# Patient Record
Sex: FEMALE | Race: BLACK OR AFRICAN AMERICAN | NOT HISPANIC OR LATINO | Employment: OTHER | URBAN - NONMETROPOLITAN AREA
[De-identification: names, ages, dates, MRNs, and addresses within clinical notes are randomized per-mention and may not be internally consistent; named-entity substitution may affect disease eponyms.]

---

## 2017-03-29 ENCOUNTER — APPOINTMENT (OUTPATIENT)
Dept: LAB | Facility: HOSPITAL | Age: 62
End: 2017-03-29
Attending: INTERNAL MEDICINE

## 2017-03-29 ENCOUNTER — TRANSCRIBE ORDERS (OUTPATIENT)
Dept: ADMINISTRATIVE | Facility: HOSPITAL | Age: 62
End: 2017-03-29

## 2017-03-29 DIAGNOSIS — Z09 FOLLOW UP: Primary | ICD-10-CM

## 2017-03-29 LAB
ALBUMIN SERPL-MCNC: 4.4 G/DL (ref 3.5–5)
ALBUMIN/GLOB SERPL: 1.4 G/DL (ref 1.1–2.5)
ALP SERPL-CCNC: 80 U/L (ref 24–120)
ALT SERPL W P-5'-P-CCNC: <15 U/L (ref 0–54)
ANION GAP SERPL CALCULATED.3IONS-SCNC: 16 MMOL/L (ref 4–13)
ARTICHOKE IGE QN: 121 MG/DL (ref 0–99)
AST SERPL-CCNC: 24 U/L (ref 7–45)
BILIRUB SERPL-MCNC: 0.8 MG/DL (ref 0.1–1)
BUN BLD-MCNC: 17 MG/DL (ref 5–21)
BUN/CREAT SERPL: 16 (ref 7–25)
CALCIUM SPEC-SCNC: 10 MG/DL (ref 8.4–10.4)
CHLORIDE SERPL-SCNC: 99 MMOL/L (ref 98–110)
CHOLEST SERPL-MCNC: 222 MG/DL (ref 130–200)
CO2 SERPL-SCNC: 30 MMOL/L (ref 24–31)
CREAT BLD-MCNC: 1.06 MG/DL (ref 0.5–1.4)
GFR SERPL CREATININE-BSD FRML MDRD: 64 ML/MIN/1.73
GLOBULIN UR ELPH-MCNC: 3.2 GM/DL
GLUCOSE BLD-MCNC: 94 MG/DL (ref 70–100)
HDLC SERPL-MCNC: 55 MG/DL
LDLC/HDLC SERPL: 2.64 {RATIO}
POTASSIUM BLD-SCNC: 4.1 MMOL/L (ref 3.5–5.3)
PROT SERPL-MCNC: 7.6 G/DL (ref 6.3–8.7)
SODIUM BLD-SCNC: 145 MMOL/L (ref 135–145)
TRIGL SERPL-MCNC: 110 MG/DL (ref 0–149)

## 2017-03-29 PROCEDURE — 36415 COLL VENOUS BLD VENIPUNCTURE: CPT | Performed by: INTERNAL MEDICINE

## 2017-03-29 PROCEDURE — 80053 COMPREHEN METABOLIC PANEL: CPT | Performed by: INTERNAL MEDICINE

## 2017-03-29 PROCEDURE — 80061 LIPID PANEL: CPT | Performed by: INTERNAL MEDICINE

## 2017-04-04 ENCOUNTER — HOSPITAL ENCOUNTER (OUTPATIENT)
Dept: GENERAL RADIOLOGY | Age: 62
Discharge: HOME | End: 2017-04-04
Payer: COMMERCIAL

## 2017-04-04 DIAGNOSIS — M25.561 RIGHT KNEE PAIN, UNSPECIFIED CHRONICITY: ICD-10-CM

## 2017-04-04 PROCEDURE — 73560 X-RAY EXAM OF KNEE 1 OR 2: CPT

## 2017-04-18 ENCOUNTER — TELEPHONE (OUTPATIENT)
Dept: NEUROSURGERY | Facility: CLINIC | Age: 62
End: 2017-04-18

## 2017-04-18 NOTE — TELEPHONE ENCOUNTER
Contacted patient regarding her appointment with our office on 04/19/17. She informs me that she did not return to Pain Management after the first exam, she was not pleased with the doctor. She states that she has recently started exercising regularly, eating better and is feeling good. She doesn't feel that she needs to see us at this time and wants to put a new pain management referral on hold. I have informed her to contact us at any time she wishes to be referred. She is agreeable and will cancel our appointment for tomorrow.

## 2017-06-30 ENCOUNTER — APPOINTMENT (OUTPATIENT)
Dept: LAB | Facility: HOSPITAL | Age: 62
End: 2017-06-30
Attending: INTERNAL MEDICINE

## 2017-06-30 ENCOUNTER — TRANSCRIBE ORDERS (OUTPATIENT)
Dept: ADMINISTRATIVE | Facility: HOSPITAL | Age: 62
End: 2017-06-30

## 2017-06-30 DIAGNOSIS — E78.5 HYPERLIPIDEMIA, UNSPECIFIED HYPERLIPIDEMIA TYPE: Primary | ICD-10-CM

## 2017-06-30 LAB
ALBUMIN SERPL-MCNC: 4.2 G/DL (ref 3.5–5)
ALBUMIN/GLOB SERPL: 1.4 G/DL (ref 1.1–2.5)
ALP SERPL-CCNC: 81 U/L (ref 24–120)
ALT SERPL W P-5'-P-CCNC: 25 U/L (ref 0–54)
ANION GAP SERPL CALCULATED.3IONS-SCNC: 11 MMOL/L (ref 4–13)
ARTICHOKE IGE QN: 111 MG/DL (ref 0–99)
AST SERPL-CCNC: 22 U/L (ref 7–45)
BILIRUB SERPL-MCNC: 0.7 MG/DL (ref 0.1–1)
BUN BLD-MCNC: 18 MG/DL (ref 5–21)
BUN/CREAT SERPL: 15.8 (ref 7–25)
CALCIUM SPEC-SCNC: 9.9 MG/DL (ref 8.4–10.4)
CHLORIDE SERPL-SCNC: 101 MMOL/L (ref 98–110)
CHOLEST SERPL-MCNC: 206 MG/DL (ref 130–200)
CO2 SERPL-SCNC: 30 MMOL/L (ref 24–31)
CREAT BLD-MCNC: 1.14 MG/DL (ref 0.5–1.4)
GFR SERPL CREATININE-BSD FRML MDRD: 59 ML/MIN/1.73
GLOBULIN UR ELPH-MCNC: 3.1 GM/DL
GLUCOSE BLD-MCNC: 89 MG/DL (ref 70–100)
HDLC SERPL-MCNC: 50 MG/DL
LDLC/HDLC SERPL: 2.76 {RATIO}
POTASSIUM BLD-SCNC: 4.3 MMOL/L (ref 3.5–5.3)
PROT SERPL-MCNC: 7.3 G/DL (ref 6.3–8.7)
SODIUM BLD-SCNC: 142 MMOL/L (ref 135–145)
TRIGL SERPL-MCNC: 90 MG/DL (ref 0–149)

## 2017-06-30 PROCEDURE — 80053 COMPREHEN METABOLIC PANEL: CPT | Performed by: INTERNAL MEDICINE

## 2017-06-30 PROCEDURE — 80061 LIPID PANEL: CPT | Performed by: INTERNAL MEDICINE

## 2017-06-30 PROCEDURE — 36415 COLL VENOUS BLD VENIPUNCTURE: CPT | Performed by: INTERNAL MEDICINE

## 2017-07-07 ENCOUNTER — TELEPHONE (OUTPATIENT)
Dept: INTERNAL MEDICINE | Age: 62
End: 2017-07-07

## 2017-07-07 ENCOUNTER — OFFICE VISIT (OUTPATIENT)
Dept: INTERNAL MEDICINE | Age: 62
End: 2017-07-07
Payer: COMMERCIAL

## 2017-07-07 VITALS
BODY MASS INDEX: 64.05 KG/M2 | DIASTOLIC BLOOD PRESSURE: 84 MMHG | SYSTOLIC BLOOD PRESSURE: 138 MMHG | OXYGEN SATURATION: 95 % | WEIGHT: 293 LBS | HEART RATE: 90 BPM

## 2017-07-07 DIAGNOSIS — R20.2 TINGLING: ICD-10-CM

## 2017-07-07 DIAGNOSIS — E78.2 MIXED HYPERLIPIDEMIA: ICD-10-CM

## 2017-07-07 DIAGNOSIS — M10.9 GOUT OF MULTIPLE SITES, UNSPECIFIED CAUSE, UNSPECIFIED CHRONICITY: ICD-10-CM

## 2017-07-07 DIAGNOSIS — F32.A DEPRESSION, UNSPECIFIED DEPRESSION TYPE: ICD-10-CM

## 2017-07-07 DIAGNOSIS — H61.23 BILATERAL IMPACTED CERUMEN: ICD-10-CM

## 2017-07-07 DIAGNOSIS — E66.01 MORBID OBESITY DUE TO EXCESS CALORIES (HCC): ICD-10-CM

## 2017-07-07 DIAGNOSIS — I10 ESSENTIAL HYPERTENSION: Primary | ICD-10-CM

## 2017-07-07 DIAGNOSIS — J01.90 ACUTE NON-RECURRENT SINUSITIS, UNSPECIFIED LOCATION: ICD-10-CM

## 2017-07-07 DIAGNOSIS — M54.50 CHRONIC MIDLINE LOW BACK PAIN WITHOUT SCIATICA: ICD-10-CM

## 2017-07-07 DIAGNOSIS — G89.29 CHRONIC MIDLINE LOW BACK PAIN WITHOUT SCIATICA: ICD-10-CM

## 2017-07-07 DIAGNOSIS — R82.90 BAD ODOR OF URINE: ICD-10-CM

## 2017-07-07 DIAGNOSIS — M79.7 FIBROMYALGIA: ICD-10-CM

## 2017-07-07 DIAGNOSIS — M54.2 NECK PAIN: ICD-10-CM

## 2017-07-07 LAB
ALBUMIN SERPL-MCNC: 4.3 G/DL (ref 3.5–5.2)
ALP BLD-CCNC: 73 U/L (ref 35–104)
ALT SERPL-CCNC: 10 U/L (ref 5–33)
ANION GAP SERPL CALCULATED.3IONS-SCNC: 17 MMOL/L (ref 7–19)
AST SERPL-CCNC: 13 U/L (ref 5–32)
BACTERIA: ABNORMAL /HPF
BASOPHILS ABSOLUTE: 0.1 K/UL (ref 0–0.2)
BASOPHILS RELATIVE PERCENT: 0.6 % (ref 0–1)
BILIRUB SERPL-MCNC: 0.5 MG/DL (ref 0.2–1.2)
BILIRUBIN URINE: NEGATIVE
BLOOD, URINE: NEGATIVE
BUN BLDV-MCNC: 23 MG/DL (ref 8–23)
CALCIUM SERPL-MCNC: 10 MG/DL (ref 8.8–10.2)
CHLORIDE BLD-SCNC: 99 MMOL/L (ref 98–111)
CHOLESTEROL, TOTAL: 220 MG/DL (ref 160–199)
CLARITY: CLEAR
CO2: 26 MMOL/L (ref 22–29)
COLOR: YELLOW
CREAT SERPL-MCNC: 1.1 MG/DL (ref 0.5–0.9)
CREATININE URINE: 99.3 MG/DL (ref 4.2–622)
EOSINOPHILS ABSOLUTE: 0.3 K/UL (ref 0–0.6)
EOSINOPHILS RELATIVE PERCENT: 2.9 % (ref 0–5)
EPITHELIAL CELLS, UA: 4 /HPF (ref 0–5)
GFR NON-AFRICAN AMERICAN: 50
GLUCOSE BLD-MCNC: 71 MG/DL (ref 74–109)
GLUCOSE URINE: NEGATIVE MG/DL
HBA1C MFR BLD: 5.6 %
HCT VFR BLD CALC: 37.1 % (ref 37–47)
HDLC SERPL-MCNC: 60 MG/DL (ref 65–121)
HEMOGLOBIN: 11.5 G/DL (ref 12–16)
HYALINE CASTS: 1 /HPF (ref 0–8)
KETONES, URINE: NEGATIVE MG/DL
LDL CHOLESTEROL CALCULATED: 139 MG/DL
LEUKOCYTE ESTERASE, URINE: ABNORMAL
LYMPHOCYTES ABSOLUTE: 2.7 K/UL (ref 1.1–4.5)
LYMPHOCYTES RELATIVE PERCENT: 31.2 % (ref 20–40)
MCH RBC QN AUTO: 29.2 PG (ref 27–31)
MCHC RBC AUTO-ENTMCNC: 31 G/DL (ref 33–37)
MCV RBC AUTO: 94.2 FL (ref 81–99)
MICROALBUMIN UR-MCNC: 1.3 MG/DL (ref 0–19)
MICROALBUMIN/CREAT UR-RTO: 13.1 MG/G
MONOCYTES ABSOLUTE: 0.4 K/UL (ref 0–0.9)
MONOCYTES RELATIVE PERCENT: 4.5 % (ref 0–10)
NEUTROPHILS ABSOLUTE: 5.3 K/UL (ref 1.5–7.5)
NEUTROPHILS RELATIVE PERCENT: 60.6 % (ref 50–65)
NITRITE, URINE: NEGATIVE
PDW BLD-RTO: 16.3 % (ref 11.5–14.5)
PH UA: 7
PLATELET # BLD: 287 K/UL (ref 130–400)
PMV BLD AUTO: 9.9 FL (ref 9.4–12.3)
POTASSIUM SERPL-SCNC: 4.5 MMOL/L (ref 3.5–5)
PROTEIN UA: NEGATIVE MG/DL
RBC # BLD: 3.94 M/UL (ref 4.2–5.4)
RBC UA: 1 /HPF (ref 0–4)
SODIUM BLD-SCNC: 142 MMOL/L (ref 136–145)
SPECIFIC GRAVITY UA: 1.02
TOTAL PROTEIN: 7.8 G/DL (ref 6.6–8.7)
TRIGL SERPL-MCNC: 104 MG/DL (ref 150–199)
TSH SERPL DL<=0.05 MIU/L-ACNC: 3.33 UIU/ML (ref 0.27–4.2)
UROBILINOGEN, URINE: 0.2 E.U./DL
VITAMIN B-12: 439 PG/ML (ref 211–946)
WBC # BLD: 8.7 K/UL (ref 4.8–10.8)
WBC UA: 8 /HPF (ref 0–5)

## 2017-07-07 PROCEDURE — 99214 OFFICE O/P EST MOD 30 MIN: CPT | Performed by: INTERNAL MEDICINE

## 2017-07-07 RX ORDER — ALLOPURINOL 300 MG/1
300 TABLET ORAL DAILY
Qty: 30 TABLET | Refills: 5 | Status: SHIPPED | OUTPATIENT
Start: 2017-07-07 | End: 2018-02-24 | Stop reason: SDUPTHER

## 2017-07-07 RX ORDER — CEPHALEXIN 500 MG/1
500 CAPSULE ORAL 3 TIMES DAILY
Qty: 30 CAPSULE | Refills: 0 | Status: SHIPPED | OUTPATIENT
Start: 2017-07-07 | End: 2018-03-27

## 2017-07-07 RX ORDER — AZELASTINE 1 MG/ML
2 SPRAY, METERED NASAL 2 TIMES DAILY
Qty: 1 BOTTLE | Refills: 3 | Status: SHIPPED | OUTPATIENT
Start: 2017-07-07 | End: 2018-03-27

## 2017-07-07 RX ORDER — CIPROFLOXACIN 500 MG/1
500 TABLET, FILM COATED ORAL 2 TIMES DAILY
Qty: 14 TABLET | Refills: 0 | Status: SHIPPED | OUTPATIENT
Start: 2017-07-07 | End: 2017-08-07 | Stop reason: ALTCHOICE

## 2017-07-07 RX ORDER — AZELASTINE 1 MG/ML
2 SPRAY, METERED NASAL 2 TIMES DAILY
Qty: 1 BOTTLE | Refills: 3 | Status: SHIPPED | OUTPATIENT
Start: 2017-07-07 | End: 2019-02-05 | Stop reason: SDUPTHER

## 2017-07-07 RX ORDER — ACETAMINOPHEN 160 MG
1 TABLET,DISINTEGRATING ORAL DAILY
COMMUNITY
Start: 2017-06-05 | End: 2017-07-07 | Stop reason: SDUPTHER

## 2017-07-07 RX ORDER — GABAPENTIN 300 MG/1
600 CAPSULE ORAL DAILY
Qty: 120 CAPSULE | Refills: 1 | Status: SHIPPED | OUTPATIENT
Start: 2017-07-07 | End: 2017-08-07 | Stop reason: DRUGHIGH

## 2017-07-07 RX ORDER — ACETAMINOPHEN 160 MG
1 TABLET,DISINTEGRATING ORAL DAILY
Qty: 30 CAPSULE | Refills: 5 | Status: SHIPPED | OUTPATIENT
Start: 2017-07-07 | End: 2018-05-02 | Stop reason: SDUPTHER

## 2017-07-07 RX ORDER — ETODOLAC 200 MG/1
200 CAPSULE ORAL 2 TIMES DAILY PRN
Qty: 90 CAPSULE | Refills: 3 | Status: SHIPPED | OUTPATIENT
Start: 2017-07-07 | End: 2018-03-27

## 2017-07-07 ASSESSMENT — PATIENT HEALTH QUESTIONNAIRE - PHQ9
SUM OF ALL RESPONSES TO PHQ QUESTIONS 1-9: 0
SUM OF ALL RESPONSES TO PHQ9 QUESTIONS 1 & 2: 0
1. LITTLE INTEREST OR PLEASURE IN DOING THINGS: 0
2. FEELING DOWN, DEPRESSED OR HOPELESS: 0

## 2017-07-08 PROBLEM — G89.29 CHRONIC MIDLINE LOW BACK PAIN WITHOUT SCIATICA: Status: ACTIVE | Noted: 2017-07-08

## 2017-07-08 PROBLEM — E66.01 MORBID OBESITY DUE TO EXCESS CALORIES (HCC): Status: ACTIVE | Noted: 2017-07-08

## 2017-07-08 PROBLEM — E78.2 MIXED HYPERLIPIDEMIA: Status: ACTIVE | Noted: 2017-07-08

## 2017-07-08 PROBLEM — I10 ESSENTIAL HYPERTENSION: Status: ACTIVE | Noted: 2017-07-08

## 2017-07-08 PROBLEM — M54.50 CHRONIC MIDLINE LOW BACK PAIN WITHOUT SCIATICA: Status: ACTIVE | Noted: 2017-07-08

## 2017-07-08 PROBLEM — M79.7 FIBROMYALGIA: Status: ACTIVE | Noted: 2017-07-08

## 2017-07-08 PROBLEM — I50.9 CHRONIC CONGESTIVE HEART FAILURE (HCC): Status: ACTIVE | Noted: 2017-07-08

## 2017-07-08 PROBLEM — F32.A DEPRESSION: Status: ACTIVE | Noted: 2017-07-08

## 2017-07-08 PROBLEM — M10.9 GOUT OF MULTIPLE SITES: Status: ACTIVE | Noted: 2017-07-08

## 2017-07-08 ASSESSMENT — ENCOUNTER SYMPTOMS
EYE DISCHARGE: 0
WHEEZING: 0
SINUS PRESSURE: 1
ABDOMINAL PAIN: 0
DIARRHEA: 0
SORE THROAT: 0
VOMITING: 0
BACK PAIN: 1
COUGH: 0
EYE PAIN: 0
CONSTIPATION: 0
PHOTOPHOBIA: 0
BLOOD IN STOOL: 0
EYE ITCHING: 0
SHORTNESS OF BREATH: 0
NAUSEA: 0

## 2017-07-09 LAB
LYME, EIA: 0.14 LIV (ref 0–1.2)
ORGANISM: ABNORMAL
URINE CULTURE, ROUTINE: ABNORMAL
URINE CULTURE, ROUTINE: ABNORMAL

## 2017-07-12 ENCOUNTER — TELEPHONE (OUTPATIENT)
Dept: INTERNAL MEDICINE | Age: 62
End: 2017-07-12

## 2017-07-12 LAB
VITAMIN D2 AND D3, TOTAL: 42.2 NG/ML (ref 30–80)
VITAMIN D2, 25 HYDROXY: 2.3 NG/ML
VITAMIN D3,25 HYDROXY: 39.9 NG/ML

## 2017-08-07 ENCOUNTER — OFFICE VISIT (OUTPATIENT)
Dept: INTERNAL MEDICINE | Age: 62
End: 2017-08-07
Payer: COMMERCIAL

## 2017-08-07 VITALS
WEIGHT: 293 LBS | SYSTOLIC BLOOD PRESSURE: 134 MMHG | HEIGHT: 61 IN | OXYGEN SATURATION: 96 % | BODY MASS INDEX: 55.32 KG/M2 | HEART RATE: 91 BPM | DIASTOLIC BLOOD PRESSURE: 84 MMHG

## 2017-08-07 DIAGNOSIS — Z12.31 SCREENING MAMMOGRAM, ENCOUNTER FOR: ICD-10-CM

## 2017-08-07 DIAGNOSIS — J01.90 ACUTE SINUSITIS, RECURRENCE NOT SPECIFIED, UNSPECIFIED LOCATION: ICD-10-CM

## 2017-08-07 DIAGNOSIS — Z78.0 MENOPAUSE: ICD-10-CM

## 2017-08-07 DIAGNOSIS — Z00.00 ROUTINE ADULT HEALTH MAINTENANCE: Primary | ICD-10-CM

## 2017-08-07 DIAGNOSIS — Z00.00 ROUTINE GENERAL MEDICAL EXAMINATION AT A HEALTH CARE FACILITY: ICD-10-CM

## 2017-08-07 DIAGNOSIS — Z12.11 SCREENING FOR COLON CANCER: ICD-10-CM

## 2017-08-07 DIAGNOSIS — I10 ESSENTIAL HYPERTENSION: ICD-10-CM

## 2017-08-07 DIAGNOSIS — R82.90 ABNORMAL URINE ODOR: ICD-10-CM

## 2017-08-07 DIAGNOSIS — M54.9 BACK PAIN, UNSPECIFIED BACK LOCATION, UNSPECIFIED BACK PAIN LATERALITY, UNSPECIFIED CHRONICITY: ICD-10-CM

## 2017-08-07 DIAGNOSIS — T78.3XXS ANGIOEDEMA, SEQUELA: ICD-10-CM

## 2017-08-07 DIAGNOSIS — F32.A DEPRESSION, UNSPECIFIED DEPRESSION TYPE: ICD-10-CM

## 2017-08-07 DIAGNOSIS — Z91.09 ENVIRONMENTAL ALLERGIES: ICD-10-CM

## 2017-08-07 PROCEDURE — G0438 PPPS, INITIAL VISIT: HCPCS | Performed by: INTERNAL MEDICINE

## 2017-08-07 PROCEDURE — 99214 OFFICE O/P EST MOD 30 MIN: CPT | Performed by: INTERNAL MEDICINE

## 2017-08-07 RX ORDER — CEFDINIR 300 MG/1
300 CAPSULE ORAL 2 TIMES DAILY
Qty: 20 CAPSULE | Refills: 0 | Status: SHIPPED | OUTPATIENT
Start: 2017-08-07 | End: 2018-03-27

## 2017-08-07 RX ORDER — GABAPENTIN 300 MG/1
600 CAPSULE ORAL 2 TIMES DAILY
Qty: 120 CAPSULE | Refills: 1 | Status: SHIPPED | OUTPATIENT
Start: 2017-08-07 | End: 2017-09-25 | Stop reason: DRUGHIGH

## 2017-08-07 RX ORDER — LISINOPRIL 20 MG/1
TABLET ORAL
Qty: 30 TABLET | Refills: 12 | Status: SHIPPED | OUTPATIENT
Start: 2017-08-07 | End: 2017-08-07

## 2017-08-07 RX ORDER — AMLODIPINE BESYLATE 10 MG/1
10 TABLET ORAL DAILY
Qty: 30 TABLET | Refills: 3 | Status: SHIPPED | OUTPATIENT
Start: 2017-08-07 | End: 2018-01-04 | Stop reason: SDUPTHER

## 2017-08-07 RX ORDER — EPINEPHRINE 0.3 MG/.3ML
0.3 INJECTION SUBCUTANEOUS ONCE
Qty: 0.3 ML | Refills: 1 | Status: SHIPPED | OUTPATIENT
Start: 2017-08-07 | End: 2022-07-18 | Stop reason: SDUPTHER

## 2017-08-07 ASSESSMENT — PATIENT HEALTH QUESTIONNAIRE - PHQ9
2. FEELING DOWN, DEPRESSED OR HOPELESS: 0
1. LITTLE INTEREST OR PLEASURE IN DOING THINGS: 0
SUM OF ALL RESPONSES TO PHQ QUESTIONS 1-9: 0
SUM OF ALL RESPONSES TO PHQ9 QUESTIONS 1 & 2: 0

## 2017-08-12 ASSESSMENT — ENCOUNTER SYMPTOMS
BLOOD IN STOOL: 0
CONSTIPATION: 0
CHEST TIGHTNESS: 0
NAUSEA: 0
COLOR CHANGE: 0
TROUBLE SWALLOWING: 0
EYE PAIN: 0
FACIAL SWELLING: 1
VOMITING: 1
BACK PAIN: 1
COUGH: 1
PHOTOPHOBIA: 0
ABDOMINAL PAIN: 0
VOICE CHANGE: 0
SINUS PRESSURE: 1
RECTAL PAIN: 0
EYE ITCHING: 0
DIARRHEA: 0
RHINORRHEA: 1
SORE THROAT: 0
EYE DISCHARGE: 0
EYE REDNESS: 0

## 2017-08-24 PROCEDURE — G0123 SCREEN CERV/VAG THIN LAYER: HCPCS | Performed by: OBSTETRICS & GYNECOLOGY

## 2017-08-25 ENCOUNTER — LAB REQUISITION (OUTPATIENT)
Dept: LAB | Facility: HOSPITAL | Age: 62
End: 2017-08-25

## 2017-08-25 DIAGNOSIS — Z12.72 ENCOUNTER FOR SCREENING FOR MALIGNANT NEOPLASM OF VAGINA: ICD-10-CM

## 2017-08-25 LAB
GEN CATEG CVX/VAG CYTO-IMP: NORMAL
LAB AP CASE REPORT: NORMAL
LAB AP GYN ADDITIONAL INFORMATION: NORMAL
LAB AP GYN OTHER FINDINGS: NORMAL
Lab: NORMAL
PATH INTERP SPEC-IMP: NORMAL
STAT OF ADQ CVX/VAG CYTO-IMP: NORMAL

## 2017-09-05 RX ORDER — SPIRONOLACTONE 25 MG/1
TABLET ORAL
Qty: 30 TABLET | Refills: 11 | Status: SHIPPED | OUTPATIENT
Start: 2017-09-05 | End: 2018-09-06 | Stop reason: SDUPTHER

## 2017-09-05 RX ORDER — LOVASTATIN 20 MG/1
TABLET ORAL
Qty: 15 TABLET | Refills: 5 | Status: SHIPPED | OUTPATIENT
Start: 2017-09-05 | End: 2018-02-23 | Stop reason: SDUPTHER

## 2017-09-25 ENCOUNTER — OFFICE VISIT (OUTPATIENT)
Dept: INTERNAL MEDICINE | Age: 62
End: 2017-09-25
Payer: COMMERCIAL

## 2017-09-25 ENCOUNTER — TELEPHONE (OUTPATIENT)
Dept: INTERNAL MEDICINE | Age: 62
End: 2017-09-25

## 2017-09-25 VITALS
HEART RATE: 86 BPM | HEIGHT: 62 IN | SYSTOLIC BLOOD PRESSURE: 134 MMHG | DIASTOLIC BLOOD PRESSURE: 72 MMHG | WEIGHT: 293 LBS | BODY MASS INDEX: 53.92 KG/M2 | OXYGEN SATURATION: 95 %

## 2017-09-25 DIAGNOSIS — I10 ESSENTIAL HYPERTENSION: Primary | ICD-10-CM

## 2017-09-25 DIAGNOSIS — G62.9 NEUROPATHY: ICD-10-CM

## 2017-09-25 PROCEDURE — 99213 OFFICE O/P EST LOW 20 MIN: CPT | Performed by: INTERNAL MEDICINE

## 2017-09-25 RX ORDER — GABAPENTIN 300 MG/1
600 CAPSULE ORAL 2 TIMES DAILY
Qty: 120 CAPSULE | Refills: 1 | Status: SHIPPED | OUTPATIENT
Start: 2017-09-25 | End: 2017-09-25 | Stop reason: SDUPTHER

## 2017-09-25 RX ORDER — GABAPENTIN 300 MG/1
600 CAPSULE ORAL 2 TIMES DAILY
Qty: 120 CAPSULE | Refills: 1 | Status: SHIPPED | OUTPATIENT
Start: 2017-09-25 | End: 2018-01-04 | Stop reason: SDUPTHER

## 2017-09-25 ASSESSMENT — ENCOUNTER SYMPTOMS
BACK PAIN: 0
COUGH: 0
COLOR CHANGE: 0
EYE ITCHING: 0
CONSTIPATION: 0
TROUBLE SWALLOWING: 0
SHORTNESS OF BREATH: 0
SINUS PRESSURE: 0
EYE REDNESS: 0
EYE PAIN: 0
DIARRHEA: 0
EYE DISCHARGE: 0
PHOTOPHOBIA: 0
BLOOD IN STOOL: 0
VOICE CHANGE: 0
WHEEZING: 0
NAUSEA: 0
SORE THROAT: 0
CHEST TIGHTNESS: 0
ABDOMINAL DISTENTION: 0
ABDOMINAL PAIN: 0

## 2017-09-25 ASSESSMENT — PATIENT HEALTH QUESTIONNAIRE - PHQ9
1. LITTLE INTEREST OR PLEASURE IN DOING THINGS: 0
SUM OF ALL RESPONSES TO PHQ9 QUESTIONS 1 & 2: 0
SUM OF ALL RESPONSES TO PHQ QUESTIONS 1-9: 0
2. FEELING DOWN, DEPRESSED OR HOPELESS: 0

## 2017-10-02 RX ORDER — CARVEDILOL 25 MG/1
TABLET ORAL
Qty: 60 TABLET | Refills: 3 | Status: SHIPPED | OUTPATIENT
Start: 2017-10-02

## 2017-11-21 DIAGNOSIS — Z78.0 MENOPAUSE: ICD-10-CM

## 2017-11-21 DIAGNOSIS — Z12.31 SCREENING MAMMOGRAM, ENCOUNTER FOR: ICD-10-CM

## 2018-01-04 RX ORDER — GABAPENTIN 300 MG/1
CAPSULE ORAL
Qty: 120 CAPSULE | Refills: 1 | Status: SHIPPED | OUTPATIENT
Start: 2018-01-04 | End: 2018-03-27

## 2018-01-04 RX ORDER — AMLODIPINE BESYLATE 10 MG/1
TABLET ORAL
Qty: 90 TABLET | Refills: 3 | Status: SHIPPED | OUTPATIENT
Start: 2018-01-04 | End: 2019-02-01 | Stop reason: SDUPTHER

## 2018-01-04 RX ORDER — GABAPENTIN 300 MG/1
600 CAPSULE ORAL 2 TIMES DAILY
Qty: 120 CAPSULE | Refills: 1 | Status: SHIPPED | OUTPATIENT
Start: 2018-01-04 | End: 2018-01-04 | Stop reason: SDUPTHER

## 2018-01-19 RX ORDER — DULOXETIN HYDROCHLORIDE 60 MG/1
CAPSULE, DELAYED RELEASE ORAL
Qty: 90 CAPSULE | Refills: 3 | Status: SHIPPED | OUTPATIENT
Start: 2018-01-19 | End: 2019-02-01 | Stop reason: SDUPTHER

## 2018-01-26 RX ORDER — AMITRIPTYLINE HYDROCHLORIDE 25 MG/1
25 TABLET, FILM COATED ORAL NIGHTLY
Qty: 30 TABLET | Refills: 3 | Status: SHIPPED | OUTPATIENT
Start: 2018-01-26 | End: 2018-06-01 | Stop reason: SDUPTHER

## 2018-01-26 RX ORDER — FENOFIBRATE 145 MG/1
145 TABLET, COATED ORAL DAILY
Qty: 30 TABLET | Refills: 3 | Status: SHIPPED | OUTPATIENT
Start: 2018-01-26 | End: 2018-06-01 | Stop reason: SDUPTHER

## 2018-02-23 NOTE — TELEPHONE ENCOUNTER
Requested Prescriptions     Pending Prescriptions Disp Refills    lovastatin (MEVACOR) 20 MG tablet [Pharmacy Med Name: LOVASTATIN 20 MG TABLET 20 TAB] 15 tablet 5     Sig: TAKE 1/2 TABLET BY MOUTH DAILY

## 2018-02-24 RX ORDER — LOVASTATIN 20 MG/1
TABLET ORAL
Qty: 15 TABLET | Refills: 5 | Status: SHIPPED | OUTPATIENT
Start: 2018-02-24 | End: 2018-03-27

## 2018-02-26 RX ORDER — ALLOPURINOL 300 MG/1
300 TABLET ORAL DAILY
Qty: 30 TABLET | Refills: 5 | Status: SHIPPED | OUTPATIENT
Start: 2018-02-26 | End: 2018-03-30 | Stop reason: SDUPTHER

## 2018-02-26 NOTE — TELEPHONE ENCOUNTER
Lula Montez called requesting a refill of the below medication which has been pended for you:     Requested Prescriptions     Pending Prescriptions Disp Refills    allopurinol (ZYLOPRIM) 300 MG tablet [Pharmacy Med Name: ALLOPURINOL 300 MG TABLET 300 TAB] 30 tablet 5     Sig: TAKE 1 TABLET BY MOUTH DAILY       Last Appointment Date: 9/25/2017  Next Appointment Date: 3/26/2018    Allergies   Allergen Reactions    Codeine     Lisinopril

## 2018-03-09 RX ORDER — CARVEDILOL 25 MG/1
25 TABLET ORAL 2 TIMES DAILY
Qty: 60 TABLET | Refills: 3 | Status: SHIPPED | OUTPATIENT
Start: 2018-03-09 | End: 2018-07-20 | Stop reason: SDUPTHER

## 2018-03-21 DIAGNOSIS — I10 ESSENTIAL HYPERTENSION: ICD-10-CM

## 2018-03-21 LAB
ALBUMIN SERPL-MCNC: 4.4 G/DL (ref 3.5–5.2)
ALP BLD-CCNC: 55 U/L (ref 35–104)
ALT SERPL-CCNC: 10 U/L (ref 5–33)
ANION GAP SERPL CALCULATED.3IONS-SCNC: 15 MMOL/L (ref 7–19)
AST SERPL-CCNC: 15 U/L (ref 5–32)
BASOPHILS ABSOLUTE: 0.1 K/UL (ref 0–0.2)
BASOPHILS RELATIVE PERCENT: 0.8 % (ref 0–1)
BILIRUB SERPL-MCNC: 0.4 MG/DL (ref 0.2–1.2)
BUN BLDV-MCNC: 21 MG/DL (ref 8–23)
CALCIUM SERPL-MCNC: 9.9 MG/DL (ref 8.8–10.2)
CHLORIDE BLD-SCNC: 99 MMOL/L (ref 98–111)
CHOLESTEROL, TOTAL: 199 MG/DL (ref 160–199)
CO2: 26 MMOL/L (ref 22–29)
CREAT SERPL-MCNC: 1.3 MG/DL (ref 0.5–0.9)
EOSINOPHILS ABSOLUTE: 0.2 K/UL (ref 0–0.6)
EOSINOPHILS RELATIVE PERCENT: 2.4 % (ref 0–5)
GFR NON-AFRICAN AMERICAN: 41
GLUCOSE BLD-MCNC: 92 MG/DL (ref 74–109)
HCT VFR BLD CALC: 36.4 % (ref 37–47)
HDLC SERPL-MCNC: 54 MG/DL (ref 65–121)
HEMOGLOBIN: 10.9 G/DL (ref 12–16)
LDL CHOLESTEROL CALCULATED: 131 MG/DL
LYMPHOCYTES ABSOLUTE: 2.9 K/UL (ref 1.1–4.5)
LYMPHOCYTES RELATIVE PERCENT: 36.5 % (ref 20–40)
MCH RBC QN AUTO: 27.9 PG (ref 27–31)
MCHC RBC AUTO-ENTMCNC: 29.9 G/DL (ref 33–37)
MCV RBC AUTO: 93.1 FL (ref 81–99)
MONOCYTES ABSOLUTE: 0.5 K/UL (ref 0–0.9)
MONOCYTES RELATIVE PERCENT: 6.3 % (ref 0–10)
NEUTROPHILS ABSOLUTE: 4.2 K/UL (ref 1.5–7.5)
NEUTROPHILS RELATIVE PERCENT: 53.7 % (ref 50–65)
PDW BLD-RTO: 17.2 % (ref 11.5–14.5)
PLATELET # BLD: 334 K/UL (ref 130–400)
PMV BLD AUTO: 9.5 FL (ref 9.4–12.3)
POTASSIUM SERPL-SCNC: 4.1 MMOL/L (ref 3.5–5)
RBC # BLD: 3.91 M/UL (ref 4.2–5.4)
SODIUM BLD-SCNC: 140 MMOL/L (ref 136–145)
TOTAL PROTEIN: 7.6 G/DL (ref 6.6–8.7)
TRIGL SERPL-MCNC: 70 MG/DL (ref 0–149)
TSH SERPL DL<=0.05 MIU/L-ACNC: 3.17 UIU/ML (ref 0.27–4.2)
WBC # BLD: 7.8 K/UL (ref 4.8–10.8)

## 2018-03-27 ENCOUNTER — OFFICE VISIT (OUTPATIENT)
Dept: INTERNAL MEDICINE | Age: 63
End: 2018-03-27
Payer: COMMERCIAL

## 2018-03-27 VITALS
OXYGEN SATURATION: 97 % | WEIGHT: 293 LBS | DIASTOLIC BLOOD PRESSURE: 78 MMHG | HEIGHT: 61 IN | SYSTOLIC BLOOD PRESSURE: 134 MMHG | BODY MASS INDEX: 55.32 KG/M2 | HEART RATE: 92 BPM

## 2018-03-27 DIAGNOSIS — M54.5 LOW BACK PAIN, UNSPECIFIED BACK PAIN LATERALITY, UNSPECIFIED CHRONICITY, WITH SCIATICA PRESENCE UNSPECIFIED: ICD-10-CM

## 2018-03-27 DIAGNOSIS — I10 ESSENTIAL HYPERTENSION: Primary | ICD-10-CM

## 2018-03-27 DIAGNOSIS — F32.A DEPRESSION, UNSPECIFIED DEPRESSION TYPE: ICD-10-CM

## 2018-03-27 PROCEDURE — 99214 OFFICE O/P EST MOD 30 MIN: CPT | Performed by: INTERNAL MEDICINE

## 2018-03-28 ASSESSMENT — ENCOUNTER SYMPTOMS
SORE THROAT: 0
NAUSEA: 0
CHEST TIGHTNESS: 0
COUGH: 0
PHOTOPHOBIA: 0
SHORTNESS OF BREATH: 0
BLOOD IN STOOL: 0
WHEEZING: 0
EYE ITCHING: 0
EYE PAIN: 0
EYE REDNESS: 0
ABDOMINAL PAIN: 0
DIARRHEA: 0
VOICE CHANGE: 0
SINUS PRESSURE: 0
COLOR CHANGE: 0
ABDOMINAL DISTENTION: 0
CONSTIPATION: 0
TROUBLE SWALLOWING: 0
EYE DISCHARGE: 0
BACK PAIN: 1

## 2018-03-28 NOTE — PROGRESS NOTES
Mother     Stroke Father     Hypertension Father     Hypertension Other     Heart Disease Other     Cancer Other         Current Outpatient Prescriptions   Medication Sig Dispense Refill    carvedilol (COREG) 25 MG tablet Take 1 tablet by mouth 2 times daily 60 tablet 3    allopurinol (ZYLOPRIM) 300 MG tablet TAKE 1 TABLET BY MOUTH DAILY 30 tablet 5    fenofibrate (TRICOR) 145 MG tablet Take 1 tablet by mouth daily 30 tablet 3    amitriptyline (ELAVIL) 25 MG tablet Take 1 tablet by mouth nightly 30 tablet 3    DULoxetine (CYMBALTA) 60 MG extended release capsule TAKE 1 CAPSULE BY MOUTH ONCE DAILY IN THE MORNING 90 capsule 3    amLODIPine (NORVASC) 10 MG tablet TAKE ONE TABLET EVERY DAY 90 tablet 3    spironolactone (ALDACTONE) 25 MG tablet TAKE ONE TABLET BY MOUTH ONCE DAILY 30 tablet 11    Cholecalciferol (VITAMIN D3) 2000 units CAPS Take 1 capsule by mouth daily 30 capsule 5    azelastine (ASTELIN) 0.1 % nasal spray 2 sprays by Nasal route 2 times daily Use in each nostril as directed 1 Bottle 3    DULoxetine (CYMBALTA) 30 MG extended release capsule 30 mg daily       furosemide (LASIX) 40 MG tablet Take 40 mg by mouth      meclizine (ANTIVERT) 25 MG tablet Take 25 mg by mouth      EPINEPHrine (EPIPEN 2-SHAUN) 0.3 MG/0.3ML SOAJ injection Inject 0.3 mLs into the muscle once for 1 dose Use as directed for allergic reaction 0.3 mL 1     No current facility-administered medications for this visit. Patient Active Problem List   Diagnosis    Chronic congestive heart failure ([address]    Depression    Fibromyalgia    Gout of multiple sites    Mixed hyperlipidemia    Morbid obesity due to excess calories ([address]    Essential hypertension    Chronic midline low back pain without sciatica        Review of Systems   Constitutional: Negative for activity change, appetite change, chills, diaphoresis, fatigue, fever and unexpected weight change.    HENT: Negative for congestion, ear discharge, postnasal Panel; Future  -     TSH without Reflex;  Future          Return in about 6 months (around 9/27/2018) for physical.     Orders Placed This Encounter   Procedures    CBC Auto Differential     Fast 12 hours     Standing Status:   Future     Standing Expiration Date:   3/27/2019    Comprehensive Metabolic Panel     Fasting 12 hours     Standing Status:   Future     Standing Expiration Date:   3/27/2019    Lipid Panel     Standing Status:   Future     Standing Expiration Date:   3/27/2019     Order Specific Question:   Is Patient Fasting?/# of Hours     Answer:   12    TSH without Reflex     Fast 12 hours     Standing Status:   Future     Standing Expiration Date:   3/27/2019       Hollie Handy MD

## 2018-03-30 RX ORDER — ALLOPURINOL 300 MG/1
300 TABLET ORAL DAILY
Qty: 30 TABLET | Refills: 5 | Status: SHIPPED | OUTPATIENT
Start: 2018-03-30 | End: 2019-04-04 | Stop reason: SDUPTHER

## 2018-04-25 ENCOUNTER — OFFICE VISIT (OUTPATIENT)
Dept: CARDIOLOGY | Facility: CLINIC | Age: 63
End: 2018-04-25

## 2018-04-25 VITALS
DIASTOLIC BLOOD PRESSURE: 80 MMHG | BODY MASS INDEX: 55.32 KG/M2 | WEIGHT: 293 LBS | HEART RATE: 86 BPM | SYSTOLIC BLOOD PRESSURE: 118 MMHG | HEIGHT: 61 IN

## 2018-04-25 DIAGNOSIS — I10 ESSENTIAL HYPERTENSION: ICD-10-CM

## 2018-04-25 DIAGNOSIS — I50.20 SYSTOLIC CONGESTIVE HEART FAILURE, UNSPECIFIED CONGESTIVE HEART FAILURE CHRONICITY: ICD-10-CM

## 2018-04-25 DIAGNOSIS — E66.01 MORBID OBESITY DUE TO EXCESS CALORIES (HCC): Primary | ICD-10-CM

## 2018-04-25 PROCEDURE — 99213 OFFICE O/P EST LOW 20 MIN: CPT | Performed by: INTERNAL MEDICINE

## 2018-04-25 PROCEDURE — 93000 ELECTROCARDIOGRAM COMPLETE: CPT | Performed by: INTERNAL MEDICINE

## 2018-04-25 RX ORDER — AMITRIPTYLINE HYDROCHLORIDE 25 MG/1
25 TABLET, FILM COATED ORAL NIGHTLY
COMMUNITY

## 2018-04-25 RX ORDER — AZELASTINE 1 MG/ML
2 SPRAY, METERED NASAL 2 TIMES DAILY
COMMUNITY
End: 2022-05-18 | Stop reason: SDUPTHER

## 2018-04-25 RX ORDER — FENOFIBRATE 145 MG/1
145 TABLET, COATED ORAL DAILY
COMMUNITY

## 2018-04-25 RX ORDER — AMLODIPINE BESYLATE 10 MG/1
10 TABLET ORAL DAILY
COMMUNITY
End: 2021-06-07 | Stop reason: HOSPADM

## 2018-04-25 NOTE — PROGRESS NOTES
Subjective    Pau Cabrera is a 62 y.o. female. Fu of chf and risks    History of Present Illness     HFmeEF:  Has had stable stamina and no unusual sob since LOV. Has no edema and is compliant with meds that are well tolerated. EKG is nsc x less lat t-inv  Now.    HTN:  Does not check at home but gets good clinic checks on current meds.     MORBID OBESE:  Has not had any success with wt loss and is wanting to consider bariatric options. This is causing a lot of back paint and difficulty with ambulation.    The following portions of the patient's history were reviewed and updated as appropriate: allergies, current medications, past family history, past medical history, past social history, past surgical history and problem list.    Patient Active Problem List   Diagnosis   • CHF (congestive heart failure)   • HTN (hypertension)   • Degeneration of lumbar intervertebral disc   • Lumbosacral neuritis   • Spondylolisthesis of lumbar region   • Morbid obesity due to excess calories       Allergies   Allergen Reactions   • Codeine Sulfate Hives   • Lisinopril Rash       Family History   Problem Relation Age of Onset   • Arthritis Mother    • Heart disease Mother    • Hypertension Mother    • Arthritis Father    • Hypertension Father    • Asthma Brother    • Cancer Brother    • Hypertension Brother        Social History     Social History   • Marital status:      Spouse name: N/A   • Number of children: N/A   • Years of education: N/A     Occupational History   • Not on file.     Social History Main Topics   • Smoking status: Never Smoker   • Smokeless tobacco: Never Used   • Alcohol use No   • Drug use: No   • Sexual activity: Defer     Other Topics Concern   • Not on file     Social History Narrative   • No narrative on file         Current Outpatient Prescriptions:   •  allopurinol (ZYLOPRIM) 300 MG tablet, , Disp: , Rfl:   •  amitriptyline (ELAVIL) 25 MG tablet, Take 25 mg by mouth Every Night., Disp: ,  "Rfl:   •  amLODIPine (NORVASC) 10 MG tablet, Take 10 mg by mouth Daily., Disp: , Rfl:   •  azelastine (ASTELIN) 0.1 % nasal spray, 2 sprays into each nostril 2 (Two) Times a Day. Use in each nostril as directed, Disp: , Rfl:   •  carvedilol (COREG) 25 MG tablet, TAKE 1 TABLET BY MOUTH TWICE A DAY, Disp: 60 tablet, Rfl: 3  •  cholecalciferol (VITAMIN D3) 1000 UNITS tablet, Take 1,000 Units by mouth daily., Disp: , Rfl:   •  DULoxetine (CYMBALTA) 30 MG capsule, 60 mg., Disp: , Rfl:   •  fenofibrate (TRICOR) 145 MG tablet, Take 145 mg by mouth Daily., Disp: , Rfl:   •  furosemide (LASIX) 40 MG tablet, Take 40 mg by mouth daily. TAKE 1 & 1/2 TABS DAILY, Disp: , Rfl:   •  meclizine (ANTIVERT) 25 MG tablet, Take 25 mg by mouth 3 (three) times a day as needed for dizziness., Disp: , Rfl:   •  spironolactone (ALDACTONE) 25 MG tablet, Take 25 mg by mouth daily., Disp: , Rfl:     Past Surgical History:   Procedure Laterality Date   •  SECTION      X 2   • HYSTERECTOMY         Review of Systems   Constitutional: Negative for fatigue, fever and unexpected weight change.   Respiratory: Negative for apnea, chest tightness and shortness of breath.    Cardiovascular: Negative for chest pain, palpitations and leg swelling.   Gastrointestinal: Negative for abdominal pain and blood in stool.   Genitourinary: Negative for dysuria and hematuria.   Musculoskeletal: Positive for arthralgias and back pain. Negative for myalgias.   Neurological: Positive for numbness. Negative for weakness and light-headedness.   Psychiatric/Behavioral: Negative for sleep disturbance.       /80   Pulse 86   Ht 154.9 cm (61\")   Wt (!) 161 kg (354 lb)   BMI 66.89 kg/m²   Procedures    Objective   Physical Exam   Constitutional: She is oriented to person, place, and time. She appears distressed.   morbidly obese   HENT:   Head: Normocephalic.   Eyes: Pupils are equal, round, and reactive to light.   Cardiovascular: Normal rate, regular " rhythm, normal heart sounds and intact distal pulses.  Exam reveals no gallop and no friction rub.    No murmur heard.  Pulmonary/Chest: Effort normal and breath sounds normal. No respiratory distress. She has no wheezes. She has no rales.   Abdominal: Soft. Bowel sounds are normal. There is no tenderness.   Musculoskeletal: She exhibits no edema or tenderness.   Neurological: She is alert and oriented to person, place, and time.   Psychiatric: She has a normal mood and affect.       Assessment/Plan   Pau was seen today for congestive heart failure, hypertension and obesity.    Diagnoses and all orders for this visit:    Morbid obesity due to excess calories  Comments:  referr to bariatrics  Orders:  -     Ambulatory Referral to Bariatric Surgery    Systolic congestive heart failure, unspecified congestive heart failure chronicity  Comments:  CL 1-2c symptoms and stable  Orders:  -     ECG 12 Lead    Essential hypertension  Comments:  now controlled                 Return in about 1 year (around 4/25/2019), or with apc.  Orders Placed This Encounter   Procedures   • Ambulatory Referral to Bariatric Surgery     Referral Priority:   Routine     Referral Type:   Consultation     Referral Reason:   Specialty Services Required     Requested Specialty:   Bariatrics     Number of Visits Requested:   1   • ECG 12 Lead     Order Specific Question:   Reason for Exam:     Answer:   fu of chf

## 2018-05-02 RX ORDER — FUROSEMIDE 40 MG/1
40 TABLET ORAL DAILY
Qty: 60 TABLET | Refills: 3 | Status: SHIPPED | OUTPATIENT
Start: 2018-05-02 | End: 2019-02-05 | Stop reason: SDUPTHER

## 2018-05-02 RX ORDER — FUROSEMIDE 40 MG/1
TABLET ORAL
Qty: 45 TABLET | Refills: 5 | Status: SHIPPED | OUTPATIENT
Start: 2018-05-02 | End: 2019-08-09 | Stop reason: SDUPTHER

## 2018-05-02 RX ORDER — ACETAMINOPHEN 160 MG
1 TABLET,DISINTEGRATING ORAL DAILY
Qty: 30 CAPSULE | Refills: 3 | Status: SHIPPED | OUTPATIENT
Start: 2018-05-02 | End: 2019-06-13 | Stop reason: SDUPTHER

## 2018-05-02 RX ORDER — CHOLECALCIFEROL (VITAMIN D3) 50 MCG
TABLET ORAL
Qty: 30 TABLET | Refills: 5 | Status: SHIPPED | OUTPATIENT
Start: 2018-05-02 | End: 2018-11-26 | Stop reason: SDUPTHER

## 2018-05-11 LAB
HEMOCCULT STL QL: NORMAL

## 2018-05-14 ENCOUNTER — TELEPHONE (OUTPATIENT)
Dept: INTERNAL MEDICINE | Age: 63
End: 2018-05-14

## 2018-05-17 ENCOUNTER — OFFICE VISIT (OUTPATIENT)
Dept: BARIATRICS/WEIGHT MGMT | Facility: HOSPITAL | Age: 63
End: 2018-05-17

## 2018-05-17 ENCOUNTER — LAB (OUTPATIENT)
Dept: LAB | Facility: HOSPITAL | Age: 63
End: 2018-05-17
Attending: NURSE PRACTITIONER

## 2018-05-17 ENCOUNTER — OFFICE VISIT (OUTPATIENT)
Dept: BARIATRICS/WEIGHT MGMT | Facility: CLINIC | Age: 63
End: 2018-05-17

## 2018-05-17 VITALS
WEIGHT: 293 LBS | DIASTOLIC BLOOD PRESSURE: 88 MMHG | SYSTOLIC BLOOD PRESSURE: 172 MMHG | HEART RATE: 108 BPM | OXYGEN SATURATION: 98 % | BODY MASS INDEX: 53.92 KG/M2 | TEMPERATURE: 97.8 F | HEIGHT: 62 IN

## 2018-05-17 DIAGNOSIS — E66.01 MORBID OBESITY WITH BMI OF 60.0-69.9, ADULT (HCC): ICD-10-CM

## 2018-05-17 DIAGNOSIS — D50.9 IRON DEFICIENCY ANEMIA, UNSPECIFIED IRON DEFICIENCY ANEMIA TYPE: ICD-10-CM

## 2018-05-17 DIAGNOSIS — E66.01 MORBID OBESITY WITH BMI OF 60.0-69.9, ADULT (HCC): Primary | ICD-10-CM

## 2018-05-17 DIAGNOSIS — I10 ESSENTIAL HYPERTENSION: ICD-10-CM

## 2018-05-17 DIAGNOSIS — R53.83 FATIGUE, UNSPECIFIED TYPE: ICD-10-CM

## 2018-05-17 DIAGNOSIS — E78.5 HYPERLIPIDEMIA, UNSPECIFIED HYPERLIPIDEMIA TYPE: ICD-10-CM

## 2018-05-17 DIAGNOSIS — G47.33 OSA (OBSTRUCTIVE SLEEP APNEA): ICD-10-CM

## 2018-05-17 DIAGNOSIS — I50.9 CHRONIC CONGESTIVE HEART FAILURE, UNSPECIFIED CONGESTIVE HEART FAILURE TYPE: ICD-10-CM

## 2018-05-17 LAB
25(OH)D3 SERPL-MCNC: 37 NG/ML (ref 30–100)
FERRITIN SERPL-MCNC: 172 NG/ML (ref 11.1–264)
FOLATE SERPL-MCNC: 16 NG/ML
IRON 24H UR-MRATE: 82 MCG/DL (ref 42–180)
IRON SATN MFR SERPL: 23 % (ref 20–45)
TIBC SERPL-MCNC: 351 MCG/DL (ref 225–420)
VIT B12 BLD-MCNC: 507 PG/ML (ref 239–931)

## 2018-05-17 PROCEDURE — 36415 COLL VENOUS BLD VENIPUNCTURE: CPT

## 2018-05-17 PROCEDURE — 82607 VITAMIN B-12: CPT | Performed by: NURSE PRACTITIONER

## 2018-05-17 PROCEDURE — 82746 ASSAY OF FOLIC ACID SERUM: CPT | Performed by: NURSE PRACTITIONER

## 2018-05-17 PROCEDURE — 99204 OFFICE O/P NEW MOD 45 MIN: CPT | Performed by: NURSE PRACTITIONER

## 2018-05-17 PROCEDURE — 82728 ASSAY OF FERRITIN: CPT | Performed by: NURSE PRACTITIONER

## 2018-05-17 PROCEDURE — 82306 VITAMIN D 25 HYDROXY: CPT | Performed by: NURSE PRACTITIONER

## 2018-05-17 PROCEDURE — 83550 IRON BINDING TEST: CPT | Performed by: NURSE PRACTITIONER

## 2018-05-17 PROCEDURE — 83540 ASSAY OF IRON: CPT | Performed by: NURSE PRACTITIONER

## 2018-05-17 PROCEDURE — 97802 MEDICAL NUTRITION INDIV IN: CPT

## 2018-05-17 RX ORDER — EPINEPHRINE 0.1 MG/ML
20 SYRINGE (ML) INJECTION
COMMUNITY

## 2018-05-17 NOTE — PROGRESS NOTES
"Subjective   Pau Cabrera is a 62 y.o. female.     History of Present Illness   Pau Cabrera is a 62 y.o. year-old who has morbid obesity and is interested in having surgical weight loss versus medically supervsed weight loss. She was referred here by Dr. Talamantes, cardiologist. Patient has been overweight for the past 56 ears. The most weight ever lost was 30 pounds from eating vegetables. She was able to keep the weight off for about 6-8 weeks.  Unsupervised diet attempts include: none. Supervised diet attempts include: medically supervised with Dr. Barbosa with metformin. Patient has tried the following OTC or prescription medications for weight loss: metformin, but it did not help so she stopped it. Patient states she tends to eat because she loves food. Patient is limited in activities due to: joint and back pain.   Vitals:    18 1042   BP: 172/88   BP Location: Right arm   Patient Position: Sitting   Cuff Size: Adult   Pulse: 108   Temp: 97.8 °F (36.6 °C)   SpO2: 98%   Weight: (!) 152 kg (334 lb 12.8 oz)   Height: 157.5 cm (62\")     She  has a past medical history of Anemia; Anxiety; Asthma; Back pain; CHF (congestive heart failure); CTS (carpal tunnel syndrome); Depression; Fibromyalgia; GERD (gastroesophageal reflux disease); Gout; Hyperlipemia; Hyperlipidemia; Hypertension; Obesity; Obstructive sleep apnea; and Peripheral neuropathy.  She  does not have any pertinent problems on file.  She  has a past surgical history that includes  section; Hysterectomy (); Breast biopsy (Left); Umbilical hernia repair; and Colonoscopy.  Her family history includes Anuerysm in her father; Arthritis in her father and mother; Asthma in her brother; Heart disease in her mother; Hypertension in her father and mother; Stroke in her father.  She  reports that she has never smoked. She has never used smokeless tobacco. She reports that she does not drink alcohol or use drugs.  Current Outpatient " Prescriptions   Medication Sig Dispense Refill   • allopurinol (ZYLOPRIM) 300 MG tablet      • amitriptyline (ELAVIL) 25 MG tablet Take 25 mg by mouth Every Night.     • amLODIPine (NORVASC) 10 MG tablet Take 10 mg by mouth Daily.     • azelastine (ASTELIN) 0.1 % nasal spray 2 sprays into each nostril 2 (Two) Times a Day. Use in each nostril as directed     • carvedilol (COREG) 25 MG tablet TAKE 1 TABLET BY MOUTH TWICE A DAY 60 tablet 3   • cholecalciferol (VITAMIN D3) 1000 UNITS tablet Take 1,000 Units by mouth daily.     • DULoxetine (CYMBALTA) 30 MG capsule 60 mg.     • EPINEPHrine (ADRENALIN) 0.05 MG/ML syringe Inject 20 mL as directed.     • fenofibrate (TRICOR) 145 MG tablet Take 145 mg by mouth Daily.     • furosemide (LASIX) 40 MG tablet Take 40 mg by mouth daily. TAKE 1 & 1/2 TABS DAILY     • meclizine (ANTIVERT) 25 MG tablet Take 25 mg by mouth 3 (three) times a day as needed for dizziness.     • spironolactone (ALDACTONE) 25 MG tablet Take 25 mg by mouth daily.       No current facility-administered medications for this visit.      She is allergic to codeine sulfate and lisinopril.      The following portions of the patient's history were reviewed and updated as appropriate: allergies, current medications, past family history, past medical history, past social history, past surgical history and problem list.    Review of Systems   Constitutional: Positive for fatigue and unexpected weight change. Negative for activity change and appetite change.   HENT: Positive for postnasal drip.    Eyes: Positive for visual disturbance.   Respiratory: Positive for apnea. Negative for cough, chest tightness and shortness of breath.         Has CHARLIE and cannot tolerate machine   Cardiovascular: Positive for leg swelling. Negative for chest pain and palpitations.        HTN, CHF   Gastrointestinal: Negative.  Negative for abdominal pain, anal bleeding, constipation, nausea and vomiting.        Heartburn, mild   Endocrine:  Positive for polydipsia and polyuria.        Night sweats   Genitourinary: Negative.    Musculoskeletal: Positive for arthralgias, back pain and gait problem.        Gout   Skin: Positive for rash.        Yeast under apron    Allergic/Immunologic: Negative.    Neurological: Positive for dizziness, weakness and numbness. Negative for seizures and syncope.   Hematological: Bruises/bleeds easily.   Psychiatric/Behavioral: Positive for dysphoric mood. Negative for self-injury, sleep disturbance and suicidal ideas.       Objective   Physical Exam   Constitutional: She is oriented to person, place, and time. Vital signs are normal. She appears well-developed and well-nourished. She is cooperative. No distress.   HENT:   Head: Normocephalic and atraumatic.   Nose: Nose normal.   Mouth/Throat: Oropharynx is clear and moist. No oropharyngeal exudate or tonsillar abscesses.   Eyes: Conjunctivae, EOM and lids are normal. Pupils are equal, round, and reactive to light. Right eye exhibits no discharge. Left eye exhibits no discharge.   Glasses noted   Neck: Trachea normal. Neck supple. No JVD present. Carotid bruit is not present. No neck rigidity. No tracheal deviation present. No thyromegaly present.   Cardiovascular: Normal rate, regular rhythm, S1 normal, S2 normal and normal heart sounds.    Pulmonary/Chest: Effort normal and breath sounds normal. No stridor. No respiratory distress. She has no wheezes. She has no rales.   Abdominal: Soft. Bowel sounds are normal. She exhibits no distension. There is no tenderness.   obese   Musculoskeletal: She exhibits edema.        Right shoulder: She exhibits normal strength.   Trace to lower legs   Lymphadenopathy:     She has no cervical adenopathy.   Neurological: She is alert and oriented to person, place, and time. She has normal strength. No cranial nerve deficit.   Skin: Skin is warm, dry and intact. No rash noted.   Psychiatric: She has a normal mood and affect. Her speech is  normal and behavior is normal.   Alert and oriented x 3   Vitals reviewed.      Assessment/Plan   Pau was seen today for consult, obesity, nutrition counseling and weight loss.    Diagnoses and all orders for this visit:    Morbid obesity with BMI of 60.0-69.9, adult  -     Bariatric Nutritional Counseling; Standing  -     Vitamin D 25 Hydroxy; Future    Essential hypertension  -     Bariatric Nutritional Counseling; Standing  -     Vitamin D 25 Hydroxy; Future    Hyperlipidemia, unspecified hyperlipidemia type  -     Bariatric Nutritional Counseling; Standing  -     Vitamin D 25 Hydroxy; Future    Chronic congestive heart failure, unspecified congestive heart failure type    CHARLIE (obstructive sleep apnea)    Iron deficiency anemia, unspecified iron deficiency anemia type  -     Ferritin; Future  -     Folate; Future  -     Iron Profile; Future  -     Vitamin B12; Future    Fatigue, unspecified type  -     Ferritin; Future  -     Folate; Future  -     Iron Profile; Future  -     Vitamin B12; Future  -     Vitamin D 25 Hydroxy; Future              Pau Cabrera is a  62 y.o. who has morbid obesity with BMI of 61.2  and desires surgical weight loss. Patient has the following comorbidities: HTN, Hyperlipidemia, CHF, CHARLIE, and iron deficiency anemia.  Patient has been advised that this surgery is considered to be elective major surgery that is typically done laparoscopically. Patient is a potentially good surgical candidate. Patient will need to meet with the Bariatric Surgeon to further discuss surgical options. She will need to get her BMI to 60 or less to have surgery at this facility. Patient has been advised that they will need to have a work-up prior to surgery. This work-up will include an EGD to assess for H. Pylori and Kendrick's esophagus. Additionally, patient will need to have a psychological evaluation and clearance prior to surgery. Patient will need the following additional clearances/testing:  cardiac (Dr. Talamantes referred her here). Baseline lab work will be obtained today. Patient will see the dietician today for make specific goals for diet, exercise, and lifestyle. Patient has received intensive behavioral therapy for obesity today. I will have them follow up in one month for a weight recheck and to further discuss options.

## 2018-05-17 NOTE — PATIENT INSTRUCTIONS
Pau Cabrera is a  62 y.o. who has morbid obesity with BMI of 61.2  and desires surgical weight loss. Patient has the following comorbidities: HTN, Hyperlipidemia, CHF, CHARLIE, and iron deficiency anemia.  Patient has been advised that this surgery is considered to be elective major surgery that is typically done laparoscopically. Patient is a potentially good surgical candidate. Patient will need to meet with the Bariatric Surgeon to further discuss surgical options. She will need to get her BMI to 60 or less to have surgery at this facility. Patient has been advised that they will need to have a work-up prior to surgery. This work-up will include an EGD to assess for H. Pylori and Kendrick's esophagus. Additionally, patient will need to have a psychological evaluation and clearance prior to surgery. Patient will need the following additional clearances/testing: cardiac (Dr. Talamantes referred her here). Baseline lab work will be obtained today. Patient will see the dietician today for make specific goals for diet, exercise, and lifestyle. Patient has received intensive behavioral therapy for obesity today. I will have them follow up in one month for a weight recheck and to further discuss options.

## 2018-05-17 NOTE — PROGRESS NOTES
"Nutrition Bariatric/[facility] Note     Visit   Initial Assessment     Anthropometrics   Height: 62\"  Weight: 334.5#  BMI: 61.2    Waist: 61\"  Hip: 66\"  Chest: 66\"  Thigh: 31\"  Arm: 22\"  Body Fat: >50%    Nutrition Recall  24 Hour recall:  (B) skips meal (11am) cereal or egg/martinez sandwich or leftovers or peanut butter/syrup sandwich, water or coffee (230pm) sandwich, Sprite (6-8pm) meat, vegetable, potato, bread, dessert, water or soda  Eating 3 meals daily   Protein lacking at some 11am meals  Snacking: chips, cookies  Limited sweet intake  Large portions  Drinking with meals  Drinking carbonated beverages  Drinking greater to or equal to 64 fluid ounces    Exercise   Pedal machine 15-30 minutes 3 times per week    Habits:  None    Education    Goal Setting and Information Packet    Nutrition Goals   Eat 3-4 meals per day with protein  Eat protein first at meals  Limit snacks by placing a portion in 1 container to eat out of during the day instead of the bag  Healthier food choices  Portion control / Use smaller plate or measuring cup   Eliminate soda  Increase fluid intake to 64 ounces per day    Exercise Goals  Continue current exercise routine and increase to 15-30 minutes of walking, cycling, elliptical, swimming, chair, yoga or crossfit daily    Jackie Salinas RD, ANNALEE  05/17/2018  10:50 AM    "

## 2018-06-01 RX ORDER — FENOFIBRATE 145 MG/1
TABLET, COATED ORAL
Qty: 30 TABLET | Refills: 3 | Status: SHIPPED | OUTPATIENT
Start: 2018-06-01 | End: 2018-06-01 | Stop reason: SDUPTHER

## 2018-06-01 RX ORDER — FENOFIBRATE 145 MG/1
TABLET, COATED ORAL
Qty: 30 TABLET | Refills: 3 | Status: SHIPPED | OUTPATIENT
Start: 2018-06-01 | End: 2018-10-10 | Stop reason: SDUPTHER

## 2018-06-01 RX ORDER — AMITRIPTYLINE HYDROCHLORIDE 25 MG/1
25 TABLET, FILM COATED ORAL NIGHTLY
Qty: 30 TABLET | Refills: 3 | Status: SHIPPED | OUTPATIENT
Start: 2018-06-01 | End: 2019-08-09 | Stop reason: SDUPTHER

## 2018-06-04 RX ORDER — AMITRIPTYLINE HYDROCHLORIDE 25 MG/1
TABLET, FILM COATED ORAL
Qty: 30 TABLET | Refills: 3 | Status: SHIPPED | OUTPATIENT
Start: 2018-06-04 | End: 2019-02-05 | Stop reason: SDUPTHER

## 2018-06-08 ENCOUNTER — RESULTS ENCOUNTER (OUTPATIENT)
Dept: BARIATRICS/WEIGHT MGMT | Facility: CLINIC | Age: 63
End: 2018-06-08

## 2018-06-08 DIAGNOSIS — E66.01 MORBID OBESITY WITH BMI OF 60.0-69.9, ADULT (HCC): ICD-10-CM

## 2018-06-08 DIAGNOSIS — E78.5 HYPERLIPIDEMIA, UNSPECIFIED HYPERLIPIDEMIA TYPE: ICD-10-CM

## 2018-06-08 DIAGNOSIS — I10 ESSENTIAL HYPERTENSION: ICD-10-CM

## 2018-07-06 ENCOUNTER — RESULTS ENCOUNTER (OUTPATIENT)
Dept: BARIATRICS/WEIGHT MGMT | Facility: CLINIC | Age: 63
End: 2018-07-06

## 2018-07-06 DIAGNOSIS — E66.01 MORBID OBESITY WITH BMI OF 60.0-69.9, ADULT (HCC): ICD-10-CM

## 2018-07-06 DIAGNOSIS — I10 ESSENTIAL HYPERTENSION: ICD-10-CM

## 2018-07-06 DIAGNOSIS — E78.5 HYPERLIPIDEMIA, UNSPECIFIED HYPERLIPIDEMIA TYPE: ICD-10-CM

## 2018-07-20 RX ORDER — CARVEDILOL 25 MG/1
25 TABLET ORAL 2 TIMES DAILY
Qty: 180 TABLET | Refills: 3 | Status: SHIPPED | OUTPATIENT
Start: 2018-07-20 | End: 2019-08-09 | Stop reason: SDUPTHER

## 2018-07-20 NOTE — TELEPHONE ENCOUNTER
Scooter Hillman called requesting a refill of the below medication which has been pended for you:     Requested Prescriptions     Pending Prescriptions Disp Refills    carvedilol (COREG) 25 MG tablet [Pharmacy Med Name: CARVEDILOL 25 MG TAB 25 TAB] 180 tablet 3     Sig: TAKE 1 TABLET BY MOUTH 2 TIMES DAILY       Last Appointment Date: 3/27/2018  Next Appointment Date: 9/28/2018    Allergies   Allergen Reactions    Codeine     Lisinopril

## 2018-08-03 ENCOUNTER — RESULTS ENCOUNTER (OUTPATIENT)
Dept: BARIATRICS/WEIGHT MGMT | Facility: CLINIC | Age: 63
End: 2018-08-03

## 2018-08-03 DIAGNOSIS — I10 ESSENTIAL HYPERTENSION: ICD-10-CM

## 2018-08-03 DIAGNOSIS — E78.5 HYPERLIPIDEMIA, UNSPECIFIED HYPERLIPIDEMIA TYPE: ICD-10-CM

## 2018-08-03 DIAGNOSIS — E66.01 MORBID OBESITY WITH BMI OF 60.0-69.9, ADULT (HCC): ICD-10-CM

## 2018-08-31 ENCOUNTER — RESULTS ENCOUNTER (OUTPATIENT)
Dept: BARIATRICS/WEIGHT MGMT | Facility: CLINIC | Age: 63
End: 2018-08-31

## 2018-08-31 DIAGNOSIS — I10 ESSENTIAL HYPERTENSION: ICD-10-CM

## 2018-08-31 DIAGNOSIS — E66.01 MORBID OBESITY WITH BMI OF 60.0-69.9, ADULT (HCC): ICD-10-CM

## 2018-08-31 DIAGNOSIS — E78.5 HYPERLIPIDEMIA, UNSPECIFIED HYPERLIPIDEMIA TYPE: ICD-10-CM

## 2018-09-06 RX ORDER — SPIRONOLACTONE 25 MG/1
TABLET ORAL
Qty: 90 TABLET | Refills: 4 | Status: SHIPPED | OUTPATIENT
Start: 2018-09-06 | End: 2018-10-10 | Stop reason: SDUPTHER

## 2018-09-06 NOTE — TELEPHONE ENCOUNTER
Wagner Castro called requesting a refill of the below medication which has been pended for you:     Requested Prescriptions     Pending Prescriptions Disp Refills    spironolactone (ALDACTONE) 25 MG tablet 30 tablet 11       Last Appointment Date: 3/27/2018  Next Appointment Date: 9/28/2018    Allergies   Allergen Reactions    Codeine     Lisinopril

## 2018-09-28 ENCOUNTER — RESULTS ENCOUNTER (OUTPATIENT)
Dept: BARIATRICS/WEIGHT MGMT | Facility: CLINIC | Age: 63
End: 2018-09-28

## 2018-09-28 DIAGNOSIS — E66.01 MORBID OBESITY WITH BMI OF 60.0-69.9, ADULT (HCC): ICD-10-CM

## 2018-09-28 DIAGNOSIS — E78.5 HYPERLIPIDEMIA, UNSPECIFIED HYPERLIPIDEMIA TYPE: ICD-10-CM

## 2018-09-28 DIAGNOSIS — I10 ESSENTIAL HYPERTENSION: ICD-10-CM

## 2018-10-10 ENCOUNTER — TELEPHONE (OUTPATIENT)
Dept: INTERNAL MEDICINE | Age: 63
End: 2018-10-10

## 2018-10-10 RX ORDER — SPIRONOLACTONE 25 MG/1
TABLET ORAL
Qty: 90 TABLET | Refills: 4 | Status: SHIPPED | OUTPATIENT
Start: 2018-10-10 | End: 2021-06-04 | Stop reason: CLARIF

## 2018-10-10 RX ORDER — FENOFIBRATE 145 MG/1
TABLET, COATED ORAL
Qty: 30 TABLET | Refills: 3 | Status: SHIPPED | OUTPATIENT
Start: 2018-10-10 | End: 2019-02-22 | Stop reason: SDUPTHER

## 2018-10-26 ENCOUNTER — RESULTS ENCOUNTER (OUTPATIENT)
Dept: BARIATRICS/WEIGHT MGMT | Facility: CLINIC | Age: 63
End: 2018-10-26

## 2018-10-26 DIAGNOSIS — I10 ESSENTIAL HYPERTENSION: ICD-10-CM

## 2018-10-26 DIAGNOSIS — E66.01 MORBID OBESITY WITH BMI OF 60.0-69.9, ADULT (HCC): ICD-10-CM

## 2018-10-26 DIAGNOSIS — E78.5 HYPERLIPIDEMIA, UNSPECIFIED HYPERLIPIDEMIA TYPE: ICD-10-CM

## 2018-10-26 LAB
ALBUMIN SERPL-MCNC: 4.2 G/DL (ref 3.5–5.2)
ALP BLD-CCNC: 61 U/L (ref 35–104)
ALT SERPL-CCNC: 13 U/L (ref 5–33)
ANION GAP SERPL CALCULATED.3IONS-SCNC: 15 MMOL/L (ref 7–19)
AST SERPL-CCNC: 16 U/L (ref 5–32)
BASOPHILS ABSOLUTE: 0.1 K/UL (ref 0–0.2)
BASOPHILS RELATIVE PERCENT: 0.7 % (ref 0–1)
BILIRUB SERPL-MCNC: 0.4 MG/DL (ref 0.2–1.2)
BUN BLDV-MCNC: 19 MG/DL (ref 8–23)
CALCIUM SERPL-MCNC: 10.5 MG/DL (ref 8.8–10.2)
CHLORIDE BLD-SCNC: 99 MMOL/L (ref 98–111)
CHOLESTEROL, TOTAL: 197 MG/DL (ref 160–199)
CO2: 28 MMOL/L (ref 22–29)
CREAT SERPL-MCNC: 1.1 MG/DL (ref 0.5–0.9)
EOSINOPHILS ABSOLUTE: 0.2 K/UL (ref 0–0.6)
EOSINOPHILS RELATIVE PERCENT: 2.5 % (ref 0–5)
GFR NON-AFRICAN AMERICAN: 50
GLUCOSE BLD-MCNC: 87 MG/DL (ref 74–109)
HCT VFR BLD CALC: 36 % (ref 37–47)
HDLC SERPL-MCNC: 59 MG/DL (ref 65–121)
HEMOGLOBIN: 11 G/DL (ref 12–16)
LDL CHOLESTEROL CALCULATED: 128 MG/DL
LYMPHOCYTES ABSOLUTE: 2.2 K/UL (ref 1.1–4.5)
LYMPHOCYTES RELATIVE PERCENT: 31 % (ref 20–40)
MCH RBC QN AUTO: 29 PG (ref 27–31)
MCHC RBC AUTO-ENTMCNC: 30.6 G/DL (ref 33–37)
MCV RBC AUTO: 95 FL (ref 81–99)
MONOCYTES ABSOLUTE: 0.5 K/UL (ref 0–0.9)
MONOCYTES RELATIVE PERCENT: 6.3 % (ref 0–10)
NEUTROPHILS ABSOLUTE: 4.2 K/UL (ref 1.5–7.5)
NEUTROPHILS RELATIVE PERCENT: 59.2 % (ref 50–65)
PDW BLD-RTO: 16.7 % (ref 11.5–14.5)
PLATELET # BLD: 320 K/UL (ref 130–400)
PMV BLD AUTO: 9.1 FL (ref 9.4–12.3)
POTASSIUM SERPL-SCNC: 4.6 MMOL/L (ref 3.5–5)
RBC # BLD: 3.79 M/UL (ref 4.2–5.4)
SODIUM BLD-SCNC: 142 MMOL/L (ref 136–145)
TOTAL PROTEIN: 7.5 G/DL (ref 6.6–8.7)
TRIGL SERPL-MCNC: 52 MG/DL (ref 0–149)
TSH SERPL DL<=0.05 MIU/L-ACNC: 2.96 UIU/ML (ref 0.27–4.2)
WBC # BLD: 7.1 K/UL (ref 4.8–10.8)

## 2018-11-02 ENCOUNTER — HOSPITAL ENCOUNTER (OUTPATIENT)
Dept: GENERAL RADIOLOGY | Age: 63
Discharge: HOME | End: 2018-11-02
Payer: MEDICARE

## 2018-11-02 ENCOUNTER — TELEPHONE (OUTPATIENT)
Dept: INTERNAL MEDICINE | Age: 63
End: 2018-11-02

## 2018-11-02 ENCOUNTER — OFFICE VISIT (OUTPATIENT)
Dept: INTERNAL MEDICINE | Age: 63
End: 2018-11-02
Payer: MEDICARE

## 2018-11-02 VITALS
HEART RATE: 77 BPM | BODY MASS INDEX: 55.32 KG/M2 | WEIGHT: 293 LBS | HEIGHT: 61 IN | SYSTOLIC BLOOD PRESSURE: 130 MMHG | DIASTOLIC BLOOD PRESSURE: 80 MMHG | OXYGEN SATURATION: 95 %

## 2018-11-02 DIAGNOSIS — M54.2 NECK PAIN: ICD-10-CM

## 2018-11-02 DIAGNOSIS — Z91.09 ENVIRONMENTAL ALLERGIES: ICD-10-CM

## 2018-11-02 DIAGNOSIS — Z00.00 ROUTINE ADULT HEALTH MAINTENANCE: Primary | ICD-10-CM

## 2018-11-02 DIAGNOSIS — Z12.31 SCREENING MAMMOGRAM, ENCOUNTER FOR: ICD-10-CM

## 2018-11-02 DIAGNOSIS — E78.5 HYPERLIPIDEMIA, UNSPECIFIED HYPERLIPIDEMIA TYPE: ICD-10-CM

## 2018-11-02 DIAGNOSIS — F32.A DEPRESSION, UNSPECIFIED DEPRESSION TYPE: ICD-10-CM

## 2018-11-02 DIAGNOSIS — R93.89 ABNORMAL X-RAY: Primary | ICD-10-CM

## 2018-11-02 DIAGNOSIS — Z23 NEED FOR PROPHYLACTIC VACCINATION AGAINST STREPTOCOCCUS PNEUMONIAE (PNEUMOCOCCUS): ICD-10-CM

## 2018-11-02 DIAGNOSIS — I10 ESSENTIAL HYPERTENSION: ICD-10-CM

## 2018-11-02 DIAGNOSIS — M10.9 GOUT, UNSPECIFIED CAUSE, UNSPECIFIED CHRONICITY, UNSPECIFIED SITE: ICD-10-CM

## 2018-11-02 DIAGNOSIS — F41.9 ANXIETY: ICD-10-CM

## 2018-11-02 DIAGNOSIS — G44.009 CLUSTER HEADACHE, NOT INTRACTABLE, UNSPECIFIED CHRONICITY PATTERN: ICD-10-CM

## 2018-11-02 PROCEDURE — G0101 CA SCREEN;PELVIC/BREAST EXAM: HCPCS | Performed by: INTERNAL MEDICINE

## 2018-11-02 PROCEDURE — 99214 OFFICE O/P EST MOD 30 MIN: CPT | Performed by: INTERNAL MEDICINE

## 2018-11-02 PROCEDURE — 90670 PCV13 VACCINE IM: CPT | Performed by: INTERNAL MEDICINE

## 2018-11-02 PROCEDURE — G0009 ADMIN PNEUMOCOCCAL VACCINE: HCPCS | Performed by: INTERNAL MEDICINE

## 2018-11-02 PROCEDURE — 72040 X-RAY EXAM NECK SPINE 2-3 VW: CPT

## 2018-11-02 RX ORDER — MELOXICAM 7.5 MG/1
7.5 TABLET ORAL DAILY
Qty: 30 TABLET | Refills: 3 | Status: SHIPPED | OUTPATIENT
Start: 2018-11-02 | End: 2019-02-22 | Stop reason: SDUPTHER

## 2018-11-02 RX ORDER — TETANUS AND DIPHTHERIA TOXOIDS ADSORBED 2; 2 [LF]/.5ML; [LF]/.5ML
0.5 INJECTION INTRAMUSCULAR ONCE
Qty: 0.5 ML | Refills: 0 | Status: CANCELLED | OUTPATIENT
Start: 2018-11-02 | End: 2018-11-02

## 2018-11-02 RX ORDER — BUTALBITAL, ACETAMINOPHEN AND CAFFEINE 50; 325; 40 MG/1; MG/1; MG/1
1 TABLET ORAL EVERY 4 HOURS PRN
Qty: 10 TABLET | Refills: 3 | Status: SHIPPED | OUTPATIENT
Start: 2018-11-02 | End: 2020-07-06

## 2018-11-02 ASSESSMENT — ENCOUNTER SYMPTOMS
BLOOD IN STOOL: 0
TROUBLE SWALLOWING: 0
EYE REDNESS: 0
CHEST TIGHTNESS: 0
SINUS PRESSURE: 0
PHOTOPHOBIA: 0
EYE ITCHING: 0
COLOR CHANGE: 0
SORE THROAT: 0
COUGH: 0
WHEEZING: 0
VOICE CHANGE: 0
ABDOMINAL DISTENTION: 0
EYE PAIN: 0
BACK PAIN: 1
NAUSEA: 0
EYE DISCHARGE: 0
SHORTNESS OF BREATH: 0
CONSTIPATION: 0
DIARRHEA: 0
ABDOMINAL PAIN: 0

## 2018-11-02 NOTE — PROGRESS NOTES
Medicare Annual Wellness Visit - Subsequent    Name: Nish Ponce Date: 2018   MRN: [id number] Sex: Female   Age: 61 y.o. Ethnicity: Non-/Non    : 1955 Race: Black      Farhan Zelaya is here for   Chief Complaint   Patient presents with    6 Month Follow-Up     no pap, gaining weight,no flu shot, head aches in afternoons        Screenings for behavioral, psychosocial and functional/safety risks, and cognitive dysfunction are all negative except as indicated below. These results, as well as other patient data from the [employer] [address] form, are documented in Flowsheets linked to this Encounter. Allergies   Allergen Reactions    Codeine     Lisinopril        Prior to Visit Medications    Medication Sig Taking?  Authorizing Provider   Multiple Vitamins-Minerals (MULTIVITAMIN ADULT PO) Take by mouth Yes Historical Provider, MD   meloxicam (MOBIC) 7.5 MG tablet Take 1 tablet by mouth daily Yes Ag Davis MD   butalbital-acetaminophen-caffeine (FIORICET, ESGIC) -40 MG per tablet Take 1 tablet by mouth every 4 hours as needed for Headaches Yes Ag Davis MD   fenofibrate (TRICOR) 145 MG tablet TAKE 1 TABLET BY MOUTH DAILY (FOR TRIGLYCERIDES/CHOLESTEROL) Yes Ag Davis MD   spironolactone (ALDACTONE) 25 MG tablet TAKE ONE TABLET BY MOUTH ONCE DAILY Yes Ag Davis MD   carvedilol (COREG) 25 MG tablet TAKE 1 TABLET BY MOUTH 2 TIMES DAILY Yes Ag Davis MD   amitriptyline (ELAVIL) 25 MG tablet Take 1 tablet by mouth nightly Yes Ag Davis MD   Cholecalciferol (VITAMIN D3) 2000 units CAPS Take 1 capsule by mouth daily Yes Ag Davis MD   furosemide (LASIX) 40 MG tablet Take 1 tablet by mouth daily Yes Ag Davis MD   allopurinol (ZYLOPRIM) 300 MG tablet Take 1 tablet by mouth daily Yes Ag Davis MD   DULoxetine (CYMBALTA) 60 MG extended release capsule TAKE 1 CAPSULE BY MOUTH ONCE DAILY IN THE MORNING Yes
She can try Esgic for the headaches. She declines CT scan to further evaluate the headaches. He does seem like some the pain that she's having actually coming from the neck and radiating down into the back and lower extremities. We'll start the cervical spine x-ray. I did write for a walker given the weakness and difficulty standing for long periods of time. 3. Hyperlipidemia: Continue fenofibrate. 4. Hypertension: Blood pressure well controlled on spironolactone, carvedilol. Routine U Lasix and Norvasc    5. Depression and anxiety: Continue Elavil and Cymbalta    6. Gout: No flares allopurinol    7. Environmental allergies: Can try Astelin continue Astelin nasal spray  Camilo Castanon was seen today for 6 month follow-up. Diagnoses and all orders for this visit:    Screening mammogram, encounter for  -     NINO DIGITAL SCREEN W OR WO CAD BILATERAL; Future    Need for prophylactic vaccination against Streptococcus pneumoniae (pneumococcus)  -     Pneumococcal conjugate vaccine 13-valent PCV13    Need for prophylactic vaccination with tetanus-diphtheria (Td)    Need for prophylactic vaccination and inoculation against varicella    Neck pain  -     XR CERVICAL SPINE 1 VW; Future    Cluster headache, not intractable, unspecified chronicity pattern  -     butalbital-acetaminophen-caffeine (FIORICET, ESGIC) -40 MG per tablet; Take 1 tablet by mouth every 4 hours as needed for Headaches    Other orders  -     Cancel: diptheria-tetanus toxoids (DECAVAC) 2-2 LF/0.5ML injection; Inject 0.5 mLs into the muscle once for 1 dose  -     Cancel: zoster recombinant adjuvanted vaccine (SHINGRIX) 50 MCG/0.5ML SUSR injection; 50 MCG IM then repeat 2-6 months. -     meloxicam (MOBIC) 7.5 MG tablet; Take 1 tablet by mouth daily          Return in about 2 weeks (around 11/16/2018), or evaluate back pain and headaches.      Orders Placed This Encounter   Procedures    NINO DIGITAL SCREEN W OR WO CAD BILATERAL     Standing Status:

## 2018-11-02 NOTE — PATIENT INSTRUCTIONS
yourself at home? · Sit or lie in positions that are most comfortable and reduce your pain. Try one of these positions when you lie down:  ¨ Lie on your back with your knees bent and supported by large pillows. ¨ Lie on the floor with your legs on the seat of a sofa or chair. [place] on your side with your knees and hips bent and a pillow between your legs. ¨ Lie on your stomach if it does not make pain worse. · Do not sit up in bed, and avoid soft couches and twisted positions. Bed rest can help relieve pain at first, but it delays healing. Avoid bed rest after the first day of back pain. · Change positions every 30 minutes. If you must sit for long periods of time, take breaks from sitting. Get up and walk around, or lie in a comfortable position. · Try using a heating pad on a low or medium setting for 15 to 20 minutes every 2 or 3 hours. Try a warm shower in place of one session with the heating pad. · You can also try an ice pack for 10 to 15 minutes every 2 to 3 hours. Put a thin cloth between the ice pack and your skin. · Take pain medicines exactly as directed. ¨ If the doctor gave you a prescription medicine for pain, take it as prescribed. ¨ If you are not taking a prescription pain medicine, ask your doctor if you can take an over-the-counter medicine. · Take short walks several times a day. You can start with 5 to 10 minutes, 3 or 4 times a day, and work up to longer walks. Walk on level surfaces and avoid hills and stairs until your back is better. · Return to work and other activities as soon as you can. Continued rest without activity is usually not good for your back. · To prevent future back pain, do exercises to stretch and strengthen your back and stomach. Learn how to use good posture, safe lifting techniques, and proper body mechanics. When should you call for help?   Call your doctor now or seek immediate medical care if:    · You have new or worsening numbness in your legs.     · You have new or worsening weakness in your legs. (This could make it hard to stand up.)     · You lose control of your bladder or bowels.    Watch closely for changes in your health, and be sure to contact your doctor if:    · You have a fever, lose weight, or don't feel well.     · You do not get better as expected. Where can you learn more? Go to https://[email or web address]. org and sign in to your [employer] account. Enter R327 in the "[employer] box to learn more about \"Back Pain: Care Instructions. \"     If you do not have an account, please click on the \"Sign Up Now\" link. Current as of: November 29, 2017  Content Version: 11.7  © 9510-0978 [employer]. Care instructions adapted under license by [facility] ([place] [facility]). If you have questions about a medical condition or this instruction, always ask your healthcare professional. [address] any warranty or liability for your use of this information.

## 2018-11-23 ENCOUNTER — RESULTS ENCOUNTER (OUTPATIENT)
Dept: BARIATRICS/WEIGHT MGMT | Facility: CLINIC | Age: 63
End: 2018-11-23

## 2018-11-23 DIAGNOSIS — I10 ESSENTIAL HYPERTENSION: ICD-10-CM

## 2018-11-23 DIAGNOSIS — E78.5 HYPERLIPIDEMIA, UNSPECIFIED HYPERLIPIDEMIA TYPE: ICD-10-CM

## 2018-11-23 DIAGNOSIS — E66.01 MORBID OBESITY WITH BMI OF 60.0-69.9, ADULT (HCC): ICD-10-CM

## 2018-11-26 RX ORDER — CHOLECALCIFEROL (VITAMIN D3) 50 MCG
TABLET ORAL
Qty: 30 TABLET | Refills: 5 | Status: SHIPPED | OUTPATIENT
Start: 2018-11-26 | End: 2018-11-28 | Stop reason: SDUPTHER

## 2018-11-27 ENCOUNTER — HOSPITAL ENCOUNTER (OUTPATIENT)
Dept: ULTRASOUND IMAGING | Age: 63
Discharge: HOME | End: 2018-11-27
Payer: MEDICARE

## 2018-11-27 ENCOUNTER — TELEPHONE (OUTPATIENT)
Dept: INTERNAL MEDICINE | Age: 63
End: 2018-11-27

## 2018-11-27 DIAGNOSIS — R93.89 ABNORMAL X-RAY: ICD-10-CM

## 2018-11-27 DIAGNOSIS — E04.1 THYROID NODULE: Primary | ICD-10-CM

## 2018-11-27 PROCEDURE — 76536 US EXAM OF HEAD AND NECK: CPT

## 2018-11-28 ENCOUNTER — OFFICE VISIT (OUTPATIENT)
Dept: INTERNAL MEDICINE | Age: 63
End: 2018-11-28
Payer: MEDICARE

## 2018-11-28 VITALS
HEART RATE: 72 BPM | OXYGEN SATURATION: 94 % | SYSTOLIC BLOOD PRESSURE: 128 MMHG | WEIGHT: 293 LBS | DIASTOLIC BLOOD PRESSURE: 84 MMHG | BODY MASS INDEX: 55.32 KG/M2 | RESPIRATION RATE: 16 BRPM | HEIGHT: 61 IN

## 2018-11-28 DIAGNOSIS — M89.9 DISORDER OF BONE: ICD-10-CM

## 2018-11-28 DIAGNOSIS — R51.9 BENIGN HEADACHE: ICD-10-CM

## 2018-11-28 DIAGNOSIS — G89.29 CHRONIC MIDLINE LOW BACK PAIN WITHOUT SCIATICA: Primary | ICD-10-CM

## 2018-11-28 DIAGNOSIS — M54.50 CHRONIC MIDLINE LOW BACK PAIN WITHOUT SCIATICA: Primary | ICD-10-CM

## 2018-11-28 DIAGNOSIS — Z00.00 HEALTHCARE MAINTENANCE: ICD-10-CM

## 2018-11-28 PROCEDURE — 1036F TOBACCO NON-USER: CPT | Performed by: NURSE PRACTITIONER

## 2018-11-28 PROCEDURE — G8417 CALC BMI ABV UP PARAM F/U: HCPCS | Performed by: NURSE PRACTITIONER

## 2018-11-28 PROCEDURE — 99213 OFFICE O/P EST LOW 20 MIN: CPT | Performed by: NURSE PRACTITIONER

## 2018-11-28 PROCEDURE — G8484 FLU IMMUNIZE NO ADMIN: HCPCS | Performed by: NURSE PRACTITIONER

## 2018-11-28 PROCEDURE — 3017F COLORECTAL CA SCREEN DOC REV: CPT | Performed by: NURSE PRACTITIONER

## 2018-11-28 PROCEDURE — G8427 DOCREV CUR MEDS BY ELIG CLIN: HCPCS | Performed by: NURSE PRACTITIONER

## 2018-11-28 ASSESSMENT — ENCOUNTER SYMPTOMS
ABDOMINAL DISTENTION: 0
SORE THROAT: 0
BACK PAIN: 1
EYE DISCHARGE: 0
VOMITING: 0
COUGH: 0
COLOR CHANGE: 0
BLOOD IN STOOL: 0
CONSTIPATION: 0
CHOKING: 0
ABDOMINAL PAIN: 0
DIARRHEA: 0
WHEEZING: 0
NAUSEA: 0
SHORTNESS OF BREATH: 0
TROUBLE SWALLOWING: 0
EYE ITCHING: 0
STRIDOR: 0

## 2018-12-13 ENCOUNTER — TELEPHONE (OUTPATIENT)
Dept: INTERNAL MEDICINE | Age: 63
End: 2018-12-13

## 2018-12-20 ENCOUNTER — OFFICE VISIT (OUTPATIENT)
Dept: OTOLARYNGOLOGY | Age: 63
End: 2018-12-20
Payer: MEDICARE

## 2018-12-20 VITALS
RESPIRATION RATE: 18 BRPM | BODY MASS INDEX: 55.32 KG/M2 | SYSTOLIC BLOOD PRESSURE: 130 MMHG | DIASTOLIC BLOOD PRESSURE: 82 MMHG | HEART RATE: 85 BPM | TEMPERATURE: 98.3 F | WEIGHT: 293 LBS | OXYGEN SATURATION: 96 % | HEIGHT: 61 IN

## 2018-12-20 DIAGNOSIS — E04.1 RIGHT THYROID NODULE: ICD-10-CM

## 2018-12-20 DIAGNOSIS — E07.9 THYROID MASS: Primary | ICD-10-CM

## 2018-12-20 PROCEDURE — G8484 FLU IMMUNIZE NO ADMIN: HCPCS | Performed by: OTOLARYNGOLOGY

## 2018-12-20 PROCEDURE — 1036F TOBACCO NON-USER: CPT | Performed by: OTOLARYNGOLOGY

## 2018-12-20 PROCEDURE — G8427 DOCREV CUR MEDS BY ELIG CLIN: HCPCS | Performed by: OTOLARYNGOLOGY

## 2018-12-20 PROCEDURE — 99203 OFFICE O/P NEW LOW 30 MIN: CPT | Performed by: OTOLARYNGOLOGY

## 2018-12-20 PROCEDURE — G8417 CALC BMI ABV UP PARAM F/U: HCPCS | Performed by: OTOLARYNGOLOGY

## 2018-12-20 PROCEDURE — 3017F COLORECTAL CA SCREEN DOC REV: CPT | Performed by: OTOLARYNGOLOGY

## 2018-12-20 NOTE — PROGRESS NOTES
[facility]  [address]  [address] [place]  Dept: [phone]  Dept Fax: [phone]  Loc: [phone]    Alcon Barrios is a 61 y.o. female who presents today for her medical conditions/complaintsas noted below.   Alcon Barrios is c/o of New Patient (Referred by Dr. Willis Course of thyroid nodule)      Past Medical History:   Diagnosis Date    Abnormal CT of spine     Acute sinusitis     Arthritis     Asthma     Bronchitis     Carpal tunnel syndrome     both    CHF (congestive heart failure) (HCC)     Depression     Fibromyalgia     Furuncle     Gout     Hyperlipidemia     Hypertension     Low vitamin D level     Lumbago     Lumbar radiculopathy     Neuropathy     feet    Obesity     Rheumatoid nodule of knee (HCC)     Sleep apnea     UTI (urinary tract infection)     Vertigo     Vitamin D deficiency       Past Surgical History:   Procedure Laterality Date     SECTION      times two    HYSTERECTOMY         Family History   Problem Relation Age of Onset    Heart Disease Mother     Hypertension Mother     Stroke Father     Hypertension Father     Hypertension Other     Heart Disease Other     Cancer Other        Social History   Substance Use Topics    Smoking status: Never Smoker    Smokeless tobacco: Never Used    Alcohol use No      Current Outpatient Prescriptions   Medication Sig Dispense Refill    Multiple Vitamins-Minerals (MULTIVITAMIN ADULT PO) Take by mouth      meloxicam (MOBIC) 7.5 MG tablet Take 1 tablet by mouth daily 30 tablet 3    butalbital-acetaminophen-caffeine (FIORICET, ESGIC) -40 MG per tablet Take 1 tablet by mouth every 4 hours as needed for Headaches 10 tablet 3    fenofibrate (TRICOR) 145 MG tablet TAKE 1 TABLET BY MOUTH DAILY (FOR TRIGLYCERIDES/CHOLESTEROL) 30 tablet 3    spironolactone (ALDACTONE) 25 MG tablet TAKE ONE TABLET BY MOUTH ONCE DAILY 90 tablet 4   

## 2018-12-21 ENCOUNTER — RESULTS ENCOUNTER (OUTPATIENT)
Dept: BARIATRICS/WEIGHT MGMT | Facility: CLINIC | Age: 63
End: 2018-12-21

## 2018-12-21 DIAGNOSIS — E66.01 MORBID OBESITY WITH BMI OF 60.0-69.9, ADULT (HCC): ICD-10-CM

## 2018-12-21 DIAGNOSIS — I10 ESSENTIAL HYPERTENSION: ICD-10-CM

## 2018-12-21 DIAGNOSIS — E78.5 HYPERLIPIDEMIA, UNSPECIFIED HYPERLIPIDEMIA TYPE: ICD-10-CM

## 2018-12-26 ENCOUNTER — HOSPITAL ENCOUNTER (OUTPATIENT)
Dept: ULTRASOUND IMAGING | Age: 63
Discharge: HOME | End: 2018-12-26
Payer: MEDICARE

## 2018-12-26 DIAGNOSIS — E04.1 RIGHT THYROID NODULE: ICD-10-CM

## 2018-12-26 DIAGNOSIS — E07.9 THYROID MASS: ICD-10-CM

## 2018-12-26 PROCEDURE — 88177 CYTP FNA EVAL EA ADDL: CPT

## 2018-12-26 PROCEDURE — 88172 CYTP DX EVAL FNA 1ST EA SITE: CPT

## 2018-12-26 PROCEDURE — 60100 BIOPSY OF THYROID: CPT

## 2018-12-26 PROCEDURE — 88173 CYTOPATH EVAL FNA REPORT: CPT

## 2019-01-09 ENCOUNTER — OFFICE VISIT (OUTPATIENT)
Dept: OTOLARYNGOLOGY | Age: 64
End: 2019-01-09
Payer: MEDICARE

## 2019-01-09 VITALS
HEART RATE: 102 BPM | SYSTOLIC BLOOD PRESSURE: 136 MMHG | OXYGEN SATURATION: 91 % | WEIGHT: 293 LBS | BODY MASS INDEX: 53.92 KG/M2 | DIASTOLIC BLOOD PRESSURE: 82 MMHG | HEIGHT: 62 IN | TEMPERATURE: 97.4 F | RESPIRATION RATE: 24 BRPM

## 2019-01-09 DIAGNOSIS — E04.1 LEFT THYROID NODULE: Primary | ICD-10-CM

## 2019-01-09 DIAGNOSIS — I50.9 CHRONIC CONGESTIVE HEART FAILURE, UNSPECIFIED HEART FAILURE TYPE (HCC): ICD-10-CM

## 2019-01-09 DIAGNOSIS — E04.1 RIGHT THYROID NODULE: ICD-10-CM

## 2019-01-09 PROCEDURE — G8484 FLU IMMUNIZE NO ADMIN: HCPCS | Performed by: OTOLARYNGOLOGY

## 2019-01-09 PROCEDURE — 1036F TOBACCO NON-USER: CPT | Performed by: OTOLARYNGOLOGY

## 2019-01-09 PROCEDURE — G8417 CALC BMI ABV UP PARAM F/U: HCPCS | Performed by: OTOLARYNGOLOGY

## 2019-01-09 PROCEDURE — G8427 DOCREV CUR MEDS BY ELIG CLIN: HCPCS | Performed by: OTOLARYNGOLOGY

## 2019-01-09 PROCEDURE — 3017F COLORECTAL CA SCREEN DOC REV: CPT | Performed by: OTOLARYNGOLOGY

## 2019-01-09 PROCEDURE — 99214 OFFICE O/P EST MOD 30 MIN: CPT | Performed by: OTOLARYNGOLOGY

## 2019-01-17 ENCOUNTER — TELEPHONE (OUTPATIENT)
Dept: OTOLARYNGOLOGY | Age: 64
End: 2019-01-17

## 2019-01-17 DIAGNOSIS — E04.1 THYROID NODULE: Primary | ICD-10-CM

## 2019-01-18 ENCOUNTER — RESULTS ENCOUNTER (OUTPATIENT)
Dept: BARIATRICS/WEIGHT MGMT | Facility: CLINIC | Age: 64
End: 2019-01-18

## 2019-01-18 DIAGNOSIS — E78.5 HYPERLIPIDEMIA, UNSPECIFIED HYPERLIPIDEMIA TYPE: ICD-10-CM

## 2019-01-18 DIAGNOSIS — I10 ESSENTIAL HYPERTENSION: ICD-10-CM

## 2019-01-18 DIAGNOSIS — E66.01 MORBID OBESITY WITH BMI OF 60.0-69.9, ADULT (HCC): ICD-10-CM

## 2019-02-01 RX ORDER — DULOXETIN HYDROCHLORIDE 60 MG/1
CAPSULE, DELAYED RELEASE ORAL
Qty: 90 CAPSULE | Refills: 3 | Status: SHIPPED | OUTPATIENT
Start: 2019-02-01 | End: 2020-02-27

## 2019-02-01 RX ORDER — AMLODIPINE BESYLATE 10 MG/1
TABLET ORAL
Qty: 90 TABLET | Refills: 3 | Status: SHIPPED | OUTPATIENT
Start: 2019-02-01 | End: 2020-02-27

## 2019-02-01 RX ORDER — AMLODIPINE BESYLATE 10 MG/1
TABLET ORAL
Qty: 90 TABLET | Refills: 3 | Status: SHIPPED | OUTPATIENT
Start: 2019-02-01 | End: 2019-02-05 | Stop reason: SDUPTHER

## 2019-02-05 ENCOUNTER — TELEPHONE (OUTPATIENT)
Dept: INTERNAL MEDICINE | Age: 64
End: 2019-02-05

## 2019-02-05 ENCOUNTER — OFFICE VISIT (OUTPATIENT)
Dept: INTERNAL MEDICINE | Age: 64
End: 2019-02-05
Payer: MEDICARE

## 2019-02-05 VITALS
HEART RATE: 89 BPM | SYSTOLIC BLOOD PRESSURE: 122 MMHG | BODY MASS INDEX: 55.32 KG/M2 | OXYGEN SATURATION: 97 % | TEMPERATURE: 98.2 F | HEIGHT: 61 IN | DIASTOLIC BLOOD PRESSURE: 84 MMHG | WEIGHT: 293 LBS

## 2019-02-05 DIAGNOSIS — F32.A DEPRESSION, UNSPECIFIED DEPRESSION TYPE: ICD-10-CM

## 2019-02-05 DIAGNOSIS — G43.809 OTHER MIGRAINE WITHOUT STATUS MIGRAINOSUS, NOT INTRACTABLE: ICD-10-CM

## 2019-02-05 DIAGNOSIS — Z00.00 HEALTHCARE MAINTENANCE: ICD-10-CM

## 2019-02-05 DIAGNOSIS — M1A.9XX0 CHRONIC GOUT WITHOUT TOPHUS, UNSPECIFIED CAUSE, UNSPECIFIED SITE: ICD-10-CM

## 2019-02-05 DIAGNOSIS — M89.9 DISORDER OF BONE: ICD-10-CM

## 2019-02-05 DIAGNOSIS — I10 ESSENTIAL HYPERTENSION: Primary | ICD-10-CM

## 2019-02-05 LAB
ALBUMIN SERPL-MCNC: 4.6 G/DL (ref 3.5–5.2)
ALP BLD-CCNC: 54 U/L (ref 35–104)
ALT SERPL-CCNC: 13 U/L (ref 5–33)
ANION GAP SERPL CALCULATED.3IONS-SCNC: 18 MMOL/L (ref 7–19)
AST SERPL-CCNC: 18 U/L (ref 5–32)
BASOPHILS ABSOLUTE: 0.1 K/UL (ref 0–0.2)
BASOPHILS RELATIVE PERCENT: 0.8 % (ref 0–1)
BILIRUB SERPL-MCNC: 0.4 MG/DL (ref 0.2–1.2)
BUN BLDV-MCNC: 21 MG/DL (ref 8–23)
CALCIUM SERPL-MCNC: 10.4 MG/DL (ref 8.8–10.2)
CHLORIDE BLD-SCNC: 98 MMOL/L (ref 98–111)
CHOLESTEROL, TOTAL: 204 MG/DL (ref 160–199)
CO2: 27 MMOL/L (ref 22–29)
CREAT SERPL-MCNC: 1.3 MG/DL (ref 0.5–0.9)
EOSINOPHILS ABSOLUTE: 0.2 K/UL (ref 0–0.6)
EOSINOPHILS RELATIVE PERCENT: 2.7 % (ref 0–5)
GFR NON-AFRICAN AMERICAN: 41
GLUCOSE BLD-MCNC: 95 MG/DL (ref 74–109)
HCT VFR BLD CALC: 39.2 % (ref 37–47)
HDLC SERPL-MCNC: 59 MG/DL (ref 65–121)
HEMOGLOBIN: 11.7 G/DL (ref 12–16)
LDL CHOLESTEROL CALCULATED: 128 MG/DL
LYMPHOCYTES ABSOLUTE: 2.9 K/UL (ref 1.1–4.5)
LYMPHOCYTES RELATIVE PERCENT: 37.7 % (ref 20–40)
MCH RBC QN AUTO: 28.3 PG (ref 27–31)
MCHC RBC AUTO-ENTMCNC: 29.8 G/DL (ref 33–37)
MCV RBC AUTO: 94.7 FL (ref 81–99)
MONOCYTES ABSOLUTE: 0.5 K/UL (ref 0–0.9)
MONOCYTES RELATIVE PERCENT: 5.8 % (ref 0–10)
NEUTROPHILS ABSOLUTE: 4.1 K/UL (ref 1.5–7.5)
NEUTROPHILS RELATIVE PERCENT: 52.6 % (ref 50–65)
PDW BLD-RTO: 17.3 % (ref 11.5–14.5)
PLATELET # BLD: 344 K/UL (ref 130–400)
PMV BLD AUTO: 9.5 FL (ref 9.4–12.3)
POTASSIUM SERPL-SCNC: 4.4 MMOL/L (ref 3.5–5)
RBC # BLD: 4.14 M/UL (ref 4.2–5.4)
SODIUM BLD-SCNC: 143 MMOL/L (ref 136–145)
TOTAL PROTEIN: 8.1 G/DL (ref 6.6–8.7)
TRIGL SERPL-MCNC: 85 MG/DL (ref 0–149)
TSH SERPL DL<=0.05 MIU/L-ACNC: 2.34 UIU/ML (ref 0.27–4.2)
VITAMIN D 25-HYDROXY: 39.6 NG/ML
WBC # BLD: 7.7 K/UL (ref 4.8–10.8)

## 2019-02-05 PROCEDURE — G8417 CALC BMI ABV UP PARAM F/U: HCPCS | Performed by: INTERNAL MEDICINE

## 2019-02-05 PROCEDURE — 3017F COLORECTAL CA SCREEN DOC REV: CPT | Performed by: INTERNAL MEDICINE

## 2019-02-05 PROCEDURE — G8484 FLU IMMUNIZE NO ADMIN: HCPCS | Performed by: INTERNAL MEDICINE

## 2019-02-05 PROCEDURE — 99214 OFFICE O/P EST MOD 30 MIN: CPT | Performed by: INTERNAL MEDICINE

## 2019-02-05 PROCEDURE — 1036F TOBACCO NON-USER: CPT | Performed by: INTERNAL MEDICINE

## 2019-02-05 PROCEDURE — G8427 DOCREV CUR MEDS BY ELIG CLIN: HCPCS | Performed by: INTERNAL MEDICINE

## 2019-02-05 RX ORDER — AZELASTINE 1 MG/ML
2 SPRAY, METERED NASAL 2 TIMES DAILY
Qty: 1 BOTTLE | Refills: 3 | Status: SHIPPED | OUTPATIENT
Start: 2019-02-05

## 2019-02-05 ASSESSMENT — ENCOUNTER SYMPTOMS
NAUSEA: 0
EYE DISCHARGE: 0
TROUBLE SWALLOWING: 0
EYE REDNESS: 0
COUGH: 0
BLOOD IN STOOL: 0
ABDOMINAL PAIN: 0
EYE ITCHING: 0
EYE PAIN: 0
COLOR CHANGE: 0
CHEST TIGHTNESS: 0
VOICE CHANGE: 0
CONSTIPATION: 0
SORE THROAT: 0
SINUS PRESSURE: 0
BACK PAIN: 1
ABDOMINAL DISTENTION: 0
SHORTNESS OF BREATH: 0
DIARRHEA: 0
PHOTOPHOBIA: 0
WHEEZING: 0

## 2019-02-12 ENCOUNTER — TELEPHONE (OUTPATIENT)
Dept: INTERNAL MEDICINE | Age: 64
End: 2019-02-12

## 2019-02-15 ENCOUNTER — RESULTS ENCOUNTER (OUTPATIENT)
Dept: BARIATRICS/WEIGHT MGMT | Facility: CLINIC | Age: 64
End: 2019-02-15

## 2019-02-15 DIAGNOSIS — E78.5 HYPERLIPIDEMIA, UNSPECIFIED HYPERLIPIDEMIA TYPE: ICD-10-CM

## 2019-02-15 DIAGNOSIS — E66.01 MORBID OBESITY WITH BMI OF 60.0-69.9, ADULT (HCC): ICD-10-CM

## 2019-02-15 DIAGNOSIS — I10 ESSENTIAL HYPERTENSION: ICD-10-CM

## 2019-02-15 RX ORDER — AMITRIPTYLINE HYDROCHLORIDE 25 MG/1
TABLET, FILM COATED ORAL
Qty: 30 TABLET | Refills: 5 | Status: SHIPPED | OUTPATIENT
Start: 2019-02-15 | End: 2019-08-09 | Stop reason: SDUPTHER

## 2019-02-22 ENCOUNTER — TELEPHONE (OUTPATIENT)
Dept: INTERNAL MEDICINE | Age: 64
End: 2019-02-22

## 2019-02-22 RX ORDER — MELOXICAM 7.5 MG/1
7.5 TABLET ORAL DAILY
Qty: 30 TABLET | Refills: 3 | Status: SHIPPED | OUTPATIENT
Start: 2019-02-22 | End: 2019-08-09 | Stop reason: ALTCHOICE

## 2019-02-22 RX ORDER — FENOFIBRATE 145 MG/1
TABLET, COATED ORAL
Qty: 30 TABLET | Refills: 3 | Status: SHIPPED | OUTPATIENT
Start: 2019-02-22 | End: 2019-07-18 | Stop reason: SDUPTHER

## 2019-03-15 ENCOUNTER — RESULTS ENCOUNTER (OUTPATIENT)
Dept: BARIATRICS/WEIGHT MGMT | Facility: CLINIC | Age: 64
End: 2019-03-15

## 2019-03-15 DIAGNOSIS — I10 ESSENTIAL HYPERTENSION: ICD-10-CM

## 2019-03-15 DIAGNOSIS — E78.5 HYPERLIPIDEMIA, UNSPECIFIED HYPERLIPIDEMIA TYPE: ICD-10-CM

## 2019-03-15 DIAGNOSIS — E66.01 MORBID OBESITY WITH BMI OF 60.0-69.9, ADULT (HCC): ICD-10-CM

## 2019-04-04 RX ORDER — ALLOPURINOL 300 MG/1
300 TABLET ORAL DAILY
Qty: 30 TABLET | Refills: 5 | Status: SHIPPED | OUTPATIENT
Start: 2019-04-04 | End: 2019-04-05 | Stop reason: SDUPTHER

## 2019-04-05 ENCOUNTER — TELEPHONE (OUTPATIENT)
Dept: INTERNAL MEDICINE | Age: 64
End: 2019-04-05

## 2019-04-05 RX ORDER — ALLOPURINOL 300 MG/1
300 TABLET ORAL DAILY
Qty: 30 TABLET | Refills: 5 | Status: SHIPPED | OUTPATIENT
Start: 2019-04-05 | End: 2020-04-16

## 2019-04-05 NOTE — TELEPHONE ENCOUNTER
Purnima Be called requesting a refill of the below medication which has been pended for you:     Requested Prescriptions     Pending Prescriptions Disp Refills    allopurinol (ZYLOPRIM) 300 MG tablet 30 tablet 5     Sig: Take 1 tablet by mouth daily       Last Appointment Date: 2/5/2019  Next Appointment Date: 6/7/2019    Allergies   Allergen Reactions    Codeine Hives    Lisinopril Hives

## 2019-04-12 ENCOUNTER — RESULTS ENCOUNTER (OUTPATIENT)
Dept: BARIATRICS/WEIGHT MGMT | Facility: CLINIC | Age: 64
End: 2019-04-12

## 2019-04-12 DIAGNOSIS — E78.5 HYPERLIPIDEMIA, UNSPECIFIED HYPERLIPIDEMIA TYPE: ICD-10-CM

## 2019-04-12 DIAGNOSIS — E66.01 MORBID OBESITY WITH BMI OF 60.0-69.9, ADULT (HCC): ICD-10-CM

## 2019-04-12 DIAGNOSIS — I10 ESSENTIAL HYPERTENSION: ICD-10-CM

## 2019-06-13 RX ORDER — ACETAMINOPHEN 160 MG
1 TABLET,DISINTEGRATING ORAL DAILY
Qty: 90 CAPSULE | Refills: 3 | Status: SHIPPED | OUTPATIENT
Start: 2019-06-13 | End: 2020-07-13

## 2019-06-13 NOTE — TELEPHONE ENCOUNTER
Requested Prescriptions     Pending Prescriptions Disp Refills    Cholecalciferol (VITAMIN D3) 2000 units CAPS 90 capsule 3     Sig: Take 1 capsule by mouth daily

## 2019-06-14 ENCOUNTER — OFFICE VISIT (OUTPATIENT)
Dept: CARDIOLOGY | Facility: CLINIC | Age: 64
End: 2019-06-14

## 2019-06-14 VITALS
DIASTOLIC BLOOD PRESSURE: 81 MMHG | WEIGHT: 293 LBS | BODY MASS INDEX: 53.92 KG/M2 | SYSTOLIC BLOOD PRESSURE: 128 MMHG | HEART RATE: 80 BPM | HEIGHT: 62 IN

## 2019-06-14 DIAGNOSIS — I10 ESSENTIAL HYPERTENSION: ICD-10-CM

## 2019-06-14 DIAGNOSIS — E66.01 MORBID OBESITY DUE TO EXCESS CALORIES (HCC): ICD-10-CM

## 2019-06-14 DIAGNOSIS — I50.42 CHRONIC COMBINED SYSTOLIC AND DIASTOLIC CONGESTIVE HEART FAILURE (HCC): Primary | ICD-10-CM

## 2019-06-14 PROCEDURE — 93000 ELECTROCARDIOGRAM COMPLETE: CPT | Performed by: INTERNAL MEDICINE

## 2019-06-14 PROCEDURE — 99214 OFFICE O/P EST MOD 30 MIN: CPT | Performed by: INTERNAL MEDICINE

## 2019-06-14 RX ORDER — BUTALBITAL, ACETAMINOPHEN AND CAFFEINE 50; 325; 40 MG/1; MG/1; MG/1
1 TABLET ORAL EVERY 4 HOURS PRN
COMMUNITY

## 2019-06-14 RX ORDER — MELOXICAM 7.5 MG/1
7.5 TABLET ORAL DAILY
COMMUNITY
End: 2020-07-27 | Stop reason: ALTCHOICE

## 2019-06-14 NOTE — PROGRESS NOTES
"Subjective    Pau Cabrera is a 63 y.o. female. Fu of chf    History of Present Illness     HFcEF:  She is slowly losing stamina with fatigue and BAHENA as rate limiters. EKG is nsc today. Is compliant with meds and salt restriction. Has no edema or orthopnea or pnd. She has CHARLIE but has refused CPAP and I have educated her today on why CPAP would be helpful to her sense of well-being.    MORB OBESE:  She has been seen in the bariatric clinic but has not been able to lose any wt on her own with aggressive counseling and has been thus determined to be a poor candidate for surgery.    HTN:  Checks at home are \"120-130/80\". meds are stable and no light-headedness.        The following portions of the patient's history were reviewed and updated as appropriate: allergies, current medications, past family history, past medical history, past social history, past surgical history and problem list.    Patient Active Problem List   Diagnosis   • Chronic combined systolic and diastolic congestive heart failure (CMS/HCC)   • HTN (hypertension)   • Degeneration of lumbar intervertebral disc   • Lumbosacral neuritis   • Spondylolisthesis of lumbar region   • Morbid obesity due to excess calories (CMS/HCC)       Allergies   Allergen Reactions   • Codeine Sulfate Hives   • Lisinopril Swelling and Rash     Facial swelling       Family History   Problem Relation Age of Onset   • Arthritis Mother    • Heart disease Mother    • Hypertension Mother    • Arthritis Father    • Hypertension Father    • Anuerysm Father    • Stroke Father    • Asthma Brother        Social History     Socioeconomic History   • Marital status:      Spouse name: Not on file   • Number of children: Not on file   • Years of education: Not on file   • Highest education level: Not on file   Tobacco Use   • Smoking status: Never Smoker   • Smokeless tobacco: Never Used   Substance and Sexual Activity   • Alcohol use: No   • Drug use: No   • Sexual activity: " Defer     Birth control/protection: Surgical         Current Outpatient Medications:   •  allopurinol (ZYLOPRIM) 300 MG tablet, , Disp: , Rfl:   •  amitriptyline (ELAVIL) 25 MG tablet, Take 25 mg by mouth Every Night., Disp: , Rfl:   •  amLODIPine (NORVASC) 10 MG tablet, Take 10 mg by mouth Daily., Disp: , Rfl:   •  azelastine (ASTELIN) 0.1 % nasal spray, 2 sprays into each nostril 2 (Two) Times a Day. Use in each nostril as directed, Disp: , Rfl:   •  butalbital-acetaminophen-caffeine (FIORICET, ESGIC) -40 MG per tablet, Take 1 tablet by mouth Every 4 (Four) Hours As Needed for Headache., Disp: , Rfl:   •  carvedilol (COREG) 25 MG tablet, TAKE 1 TABLET BY MOUTH TWICE A DAY, Disp: 60 tablet, Rfl: 3  •  cholecalciferol (VITAMIN D3) 1000 UNITS tablet, Take 1,000 Units by mouth daily., Disp: , Rfl:   •  DULoxetine (CYMBALTA) 30 MG capsule, 60 mg., Disp: , Rfl:   •  EPINEPHrine (ADRENALIN) 0.05 MG/ML syringe, Inject 20 mL as directed., Disp: , Rfl:   •  fenofibrate (TRICOR) 145 MG tablet, Take 145 mg by mouth Daily., Disp: , Rfl:   •  furosemide (LASIX) 40 MG tablet, Take 40 mg by mouth daily. TAKE 1 & 1/2 TABS DAILY, Disp: , Rfl:   •  meclizine (ANTIVERT) 25 MG tablet, Take 25 mg by mouth 3 (three) times a day as needed for dizziness., Disp: , Rfl:   •  meloxicam (MOBIC) 7.5 MG tablet, Take 7.5 mg by mouth Daily., Disp: , Rfl:   •  Multiple Vitamins-Minerals (MULTIVITAMIN ADULT PO), Take  by mouth., Disp: , Rfl:   •  spironolactone (ALDACTONE) 25 MG tablet, Take 25 mg by mouth daily., Disp: , Rfl:     Past Surgical History:   Procedure Laterality Date   • BREAST BIOPSY Left    •  SECTION      X 2   • COLONOSCOPY     • HYSTERECTOMY      total   • UMBILICAL HERNIA REPAIR      had 1/2 of it repaired with hysterectomy        Review of Systems   Constitutional: Positive for fatigue. Negative for fever and unexpected weight change.   Respiratory: Positive for shortness of breath. Negative for apnea and  "chest tightness.    Cardiovascular: Negative for chest pain, palpitations and leg swelling.   Gastrointestinal: Negative for abdominal pain.   Genitourinary: Negative for dysuria.   Musculoskeletal: Positive for arthralgias and back pain. Negative for myalgias.   Neurological: Negative for weakness and light-headedness.   Psychiatric/Behavioral: Positive for sleep disturbance.       /81   Pulse 80   Ht 156.2 cm (61.5\")   Wt (!) 165 kg (364 lb)   BMI 67.66 kg/m²   Procedures    Objective   Physical Exam   Constitutional: She is oriented to person, place, and time. No distress.   MORBID OBESE   HENT:   Head: Normocephalic.   Eyes: Pupils are equal, round, and reactive to light.   Cardiovascular: Normal rate, regular rhythm, normal heart sounds and intact distal pulses. Exam reveals no gallop and no friction rub.   No murmur heard.  Pulmonary/Chest: Effort normal and breath sounds normal. No stridor. No respiratory distress. She has no wheezes. She has no rales.   Abdominal: Soft. Bowel sounds are normal. She exhibits no distension and no mass. There is no tenderness. There is no guarding.   Musculoskeletal: She exhibits no edema, tenderness or deformity.   Neurological: She is alert and oriented to person, place, and time.   Skin: Skin is warm and dry. She is not diaphoretic.   Psychiatric: She has a normal mood and affect.       Assessment/Plan   Pau was seen today for congestive heart failure and hypertension.    Diagnoses and all orders for this visit:    Chronic combined systolic and diastolic congestive heart failure (CMS/HCC)  Comments:  class 3c with declining stamina  Orders:  -     ECG 12 Lead  -     Adult Transthoracic Echo Complete W/ Cont if Necessary Per Protocol    Essential hypertension  Comments:  controlled    Morbid obesity due to excess calories (CMS/HCC)  Comments:  #1 threat to health and longevity - failed wt loss with aggressive counseling in Bariatric clinic - can't get BMI low " enough for a procedure.                  Return in about 1 year (around 6/14/2020) for Next scheduled follow up with apc.  Orders Placed This Encounter   Procedures   • ECG 12 Lead     Order Specific Question:   Reason for Exam:     Answer:   [email or web address]   • Adult Transthoracic Echo Complete W/ Cont if Necessary Per Protocol     Order Specific Question:   Reason for exam?     Answer:   Heart Failure, Cardiomyopathy, or Sytemic or Pulmonary Hypertension

## 2019-06-25 ENCOUNTER — APPOINTMENT (OUTPATIENT)
Dept: CARDIOLOGY | Facility: HOSPITAL | Age: 64
End: 2019-06-25

## 2019-07-03 ENCOUNTER — HOSPITAL ENCOUNTER (OUTPATIENT)
Dept: CARDIOLOGY | Facility: HOSPITAL | Age: 64
Discharge: HOME | End: 2019-07-03
Admitting: INTERNAL MEDICINE

## 2019-07-03 VITALS
DIASTOLIC BLOOD PRESSURE: 81 MMHG | SYSTOLIC BLOOD PRESSURE: 128 MMHG | HEIGHT: 61 IN | WEIGHT: 293 LBS | BODY MASS INDEX: 55.32 KG/M2

## 2019-07-03 LAB
BH CV ECHO MEAS - AO MAX PG (FULL): 4.7 MMHG
BH CV ECHO MEAS - AO MAX PG: 9.5 MMHG
BH CV ECHO MEAS - AO MEAN PG (FULL): 3 MMHG
BH CV ECHO MEAS - AO MEAN PG: 5 MMHG
BH CV ECHO MEAS - AO ROOT AREA (BSA CORRECTED): 1.4
BH CV ECHO MEAS - AO ROOT AREA: 8.6 CM^2
BH CV ECHO MEAS - AO ROOT DIAM: 3.3 CM
BH CV ECHO MEAS - AO V2 MAX: 154 CM/SEC
BH CV ECHO MEAS - AO V2 MEAN: 108 CM/SEC
BH CV ECHO MEAS - AO V2 VTI: 31.2 CM
BH CV ECHO MEAS - AVA(I,A): 1.9 CM^2
BH CV ECHO MEAS - AVA(I,D): 1.9 CM^2
BH CV ECHO MEAS - AVA(V,A): 2.2 CM^2
BH CV ECHO MEAS - AVA(V,D): 2.2 CM^2
BH CV ECHO MEAS - BSA(HAYCOCK): 2.8 M^2
BH CV ECHO MEAS - BSA: 2.4 M^2
BH CV ECHO MEAS - BZI_BMI: 68.8 KILOGRAMS/M^2
BH CV ECHO MEAS - BZI_METRIC_HEIGHT: 154.9 CM
BH CV ECHO MEAS - BZI_METRIC_WEIGHT: 165.1 KG
BH CV ECHO MEAS - EDV(CUBED): 170 ML
BH CV ECHO MEAS - EDV(MOD-SP4): 110 ML
BH CV ECHO MEAS - EDV(TEICH): 149.9 ML
BH CV ECHO MEAS - EF(CUBED): 54.9 %
BH CV ECHO MEAS - EF(MOD-SP4): 56.7 %
BH CV ECHO MEAS - EF(TEICH): 46.1 %
BH CV ECHO MEAS - ESV(CUBED): 76.8 ML
BH CV ECHO MEAS - ESV(MOD-SP4): 47.6 ML
BH CV ECHO MEAS - ESV(TEICH): 80.8 ML
BH CV ECHO MEAS - FS: 23.3 %
BH CV ECHO MEAS - IVS/LVPW: 0.98
BH CV ECHO MEAS - IVSD: 1.2 CM
BH CV ECHO MEAS - LA DIMENSION: 4 CM
BH CV ECHO MEAS - LA/AO: 1.2
BH CV ECHO MEAS - LAT PEAK E' VEL: 5.1 CM/SEC
BH CV ECHO MEAS - LV DIASTOLIC VOL/BSA (35-75): 45.1 ML/M^2
BH CV ECHO MEAS - LV MASS(C)D: 274 GRAMS
BH CV ECHO MEAS - LV MASS(C)DI: 112.5 GRAMS/M^2
BH CV ECHO MEAS - LV MAX PG: 4.8 MMHG
BH CV ECHO MEAS - LV MEAN PG: 2 MMHG
BH CV ECHO MEAS - LV SYSTOLIC VOL/BSA (12-30): 19.5 ML/M^2
BH CV ECHO MEAS - LV V1 MAX: 109 CM/SEC
BH CV ECHO MEAS - LV V1 MEAN: 68.6 CM/SEC
BH CV ECHO MEAS - LV V1 VTI: 19.2 CM
BH CV ECHO MEAS - LVIDD: 5.5 CM
BH CV ECHO MEAS - LVIDS: 4.3 CM
BH CV ECHO MEAS - LVLD AP4: 7.9 CM
BH CV ECHO MEAS - LVLS AP4: 7.2 CM
BH CV ECHO MEAS - LVOT AREA (M): 3.1 CM^2
BH CV ECHO MEAS - LVOT AREA: 3.1 CM^2
BH CV ECHO MEAS - LVOT DIAM: 2 CM
BH CV ECHO MEAS - LVPWD: 1.2 CM
BH CV ECHO MEAS - MED PEAK E' VEL: 6.2 CM/SEC
BH CV ECHO MEAS - MV A MAX VEL: 64.5 CM/SEC
BH CV ECHO MEAS - MV DEC SLOPE: 251 CM/SEC^2
BH CV ECHO MEAS - MV DEC TIME: 0.11 SEC
BH CV ECHO MEAS - MV E MAX VEL: 42.7 CM/SEC
BH CV ECHO MEAS - MV E/A: 0.66
BH CV ECHO MEAS - MV P1/2T MAX VEL: 70.8 CM/SEC
BH CV ECHO MEAS - MV P1/2T: 82.6 MSEC
BH CV ECHO MEAS - MVA P1/2T LCG: 3.1 CM^2
BH CV ECHO MEAS - MVA(P1/2T): 2.7 CM^2
BH CV ECHO MEAS - RAP SYSTOLE: 5 MMHG
BH CV ECHO MEAS - RVSP: 37.7 MMHG
BH CV ECHO MEAS - SI(AO): 109.5 ML/M^2
BH CV ECHO MEAS - SI(CUBED): 38.3 ML/M^2
BH CV ECHO MEAS - SI(LVOT): 24.8 ML/M^2
BH CV ECHO MEAS - SI(MOD-SP4): 25.6 ML/M^2
BH CV ECHO MEAS - SI(TEICH): 28.4 ML/M^2
BH CV ECHO MEAS - SV(AO): 266.9 ML
BH CV ECHO MEAS - SV(CUBED): 93.3 ML
BH CV ECHO MEAS - SV(LVOT): 60.3 ML
BH CV ECHO MEAS - SV(MOD-SP4): 62.4 ML
BH CV ECHO MEAS - SV(TEICH): 69.1 ML
BH CV ECHO MEAS - TR MAX VEL: 286 CM/SEC
BH CV ECHO MEASUREMENTS AVERAGE E/E' RATIO: 7.56
LEFT ATRIUM VOLUME INDEX: 30.9 ML/M2
MAXIMAL PREDICTED HEART RATE: 157 BPM
STRESS TARGET HR: 133 BPM

## 2019-07-03 PROCEDURE — 93306 TTE W/DOPPLER COMPLETE: CPT | Performed by: INTERNAL MEDICINE

## 2019-07-03 PROCEDURE — 93306 TTE W/DOPPLER COMPLETE: CPT

## 2019-07-18 RX ORDER — FENOFIBRATE 145 MG/1
TABLET, COATED ORAL
Qty: 30 TABLET | Refills: 3 | Status: SHIPPED | OUTPATIENT
Start: 2019-07-18 | End: 2019-11-27 | Stop reason: SDUPTHER

## 2019-07-25 ENCOUNTER — TELEPHONE (OUTPATIENT)
Dept: OTOLARYNGOLOGY | Age: 64
End: 2019-07-25

## 2019-07-30 RX ORDER — METHYLPREDNISOLONE 4 MG/1
TABLET ORAL
Qty: 1 KIT | Refills: 0 | Status: SHIPPED | OUTPATIENT
Start: 2019-07-30 | End: 2019-08-05

## 2019-07-30 RX ORDER — AMOXICILLIN AND CLAVULANATE POTASSIUM 875; 125 MG/1; MG/1
1 TABLET, FILM COATED ORAL 2 TIMES DAILY
Qty: 20 TABLET | Refills: 0 | Status: SHIPPED | OUTPATIENT
Start: 2019-07-30 | End: 2019-08-09 | Stop reason: ALTCHOICE

## 2019-08-02 DIAGNOSIS — I10 ESSENTIAL HYPERTENSION: ICD-10-CM

## 2019-08-02 LAB
ALBUMIN SERPL-MCNC: 4.7 G/DL (ref 3.5–5.2)
ALP BLD-CCNC: 55 U/L (ref 35–104)
ALT SERPL-CCNC: 11 U/L (ref 5–33)
ANION GAP SERPL CALCULATED.3IONS-SCNC: 15 MMOL/L (ref 7–19)
AST SERPL-CCNC: 15 U/L (ref 5–32)
BASOPHILS ABSOLUTE: 0 K/UL (ref 0–0.2)
BASOPHILS RELATIVE PERCENT: 0.3 % (ref 0–1)
BILIRUB SERPL-MCNC: <0.2 MG/DL (ref 0.2–1.2)
BUN BLDV-MCNC: 22 MG/DL (ref 8–23)
CALCIUM SERPL-MCNC: 10.8 MG/DL (ref 8.8–10.2)
CHLORIDE BLD-SCNC: 97 MMOL/L (ref 98–111)
CHOLESTEROL, TOTAL: 182 MG/DL (ref 160–199)
CO2: 28 MMOL/L (ref 22–29)
CREAT SERPL-MCNC: 1.1 MG/DL (ref 0.5–0.9)
EOSINOPHILS ABSOLUTE: 0 K/UL (ref 0–0.6)
EOSINOPHILS RELATIVE PERCENT: 0.1 % (ref 0–5)
GFR NON-AFRICAN AMERICAN: 50
GLUCOSE BLD-MCNC: 97 MG/DL (ref 74–109)
HCT VFR BLD CALC: 39.5 % (ref 37–47)
HDLC SERPL-MCNC: 59 MG/DL (ref 65–121)
HEMOGLOBIN: 12 G/DL (ref 12–16)
LDL CHOLESTEROL CALCULATED: 114 MG/DL
LYMPHOCYTES ABSOLUTE: 2.2 K/UL (ref 1.1–4.5)
LYMPHOCYTES RELATIVE PERCENT: 17.4 % (ref 20–40)
MCH RBC QN AUTO: 28.8 PG (ref 27–31)
MCHC RBC AUTO-ENTMCNC: 30.4 G/DL (ref 33–37)
MCV RBC AUTO: 95 FL (ref 81–99)
MONOCYTES ABSOLUTE: 0.4 K/UL (ref 0–0.9)
MONOCYTES RELATIVE PERCENT: 3.5 % (ref 0–10)
NEUTROPHILS ABSOLUTE: 9.7 K/UL (ref 1.5–7.5)
NEUTROPHILS RELATIVE PERCENT: 78 % (ref 50–65)
PDW BLD-RTO: 16.3 % (ref 11.5–14.5)
PLATELET # BLD: 367 K/UL (ref 130–400)
PMV BLD AUTO: 9.6 FL (ref 9.4–12.3)
POTASSIUM SERPL-SCNC: 4.6 MMOL/L (ref 3.5–5)
RBC # BLD: 4.16 M/UL (ref 4.2–5.4)
SODIUM BLD-SCNC: 140 MMOL/L (ref 136–145)
TOTAL PROTEIN: 8.4 G/DL (ref 6.6–8.7)
TRIGL SERPL-MCNC: 43 MG/DL (ref 0–149)
TSH SERPL DL<=0.05 MIU/L-ACNC: 1.25 UIU/ML (ref 0.27–4.2)
WBC # BLD: 12.4 K/UL (ref 4.8–10.8)

## 2019-08-09 ENCOUNTER — OFFICE VISIT (OUTPATIENT)
Dept: INTERNAL MEDICINE | Age: 64
End: 2019-08-09
Payer: MEDICARE

## 2019-08-09 VITALS
WEIGHT: 293 LBS | BODY MASS INDEX: 55.32 KG/M2 | SYSTOLIC BLOOD PRESSURE: 110 MMHG | DIASTOLIC BLOOD PRESSURE: 70 MMHG | HEART RATE: 79 BPM | HEIGHT: 61 IN | OXYGEN SATURATION: 96 %

## 2019-08-09 DIAGNOSIS — I10 ESSENTIAL HYPERTENSION: ICD-10-CM

## 2019-08-09 DIAGNOSIS — F32.A DEPRESSION, UNSPECIFIED DEPRESSION TYPE: ICD-10-CM

## 2019-08-09 DIAGNOSIS — R42 VERTIGO: ICD-10-CM

## 2019-08-09 DIAGNOSIS — E55.9 VITAMIN D DEFICIENCY: ICD-10-CM

## 2019-08-09 DIAGNOSIS — M19.90 ARTHRITIS: ICD-10-CM

## 2019-08-09 DIAGNOSIS — E78.5 HYPERLIPIDEMIA, UNSPECIFIED HYPERLIPIDEMIA TYPE: ICD-10-CM

## 2019-08-09 DIAGNOSIS — R09.89 CHEST CONGESTION: ICD-10-CM

## 2019-08-09 DIAGNOSIS — E04.1 THYROID NODULE: Primary | ICD-10-CM

## 2019-08-09 PROCEDURE — G8427 DOCREV CUR MEDS BY ELIG CLIN: HCPCS | Performed by: INTERNAL MEDICINE

## 2019-08-09 PROCEDURE — 1036F TOBACCO NON-USER: CPT | Performed by: INTERNAL MEDICINE

## 2019-08-09 PROCEDURE — G8417 CALC BMI ABV UP PARAM F/U: HCPCS | Performed by: INTERNAL MEDICINE

## 2019-08-09 PROCEDURE — 3017F COLORECTAL CA SCREEN DOC REV: CPT | Performed by: INTERNAL MEDICINE

## 2019-08-09 PROCEDURE — 99214 OFFICE O/P EST MOD 30 MIN: CPT | Performed by: INTERNAL MEDICINE

## 2019-08-09 RX ORDER — AMOXICILLIN AND CLAVULANATE POTASSIUM 875; 125 MG/1; MG/1
1 TABLET, FILM COATED ORAL 2 TIMES DAILY
Qty: 10 TABLET | Refills: 0 | Status: SHIPPED | OUTPATIENT
Start: 2019-08-09 | End: 2019-08-14

## 2019-08-09 RX ORDER — AMITRIPTYLINE HYDROCHLORIDE 25 MG/1
25 TABLET, FILM COATED ORAL NIGHTLY
Qty: 90 TABLET | Refills: 3 | Status: SHIPPED | OUTPATIENT
Start: 2019-08-09 | End: 2020-10-01

## 2019-08-09 RX ORDER — MECLIZINE HYDROCHLORIDE 25 MG/1
25 TABLET ORAL 3 TIMES DAILY PRN
Qty: 90 TABLET | Refills: 5 | Status: SHIPPED | OUTPATIENT
Start: 2019-08-09

## 2019-08-09 RX ORDER — CARVEDILOL 25 MG/1
25 TABLET ORAL 2 TIMES DAILY
Qty: 180 TABLET | Refills: 3 | Status: SHIPPED | OUTPATIENT
Start: 2019-08-09 | End: 2020-09-28

## 2019-08-09 RX ORDER — CELECOXIB 100 MG/1
100 CAPSULE ORAL 2 TIMES DAILY
Qty: 60 CAPSULE | Refills: 5 | Status: SHIPPED | OUTPATIENT
Start: 2019-08-09 | End: 2020-02-25

## 2019-08-09 RX ORDER — FUROSEMIDE 40 MG/1
60 TABLET ORAL DAILY
Qty: 135 TABLET | Refills: 3 | Status: SHIPPED | OUTPATIENT
Start: 2019-08-09 | End: 2022-01-04 | Stop reason: SDUPTHER

## 2019-08-09 ASSESSMENT — ENCOUNTER SYMPTOMS
WHEEZING: 0
ABDOMINAL PAIN: 0
EYE PAIN: 0
SORE THROAT: 0
DIARRHEA: 0
SHORTNESS OF BREATH: 0
VOICE CHANGE: 0
ABDOMINAL DISTENTION: 0
EYE DISCHARGE: 0
NAUSEA: 0
BLOOD IN STOOL: 0
EYE ITCHING: 0
SINUS PRESSURE: 0
EYE REDNESS: 0
COUGH: 1
BACK PAIN: 1
TROUBLE SWALLOWING: 0
PHOTOPHOBIA: 0
CHEST TIGHTNESS: 0
CONSTIPATION: 0
COLOR CHANGE: 0

## 2019-08-12 ENCOUNTER — TELEPHONE (OUTPATIENT)
Dept: OTOLARYNGOLOGY | Age: 64
End: 2019-08-12

## 2019-11-27 RX ORDER — SPIRONOLACTONE 25 MG/1
TABLET ORAL
Qty: 90 TABLET | Refills: 3 | Status: SHIPPED | OUTPATIENT
Start: 2019-11-27 | End: 2020-12-21

## 2019-11-27 RX ORDER — FENOFIBRATE 145 MG/1
TABLET, COATED ORAL
Qty: 90 TABLET | Refills: 3 | Status: SHIPPED | OUTPATIENT
Start: 2019-11-27 | End: 2020-12-29

## 2020-02-11 PROCEDURE — 87624 HPV HI-RISK TYP POOLED RSLT: CPT | Performed by: OBSTETRICS & GYNECOLOGY

## 2020-02-11 PROCEDURE — G0123 SCREEN CERV/VAG THIN LAYER: HCPCS | Performed by: OBSTETRICS & GYNECOLOGY

## 2020-02-12 ENCOUNTER — LAB REQUISITION (OUTPATIENT)
Dept: LAB | Facility: HOSPITAL | Age: 65
End: 2020-02-12

## 2020-02-12 DIAGNOSIS — Z12.72 ENCOUNTER FOR SCREENING FOR MALIGNANT NEOPLASM OF VAGINA: ICD-10-CM

## 2020-02-12 PROCEDURE — 87624 HPV HI-RISK TYP POOLED RSLT: CPT | Performed by: OBSTETRICS & GYNECOLOGY

## 2020-02-13 LAB
GEN CATEG CVX/VAG CYTO-IMP: ABNORMAL
LAB AP CASE REPORT: ABNORMAL
LAB AP GYN ADDITIONAL INFORMATION: ABNORMAL
PATH INTERP SPEC-IMP: ABNORMAL
STAT OF ADQ CVX/VAG CYTO-IMP: ABNORMAL

## 2020-02-18 LAB — HPV I/H RISK 4 DNA CVX QL PROBE+SIG AMP: NOT DETECTED

## 2020-02-25 RX ORDER — CELECOXIB 100 MG/1
100 CAPSULE ORAL 2 TIMES DAILY
Qty: 60 CAPSULE | Refills: 5 | Status: SHIPPED | OUTPATIENT
Start: 2020-02-25 | End: 2020-09-14

## 2020-02-25 NOTE — TELEPHONE ENCOUNTER
Tariq Hess called requesting a refill of the below medication which has been pended for you:     Requested Prescriptions     Pending Prescriptions Disp Refills    celecoxib (CELEBREX) 100 MG capsule [Pharmacy Med Name: CELECOXIB 100 MG CAPS 100 CAP] 60 capsule 5     Sig: TAKE 1 CAPSULE BY MOUTH 2 TIMES DAILY       Last Appointment Date: 8/9/2019  Next Appointment Date: 3/17/2020    Allergies   Allergen Reactions    Codeine Hives    Lisinopril Hives

## 2020-02-27 RX ORDER — AMLODIPINE BESYLATE 10 MG/1
TABLET ORAL
Qty: 90 TABLET | Refills: 3 | Status: SHIPPED | OUTPATIENT
Start: 2020-02-27 | End: 2021-03-23

## 2020-02-27 RX ORDER — DULOXETIN HYDROCHLORIDE 60 MG/1
CAPSULE, DELAYED RELEASE ORAL
Qty: 90 CAPSULE | Refills: 3 | Status: SHIPPED | OUTPATIENT
Start: 2020-02-27 | End: 2021-03-23

## 2020-04-16 RX ORDER — ALLOPURINOL 300 MG/1
300 TABLET ORAL DAILY
Qty: 30 TABLET | Refills: 5 | Status: SHIPPED | OUTPATIENT
Start: 2020-04-16 | End: 2020-10-30

## 2020-04-16 NOTE — TELEPHONE ENCOUNTER
Geronimo Diamond called requesting a refill of the below medication which has been pended for you:     Requested Prescriptions     Pending Prescriptions Disp Refills    allopurinol (ZYLOPRIM) 300 MG tablet [Pharmacy Med Name: ALLOPURINOL 300 MG TABLET 300 TAB] 30 tablet 5     Sig: TAKE 1 TABLET BY MOUTH DAILY       Last Appointment Date: 8/9/2019  Next Appointment Date: 4/23/2020    Allergies   Allergen Reactions    Codeine Hives    Lisinopril Hives

## 2020-04-23 ENCOUNTER — OFFICE VISIT (OUTPATIENT)
Dept: INTERNAL MEDICINE | Age: 65
End: 2020-04-23
Payer: MEDICARE

## 2020-04-23 PROCEDURE — 3017F COLORECTAL CA SCREEN DOC REV: CPT | Performed by: INTERNAL MEDICINE

## 2020-04-23 PROCEDURE — G0439 PPPS, SUBSEQ VISIT: HCPCS | Performed by: INTERNAL MEDICINE

## 2020-04-23 ASSESSMENT — ENCOUNTER SYMPTOMS
NAUSEA: 0
EYE DISCHARGE: 0
ABDOMINAL DISTENTION: 0
CONSTIPATION: 0
VOICE CHANGE: 0
BLOOD IN STOOL: 0
SINUS PRESSURE: 0
SHORTNESS OF BREATH: 0
EYE ITCHING: 0
PHOTOPHOBIA: 0
ABDOMINAL PAIN: 0
WHEEZING: 0
CHEST TIGHTNESS: 0
BACK PAIN: 1
TROUBLE SWALLOWING: 0
COUGH: 0
COLOR CHANGE: 0
SORE THROAT: 0
EYE PAIN: 0
EYE REDNESS: 0
DIARRHEA: 0

## 2020-04-23 NOTE — PROGRESS NOTES
2020    TELEHEALTH EVALUATION -- Audio/Visual (During [id number] public health emergency)    HPI:    Dale Kovacs (:  1955) has requested an audio/video evaluation for the following concern(s): Annual physical exam.  Her functional status is good. She denies any history of falls. She has no concerns in regards to safety, hearing, or cognition. She sees Dr. Prieto Flower for her routine Pap smears. She thinks her Pap smear was done in March. She is due for an eye exam later this year. She has a mammogram scheduled for May 2. She thinks her colonoscopy is up-to-date. She thinks that she has a bone density ordered, but she is not sure. She will need to get the Pneumovax vaccine. She has had a Prevnar 13. Her flu vaccine is up-to-date. She has not had any gout flares. She reports her blood pressures well controlled. Her depression is stable. Her medications do seem to work well for joint pain, but she does have some joint pain every now and then. She has not had any recent migraines. Overall, she feels well. Has no major concerns or complaints. Review of Systems   Constitutional: Negative for activity change, appetite change, chills, diaphoresis, fatigue, fever and unexpected weight change. HENT: Negative for congestion, ear discharge, postnasal drip, sinus pressure, sneezing, sore throat, tinnitus, trouble swallowing and voice change. Eyes: Negative for photophobia, pain, discharge, redness, itching and visual disturbance. Respiratory: Negative for cough, chest tightness, shortness of breath and wheezing. Cardiovascular: Negative for chest pain, palpitations and leg swelling. Gastrointestinal: Negative for abdominal distention, abdominal pain, blood in stool, constipation, diarrhea and nausea. Endocrine: Negative for cold intolerance, heat intolerance, polydipsia, polyphagia and polyuria.    Genitourinary: Negative for difficulty urinating, dysuria, flank pain, frequency, hematuria and urgency. Musculoskeletal: Positive for back pain. Negative for arthralgias, gait problem, joint swelling, myalgias, neck pain and neck stiffness. She does complain of neck pain and hip pain. She has difficulty standing after long periods of time. Skin: Negative for color change, pallor, rash and wound. Allergic/Immunologic: Negative for environmental allergies, food allergies and immunocompromised state. Neurological: Negative for dizziness, tremors, seizures, syncope, facial asymmetry, speech difficulty, weakness, light-headedness, numbness and headaches. Hematological: Negative for adenopathy. Does not bruise/bleed easily. Psychiatric/Behavioral: Negative for agitation, confusion, decreased concentration, dysphoric mood, hallucinations, self-injury, sleep disturbance and suicidal ideas. The patient is not nervous/anxious and is not hyperactive. Prior to Visit Medications    Medication Sig Taking?  Authorizing Provider   allopurinol (ZYLOPRIM) 300 MG tablet TAKE 1 TABLET BY MOUTH DAILY  Tere Pineda MD   amLODIPine (NORVASC) 10 MG tablet TAKE ONE TABLET BY MOUTH DAILY  Sandy Borden MD   DULoxetine (CYMBALTA) 60 MG extended release capsule TAKE 1 CAPSULE BY MOUTH ONCE DAILY IN THE MORNING  Sandy Borden MD   celecoxib (CELEBREX) 100 MG capsule TAKE 1 CAPSULE BY MOUTH 2 TIMES DAILY  Sandy Borden MD   spironolactone (ALDACTONE) 25 MG tablet TAKE ONE TABLET BY MOUTH ONCE DAILY  Sandy Borden MD   fenofibrate (TRICOR) 145 MG tablet TAKE 1 TABLET BY MOUTH DAILY (FOR TRIGLYCERIDES/CHOLESTEROL)  Sandy Borden MD   amitriptyline (ELAVIL) 25 MG tablet Take 1 tablet by mouth nightly Indications: Nerve Disease  Sandy Borden MD   carvedilol (COREG) 25 MG tablet Take 1 tablet by mouth 2 times daily  Sandy Borden MD   furosemide (LASIX) 40 MG tablet Take 1.5 tablets by mouth daily  Sandy Borden MD   meclizine (ANTIVERT) 25 MG tablet Take 1 tablet by mouth 3 times daily as needed for Dizziness  Miguelangel Clark MD   Cholecalciferol (VITAMIN D3) 2000 units CAPS Take 1 capsule by mouth daily  Miguelangel Clark MD   azelastine (ASTELIN) 0.1 % nasal spray 2 sprays by Nasal route 2 times daily Use in each nostril as directed  Miguelangel Clark MD   Multiple Vitamins-Minerals (MULTIVITAMIN ADULT PO) Take by mouth  Historical Provider, MD   butalbital-acetaminophen-caffeine (FIORICET, ESGIC) -40 MG per tablet Take 1 tablet by mouth every 4 hours as needed for Headaches  Miguelangel Clark MD   spironolactone (ALDACTONE) 25 MG tablet TAKE ONE TABLET BY MOUTH ONCE DAILY  Miguelangel Clark MD   EPINEPHrine (EPIPEN 2-SHAUN) 0.3 MG/0.3ML SOAJ injection Inject 0.3 mLs into the muscle once for 1 dose Use as directed for allergic reaction  Miguelangel Clark MD       Social History     Tobacco Use    Smoking status: Never Smoker    Smokeless tobacco: Never Used   Substance Use Topics    Alcohol use: No    Drug use: No        Allergies   Allergen Reactions    Codeine Hives    Lisinopril Hives   ,   Past Medical History:   Diagnosis Date    Abnormal CT of spine     Acute sinusitis     Arthritis     Asthma     Bronchitis     Carpal tunnel syndrome     both    CHF (congestive heart failure) (HCC)     Depression     Fibromyalgia     Furuncle     Gout     Hyperlipidemia     Hypertension     Low vitamin D level     Lumbago     Lumbar radiculopathy     Neuropathy     feet    Obesity     Rheumatoid nodule of knee ([address]     Sleep apnea     UTI (urinary tract infection)     Vertigo     Vitamin D deficiency    ,   Past Surgical History:   Procedure Laterality Date     SECTION      times two    HYSTERECTOMY     ,   Social History     Tobacco Use    Smoking status: Never Smoker    Smokeless tobacco: Never Used   Substance Use Topics    Alcohol use: No    Drug use: No   ,   Family History   Problem Relation Age of Onset    Heart treatment and/or call 911 if deemed necessary. Patient identification was verified at the start of the visit: Yes    Total time spent on this encounter: Not billed by time    Services were provided through a video synchronous discussion virtually to substitute for in-person clinic visit. Patient and provider were located at their individual homes. --Danilo Sampson MD on 2020 at 1:08 PM    An electronic signature was used to authenticate this note. Medicare Annual Wellness Visit - Subsequent    Name: Candelaria Castro Date: 2020   MRN: [id number] Sex: Female   Age: 59 y.o. Ethnicity: Non-/Non    : 1955 Race: Mac Lynch is here for No chief complaint on file. Screenings for behavioral, psychosocial and functional/safety risks, and cognitive dysfunction are all negative except as indicated below. These results, as well as other patient data from the [address] form, are documented in Flowsheets linked to this Encounter. Allergies   Allergen Reactions    Codeine Hives    Lisinopril Hives       Prior to Visit Medications    Medication Sig Taking?  Authorizing Provider   allopurinol (ZYLOPRIM) 300 MG tablet TAKE 1 TABLET BY MOUTH DAILY  Tere Pineda MD   amLODIPine (NORVASC) 10 MG tablet TAKE ONE TABLET BY MOUTH DAILY  Danilo Sampson MD   DULoxetine (CYMBALTA) 60 MG extended release capsule TAKE 1 CAPSULE BY MOUTH ONCE DAILY IN THE MORNING  Tere Loco MD   celecoxib (CELEBREX) 100 MG capsule TAKE 1 CAPSULE BY MOUTH 2 TIMES DAILY  Danilo Sampson MD   spironolactone (ALDACTONE) 25 MG tablet TAKE ONE TABLET BY MOUTH ONCE DAILY  Danilo Sampson MD   fenofibrate (TRICOR) 145 MG tablet TAKE 1 TABLET BY MOUTH DAILY (FOR TRIGLYCERIDES/CHOLESTEROL)  Danilo Sampson MD   amitriptyline (ELAVIL) 25 MG tablet Take 1 tablet by mouth nightly Indications: Nerve Disease  Danilo Sampson MD   carvedilol (COREG) 25 MG tablet Take 1 tablet by mouth 2 times daily  Kaylah Real MD   furosemide (LASIX) 40 MG tablet Take 1.5 tablets by mouth daily  Kaylah Real MD   meclizine (ANTIVERT) 25 MG tablet Take 1 tablet by mouth 3 times daily as needed for Dizziness  Kaylah Real MD   Cholecalciferol (VITAMIN D3) 2000 units CAPS Take 1 capsule by mouth daily  Kaylah Real MD   azelastine (ASTELIN) 0.1 % nasal spray 2 sprays by Nasal route 2 times daily Use in each nostril as directed  Kaylah Real MD   Multiple Vitamins-Minerals (MULTIVITAMIN ADULT PO) Take by mouth  Historical Provider, MD   butalbital-acetaminophen-caffeine (FIORICET, ESGIC) -40 MG per tablet Take 1 tablet by mouth every 4 hours as needed for Headaches  Kaylah Real MD   spironolactone (ALDACTONE) 25 MG tablet TAKE ONE TABLET BY MOUTH ONCE DAILY  Kaylah Real MD   EPINEPHrine (EPIPEN 2-SHAUN) 0.3 MG/0.3ML SOAJ injection Inject 0.3 mLs into the muscle once for 1 dose Use as directed for allergic reaction  Kaylah Real MD       Past Medical History:   Diagnosis Date    Abnormal CT of spine     Acute sinusitis     Arthritis     Asthma     Bronchitis     Carpal tunnel syndrome     both    CHF (congestive heart failure) (HCC)     Depression     Fibromyalgia     Furuncle     Gout     Hyperlipidemia     Hypertension     Low vitamin D level     Lumbago     Lumbar radiculopathy     Neuropathy     feet    Obesity     Rheumatoid nodule of knee ([address]     Sleep apnea     UTI (urinary tract infection)     Vertigo     Vitamin D deficiency        Past Surgical History:   Procedure Laterality Date     SECTION      times two    HYSTERECTOMY         Family History   Problem Relation Age of Onset    Heart Disease Mother     Hypertension Mother     Stroke Father     Hypertension Father     Hypertension Other     Heart Disease Other     Cancer Other        CareTeam (Including outside providers/suppliers regularly involved in providing care):   Patient Care Team:  Beto Albert MD as PCP - General (Family Medicine)  Beto Albert MD as PCP - [facility] Empaneled Provider  Arabella Paulson MD as Consulting Physician (Otolaryngology)    Wt Readings from Last 3 Encounters:   08/09/19 (!) 350 lb (158.8 kg)   02/05/19 (!) 360 lb (163.3 kg)   01/09/19 (!) 360 lb (163.3 kg)     There were no vitals filed for this visit. The following problems were reviewed today and where indicated follow up appointments were made and/or referrals ordered.     Risk Factor Screenings with Interventions     Fall Risk:     Fall Risk Interventions:  No falls    · Home safety tips provided    Depression:     Depression Interventions:  · Relaxation techniques discussed    Anxiety:     Anxiety Interventions:  · Relaxation techniques discussed    Cognitive:     Cognitive Impairment Interventions:  · Patient declines any further evaluation/treatment for cognitive impairment  · Denies cognitive impairment    Substance Abuse:  Social History     Socioeconomic History    Marital status:      Spouse name: Not on file    Number of children: Not on file    Years of education: Not on file    Highest education level: Not on file   Occupational History    Not on file   Social Needs    Financial resource strain: Not on file    Food insecurity     Worry: Not on file     Inability: Not on file    Transportation needs     Medical: Not on file     Non-medical: Not on file   Tobacco Use    Smoking status: Never Smoker    Smokeless tobacco: Never Used   Substance and Sexual Activity    Alcohol use: No    Drug use: No    Sexual activity: Not on file   Lifestyle    Physical activity     Days per week: Not on file     Minutes per session: Not on file    Stress: Not on file   Relationships    Social connections     Talks on phone: Not on file     Gets together: Not on file     Attends [employer] service: Not on file     Active member of club or organization: Not on file     Attends meetings of clubs or organizations: Not on file     Relationship status: Not on file    Intimate partner violence     Fear of current or ex partner: Not on file     Emotionally abused: Not on file     Physically abused: Not on file     Forced sexual activity: Not on file   Other Topics Concern    Not on file   Social History Narrative    Not on file        Substance Abuse Interventions:  · none needed    Health Risk Assessment:        General Health Risk Interventions:  · none neeeed       There is no height or weight on file to calculate BMI. Health Habits/Nutrition Interventions:  · none needed       Hearing/Vision Interventions:  · Hearing concerns:  patient declines any further evaluation/treatment for hearing issues       Safety Interventions:  · Patient declines any further evaluation/treatment for this issue       ADL Interventions:  · Patient declines any further evaluation/treatment for this issue    Personalized Preventive Plan   Current Health Maintenance Status  Immunization History   Administered Date(s) Administered    Pneumococcal Conjugate 13-valent (Vitor Brooke) 11/02/2018        Health Maintenance   Topic Date Due    Hepatitis C screen  1955    HIV screen  08/12/1970    DTaP/Tdap/Td vaccine (1 - Tdap) 08/12/1974    Cervical cancer screen  08/12/1976    Shingles Vaccine (1 of 2) 08/12/2005    Pneumococcal 0-64 years Vaccine (1 of 1 - PPSV23) 12/28/2018    Annual Wellness Visit (AWV)  05/29/2019    Breast cancer screen  09/21/2019    Potassium monitoring  08/02/2020    Creatinine monitoring  08/02/2020    Flu vaccine (Season Ended) 09/01/2020    Colon cancer screen colonoscopy  10/22/2020    Lipid screen  08/02/2024    Hepatitis A vaccine  Aged Out    Hepatitis B vaccine  Aged Out    Hib vaccine  Aged Out    Meningococcal (ACWY) vaccine  Aged Out       Recommendations for [employer] Due: see orders.   Recommended screening

## 2020-07-06 RX ORDER — BUTALBITAL, ACETAMINOPHEN AND CAFFEINE 50; 325; 40 MG/1; MG/1; MG/1
TABLET ORAL
Qty: 10 TABLET | Refills: 3 | Status: SHIPPED | OUTPATIENT
Start: 2020-07-06 | End: 2021-10-22

## 2020-07-13 RX ORDER — CHOLECALCIFEROL (VITAMIN D3) 125 MCG
CAPSULE ORAL
Qty: 90 TABLET | Refills: 3 | Status: SHIPPED | OUTPATIENT
Start: 2020-07-13 | End: 2021-06-10 | Stop reason: SDUPTHER

## 2020-07-13 NOTE — TELEPHONE ENCOUNTER
[facility] called requesting a refill of the below medication which has been pended for you:     Requested Prescriptions     Pending Prescriptions Disp Refills    Cholecalciferol (VITAMIN D3) 50 MCG (2000 UT) TABS [Pharmacy Med Name: VITAMIN D3 2000 UNIT TABS 50 MCG TAB] 90 tablet 3     Sig: TAKE 1 TABLET BY MOUTH DAILY       Last Appointment Date: 4/23/2020  Next Appointment Date: 10/23/2020    Allergies   Allergen Reactions    Codeine Hives    Lisinopril Hives

## 2020-07-27 ENCOUNTER — OFFICE VISIT (OUTPATIENT)
Dept: CARDIOLOGY | Facility: CLINIC | Age: 65
End: 2020-07-27

## 2020-07-27 VITALS
BODY MASS INDEX: 55.32 KG/M2 | DIASTOLIC BLOOD PRESSURE: 63 MMHG | HEART RATE: 78 BPM | SYSTOLIC BLOOD PRESSURE: 108 MMHG | WEIGHT: 293 LBS | HEIGHT: 61 IN | OXYGEN SATURATION: 95 %

## 2020-07-27 DIAGNOSIS — I50.42 CHRONIC COMBINED SYSTOLIC AND DIASTOLIC CONGESTIVE HEART FAILURE (HCC): Primary | ICD-10-CM

## 2020-07-27 DIAGNOSIS — R06.02 SHORTNESS OF BREATH: ICD-10-CM

## 2020-07-27 DIAGNOSIS — I10 ESSENTIAL HYPERTENSION: ICD-10-CM

## 2020-07-27 DIAGNOSIS — E66.01 MORBID OBESITY DUE TO EXCESS CALORIES (HCC): ICD-10-CM

## 2020-07-27 PROCEDURE — 99214 OFFICE O/P EST MOD 30 MIN: CPT | Performed by: NURSE PRACTITIONER

## 2020-07-27 PROCEDURE — 93000 ELECTROCARDIOGRAM COMPLETE: CPT | Performed by: NURSE PRACTITIONER

## 2020-07-27 NOTE — PROGRESS NOTES
Subjective:     Encounter Date:07/27/2020      Patient ID: Pau Cabrera is a 64 y.o. female.    Chief Complaint:  Congestive Heart Failure   Presents for follow-up visit. Associated symptoms include shortness of breath. Pertinent negatives include no abdominal pain, chest pain, near-syncope or palpitations. The symptoms have been stable. Compliance with total regimen is %. Compliance with diet is %. Compliance with exercise is %. Compliance with medications is %.   Hypertension   This is a chronic problem. The current episode started more than 1 year ago. Associated symptoms include malaise/fatigue and shortness of breath. Pertinent negatives include no chest pain, headaches, orthopnea, palpitations or PND. Past treatments include beta blockers, calcium channel blockers and diuretics. The current treatment provides significant improvement.     Patient presents today for routine follow up for combined congestive heart failure. LVEF in 2013 was 40-45%. Echo last year had improved to 55%. She did have a low risk stress test at time of low EF. Chief Complaint is shortness of breath- chronic and unchanged. This is worse on exertion. She is relatively inactive. She does do stationary exercise. Reports good BP Control. Hives noted while taking Lisinopril. She follows with Dr. Barbosa as her PCP. Denies chest pain, palpitations, lightheadedness and syncope. She does report occasional leg swelling.     The following portions of the patient's history were reviewed and updated as appropriate: allergies, current medications, past family history, past medical history, past social history, past surgical history and problem list.  Prior to Admission medications    Medication Sig Start Date End Date Taking? Authorizing Provider   allopurinol (ZYLOPRIM) 300 MG tablet  8/30/16  Yes Provider, MD Mamie   amitriptyline (ELAVIL) 25 MG tablet Take 25 mg by mouth Every Night.   Yes Provider  MD Mamie   amLODIPine (NORVASC) 10 MG tablet Take 10 mg by mouth Daily.   Yes Mamie Chase MD   azelastine (ASTELIN) 0.1 % nasal spray 2 sprays into each nostril 2 (Two) Times a Day. Use in each nostril as directed   Yes Mamie Chase MD   butalbital-acetaminophen-caffeine (FIORICET, ESGIC) -40 MG per tablet Take 1 tablet by mouth Every 4 (Four) Hours As Needed for Headache.   Yes Mamie Chase MD   carvedilol (COREG) 25 MG tablet TAKE 1 TABLET BY MOUTH TWICE A DAY 10/2/17  Yes Yoan Talamantes MD   cholecalciferol (VITAMIN D3) 1000 UNITS tablet Take 1,000 Units by mouth daily.   Yes Mamie Chase MD   DULoxetine (CYMBALTA) 30 MG capsule 60 mg. 8/30/16  Yes Mamie Chase MD   EPINEPHrine (ADRENALIN) 0.05 MG/ML syringe Inject 20 mL as directed.   Yes Mamie Chase MD   fenofibrate (TRICOR) 145 MG tablet Take 145 mg by mouth Daily.   Yes Mamie Chase MD   furosemide (LASIX) 40 MG tablet Take 40 mg by mouth daily. TAKE 1 & 1/2 TABS DAILY   Yes Mamie Chase MD   meclizine (ANTIVERT) 25 MG tablet Take 25 mg by mouth 3 (three) times a day as needed for dizziness.   Yes Mamie Chase MD   Multiple Vitamins-Minerals (MULTIVITAMIN ADULT PO) Take  by mouth.   Yes Mamie Chase MD   spironolactone (ALDACTONE) 25 MG tablet Take 25 mg by mouth daily.   Yes Mamie Chase MD       Review of Systems   Constitution: Positive for malaise/fatigue. Negative for chills, decreased appetite, fever, weight gain and weight loss.   HENT: Negative for nosebleeds.    Eyes: Negative for visual disturbance.   Cardiovascular: Positive for dyspnea on exertion and leg swelling. Negative for chest pain, near-syncope, orthopnea, palpitations, paroxysmal nocturnal dyspnea and syncope.   Respiratory: Positive for shortness of breath. Negative for cough, hemoptysis and snoring.    Endocrine: Negative for cold intolerance and heat intolerance.  "  Hematologic/Lymphatic: Negative for bleeding problem. Does not bruise/bleed easily.   Skin: Negative for rash.   Musculoskeletal: Negative for back pain and falls.   Gastrointestinal: Negative for abdominal pain, constipation, diarrhea, heartburn, melena, nausea and vomiting.   Genitourinary: Negative for hematuria.   Neurological: Negative for dizziness, headaches and light-headedness.   Psychiatric/Behavioral: Negative for altered mental status.   Allergic/Immunologic: Negative for persistent infections.         ECG 12 Lead  Date/Time: 7/27/2020 3:42 PM  Performed by: Felipe Hagan APRN  Authorized by: Felipe Hagan APRN   Comparison: compared with previous ECG from 6/14/2019  Similar to previous ECG  Rhythm: sinus rhythm  Other findings: left ventricular hypertrophy               Objective:     Physical Exam   Constitutional: She is oriented to person, place, and time. She appears well-developed and well-nourished.   obesity   HENT:   Head: Normocephalic and atraumatic.   Eyes: Pupils are equal, round, and reactive to light.   Neck: Normal range of motion. Neck supple. No JVD present. Carotid bruit is not present.   Cardiovascular: Normal rate, regular rhythm, normal heart sounds and intact distal pulses.   Pulmonary/Chest: Effort normal and breath sounds normal.   Abdominal: Soft. Bowel sounds are normal.   Musculoskeletal: Normal range of motion. She exhibits edema (trace).   Neurological: She is alert and oriented to person, place, and time. She has normal reflexes.   Skin: Skin is warm and dry.   Psychiatric: She has a normal mood and affect. Her behavior is normal. Judgment and thought content normal.     Blood pressure 108/63, pulse 78, height 154.9 cm (61\"), weight (!) 162 kg (357 lb), SpO2 95 %.      Lab Review:       Assessment:          Diagnosis Plan   1. Chronic combined systolic and diastolic congestive heart failure (CMS/HCC)     2. Essential hypertension     3. Morbid obesity due to excess " calories (CMS/[facility])     4. Shortness of breath            Plan:       1. Combined congestive heart failure- LVEF previously 40% had improved to 55%. On Coreg, Aldactone and Norvasc. Allergy to Lisinopril with hives. Discussed low salt diet. Discussed importance of weight loss. Chronic shortness of breath unchanged.  2. Blood Pressure controlled. Stable. On Norvasc, Coreg and Aldactone.  3. Patient's Body mass index is 67.45 kg/m². BMI is above normal parameters. Recommendations include: exercise counseling and nutrition counseling.  4. Shortness of Breath- chronic and unchanged. Likely multifactorial with obesity, deconditioned and diastolic CHF.

## 2020-09-14 RX ORDER — CELECOXIB 100 MG/1
100 CAPSULE ORAL 2 TIMES DAILY
Qty: 180 CAPSULE | Refills: 0 | Status: SHIPPED | OUTPATIENT
Start: 2020-09-14 | End: 2020-12-21

## 2020-09-28 RX ORDER — CARVEDILOL 25 MG/1
25 TABLET ORAL 2 TIMES DAILY
Qty: 180 TABLET | Refills: 3 | Status: SHIPPED | OUTPATIENT
Start: 2020-09-28 | End: 2021-11-23

## 2020-09-28 NOTE — TELEPHONE ENCOUNTER
Doreen Oconnor called requesting a refill of the below medication which has been pended for you:     Requested Prescriptions     Pending Prescriptions Disp Refills    carvedilol (COREG) 25 MG tablet [Pharmacy Med Name: CARVEDILOL 25 MG TABS 25 Tablet] 180 tablet 3     Sig: TAKE 1 TABLET BY MOUTH 2 TIMES DAILY       Last Appointment Date: 4/23/2020  Next Appointment Date: 10/23/2020    Allergies   Allergen Reactions    Codeine Hives    Lisinopril Hives

## 2020-10-01 ENCOUNTER — OFFICE VISIT (OUTPATIENT)
Dept: GASTROENTEROLOGY | Facility: CLINIC | Age: 65
End: 2020-10-01

## 2020-10-01 VITALS
WEIGHT: 293 LBS | HEART RATE: 82 BPM | OXYGEN SATURATION: 94 % | SYSTOLIC BLOOD PRESSURE: 132 MMHG | DIASTOLIC BLOOD PRESSURE: 78 MMHG | TEMPERATURE: 97.1 F | BODY MASS INDEX: 53.92 KG/M2 | HEIGHT: 62 IN

## 2020-10-01 DIAGNOSIS — Z80.0 FAMILY HX OF COLON CANCER: Primary | ICD-10-CM

## 2020-10-01 DIAGNOSIS — I10 HTN (HYPERTENSION), BENIGN: ICD-10-CM

## 2020-10-01 PROCEDURE — 99212 OFFICE O/P EST SF 10 MIN: CPT | Performed by: CLINICAL NURSE SPECIALIST

## 2020-10-01 RX ORDER — SODIUM, POTASSIUM,MAG SULFATES 17.5-3.13G
SOLUTION, RECONSTITUTED, ORAL ORAL
Qty: 2 BOTTLE | Refills: 0 | Status: SHIPPED | OUTPATIENT
Start: 2020-10-01 | End: 2021-06-07 | Stop reason: HOSPADM

## 2020-10-01 RX ORDER — AMITRIPTYLINE HYDROCHLORIDE 25 MG/1
TABLET, FILM COATED ORAL
Qty: 90 TABLET | Refills: 3 | Status: SHIPPED | OUTPATIENT
Start: 2020-10-01 | End: 2021-11-23

## 2020-10-01 NOTE — PROGRESS NOTES
Pua Cabrera  1955      10/1/2020  Chief Complaint   Patient presents with   • Colonoscopy     Subjective   HPI  Pau Cabrera is a 65 y.o. female who presents as a referral for preventative maintenance. She has no complaints of nausea or vomiting. No change in bowels. No wt loss. No BRBPR. No melena. There is positive family hx for colon cancer. No abdominal pain.  Past Medical History:   Diagnosis Date   • Anemia     iron def   • Anxiety    • Asthma    • Back pain    • CHF (congestive heart failure) (CMS/HCC)    • CTS (carpal tunnel syndrome)    • Depression    • Fibromyalgia    • GERD (gastroesophageal reflux disease)    • Gout    • Hyperlipemia    • Hyperlipidemia    • Hypertension    • Obesity    • Obstructive sleep apnea     could not tolerate machine    • Peripheral neuropathy      Past Surgical History:   Procedure Laterality Date   • BREAST BIOPSY Left    •  SECTION      X 2   • COLONOSCOPY  10/22/2015    Tics repeat exam in 5 years   • COLONOSCOPY  2007    Small hemorrhoids repeat exam in 3 years   • HYSTERECTOMY      total   • UMBILICAL HERNIA REPAIR      had 1/2 of it repaired with hysterectomy      Outpatient Medications Marked as Taking for the 10/1/20 encounter (Office Visit) with Rosa Fernando APRN   Medication Sig Dispense Refill   • allopurinol (ZYLOPRIM) 300 MG tablet      • amitriptyline (ELAVIL) 25 MG tablet Take 25 mg by mouth Every Night.     • amLODIPine (NORVASC) 10 MG tablet Take 10 mg by mouth Daily.     • azelastine (ASTELIN) 0.1 % nasal spray 2 sprays into each nostril 2 (Two) Times a Day. Use in each nostril as directed     • butalbital-acetaminophen-caffeine (FIORICET, ESGIC) -40 MG per tablet Take 1 tablet by mouth Every 4 (Four) Hours As Needed for Headache.     • carvedilol (COREG) 25 MG tablet TAKE 1 TABLET BY MOUTH TWICE A DAY 60 tablet 3   • cholecalciferol (VITAMIN D3) 1000 UNITS tablet Take 1,000 Units by mouth daily.     •  DULoxetine (CYMBALTA) 30 MG capsule 60 mg.     • EPINEPHrine (ADRENALIN) 0.05 MG/ML syringe Inject 20 mL as directed.     • fenofibrate (TRICOR) 145 MG tablet Take 145 mg by mouth Daily.     • furosemide (LASIX) 40 MG tablet Take 40 mg by mouth daily. TAKE 1 & 1/2 TABS DAILY     • meclizine (ANTIVERT) 25 MG tablet Take 25 mg by mouth 3 (three) times a day as needed for dizziness.     • Multiple Vitamins-Minerals (MULTIVITAMIN ADULT PO) Take  by mouth.     • spironolactone (ALDACTONE) 25 MG tablet Take 25 mg by mouth daily.       Allergies   Allergen Reactions   • Codeine Sulfate Hives   • Lisinopril Swelling and Rash     Facial swelling     Social History     Socioeconomic History   • Marital status:      Spouse name: Not on file   • Number of children: Not on file   • Years of education: Not on file   • Highest education level: Not on file   Tobacco Use   • Smoking status: Never Smoker   • Smokeless tobacco: Never Used   Substance and Sexual Activity   • Alcohol use: No   • Drug use: No   • Sexual activity: Defer     Birth control/protection: Surgical     Family History   Problem Relation Age of Onset   • Arthritis Mother    • Heart disease Mother    • Hypertension Mother    • Arthritis Father    • Hypertension Father    • Anuerysm Father    • Stroke Father    • Asthma Brother    • Colon polyps Neg Hx    • Colon cancer Neg Hx      Health Maintenance   Topic Date Due   • ZOSTER VACCINE (1 of 2) 08/12/2005   • HEPATITIS C SCREENING  04/18/2017   • MEDICARE ANNUAL WELLNESS  04/18/2017   • MAMMOGRAM  09/21/2019   • INFLUENZA VACCINE  08/01/2020   • LIPID PANEL  08/02/2020   • Pneumococcal Vaccine 65+ (1 of 1 - PPSV23) 08/12/2020   • TDAP/TD VACCINES (2 - Td) 10/11/2021   • PAP SMEAR  02/11/2023   • COLONOSCOPY  10/22/2025       REVIEW OF SYSTEMS  General: well appearing, no fever chills or sweats, no unexplained wt loss  HEENT: no acute visual or hearing disturbances  Cardiovascular: No chest pain or  "palpitations  Pulmonary: No shortness of breath, coughing, wheezing or hemoptysis  : No burning, urgency, hematuria, or dysuria  Musculoskeletal: No joint pain or stiffness  Peripheral: no edema  Skin: No lesions or rashes  Neuro: No dizziness, headaches, stroke, syncope  Endocrine: No hot or cold intolerances  Hematological: No blood dyscrasias    Objective   Vitals:    10/01/20 1251   BP: 132/78   Pulse: 82   Temp: 97.1 °F (36.2 °C)   SpO2: 94%   Weight: (!) 163 kg (359 lb)   Height: 156.2 cm (61.5\")     Body mass index is 66.73 kg/m².  Patient's Body mass index is 66.73 kg/m². BMI is above normal parameters. Recommendations include: nutrition counseling.      PHYSICAL EXAM  General: age appropriate well nourished well appearing, no acute distress  Head: normocephalic and atraumatic  Global assessment-supple  Neck-No JVD noted, no lymphadenopathy  Pulmonary-clear to auscultation bilaterally, normal respiratory effort  Cardiovascular-normal rate and rhythm, normal heart sounds, S1 and S2 noted  Abdomen-soft, non tender, non distended, normal bowel sounds all 4 quadrants, no hepatosplenomegaly noted  Extremities-No clubbing cyanosis or edema  Neuro-Non focal, converses appropriately, awake, alert, oriented    Assessment/Plan     Pau was seen today for colonoscopy.    Diagnoses and all orders for this visit:    Family hx of colon cancer  -     Case Request; Standing  -     Case Request  -     Suprep Bowel Prep Kit 17.5-3.13-1.6 GM/177ML solution oral solution; Take as directed by office instructions provided    HTN (hypertension), benign  Comments:  cont BP medication the day of procedure        COLONOSCOPY WITH ANESTHESIA (N/A)  Body mass index is 66.73 kg/m².    Patient instructions on prep prior to procedure provided to the patient.    All risks, benefits, alternatives, and indications of colonoscopy procedure have been discussed with the patient. Risks to include perforation of the colon requiring possible " surgery or colostomy, risk of bleeding from biopsies or removal of colon tissue, possibility of missing a colon polyp or cancer, or adverse drug reaction.  Benefits to include the diagnosis and management of disease of the colon and rectum. Alternatives to include barium enema, radiographic evaluation, lab testing or no intervention. Pt verbalizes understanding and agrees.     Rosa Blessing Fernando, APRN  10/1/2020  13:18 CDT      IF YOU SMOKE OR USE TOBACCO PLEASE READ THE FOLLOWIN minutes reading provided    Why is smoking bad for me?  Smoking increases the risk of heart disease, lung disease, vascular disease, stroke, and cancer.     If you smoke, STOP!    If you would like more information on quitting smoking, please visit the [employer] website: [email or web address]/[email or web address]   This link will provide additional resources including the QUIT line and the [employer] the [employer] support groups.     For more information:    Quit Now [place]  -QUIT-NOW  [email or web address]/en-US/    Obesity, Adult  Obesity is the condition of having too much total body fat. Being overweight or obese means that your weight is greater than what is considered healthy for your body size. Obesity is determined by a measurement called BMI. BMI is an estimate of body fat and is calculated from height and weight. For adults, a BMI of 30 or higher is considered obese.  Obesity can eventually lead to other health concerns and major illnesses, including:  · Stroke.  · Coronary artery disease (CAD).  · Type 2 diabetes.  · Some types of cancer, including cancers of the colon, breast, uterus, and gallbladder.  · Osteoarthritis.  · High blood pressure (hypertension).  · High cholesterol.  · Sleep apnea.  · Gallbladder stones.  · Infertility problems.  What are the causes?  The main cause of obesity is taking in (consuming) more calories than your body uses for energy. Other factors that contribute to  this condition may include:  · Being born with genes that make you more likely to become obese.  · Having a medical condition that causes obesity. These conditions include:  ¨ Hypothyroidism.  ¨ Polycystic ovarian syndrome (PCOS).  ¨ Binge-eating disorder.  ¨ Cushing syndrome.  · Taking certain medicines, such as steroids, antidepressants, and seizure medicines.  · Not being physically active (sedentary lifestyle).  · Living where there are limited places to exercise safely or buy healthy foods.  · Not getting enough sleep.  What increases the risk?  The following factors may increase your risk of this condition:  · Having a family history of obesity.  · Being a woman of -American descent.  · Being a man of  descent.  What are the signs or symptoms?  Having excessive body fat is the main symptom of this condition.  How is this diagnosed?  This condition may be diagnosed based on:  · Your symptoms.  · Your medical history.  · A physical exam. Your health care provider may measure:  ¨ Your BMI. If you are an adult with a BMI between 25 and less than 30, you are considered overweight. If you are an adult with a BMI of 30 or higher, you are considered obese.  ¨ The distances around your hips and your waist (circumferences). These may be compared to each other to help diagnose your condition.  ¨ Your skinfold thickness. Your health care provider may gently pinch a fold of your skin and measure it.  How is this treated?  Treatment for this condition often includes changing your lifestyle. Treatment may include some or all of the following:  · Dietary changes. Work with your health care provider and a dietitian to set a weight-loss goal that is healthy and reasonable for you. Dietary changes may include eating:  ¨ Smaller portions. A portion size is the amount of a particular food that is healthy for you to eat at one time. This varies from person to person.  ¨ Low-calorie or low-fat options.  ¨ More whole  grains, fruits, and vegetables.  · Regular physical activity. This may include aerobic activity (cardio) and strength training.  · Medicine to help you lose weight. Your health care provider may prescribe medicine if you are unable to lose 1 pound a week after 6 weeks of eating more healthily and doing more physical activity.  · Surgery. Surgical options may include gastric banding and gastric bypass. Surgery may be done if:  ¨ Other treatments have not helped to improve your condition.  ¨ You have a BMI of 40 or higher.  ¨ You have life-threatening health problems related to obesity.  Follow these instructions at home:     Eating and drinking     · Follow recommendations from your health care provider about what you eat and drink. Your health care provider may advise you to:  ¨ Limit fast foods, sweets, and processed snack foods.  ¨ Choose low-fat options, such as low-fat milk instead of whole milk.  ¨ Eat 5 or more servings of fruits or vegetables every day.  ¨ Eat at home more often. This gives you more control over what you eat.  ¨ Choose healthy foods when you eat out.  ¨ Learn what a healthy portion size is.  ¨ Keep low-fat snacks on hand.  ¨ Avoid sugary drinks, such as soda, fruit juice, iced tea sweetened with sugar, and flavored milk.  ¨ Eat a healthy breakfast.  · Drink enough water to keep your urine clear or pale yellow.  · Do not go without eating for long periods of time (do not fast) or follow a fad diet. Fasting and fad diets can be unhealthy and even dangerous.  Physical Activity   · Exercise regularly, as told by your health care provider. Ask your health care provider what types of exercise are safe for you and how often you should exercise.  · Warm up and stretch before being active.  · Cool down and stretch after being active.  · Rest between periods of activity.  Lifestyle   · Limit the time that you spend in front of your TV, computer, or video game system.  · Find ways to reward yourself  that do not involve food.  · Limit alcohol intake to no more than 1 drink a day for nonpregnant women and 2 drinks a day for men. One drink equals 12 oz of beer, 5 oz of wine, or 1½ oz of hard liquor.  General instructions   · Keep a weight loss journal to keep track of the food you eat and how much you exercise you get.  · Take over-the-counter and prescription medicines only as told by your health care provider.  · Take vitamins and supplements only as told by your health care provider.  · Consider joining a support group. Your health care provider may be able to recommend a support group.  · Keep all follow-up visits as told by your health care provider. This is important.  Contact a health care provider if:  · You are unable to meet your weight loss goal after 6 weeks of dietary and lifestyle changes.  This information is not intended to replace advice given to you by your health care provider. Make sure you discuss any questions you have with your health care provider.  Document Released: 01/25/2006 Document Revised: 05/22/2017 Document Reviewed: 10/05/2016  Elsevier Interactive Patient Education © 2017 [employer]

## 2020-10-05 PROBLEM — Z80.0 FAMILY HX OF COLON CANCER: Status: ACTIVE | Noted: 2020-10-05

## 2020-10-30 RX ORDER — ALLOPURINOL 300 MG/1
300 TABLET ORAL DAILY
Qty: 30 TABLET | Refills: 5 | Status: SHIPPED | OUTPATIENT
Start: 2020-10-30 | End: 2021-06-03

## 2020-10-30 NOTE — TELEPHONE ENCOUNTER
Arpan Lipps called requesting a refill of the below medication which has been pended for you:     Requested Prescriptions     Pending Prescriptions Disp Refills    allopurinol (ZYLOPRIM) 300 MG tablet [Pharmacy Med Name: ALLOPURINOL 300 MG TABLET 300 Tablet] 30 tablet 5     Sig: TAKE 1 TABLET BY MOUTH DAILY       Last Appointment Date: 10/23/2020  Next Appointment Date: 11/30/2020    Allergies   Allergen Reactions    Codeine Hives    Lisinopril Hives

## 2020-11-04 ENCOUNTER — TRANSCRIBE ORDERS (OUTPATIENT)
Dept: LAB | Facility: HOSPITAL | Age: 65
End: 2020-11-04

## 2020-11-04 DIAGNOSIS — Z01.818 PRE-OP TESTING: Primary | ICD-10-CM

## 2020-11-09 ENCOUNTER — APPOINTMENT (OUTPATIENT)
Dept: LAB | Facility: HOSPITAL | Age: 65
End: 2020-11-09

## 2020-11-10 ENCOUNTER — TRANSCRIBE ORDERS (OUTPATIENT)
Dept: LAB | Facility: HOSPITAL | Age: 65
End: 2020-11-10

## 2020-11-10 ENCOUNTER — LAB (OUTPATIENT)
Dept: LAB | Facility: HOSPITAL | Age: 65
End: 2020-11-10

## 2020-11-10 DIAGNOSIS — Z01.818 PRE-OP TESTING: Primary | ICD-10-CM

## 2020-11-10 LAB — SARS-COV-2 N GENE RESP QL NAA+PROBE: NOT DETECTED

## 2020-11-10 PROCEDURE — 87635 SARS-COV-2 COVID-19 AMP PRB: CPT | Performed by: INTERNAL MEDICINE

## 2020-11-10 PROCEDURE — C9803 HOPD COVID-19 SPEC COLLECT: HCPCS | Performed by: INTERNAL MEDICINE

## 2020-11-12 ENCOUNTER — ANESTHESIA EVENT (OUTPATIENT)
Dept: GASTROENTEROLOGY | Facility: HOSPITAL | Age: 65
End: 2020-11-12

## 2020-11-12 ENCOUNTER — HOSPITAL ENCOUNTER (OUTPATIENT)
Facility: HOSPITAL | Age: 65
Setting detail: HOSPITAL OUTPATIENT SURGERY
Discharge: HOME | End: 2020-11-12
Attending: INTERNAL MEDICINE | Admitting: INTERNAL MEDICINE

## 2020-11-12 ENCOUNTER — ANESTHESIA (OUTPATIENT)
Dept: GASTROENTEROLOGY | Facility: HOSPITAL | Age: 65
End: 2020-11-12

## 2020-11-12 VITALS
SYSTOLIC BLOOD PRESSURE: 103 MMHG | OXYGEN SATURATION: 92 % | TEMPERATURE: 97.6 F | WEIGHT: 293 LBS | DIASTOLIC BLOOD PRESSURE: 78 MMHG | BODY MASS INDEX: 55.32 KG/M2 | RESPIRATION RATE: 14 BRPM | HEART RATE: 89 BPM | HEIGHT: 61 IN

## 2020-11-12 DIAGNOSIS — Z80.0 FAMILY HX OF COLON CANCER: ICD-10-CM

## 2020-11-12 PROCEDURE — G0121 COLON CA SCRN NOT HI RSK IND: HCPCS | Performed by: INTERNAL MEDICINE

## 2020-11-12 PROCEDURE — 25010000002 PROPOFOL 10 MG/ML EMULSION: Performed by: NURSE ANESTHETIST, CERTIFIED REGISTERED

## 2020-11-12 RX ORDER — SODIUM CHLORIDE 9 MG/ML
500 INJECTION, SOLUTION INTRAVENOUS CONTINUOUS PRN
Status: DISCONTINUED | OUTPATIENT
Start: 2020-11-12 | End: 2020-11-12 | Stop reason: HOSPADM

## 2020-11-12 RX ORDER — LIDOCAINE HYDROCHLORIDE 10 MG/ML
0.5 INJECTION, SOLUTION EPIDURAL; INFILTRATION; INTRACAUDAL; PERINEURAL ONCE AS NEEDED
Status: DISCONTINUED | OUTPATIENT
Start: 2020-11-12 | End: 2020-11-12 | Stop reason: HOSPADM

## 2020-11-12 RX ORDER — PROPOFOL 10 MG/ML
VIAL (ML) INTRAVENOUS AS NEEDED
Status: DISCONTINUED | OUTPATIENT
Start: 2020-11-12 | End: 2020-11-12 | Stop reason: SURG

## 2020-11-12 RX ORDER — SODIUM CHLORIDE 0.9 % (FLUSH) 0.9 %
10 SYRINGE (ML) INJECTION AS NEEDED
Status: DISCONTINUED | OUTPATIENT
Start: 2020-11-12 | End: 2020-11-12 | Stop reason: HOSPADM

## 2020-11-12 RX ADMIN — PROPOFOL 30 MG: 10 INJECTION, EMULSION INTRAVENOUS at 08:02

## 2020-11-12 RX ADMIN — LIDOCAINE HYDROCHLORIDE 100 MG: 20 INJECTION, SOLUTION INTRAVENOUS at 07:54

## 2020-11-12 RX ADMIN — SODIUM CHLORIDE 500 ML: 9 INJECTION, SOLUTION INTRAVENOUS at 07:16

## 2020-11-12 RX ADMIN — PROPOFOL 30 MG: 10 INJECTION, EMULSION INTRAVENOUS at 08:00

## 2020-11-12 RX ADMIN — PROPOFOL 30 MG: 10 INJECTION, EMULSION INTRAVENOUS at 07:56

## 2020-11-12 RX ADMIN — PROPOFOL 30 MG: 10 INJECTION, EMULSION INTRAVENOUS at 08:03

## 2020-11-12 RX ADMIN — PROPOFOL 30 MG: 10 INJECTION, EMULSION INTRAVENOUS at 07:58

## 2020-11-12 RX ADMIN — PROPOFOL 100 MG: 10 INJECTION, EMULSION INTRAVENOUS at 07:54

## 2020-11-12 NOTE — H&P
[facility]  Pre Procedure History & Physical    Chief Complaint:   Family history    Subjective     HPI:   Here for colonoscopy.  Family history of colon cancer    Past Medical History:   Past Medical History:   Diagnosis Date   • Anemia     iron def   • Anxiety    • Asthma    • Back pain    • CHF (congestive heart failure) (CMS/HCC)    • CTS (carpal tunnel syndrome)    • Depression    • Fibromyalgia    • GERD (gastroesophageal reflux disease)    • Gout    • Hyperlipemia    • Hyperlipidemia    • Hypertension    • Obesity    • Obstructive sleep apnea     could not tolerate machine    • Peripheral neuropathy        Past Surgical History:  Past Surgical History:   Procedure Laterality Date   • BREAST BIOPSY Left    •  SECTION      X 2   • COLONOSCOPY  10/22/2015    Tics repeat exam in 5 years   • COLONOSCOPY  2007    Small hemorrhoids repeat exam in 3 years   • HYSTERECTOMY      total   • UMBILICAL HERNIA REPAIR      had 1/2 of it repaired with hysterectomy        Family History:  Family History   Problem Relation Age of Onset   • Arthritis Mother    • Heart disease Mother    • Hypertension Mother    • Arthritis Father    • Hypertension Father    • Anuerysm Father    • Stroke Father    • Asthma Brother    • Colon polyps Neg Hx    • Colon cancer Neg Hx        Social History:   reports that she has never smoked. She has never used smokeless tobacco. She reports that she does not drink alcohol or use drugs.    Medications:   Prior to Admission medications    Medication Sig Start Date End Date Taking? Authorizing Provider   allopurinol (ZYLOPRIM) 300 MG tablet  16  Yes ProviderMamie MD   amitriptyline (ELAVIL) 25 MG tablet Take 25 mg by mouth Every Night.   Yes ProviderMamie MD   amLODIPine (NORVASC) 10 MG tablet Take 10 mg by mouth Daily.   Yes ProviderMamie MD   azelastine (ASTELIN) 0.1 % nasal spray 2 sprays into each nostril 2 (Two) Times a Day. Use in  "each nostril as directed   Yes Mamie Chase MD   carvedilol (COREG) 25 MG tablet TAKE 1 TABLET BY MOUTH TWICE A DAY 10/2/17  Yes Yoan Talamantes MD   cholecalciferol (VITAMIN D3) 1000 UNITS tablet Take 1,000 Units by mouth daily.   Yes Mamie Chase MD   DULoxetine (CYMBALTA) 30 MG capsule 60 mg. 8/30/16  Yes Mamie Chase MD   fenofibrate (TRICOR) 145 MG tablet Take 145 mg by mouth Daily.   Yes Mamie Chase MD   furosemide (LASIX) 40 MG tablet Take 40 mg by mouth daily. TAKE 1 & 1/2 TABS DAILY   Yes Mamie Chase MD   Suprep Bowel Prep Kit 17.5-3.13-1.6 GM/177ML solution oral solution Take as directed by office instructions provided 10/1/20  Yes Rosa Fernando APRN   butalbital-acetaminophen-caffeine (FIORICET, ESGIC) -40 MG per tablet Take 1 tablet by mouth Every 4 (Four) Hours As Needed for Headache.    Mamie Chase MD   EPINEPHrine (ADRENALIN) 0.05 MG/ML syringe Inject 20 mL as directed.    Mamie Chase MD   meclizine (ANTIVERT) 25 MG tablet Take 25 mg by mouth 3 (three) times a day as needed for dizziness.    Mamie Chase MD   Multiple Vitamins-Minerals (MULTIVITAMIN ADULT PO) Take  by mouth.    Mamie Chase MD   spironolactone (ALDACTONE) 25 MG tablet Take 25 mg by mouth daily.    Mamie Chase MD       Allergies:  Codeine sulfate and Lisinopril    Objective     Blood pressure 168/93, pulse 98, temperature 97.6 °F (36.4 °C), temperature source Temporal, resp. rate 22, height 154.9 cm (61\"), weight (!) 162 kg (357 lb), SpO2 91 %, not currently breastfeeding.    Physical Exam   Constitutional: Pt is oriented to person, place, and in no distress.   HENT: Mouth/Throat: Oropharynx is clear.   Cardiovascular: Normal rate, regular rhythm.    Pulmonary/Chest: Effort normal. No respiratory distress. No  wheezes.   Abdominal: Soft. Non-distended.  Skin: Skin is warm and dry.   Psychiatric: Mood, memory, affect and " judgment appear normal.     Assessment/Plan     Diagnosis:  Family history of colon cancer second-degree relative    Anticipated Surgical Procedure:    Proceed with colonoscopy as scheduled    The following major R/B/A were discussed with the patient, however the list is not all inclusive . Risk:  Bleeding (immediate and delayed), perforation (rupture or tear), reaction to medication, missed lesion/cancer, pain during the procedure, infection, need for surgery, need for ostomy, need for mechanical ventilation (breathing machine), death.  Benefits: removal of polyp/tissue, burn/clip/or inject to stop bleeding, removal of foreign body, dilate any stricture.  Alternatives: Xray or CT, surgery, do nothing with associated risk   The patient was given time to ask question and received explanation, and agrees to proceed as per History and Physical.   No guarantee given or expressed.    EMR Dragon/transcription disclaimer: Much of this encounter note is an electronic transcription/translation of spoken language to printed text.  The electronic translation of spoken language may permit erroneous, or at times, nonsensical words or phrases to be inadvertently transcribed.  Although I have reviewed the note for such errors, some may still exist.    Denny Francis MD  07:48 CST  11/12/2020

## 2020-11-12 NOTE — ANESTHESIA POSTPROCEDURE EVALUATION
"Patient: Pau Cabrera    Procedure Summary     Date: 11/12/20 Room / Location: [facility] ENDOSCOPY 2 / [facility]    Anesthesia Start: 0748 Anesthesia Stop: 0805    Procedure: COLONOSCOPY WITH ANESTHESIA (N/A ) Diagnosis:       Family hx of colon cancer      (Family hx of colon cancer [Z80.0])    Surgeon: Denny Francis MD Provider: Rika Alicia CRNA    Anesthesia Type: MAC ASA Status: 3          Anesthesia Type: MAC    Vitals  No vitals data found for the desired time range.          Post Anesthesia Care and Evaluation    Patient location during evaluation: PHASE II  Patient participation: complete - patient participated  Level of consciousness: awake and alert  Pain score: 0  Pain management: adequate  Airway patency: patent  Anesthetic complications: No anesthetic complications  PONV Status: none  Cardiovascular status: acceptable  Respiratory status: acceptable  Hydration status: acceptable    Comments: Blood pressure 168/93, pulse 98, temperature 97.6 °F (36.4 °C), temperature source Temporal, resp. rate 22, height 154.9 cm (61\"), weight (!) 162 kg (357 lb), SpO2 91 %, not currently breastfeeding.    Pt discharged from PACU based on ronald score >8      "

## 2020-11-12 NOTE — ANESTHESIA PREPROCEDURE EVALUATION
Anesthesia Evaluation     Patient summary reviewed   no history of anesthetic complications:  NPO Solid Status: > 8 hours  NPO Liquid Status: > 2 hours           Airway   Mallampati: II  TM distance: >3 FB  Neck ROM: full  No difficulty expected  Dental - normal exam     Pulmonary    (+) asthma (well controlled),sleep apnea,   (-) not a smoker  Cardiovascular     (+) hypertension, CHF , hyperlipidemia,   (-) past MI, CAD, dysrhythmias, cardiac stents    ROS comment: · Left ventricular diastolic dysfunction (grade I) consistent with impaired relaxation.  · Left ventricular systolic function is low normal.  · The left ventricular cavity is mildly dilated.  · Left ventricular wall thickness is consistent with mild concentric hypertrophy.     DIFFICULT STUDY AND CONTRAST WOULD HAVE BEEN HELPFUL  LOW NORMAL LV SYSTOLIC FUNCTION  LV DIASTOLIC DYSFUNCTION GRADE 1 NOTED  NORMAL RV FUNCTION  MILD LVE AND MILD LVH    Neuro/Psych  (-) seizures, TIA, CVA  GI/Hepatic/Renal/Endo    (+) morbid obesity, GERD,    (-) liver disease, no renal disease, diabetes    Musculoskeletal     (+) myalgias,   Abdominal    Substance History      OB/GYN          Other   arthritis,                      Anesthesia Plan    ASA 3     MAC     intravenous induction     Anesthetic plan, all risks, benefits, and alternatives have been provided, discussed and informed consent has been obtained with: patient.

## 2020-11-16 ENCOUNTER — TELEPHONE (OUTPATIENT)
Dept: GASTROENTEROLOGY | Facility: CLINIC | Age: 65
End: 2020-11-16

## 2020-11-30 ENCOUNTER — OFFICE VISIT (OUTPATIENT)
Dept: INTERNAL MEDICINE | Age: 65
End: 2020-11-30
Payer: MEDICARE

## 2020-11-30 DIAGNOSIS — E55.9 VITAMIN D DEFICIENCY: ICD-10-CM

## 2020-11-30 DIAGNOSIS — I10 ESSENTIAL HYPERTENSION: ICD-10-CM

## 2020-11-30 LAB
ALBUMIN SERPL-MCNC: 4.7 G/DL (ref 3.5–5.2)
ALP BLD-CCNC: 64 U/L (ref 35–104)
ALT SERPL-CCNC: 11 U/L (ref 5–33)
ANION GAP SERPL CALCULATED.3IONS-SCNC: 13 MMOL/L (ref 7–19)
AST SERPL-CCNC: 15 U/L (ref 5–32)
BASOPHILS ABSOLUTE: 0.1 K/UL (ref 0–0.2)
BASOPHILS RELATIVE PERCENT: 0.9 % (ref 0–1)
BILIRUB SERPL-MCNC: 0.3 MG/DL (ref 0.2–1.2)
BUN BLDV-MCNC: 20 MG/DL (ref 8–23)
CALCIUM SERPL-MCNC: 10.2 MG/DL (ref 8.8–10.2)
CHLORIDE BLD-SCNC: 100 MMOL/L (ref 98–111)
CHOLESTEROL, TOTAL: 192 MG/DL (ref 160–199)
CO2: 28 MMOL/L (ref 22–29)
CREAT SERPL-MCNC: 1.1 MG/DL (ref 0.5–0.9)
EOSINOPHILS ABSOLUTE: 0.3 K/UL (ref 0–0.6)
EOSINOPHILS RELATIVE PERCENT: 3.5 % (ref 0–5)
GFR AFRICAN AMERICAN: >59
GFR NON-AFRICAN AMERICAN: 50
GLUCOSE BLD-MCNC: 92 MG/DL (ref 74–109)
HCT VFR BLD CALC: 39.4 % (ref 37–47)
HDLC SERPL-MCNC: 60 MG/DL (ref 65–121)
HEMOGLOBIN: 11.9 G/DL (ref 12–16)
IMMATURE GRANULOCYTES #: 0 K/UL
LDL CHOLESTEROL CALCULATED: 120 MG/DL
LYMPHOCYTES ABSOLUTE: 3.1 K/UL (ref 1.1–4.5)
LYMPHOCYTES RELATIVE PERCENT: 33 % (ref 20–40)
MCH RBC QN AUTO: 28.5 PG (ref 27–31)
MCHC RBC AUTO-ENTMCNC: 30.2 G/DL (ref 33–37)
MCV RBC AUTO: 94.3 FL (ref 81–99)
MONOCYTES ABSOLUTE: 0.6 K/UL (ref 0–0.9)
MONOCYTES RELATIVE PERCENT: 6 % (ref 0–10)
NEUTROPHILS ABSOLUTE: 5.3 K/UL (ref 1.5–7.5)
NEUTROPHILS RELATIVE PERCENT: 56.3 % (ref 50–65)
PDW BLD-RTO: 17.6 % (ref 11.5–14.5)
PLATELET # BLD: 378 K/UL (ref 130–400)
PMV BLD AUTO: 10.1 FL (ref 9.4–12.3)
POTASSIUM SERPL-SCNC: 4.4 MMOL/L (ref 3.5–5)
RBC # BLD: 4.18 M/UL (ref 4.2–5.4)
SODIUM BLD-SCNC: 141 MMOL/L (ref 136–145)
TOTAL PROTEIN: 8.2 G/DL (ref 6.6–8.7)
TRIGL SERPL-MCNC: 61 MG/DL (ref 0–149)
TSH SERPL DL<=0.05 MIU/L-ACNC: 2.86 UIU/ML (ref 0.27–4.2)
VITAMIN D 25-HYDROXY: 48.7 NG/ML
WBC # BLD: 9.4 K/UL (ref 4.8–10.8)

## 2020-11-30 PROCEDURE — 1090F PRES/ABSN URINE INCON ASSESS: CPT | Performed by: INTERNAL MEDICINE

## 2020-11-30 PROCEDURE — 1036F TOBACCO NON-USER: CPT | Performed by: INTERNAL MEDICINE

## 2020-11-30 PROCEDURE — 4040F PNEUMOC VAC/ADMIN/RCVD: CPT | Performed by: INTERNAL MEDICINE

## 2020-11-30 PROCEDURE — G8399 PT W/DXA RESULTS DOCUMENT: HCPCS | Performed by: INTERNAL MEDICINE

## 2020-11-30 PROCEDURE — 99214 OFFICE O/P EST MOD 30 MIN: CPT | Performed by: INTERNAL MEDICINE

## 2020-11-30 PROCEDURE — 3017F COLORECTAL CA SCREEN DOC REV: CPT | Performed by: INTERNAL MEDICINE

## 2020-11-30 PROCEDURE — 1123F ACP DISCUSS/DSCN MKR DOCD: CPT | Performed by: INTERNAL MEDICINE

## 2020-11-30 PROCEDURE — G8427 DOCREV CUR MEDS BY ELIG CLIN: HCPCS | Performed by: INTERNAL MEDICINE

## 2020-11-30 PROCEDURE — 90732 PPSV23 VACC 2 YRS+ SUBQ/IM: CPT | Performed by: INTERNAL MEDICINE

## 2020-11-30 PROCEDURE — G8417 CALC BMI ABV UP PARAM F/U: HCPCS | Performed by: INTERNAL MEDICINE

## 2020-11-30 PROCEDURE — G0009 ADMIN PNEUMOCOCCAL VACCINE: HCPCS | Performed by: INTERNAL MEDICINE

## 2020-11-30 PROCEDURE — G8484 FLU IMMUNIZE NO ADMIN: HCPCS | Performed by: INTERNAL MEDICINE

## 2020-11-30 NOTE — PROGRESS NOTES
Chief Complaint   Patient presents with    Shortness of Breath       HPI: Linda Oswald is a 72 y.o. female is here for follow-up of hypertension, depression, gout, arthritis, migraines, lower extremity swelling. She does complain that she is been more short of breath since mid summer. She did see her cardiologist in the summer and was told everything was okay. Her last 2D echocardiogram was done in July 2019. At that time, her 2D echocardiogram did show 1 left ventricular dysfunction with an ejection fraction of 51 to 55%. She states that she gets more short of breath with exertion. Sometimes, she has some wheezing and lower extremity swelling. Her inhalers do not really help. She does not take them regularly because they make her mouth sore. If she takes her fluid pill, her symptoms improved. She has not had a chest x-ray recently. We will go ahead and order chest x-ray. Her labs are currently pending. She would like a pneumonia vaccine today. Her chest x-ray is clear, she may need a cardiac work-up. She has not had any gout flares. Her depression is stable on her current medication regimen. Her blood pressure is well controlled. Celebrex is helpful for joint pain. Her vitamin D level is currently pending. She does take Fioricet as needed for migraine headaches. Lipid panel is pending. She continues to take her TriCor without difficulty. She has not had any episodes of vertigo recently.     Past Medical History:   Diagnosis Date    Abnormal CT of spine     Acute sinusitis     Arthritis     Asthma     Bronchitis     Carpal tunnel syndrome     both    CHF (congestive heart failure) (HCC)     Depression     Fibromyalgia     Furuncle     Gout     Hyperlipidemia     Hypertension     Low vitamin D level     Lumbago     Lumbar radiculopathy     Neuropathy     feet    Obesity     Rheumatoid nodule of knee (HCC)     Sleep apnea     UTI (urinary tract infection)     Vertigo     Vitamin D deficiency       Past Surgical History:   Procedure Laterality Date     SECTION      times two    HYSTERECTOMY        Social History     Socioeconomic History    Marital status:      Spouse name: Not on file    Number of children: Not on file    Years of education: Not on file    Highest education level: Not on file   Occupational History    Not on file   Social Needs    Financial resource strain: Not on file    Food insecurity     Worry: Not on file     Inability: Not on file    Transportation needs     Medical: Not on file     Non-medical: Not on file   Tobacco Use    Smoking status: Never Smoker    Smokeless tobacco: Never Used   Substance and Sexual Activity    Alcohol use: No    Drug use: No    Sexual activity: Not on file   Lifestyle    Physical activity     Days per week: Not on file     Minutes per session: Not on file    Stress: Not on file   Relationships    Social connections     Talks on phone: Not on file     Gets together: Not on file     Attends Mosque service: Not on file     Active member of club or organization: Not on file     Attends meetings of clubs or organizations: Not on file     Relationship status: Not on file    Intimate partner violence     Fear of current or ex partner: Not on file     Emotionally abused: Not on file     Physically abused: Not on file     Forced sexual activity: Not on file   Other Topics Concern    Not on file   Social History Narrative    Not on file      Family History   Problem Relation Age of Onset    Heart Disease Mother     Hypertension Mother     Stroke Father     Hypertension Father     Hypertension Other     Heart Disease Other     Cancer Other         Current Outpatient Medications   Medication Sig Dispense Refill    allopurinol (ZYLOPRIM) 300 MG tablet TAKE 1 TABLET BY MOUTH DAILY 30 tablet 5    amitriptyline (ELAVIL) 25 MG tablet TAKE ONE TABLET BY MOUTH NIGHTLY FOR NERVE DISEASE 90 tablet 3    carvedilol (COREG) 25 MG tablet TAKE 1 TABLET BY MOUTH 2 TIMES DAILY 180 tablet 3    celecoxib (CELEBREX) 100 MG capsule TAKE 1 CAPSULE BY MOUTH 2 TIMES DAILY 180 capsule 0    Cholecalciferol (VITAMIN D3) 50 MCG (2000 UT) TABS TAKE 1 TABLET BY MOUTH DAILY 90 tablet 3    butalbital-acetaminophen-caffeine (FIORICET, ESGIC) -40 MG per tablet TAKE ONE TABLET EVERY 4 HOURS AS NEEDED FOR HEADACHES 10 tablet 3    amLODIPine (NORVASC) 10 MG tablet TAKE ONE TABLET BY MOUTH DAILY 90 tablet 3    DULoxetine (CYMBALTA) 60 MG extended release capsule TAKE 1 CAPSULE BY MOUTH ONCE DAILY IN THE MORNING 90 capsule 3    fenofibrate (TRICOR) 145 MG tablet TAKE 1 TABLET BY MOUTH DAILY (FOR TRIGLYCERIDES/CHOLESTEROL) 90 tablet 3    furosemide (LASIX) 40 MG tablet Take 1.5 tablets by mouth daily 135 tablet 3    meclizine (ANTIVERT) 25 MG tablet Take 1 tablet by mouth 3 times daily as needed for Dizziness 90 tablet 5    azelastine (ASTELIN) 0.1 % nasal spray 2 sprays by Nasal route 2 times daily Use in each nostril as directed 1 Bottle 3    Multiple Vitamins-Minerals (MULTIVITAMIN ADULT PO) Take by mouth      spironolactone (ALDACTONE) 25 MG tablet TAKE ONE TABLET BY MOUTH ONCE DAILY 90 tablet 4    EPINEPHrine (EPIPEN 2-SHAUN) 0.3 MG/0.3ML SOAJ injection Inject 0.3 mLs into the muscle once for 1 dose Use as directed for allergic reaction 0.3 mL 1    spironolactone (ALDACTONE) 25 MG tablet TAKE ONE TABLET BY MOUTH ONCE DAILY (Patient not taking: Reported on 11/30/2020) 90 tablet 3     No current facility-administered medications for this visit.          Patient Active Problem List   Diagnosis    Chronic congestive heart failure ([address]    Depression    Fibromyalgia    Gout of multiple sites    Mixed hyperlipidemia    Morbid obesity due to excess calories ([facility] [address]    Essential hypertension    Chronic midline low back pain without sciatica    Benign headache        Review of Systems   Constitutional: Negative for activity change, appetite change, chills, diaphoresis, fatigue, fever and unexpected weight change. HENT: Negative for congestion, ear discharge, postnasal drip, sinus pressure, sneezing, sore throat, tinnitus, trouble swallowing and voice change. Eyes: Negative for photophobia, pain, discharge, redness, itching and visual disturbance. Respiratory: Positive for shortness of breath. Negative for cough, chest tightness and wheezing. Cardiovascular: Positive for leg swelling. Negative for chest pain and palpitations. Occasional lower extremity swelling   Gastrointestinal: Negative for abdominal distention, abdominal pain, blood in stool, constipation, diarrhea and nausea. Endocrine: Negative for cold intolerance, heat intolerance, polydipsia, polyphagia and polyuria. Genitourinary: Negative for difficulty urinating, dysuria, flank pain, frequency, hematuria and urgency. Musculoskeletal: Positive for back pain. Negative for arthralgias, gait problem, joint swelling, myalgias, neck pain and neck stiffness. She does complain of neck pain and hip pain. She has difficulty standing after long periods of time. Skin: Negative for color change, pallor, rash and wound. Allergic/Immunologic: Negative for environmental allergies, food allergies and immunocompromised state. Neurological: Negative for dizziness, tremors, seizures, syncope, facial asymmetry, speech difficulty, weakness, light-headedness, numbness and headaches. Hematological: Negative for adenopathy. Does not bruise/bleed easily. Psychiatric/Behavioral: Negative for agitation, confusion, decreased concentration, dysphoric mood, hallucinations, self-injury, sleep disturbance and suicidal ideas. The patient is not nervous/anxious and is not hyperactive.         /80 (Site: Left Lower Arm, Position: Sitting, Cuff Size: Large Adult)   Pulse 126   Ht 5' 1\" (1.549 m)   Wt (!) 357 lb 12.8 oz (162.3 kg)   SpO2 97%   BMI 67.61 kg/m²   Physical Exam  Vitals signs and nursing note reviewed. Constitutional:       General: She is not in acute distress. Appearance: Normal appearance. She is well-developed and normal weight. She is not ill-appearing, toxic-appearing or diaphoretic. HENT:      Head: Normocephalic and atraumatic. Right Ear: Tympanic membrane, ear canal and external ear normal. There is no impacted cerumen. Left Ear: Tympanic membrane, ear canal and external ear normal. There is no impacted cerumen. Nose: Nose normal. No congestion or rhinorrhea. Mouth/Throat:      Mouth: Mucous membranes are moist.      Pharynx: Oropharynx is clear. No oropharyngeal exudate or posterior oropharyngeal erythema. Eyes:      General: No scleral icterus. Right eye: No discharge. Left eye: No discharge. Extraocular Movements: Extraocular movements intact. Conjunctiva/sclera: Conjunctivae normal.      Pupils: Pupils are equal, round, and reactive to light. Neck:      Musculoskeletal: Normal range of motion and neck supple. No neck rigidity or muscular tenderness. Thyroid: No thyromegaly. Vascular: No carotid bruit or JVD. Trachea: No tracheal deviation. Comments: Palpable thyroid nodules noted  Cardiovascular:      Rate and Rhythm: Normal rate and regular rhythm. Pulses: Normal pulses. Heart sounds: Normal heart sounds. No murmur. No friction rub. No gallop. Pulmonary:      Effort: Pulmonary effort is normal. No respiratory distress. Breath sounds: Normal breath sounds. No stridor. No wheezing, rhonchi or rales. Chest:      Chest wall: No tenderness. Abdominal:      General: Abdomen is flat. Bowel sounds are normal. There is no distension. Palpations: Abdomen is soft. There is no mass. Tenderness: There is no abdominal tenderness. There is no right CVA tenderness, left CVA tenderness, guarding or rebound. Hernia: No hernia is present. Musculoskeletal: Normal range of motion. General: No swelling, tenderness, deformity or signs of injury. Right lower leg: No edema. Left lower leg: No edema. Comments: Gait is antalgic. There is tenderness on palpation of the lower lumbar spine. Lymphadenopathy:      Cervical: No cervical adenopathy. Skin:     General: Skin is warm and dry. Capillary Refill: Capillary refill takes less than 2 seconds. Coloration: Skin is not jaundiced or pale. Findings: No bruising, erythema, lesion or rash. Neurological:      General: No focal deficit present. Mental Status: She is alert and oriented to person, place, and time. Mental status is at baseline. Cranial Nerves: No cranial nerve deficit. Sensory: No sensory deficit. Motor: No weakness or abnormal muscle tone. Coordination: Coordination normal.      Gait: Gait normal.      Deep Tendon Reflexes: Reflexes are normal and symmetric. Reflexes normal.   Psychiatric:         Mood and Affect: Mood normal.         Behavior: Behavior normal.         Thought Content: Thought content normal.         Judgment: Judgment normal.         1. Essential hypertension    2. Vitamin D deficiency    3. Shortness of breath    4. Need for vaccination against Streptococcus pneumoniae    5. Gout, unspecified cause, unspecified chronicity, unspecified site    6. Other depression    7. Arthralgia, unspecified joint    8. Other migraine without status migrainosus, not intractable    9. Vertigo        ASSESSMENT/PLAN:    77-year-old woman here for follow-up    1. Hypertension: Blood pressure well controlled    2. Shortness of breath: Labs are currently pending. We will check a chest x-ray. If chest x-ray is within normal limits, we will set her up for 2D echocardiogram    3. Pneumonia vaccine given today    4. Gout: No flares on allopurinol    5. Depression: Stable    6. Joint pain: Continue Celebrex as needed    7. Migraines: Fioricet as needed    8. Vertigo: No recent flares of vertigo. Can take meclizine as needed    9. Vitamin D deficiency: Vitamin D level is currently pending  Deann Des was seen today for shortness of breath. Diagnoses and all orders for this visit:    Essential hypertension  -     CBC Auto Differential; Future  -     Comprehensive Metabolic Panel; Future  -     Lipid Panel; Future  -     TSH without Reflex; Future    Vitamin D deficiency  -     Vitamin D 25 Hydroxy; Future    Shortness of breath  -     XR CHEST STANDARD (2 VW); Future    Need for vaccination against Streptococcus pneumoniae    Gout, unspecified cause, unspecified chronicity, unspecified site    Other depression    Arthralgia, unspecified joint    Other migraine without status migrainosus, not intractable    Vertigo    Other orders  -     PNEUMOVAX 23 subcutaneous/IM (Pneumococcal polysaccharide vaccine 23-valent >= 1yo)          Return in about 1 month (around 12/30/2020), or shortness of breath.      Orders Placed This Encounter   Procedures    XR CHEST STANDARD (2 VW)     Standing Status:   Future     Standing Expiration Date:   11/30/2021     Order Specific Question:   Reason for exam:     Answer:   shortness of breath    PNEUMOVAX 23 subcutaneous/IM (Pneumococcal polysaccharide vaccine 23-valent >= 1yo)    CBC Auto Differential     Fast 12 hours     Standing Status:   Future     Standing Expiration Date:   11/30/2021    Comprehensive Metabolic Panel     Fasting 12 hours     Standing Status:   Future     Standing Expiration Date:   11/30/2021    Lipid Panel     Standing Status:   Future     Standing Expiration Date:   11/30/2021     Order Specific Question:   Is Patient Fasting?/# of Hours     Answer:   12    TSH without Reflex     Fast 12 hours     Standing Status:   Future     Standing Expiration Date:   11/30/2021    Vitamin D 25 Hydroxy     Standing Status:   Future     Standing Expiration Date:   11/30/2021       Svitlana Flores Tiffanie Cartagena MD

## 2020-11-30 NOTE — PATIENT INSTRUCTIONS
Patient Education        Shortness of Breath: Care Instructions  Your Care Instructions     Shortness of breath has many causes. Sometimes conditions such as anxiety can lead to shortness of breath. Some people get mild shortness of breath when they exercise. Trouble breathing also can be a symptom of a serious problem, such as asthma, lung disease, emphysema, heart problems, and pneumonia. If your shortness of breath continues, you may need tests and treatment. Watch for any changes in your breathing and other symptoms. Follow-up care is a key part of your treatment and safety. Be sure to make and go to all appointments, and call your doctor if you are having problems. It's also a good idea to know your test results and keep a list of the medicines you take. How can you care for yourself at home? · Do not smoke or allow others to smoke around you. If you need help quitting, talk to your doctor about stop-smoking programs and medicines. These can increase your chances of quitting for good. · Get plenty of rest and sleep. · Take your medicines exactly as prescribed. Call your doctor if you think you are having a problem with your medicine. · Find healthy ways to deal with stress. ? Exercise daily. ? Get plenty of sleep. ? Eat regularly and well. When should you call for help? Call 911 anytime you think you may need emergency care. For example, call if:    · You have severe shortness of breath.     · You have symptoms of a heart attack. These may include:  ? Chest pain or pressure, or a strange feeling in the chest.  ? Sweating. ? Shortness of breath. ? Nausea or vomiting. ? Pain, pressure, or a strange feeling in the back, neck, jaw, or upper belly or in one or both shoulders or arms. ? Lightheadedness or sudden weakness. ? A fast or irregular heartbeat. After you call 911, the  may tell you to chew 1 adult-strength or 2 to 4 low-dose aspirin. Wait for an ambulance.  Do not try to drive yourself. Call your doctor now or seek immediate medical care if:    · Your shortness of breath gets worse or you start to wheeze. Wheezing is a high-pitched sound when you breathe.     · You wake up at night out of breath or have to prop your head up on several pillows to breathe.     · You are short of breath after only light activity or while at rest.   Watch closely for changes in your health, and be sure to contact your doctor if:    · You do not get better over the next 1 to 2 days. Where can you learn more? Go to https://[email or web address] org and sign in to your [employer] account. Enter S780 in the [employer] box to learn more about \"Shortness of Breath: Care Instructions. \"     If you do not have an account, please click on the \"Sign Up Now\" link. Current as of: February 24, 2020               Content Version: 12.6  © 0436-7746 [employer]. Care instructions adapted under license by [facility] ([facility]). If you have questions about a medical condition or this instruction, always ask your healthcare professional. [place] [address] any warranty or liability for your use of this information.

## 2020-11-30 NOTE — PROGRESS NOTES
After obtaining consent, and per orders of Dr. Tomeka Paniagua, injection of Pneumovax-23 given in Right deltoid by Simone Harman. Patient instructed to remain in clinic for 20 minutes afterwards, and to report any adverse reaction to me immediately.

## 2020-12-01 VITALS
DIASTOLIC BLOOD PRESSURE: 80 MMHG | OXYGEN SATURATION: 97 % | WEIGHT: 293 LBS | BODY MASS INDEX: 55.32 KG/M2 | SYSTOLIC BLOOD PRESSURE: 124 MMHG | HEART RATE: 126 BPM | HEIGHT: 61 IN

## 2020-12-01 ASSESSMENT — ENCOUNTER SYMPTOMS
BACK PAIN: 1
PHOTOPHOBIA: 0
SHORTNESS OF BREATH: 1
EYE ITCHING: 0
COUGH: 0
ABDOMINAL PAIN: 0
EYE DISCHARGE: 0
SORE THROAT: 0
WHEEZING: 0
DIARRHEA: 0
EYE PAIN: 0
CONSTIPATION: 0
ABDOMINAL DISTENTION: 0
EYE REDNESS: 0
BLOOD IN STOOL: 0
COLOR CHANGE: 0
VOICE CHANGE: 0
CHEST TIGHTNESS: 0
TROUBLE SWALLOWING: 0
SINUS PRESSURE: 0
NAUSEA: 0

## 2020-12-21 RX ORDER — SPIRONOLACTONE 25 MG/1
TABLET ORAL
Qty: 90 TABLET | Refills: 3 | Status: SHIPPED | OUTPATIENT
Start: 2020-12-21 | End: 2021-03-01 | Stop reason: SDUPTHER

## 2020-12-21 RX ORDER — CELECOXIB 100 MG/1
100 CAPSULE ORAL 2 TIMES DAILY
Qty: 180 CAPSULE | Refills: 0 | Status: SHIPPED | OUTPATIENT
Start: 2020-12-21 | End: 2021-03-23

## 2020-12-21 NOTE — TELEPHONE ENCOUNTER
Shabana Deshpande called requesting a refill of the below medication which has been pended for you:     Requested Prescriptions     Pending Prescriptions Disp Refills    celecoxib (CELEBREX) 100 MG capsule [Pharmacy Med Name: CELECOXIB 100 MG CAPS 100 Capsule] 180 capsule 0     Sig: TAKE 1 CAPSULE BY MOUTH 2 TIMES DAILY       Last Appointment Date: 11/30/2020  Next Appointment Date: 1/7/2021    Allergies   Allergen Reactions    Codeine Hives    Lisinopril Hives

## 2020-12-21 NOTE — TELEPHONE ENCOUNTER
Deann Nunes called requesting a refill of the below medication which has been pended for you:     Requested Prescriptions     Pending Prescriptions Disp Refills    spironolactone (ALDACTONE) 25 MG tablet [Pharmacy Med Name: SPIRONOLACTONE 25 MG TABS 25 Tablet] 90 tablet 3     Sig: TAKE ONE TABLET BY MOUTH ONCE DAILY       Last Appointment Date: 11/30/2020  Next Appointment Date: 1/7/2021    Allergies   Allergen Reactions    Codeine Hives    Lisinopril Hives

## 2020-12-29 RX ORDER — FENOFIBRATE 145 MG/1
TABLET, COATED ORAL
Qty: 90 TABLET | Refills: 3 | Status: SHIPPED | OUTPATIENT
Start: 2020-12-29 | End: 2022-01-04 | Stop reason: SDUPTHER

## 2020-12-29 NOTE — TELEPHONE ENCOUNTER
Ava Ramirez called requesting a refill of the below medication which has been pended for you:     Requested Prescriptions     Pending Prescriptions Disp Refills    fenofibrate (TRICOR) 145 MG tablet [Pharmacy Med Name: FENOFIBRATE 145 MG TABS 145 Tablet] 90 tablet 3     Sig: TAKE 1 TABLET BY MOUTH DAILY (FOR TRIGLYCERIDES/CHOLESTEROL)       Last Appointment Date: 11/30/2020  Next Appointment Date: 1/7/2021    Allergies   Allergen Reactions    Codeine Hives    Lisinopril Hives

## 2021-01-04 ENCOUNTER — TELEPHONE (OUTPATIENT)
Dept: INTERNAL MEDICINE | Age: 66
End: 2021-01-04

## 2021-01-04 RX ORDER — AMOXICILLIN AND CLAVULANATE POTASSIUM 875; 125 MG/1; MG/1
1 TABLET, FILM COATED ORAL 2 TIMES DAILY
Qty: 20 TABLET | Refills: 0 | Status: SHIPPED | OUTPATIENT
Start: 2021-01-04 | End: 2021-01-14

## 2021-01-04 NOTE — TELEPHONE ENCOUNTER
She can get the CXR done at any point if she changes her mind. augmentin 875 mg po BID for 10 days.  Move appt out a couple of months

## 2021-01-04 NOTE — TELEPHONE ENCOUNTER
Pt c/o of sinus drainage that went into her (L) ear and caused a sore throat. She is requesting an abx. Cj's    Pt also doesn't want to get the CXR that was ordered. She says she has figured out that her dyspnea is from certain foods that she eats. She also wants to cancel her appt on 1/7 that was a FU for these problems she discussed at her 11/30 appt. Please advise.

## 2021-03-01 ENCOUNTER — OFFICE VISIT (OUTPATIENT)
Dept: INTERNAL MEDICINE | Age: 66
End: 2021-03-01
Payer: MEDICARE

## 2021-03-01 VITALS
HEIGHT: 61 IN | HEART RATE: 98 BPM | SYSTOLIC BLOOD PRESSURE: 138 MMHG | BODY MASS INDEX: 55.32 KG/M2 | WEIGHT: 293 LBS | OXYGEN SATURATION: 93 % | DIASTOLIC BLOOD PRESSURE: 84 MMHG

## 2021-03-01 DIAGNOSIS — I10 ESSENTIAL HYPERTENSION: ICD-10-CM

## 2021-03-01 DIAGNOSIS — E78.5 HYPERLIPIDEMIA, UNSPECIFIED HYPERLIPIDEMIA TYPE: ICD-10-CM

## 2021-03-01 DIAGNOSIS — F32.89 OTHER DEPRESSION: ICD-10-CM

## 2021-03-01 DIAGNOSIS — E55.9 VITAMIN D DEFICIENCY: ICD-10-CM

## 2021-03-01 DIAGNOSIS — Z00.00 ROUTINE ADULT HEALTH MAINTENANCE: Primary | ICD-10-CM

## 2021-03-01 DIAGNOSIS — Z11.4 SCREENING FOR HIV (HUMAN IMMUNODEFICIENCY VIRUS): ICD-10-CM

## 2021-03-01 DIAGNOSIS — G43.809 OTHER MIGRAINE WITHOUT STATUS MIGRAINOSUS, NOT INTRACTABLE: ICD-10-CM

## 2021-03-01 DIAGNOSIS — I50.9 CHRONIC CONGESTIVE HEART FAILURE, UNSPECIFIED HEART FAILURE TYPE (HCC): ICD-10-CM

## 2021-03-01 DIAGNOSIS — Z91.09 ENVIRONMENTAL ALLERGIES: ICD-10-CM

## 2021-03-01 DIAGNOSIS — Z78.0 MENOPAUSE: ICD-10-CM

## 2021-03-01 DIAGNOSIS — M10.9 GOUT, UNSPECIFIED CAUSE, UNSPECIFIED CHRONICITY, UNSPECIFIED SITE: ICD-10-CM

## 2021-03-01 DIAGNOSIS — M25.50 ARTHRALGIA, UNSPECIFIED JOINT: ICD-10-CM

## 2021-03-01 DIAGNOSIS — Z11.59 ENCOUNTER FOR HEPATITIS C SCREENING TEST FOR LOW RISK PATIENT: ICD-10-CM

## 2021-03-01 DIAGNOSIS — E66.01 MORBID OBESITY DUE TO EXCESS CALORIES (HCC): ICD-10-CM

## 2021-03-01 DIAGNOSIS — R42 VERTIGO: ICD-10-CM

## 2021-03-01 DIAGNOSIS — Z12.31 ENCOUNTER FOR SCREENING MAMMOGRAM FOR MALIGNANT NEOPLASM OF BREAST: ICD-10-CM

## 2021-03-01 LAB
ALBUMIN SERPL-MCNC: 4.4 G/DL (ref 3.5–5.2)
ALP BLD-CCNC: 54 U/L (ref 35–104)
ALT SERPL-CCNC: 11 U/L (ref 5–33)
ANION GAP SERPL CALCULATED.3IONS-SCNC: 18 MMOL/L (ref 7–19)
AST SERPL-CCNC: 17 U/L (ref 5–32)
BASOPHILS ABSOLUTE: 0.1 K/UL (ref 0–0.2)
BASOPHILS RELATIVE PERCENT: 0.7 % (ref 0–1)
BILIRUB SERPL-MCNC: 0.4 MG/DL (ref 0.2–1.2)
BUN BLDV-MCNC: 16 MG/DL (ref 8–23)
CALCIUM SERPL-MCNC: 10.5 MG/DL (ref 8.8–10.2)
CHLORIDE BLD-SCNC: 99 MMOL/L (ref 98–111)
CHOLESTEROL, TOTAL: 215 MG/DL (ref 160–199)
CO2: 24 MMOL/L (ref 22–29)
CREAT SERPL-MCNC: 1.3 MG/DL (ref 0.5–0.9)
EOSINOPHILS ABSOLUTE: 0.3 K/UL (ref 0–0.6)
EOSINOPHILS RELATIVE PERCENT: 4.9 % (ref 0–5)
GFR AFRICAN AMERICAN: 50
GFR NON-AFRICAN AMERICAN: 41
GLUCOSE BLD-MCNC: 85 MG/DL (ref 74–109)
HCT VFR BLD CALC: 38.2 % (ref 37–47)
HDLC SERPL-MCNC: 56 MG/DL (ref 65–121)
HEMOGLOBIN: 11.8 G/DL (ref 12–16)
HEPATITIS C ANTIBODY INTERPRETATION: NORMAL
HIV-1 P24 AG: NORMAL
IMMATURE GRANULOCYTES #: 0 K/UL
LDL CHOLESTEROL CALCULATED: 143 MG/DL
LYMPHOCYTES ABSOLUTE: 2 K/UL (ref 1.1–4.5)
LYMPHOCYTES RELATIVE PERCENT: 29.2 % (ref 20–40)
MCH RBC QN AUTO: 28.4 PG (ref 27–31)
MCHC RBC AUTO-ENTMCNC: 30.9 G/DL (ref 33–37)
MCV RBC AUTO: 91.8 FL (ref 81–99)
MONOCYTES ABSOLUTE: 0.4 K/UL (ref 0–0.9)
MONOCYTES RELATIVE PERCENT: 6.3 % (ref 0–10)
NEUTROPHILS ABSOLUTE: 3.9 K/UL (ref 1.5–7.5)
NEUTROPHILS RELATIVE PERCENT: 58.8 % (ref 50–65)
PDW BLD-RTO: 17.5 % (ref 11.5–14.5)
PLATELET # BLD: 296 K/UL (ref 130–400)
PMV BLD AUTO: 11.2 FL (ref 9.4–12.3)
POTASSIUM SERPL-SCNC: 4.9 MMOL/L (ref 3.5–5)
RAPID HIV 1&2: NORMAL
RBC # BLD: 4.16 M/UL (ref 4.2–5.4)
SODIUM BLD-SCNC: 141 MMOL/L (ref 136–145)
TOTAL PROTEIN: 7.8 G/DL (ref 6.6–8.7)
TRIGL SERPL-MCNC: 80 MG/DL (ref 0–149)
TSH SERPL DL<=0.05 MIU/L-ACNC: 1.94 UIU/ML (ref 0.27–4.2)
VITAMIN D 25-HYDROXY: 50 NG/ML
WBC # BLD: 6.7 K/UL (ref 4.8–10.8)

## 2021-03-01 PROCEDURE — 1123F ACP DISCUSS/DSCN MKR DOCD: CPT | Performed by: INTERNAL MEDICINE

## 2021-03-01 PROCEDURE — G8484 FLU IMMUNIZE NO ADMIN: HCPCS | Performed by: INTERNAL MEDICINE

## 2021-03-01 PROCEDURE — 4040F PNEUMOC VAC/ADMIN/RCVD: CPT | Performed by: INTERNAL MEDICINE

## 2021-03-01 PROCEDURE — 3017F COLORECTAL CA SCREEN DOC REV: CPT | Performed by: INTERNAL MEDICINE

## 2021-03-01 PROCEDURE — G0439 PPPS, SUBSEQ VISIT: HCPCS | Performed by: INTERNAL MEDICINE

## 2021-03-01 SDOH — ECONOMIC STABILITY: INCOME INSECURITY: HOW HARD IS IT FOR YOU TO PAY FOR THE VERY BASICS LIKE FOOD, HOUSING, MEDICAL CARE, AND HEATING?: NOT HARD AT ALL

## 2021-03-01 SDOH — ECONOMIC STABILITY: FOOD INSECURITY: WITHIN THE PAST 12 MONTHS, YOU WORRIED THAT YOUR FOOD WOULD RUN OUT BEFORE YOU GOT MONEY TO BUY MORE.: NEVER TRUE

## 2021-03-01 SDOH — ECONOMIC STABILITY: FOOD INSECURITY: WITHIN THE PAST 12 MONTHS, THE FOOD YOU BOUGHT JUST DIDN'T LAST AND YOU DIDN'T HAVE MONEY TO GET MORE.: NEVER TRUE

## 2021-03-01 SDOH — ECONOMIC STABILITY: TRANSPORTATION INSECURITY
IN THE PAST 12 MONTHS, HAS THE LACK OF TRANSPORTATION KEPT YOU FROM MEDICAL APPOINTMENTS OR FROM GETTING MEDICATIONS?: NO

## 2021-03-01 ASSESSMENT — PATIENT HEALTH QUESTIONNAIRE - PHQ9
SUM OF ALL RESPONSES TO PHQ QUESTIONS 1-9: 1
SUM OF ALL RESPONSES TO PHQ QUESTIONS 1-9: 1

## 2021-03-01 NOTE — PATIENT INSTRUCTIONS
Patient Education        COVID-19 Vaccine: Care Instructions  Overview     The COVID-19 vaccine can help you avoid getting COVID-19, a disease caused by a new type of coronavirus. COVID-19 can cause pneumonia and even death. You may need two doses of the vaccine. And you might need \"booster\" doses later on to help you stay protected. The vaccine prevents most cases of COVID-19. But if you do still catch COVID-19, your symptoms will probably be less severe than if you hadn't gotten the vaccine. You can't get COVID-19 from the vaccine. The risk of serious problems from the vaccine is very low. And you might not have any side effects from the vaccine at all. If you do, they will probably be a lot like the common side effects of other vaccines. They include things like a slight fever, muscle aches, and soreness. These side effects don't last too long, and they can be treated if they bother you. Follow-up care is a key part of your treatment and safety. Be sure to make and go to all appointments, and call your doctor if you are having problems. It's also a good idea to know your test results and keep a list of the medicines you take. How can you care for yourself at home? · If you have a sore arm or a slight fever after the vaccine, take an over-the-counter pain medicine, such as acetaminophen or ibuprofen. Read and follow all instructions on the label. Do not give aspirin to anyone younger than 20. It has been linked to Reye syndrome, a serious illness. · Put ice or a cold pack on the sore area for 10 to 20 minutes at a time. Put a thin cloth between the ice and your skin. · Continue to practice social distancing, wear a mask, and follow all the steps for good hand-washing. When should you call for help? Call 911 anytime you think you may need emergency care. For example, call if after getting the COVID-19 vaccine:    · You have symptoms of a severe reaction to the vaccine.  Symptoms of a severe reaction may include:  ? Severe difficulty breathing. ? Sudden raised, red areas (hives) all over your body. ? Severe lightheadedness. Watch closely for changes in your health, and be sure to contact your doctor if you have any problems. Where can you learn more? Go to https://chpepiceweb.[email or web address]. org and sign in to your [employer] account. Enter C126 in the [employer] box to learn more about \"COVID-19 Vaccine: Care Instructions. \"     If you do not have an account, please click on the \"Sign Up Now\" link. Current as of: December 18, 2020               Content Version: 12.7  © 7027-7127 [employer]. Care instructions adapted under license by [facility] ([facility]). If you have questions about a medical condition or this instruction, always ask your healthcare professional. [address] any warranty or liability for your use of this information.

## 2021-03-01 NOTE — PROGRESS NOTES
Chief Complaint   Patient presents with    Medicare AWV       HPI: Emily Bustillo is a 72 y.o. female is here for her annual physical exam.  Her functional status is good. She denies any history of falls. She has no concerns in regards to safety, hearing, or cognition. She was getting her routine Pap smears per Dr. Mary Kay Lux. However, he has since retired. She plans to see another gynecologist in the future. She has been referred to someone, but cannot recall who she has been referred to. She is tolerating her fibrate for hyper lipidemia without side effects. Her joint pain is well controlled on Celebrex. She has not had any gout flares on allopurinol. Her mood is currently stable her current medication regimen. Her blood pressure is well controlled. She denies any complaints of chest pain, chest pressure or shortness of breath. She does have a history of vitamin D deficiency. She does take cholecalciferol for this. She has not had any further episodes of vertigo. She does take her meclizine as needed. Her allergies are relatively stable. She still has some chronic fatigue following COVID-19 infection earlier this year. She recently just got out of quarantine. She still has lost her taste and smell. She still gets somewhat short of breath with exertion, but this is overall improving. Routine screening is as follows:  Eye exam yearly  Flu vaccine advised yearly  Pap smear per Dr. Mary Kay Lux in February 2020. She does have a referral for a Pap smear in the near future.   Mammogram in June 2020  Colonoscopy 11/20/2020  DEXA 2017  Pneumovax up-to-date    Past Medical History:   Diagnosis Date    Abnormal CT of spine     Acute sinusitis     Arthritis     Asthma     Bronchitis     Carpal tunnel syndrome     both    CHF (congestive heart failure) (HCC)     Depression     Fibromyalgia     Furuncle     Gout     Hyperlipidemia     Hypertension     Low vitamin D level     Lumbago  Lumbar radiculopathy     Neuropathy     feet    Obesity     Rheumatoid nodule of knee (HCC)     Sleep apnea     UTI (urinary tract infection)     Vertigo     Vitamin D deficiency       Past Surgical History:   Procedure Laterality Date     SECTION      times two    HYSTERECTOMY        Social History     Socioeconomic History    Marital status:      Spouse name: None    Number of children: None    Years of education: None    Highest education level: None   Occupational History    None   Social Needs    Financial resource strain: Not hard at all   [place] insecurity     Worry: Never true     Inability: Never true    Transportation needs     Medical: No     Non-medical: No   Tobacco Use    Smoking status: Never Smoker    Smokeless tobacco: Never Used   Substance and Sexual Activity    Alcohol use: No    Drug use: No    Sexual activity: None   Lifestyle    Physical activity     Days per week: None     Minutes per session: None    Stress: None   Relationships    Social connections     Talks on phone: None     Gets together: None     Attends [employer] service: None     Active member of club or organization: None     Attends meetings of clubs or organizations: None     Relationship status: None    Intimate partner violence     Fear of current or ex partner: None     Emotionally abused: None     Physically abused: None     Forced sexual activity: None   Other Topics Concern    None   Social History Narrative    None      Family History   Problem Relation Age of Onset    Heart Disease Mother     Hypertension Mother     Stroke Father     Hypertension Father     Hypertension Other     Heart Disease Other     Cancer Other         Current Outpatient Medications   Medication Sig Dispense Refill    fenofibrate (TRICOR) 145 MG tablet TAKE 1 TABLET BY MOUTH DAILY (FOR TRIGLYCERIDES/CHOLESTEROL) 90 tablet 3    celecoxib (CELEBREX) 100 MG capsule TAKE 1 CAPSULE BY MOUTH 2 TIMES DAILY 180 capsule 0    allopurinol (ZYLOPRIM) 300 MG tablet TAKE 1 TABLET BY MOUTH DAILY 30 tablet 5    amitriptyline (ELAVIL) 25 MG tablet TAKE ONE TABLET BY MOUTH NIGHTLY FOR NERVE DISEASE 90 tablet 3    carvedilol (COREG) 25 MG tablet TAKE 1 TABLET BY MOUTH 2 TIMES DAILY 180 tablet 3    Cholecalciferol (VITAMIN D3) 50 MCG (2000 UT) TABS TAKE 1 TABLET BY MOUTH DAILY 90 tablet 3    butalbital-acetaminophen-caffeine (FIORICET, ESGIC) -40 MG per tablet TAKE ONE TABLET EVERY 4 HOURS AS NEEDED FOR HEADACHES 10 tablet 3    amLODIPine (NORVASC) 10 MG tablet TAKE ONE TABLET BY MOUTH DAILY 90 tablet 3    DULoxetine (CYMBALTA) 60 MG extended release capsule TAKE 1 CAPSULE BY MOUTH ONCE DAILY IN THE MORNING 90 capsule 3    furosemide (LASIX) 40 MG tablet Take 1.5 tablets by mouth daily 135 tablet 3    meclizine (ANTIVERT) 25 MG tablet Take 1 tablet by mouth 3 times daily as needed for Dizziness 90 tablet 5    azelastine (ASTELIN) 0.1 % nasal spray 2 sprays by Nasal route 2 times daily Use in each nostril as directed 1 Bottle 3    Multiple Vitamins-Minerals (MULTIVITAMIN ADULT PO) Take by mouth      spironolactone (ALDACTONE) 25 MG tablet TAKE ONE TABLET BY MOUTH ONCE DAILY 90 tablet 4    EPINEPHrine (EPIPEN 2-SHAUN) 0.3 MG/0.3ML SOAJ injection Inject 0.3 mLs into the muscle once for 1 dose Use as directed for allergic reaction 0.3 mL 1     No current facility-administered medications for this visit. Patient Active Problem List   Diagnosis    Chronic congestive [facility] failure ([facility] [address]    Depression    Fibromyalgia    Gout of multiple sites    Mixed hyperlipidemia    Morbid obesity due to excess calories ([facility] [facility]    Essential hypertension    Chronic midline low back pain without sciatica    Benign headache        Review of Systems   Constitutional: Negative for activity change, appetite change, chills, diaphoresis, fatigue, fever and unexpected weight change.    HENT: Negative for congestion, ear discharge, Normal appearance. She is well-developed and normal weight. She is not ill-appearing, toxic-appearing or diaphoretic. HENT:      Head: Normocephalic and atraumatic. Right Ear: Tympanic membrane, ear canal and external ear normal. There is no impacted cerumen. Left Ear: Tympanic membrane, ear canal and external ear normal. There is no impacted cerumen. Nose: Nose normal. No congestion or rhinorrhea. Mouth/Throat:      Mouth: Mucous membranes are moist.      Pharynx: Oropharynx is clear. No oropharyngeal exudate or posterior oropharyngeal erythema. Eyes:      General: No scleral icterus. Right eye: No discharge. Left eye: No discharge. Extraocular Movements: Extraocular movements intact. Conjunctiva/sclera: Conjunctivae normal.      Pupils: Pupils are equal, round, and reactive to light. Neck:      Musculoskeletal: Normal range of motion and neck supple. No neck rigidity or muscular tenderness. Thyroid: No thyromegaly. Vascular: No carotid bruit or JVD. Trachea: No tracheal deviation. Comments: Palpable thyroid nodules noted  Cardiovascular:      Rate and Rhythm: Normal rate and regular rhythm. Pulses: Normal pulses. Heart sounds: Normal heart sounds. No murmur. No friction rub. No gallop. Pulmonary:      Effort: Pulmonary effort is normal. No respiratory distress. Breath sounds: Normal breath sounds. No stridor. No wheezing, rhonchi or rales. Chest:      Chest wall: No tenderness. Abdominal:      General: Abdomen is flat. Bowel sounds are normal. There is no distension. Palpations: Abdomen is soft. There is no mass. Tenderness: There is no abdominal tenderness. There is no right CVA tenderness, left CVA tenderness, guarding or rebound. Hernia: No hernia is present. Musculoskeletal: Normal range of motion. General: No swelling, tenderness, deformity or signs of injury. Right lower leg: No edema. Left lower leg: No edema. Comments: Gait is antalgic. There is tenderness on palpation of the lower lumbar spine. Lymphadenopathy:      Cervical: No cervical adenopathy. Skin:     General: Skin is warm and dry. Capillary Refill: Capillary refill takes less than 2 seconds. Coloration: Skin is not jaundiced or pale. Findings: No bruising, erythema, lesion or rash. Neurological:      General: No focal deficit present. Mental Status: She is alert and oriented to person, place, and time. Mental status is at baseline. Cranial Nerves: No cranial nerve deficit. Sensory: No sensory deficit. Motor: No weakness or abnormal muscle tone. Coordination: Coordination normal.      Gait: Gait normal.      Deep Tendon Reflexes: Reflexes are normal and symmetric. Reflexes normal.   Psychiatric:         Mood and Affect: Mood normal.         Behavior: Behavior normal.         Thought Content: Thought content normal.         Judgment: Judgment normal.         1. Routine adult health maintenance    2. Encounter for screening mammogram for malignant neoplasm of breast    3. Menopause    4. Screening for HIV (human immunodeficiency virus)    5. Encounter for hepatitis C screening test for low risk patient    6. Essential hypertension    7. Vitamin D deficiency     8. Morbid obesity due to excess calories ([address]    9. Chronic congestive heart failure, unspecified heart failure type ([address]    10. Hyperlipidemia, unspecified hyperlipidemia type    11. Arthralgia, unspecified joint    12. Gout, unspecified cause, unspecified chronicity, unspecified site    13. Other depression    14. Other migraine without status migrainosus, not intractable    15. Vertigo    16. Environmental allergies        ASSESSMENT/PLAN:    55-year-old woman here for follow-up and annual physical exam    1. Health maintenance: Routine screenings as per HPI. Labs discussed with patient today    2.   Hyperlipidemia: Continue TriCor as prescribed    3. Arthralgias: Continue Celebrex as prescribed    4. Gout: Doing well with allopurinol. No recent flares. 5.  Depression: Stable on a regimen of Cymbalta and Elavil    6. Hypertension: Blood pressure well controlled on carvedilol, amlodipine, Lasix spironolactone    7. Congestive heart failure: Stable    8. Morbid obesity: Stable    9. Vitamin D deficiency: Continue cholecalciferol as prescribed    10. Migraines: Stable on Fioricet    11. Vertigo: Meclizine as needed    12. Environmental allergies: Astelin nasal spray as needed    Omari Baig was seen today for medicare awv. Diagnoses and all orders for this visit:    Routine adult health maintenance    Encounter for screening mammogram for malignant neoplasm of breast  -     NINO DIGITAL SCREEN W OR WO CAD BILATERAL; Future    Menopause  -     DEXA BONE DENSITY 2 SITES; Future    Screening for HIV (human immunodeficiency virus)  -     HIV Rapid 1&2; Future    Encounter for hepatitis C screening test for low risk patient  -     Hepatitis C Antibody; Future    Essential hypertension  -     Comprehensive Metabolic Panel; Future  -     CBC Auto Differential; Future  -     Lipid Panel; Future  -     TSH without Reflex; Future  -     Vitamin D 25 Hydroxy; Future    Vitamin D deficiency   -     Vitamin D 25 Hydroxy; Future    Morbid obesity due to excess calories (HCC)    Chronic congestive heart failure, unspecified heart failure type ([facility] [address]    Hyperlipidemia, unspecified hyperlipidemia type    Arthralgia, unspecified joint    Gout, unspecified cause, unspecified chronicity, unspecified site    Other depression    Other migraine without status migrainosus, not intractable    Vertigo    Environmental allergies          Return in about 6 months (around 9/1/2021).      Orders Placed This Encounter   Procedures    NINO DIGITAL SCREEN W OR WO CAD BILATERAL     Standing Status:   Future     Standing Expiration Date:   5/1/2022 Order Specific Question:   Reason for exam:     Answer:   screening     Order Specific Question:   Does this patient have implants? Answer:   No     Order Specific Question:   Does this patient have a personal history of breast cancer? Answer:   No     Order Specific Question:   Where was last mammogram performed? Answer:   [facility] Specific Question:   When was last mammogram performed? Answer:   2020    DEXA BONE DENSITY 2 SITES     Standing Status:   Future     Standing Expiration Date:   3/1/2022     Order Specific Question:   Reason for exam:     Answer:   screening osteoporosis    Comprehensive Metabolic Panel     Fasting 12 hours     Standing Status:   Future     Number of Occurrences:   1     Standing Expiration Date:   3/1/2022    CBC Auto Differential     Fast 12 hours     Standing Status:   Future     Number of Occurrences:   1     Standing Expiration Date:   3/1/2022    Lipid Panel     Standing Status:   Future     Number of Occurrences:   1     Standing Expiration Date:   3/1/2022     Order Specific Question:   Is Patient Fasting?/# of Hours     Answer:   12    TSH without Reflex     Fast 12 hours     Standing Status:   Future     Number of Occurrences:   1     Standing Expiration Date:   3/1/2022    Vitamin D 25 Hydroxy     Standing Status:   Future     Number of Occurrences:   1     Standing Expiration Date:   3/1/2022    Hepatitis C Antibody     Standing Status:   Future     Number of Occurrences:   1     Standing Expiration Date:   3/1/2022    HIV Rapid 1&2     Standing Status:   Future     Number of Occurrences:   1     Standing Expiration Date:   3/1/2022     Order Specific Question:   Specify Date & Time of Incident     Answer:   Davie Salcedo MD     Medicare Annual Wellness Visit - Subsequent    Name: Erwin Bourgeois Date: 3/8/2021   MRN: [id number] Sex: Female   Age: 72 y.o.  Ethnicity: Non-/Non    : 1955 Race: Black Rashaad Shannon is here for   Chief Complaint   Patient presents with   [facility] AWV        Screenings for behavioral, psychosocial and functional/safety risks, and cognitive dysfunction are all negative except as indicated below. These results, as well as other patient data from the [address] form, are documented in Flowsheets linked to this Encounter. Allergies   Allergen Reactions    Codeine Hives    Lisinopril Hives       Prior to Visit Medications    Medication Sig Taking?  Authorizing Provider   fenofibrate (TRICOR) 145 MG tablet TAKE 1 TABLET BY MOUTH DAILY (FOR TRIGLYCERIDES/CHOLESTEROL) Yes Chandana Mistry MD   celecoxib (CELEBREX) 100 MG capsule TAKE 1 CAPSULE BY MOUTH 2 TIMES DAILY Yes Chandana Mistry MD   allopurinol (ZYLOPRIM) 300 MG tablet TAKE 1 TABLET BY MOUTH DAILY Yes Chandana Mistry MD   amitriptyline (ELAVIL) 25 MG tablet TAKE ONE TABLET BY MOUTH NIGHTLY FOR NERVE DISEASE Yes Chandana Mistry MD   carvedilol (COREG) 25 MG tablet TAKE 1 TABLET BY MOUTH 2 TIMES DAILY Yes Chandana Mistry MD   Cholecalciferol (VITAMIN D3) 50 MCG (2000 UT) TABS TAKE 1 TABLET BY MOUTH DAILY Yes Chandana Mistry MD   butalbital-acetaminophen-caffeine (FIORICET, ESGIC) -40 MG per tablet TAKE ONE TABLET EVERY 4 HOURS AS NEEDED FOR HEADACHES Yes Chandana Mistry MD   amLODIPine (NORVASC) 10 MG tablet TAKE ONE TABLET BY MOUTH DAILY Yes Chandana Mistry MD   DULoxetine (CYMBALTA) 60 MG extended release capsule TAKE 1 CAPSULE BY MOUTH ONCE DAILY IN THE MORNING Yes Chandana Mistry MD   furosemide (LASIX) 40 MG tablet Take 1.5 tablets by mouth daily Yes Chandana Mistry MD   meclizine (ANTIVERT) 25 MG tablet Take 1 tablet by mouth 3 times daily as needed for Dizziness Yes Chandana Mistry MD   azelastine (ASTELIN) 0.1 % nasal spray 2 sprays by Nasal route 2 times daily Use in each nostril as directed Yes Chandana Mistry MD   Multiple Vitamins-Minerals (MULTIVITAMIN ADULT PO) Take by mouth Yes Historical Provider, MD   spironolactone (ALDACTONE) 25 MG tablet TAKE ONE TABLET BY MOUTH ONCE DAILY Yes Raj Austin MD   EPINEPHrine (EPIPEN 2-SHAUN) 0.3 MG/0.3ML SOAJ injection Inject 0.3 mLs into the muscle once for 1 dose Use as directed for allergic reaction Yes Raj Austin MD       Past Medical History:   Diagnosis Date    Abnormal CT of spine     Acute sinusitis     Arthritis     Asthma     Bronchitis     Carpal tunnel syndrome     both    CHF (congestive heart failure) (HCC)     Depression     Fibromyalgia     Furuncle     Gout     Hyperlipidemia     Hypertension     Low vitamin D level     Lumbago     Lumbar radiculopathy     Neuropathy     feet    Obesity     Rheumatoid nodule of knee (HCC)     Sleep apnea     UTI (urinary tract infection)     Vertigo     Vitamin D deficiency        Past Surgical History:   Procedure Laterality Date     SECTION      times two    HYSTERECTOMY         Family History   Problem Relation Age of Onset    Heart Disease Mother     Hypertension Mother     Stroke Father     Hypertension Father     Hypertension Other     Heart Disease Other     Cancer Other        CareTeam (Including outside providers/suppliers regularly involved in providing care):   Patient Care Team:  Raj Austin MD as PCP - General (Family Medicine)  Raj Austin MD as PCP - [facility] Empaneled Provider  Merline [place], MD as Consulting Physician (Otolaryngology)    Wt Readings from Last 3 Encounters:   21 (!) 347 lb (157.4 kg)   20 (!) 357 lb 12.8 oz (162.3 kg)   19 (!) 350 lb (158.8 kg)     Vitals:    21 1519 21 1529   BP: 138/84    Pulse: 98    SpO2: (!) 86% 93%   Weight: (!) 347 lb (157.4 kg)    Height: 5' 1\" (1.549 m)            The following problems were reviewed today and where indicated follow up appointments were made and/or referrals ordered.     Risk Factor Screenings with Interventions     Fall have a drink containing alcohol?: Never  Substance Abuse Interventions:  · none needed    Health Risk Assessment:     General  In general, how would you say your health is?: Fair  In the past 7 days, have you experienced any of the following? New or Increased Pain, New or Increased Fatigue, Loneliness, Social Isolation, Stress or Anger?: (!) New or Increased Fatigue, New or Increased Pain, Social Isolation  Do you get the social and emotional support that you need?: Yes  Do you have a Living Will?: (!) No  General Health Risk Interventions:  · none needed    Health Habits/Nutrition  Do you exercise for at least 20 minutes 2-3 times per week?: (!) No  Have you lost any weight without trying in the past 3 months?: No  Do you eat only one meal per day?: No  Have you seen the dentist within the past year?: (!) No  Body mass index is 65.57 kg/m². Health Habits/Nutrition Interventions:  · none needed    Hearing/Vision  Do you or your family notice any trouble with your hearing that hasn't been managed with hearing aids?: No  Do you have difficulty driving, watching TV, or doing any of your daily activities because of your eyesight?: No  Have you had an eye exam within the past year?: (!) No  Hearing/Vision Interventions:  · none needed    Safety  Do you have working smoke detectors?: Yes  Have all throw rugs been removed or fastened?: Yes  Do you have non-slip mats or surfaces in all bathtubs/showers?: Yes  Do all of your stairways have a railing or banister?: Yes  Are your doorways, halls and stairs free of clutter?: Yes  Do you always fasten your seatbelt when you are in a car?: Yes  Safety Interventions:  · Patient declines any further evaluation/treatment for this issue    ADLs  In the past 7 days, did you need help from others to perform any of the following everyday activities?  Eating, dressing, grooming, bathing, toileting, or walking/balance?: None  In the past 7 days, did you need help from others to take care of any of the following? Laundry, housekeeping, banking/finances, shopping, telephone use, food preparation, transportation, or taking medications?: None  ADL Interventions:  · Patient declines any further evaluation/treatment for this issue    Personalized Preventive Plan   Current Health Maintenance Status  Immunization History   Administered Date(s) Administered    Pneumococcal Conjugate 13-valent ([id number]) 11/02/2018    Pneumococcal Polysaccharide ([id number]) 11/30/2020        Health Maintenance   Topic Date Due    COVID-19 Vaccine (1 of 2) Never done    DTaP/Tdap/Td vaccine (1 - Tdap) Never done    Cervical cancer screen  Never done    Shingles Vaccine (1 of 2) Never done    Flu vaccine (1) 03/01/2022 (Originally 9/1/2020)    Annual Wellness Visit (AWV)  04/24/2021    Potassium monitoring  03/01/2022    Creatinine monitoring  03/01/2022    Breast cancer screen  06/02/2022    Colon cancer screen colonoscopy  11/12/2025    Lipid screen  03/01/2026    DEXA (modify frequency per FRAX score)  Completed    Pneumococcal 65+ years Vaccine  Completed    Hepatitis C screen  Completed    HIV screen  Completed    Hepatitis A vaccine  Aged Out    Hepatitis B vaccine  Aged Out    Hib vaccine  Aged Out    Meningococcal (ACWY) vaccine  Aged Out       Recommendations for [employer] [place] Due: see orders.   Recommended screening schedule for the next 5-10 years is provided to the patient in written form: see Patient Instructions/AVS.

## 2021-03-08 ASSESSMENT — ENCOUNTER SYMPTOMS
SINUS PRESSURE: 0
CHEST TIGHTNESS: 0
SHORTNESS OF BREATH: 1
TROUBLE SWALLOWING: 0
EYE ITCHING: 0
ABDOMINAL PAIN: 0
COUGH: 0
PHOTOPHOBIA: 0
ABDOMINAL DISTENTION: 0
SORE THROAT: 0
VOICE CHANGE: 0
WHEEZING: 0
EYE DISCHARGE: 0
DIARRHEA: 0
EYE PAIN: 0
EYE REDNESS: 0
COLOR CHANGE: 0
BACK PAIN: 1
CONSTIPATION: 0
NAUSEA: 0
BLOOD IN STOOL: 0

## 2021-03-09 ENCOUNTER — IMMUNIZATION (OUTPATIENT)
Age: 66
End: 2021-03-09
Payer: MEDICARE

## 2021-03-09 PROCEDURE — 0001A COVID-19, PFIZER VACCINE 30MCG/0.3ML DOSE: CPT | Performed by: FAMILY MEDICINE

## 2021-03-09 PROCEDURE — 91300 COVID-19, PFIZER VACCINE 30MCG/0.3ML DOSE: CPT | Performed by: FAMILY MEDICINE

## 2021-03-23 RX ORDER — AMLODIPINE BESYLATE 10 MG/1
TABLET ORAL
Qty: 90 TABLET | Refills: 3 | Status: SHIPPED | OUTPATIENT
Start: 2021-03-23

## 2021-03-23 RX ORDER — CELECOXIB 100 MG/1
100 CAPSULE ORAL 2 TIMES DAILY
Qty: 180 CAPSULE | Refills: 0 | Status: SHIPPED | OUTPATIENT
Start: 2021-03-23 | End: 2021-08-02

## 2021-03-23 RX ORDER — DULOXETIN HYDROCHLORIDE 60 MG/1
CAPSULE, DELAYED RELEASE ORAL
Qty: 90 CAPSULE | Refills: 3 | Status: SHIPPED | OUTPATIENT
Start: 2021-03-23 | End: 2022-05-23

## 2021-03-30 ENCOUNTER — IMMUNIZATION (OUTPATIENT)
Age: 66
End: 2021-03-30
Payer: MEDICARE

## 2021-03-30 PROCEDURE — 91300 COVID-19, PFIZER VACCINE 30MCG/0.3ML DOSE: CPT | Performed by: FAMILY MEDICINE

## 2021-03-30 PROCEDURE — 0002A COVID-19, PFIZER VACCINE 30MCG/0.3ML DOSE: CPT | Performed by: FAMILY MEDICINE

## 2021-06-01 ENCOUNTER — TELEPHONE (OUTPATIENT)
Dept: INTERNAL MEDICINE | Age: 66
End: 2021-06-01

## 2021-06-01 DIAGNOSIS — R06.02 SHORTNESS OF BREATH: ICD-10-CM

## 2021-06-01 NOTE — TELEPHONE ENCOUNTER
Dr. Tulio Pringle on call. Pt calling for CXR results that she had done 5/28. Dr. Vasu Matamoros had ordered these to be done in November, but pt just had them done 5/28 at [facility]. Results scanned in to chart.

## 2021-06-01 NOTE — TELEPHONE ENCOUNTER
Lateral streaky infiltrates/worry per radiologist for early congestive heart failure  This patient needs an office appointment within 1 to 2 days

## 2021-06-03 RX ORDER — ALLOPURINOL 300 MG/1
300 TABLET ORAL DAILY
Qty: 30 TABLET | Refills: 5 | Status: SHIPPED | OUTPATIENT
Start: 2021-06-03 | End: 2021-12-28

## 2021-06-03 NOTE — TELEPHONE ENCOUNTER
Pt notified. She is unable to come to the office today. Appt scheduled tomorrow with Dr. Beau Parra.

## 2021-06-04 ENCOUNTER — APPOINTMENT (OUTPATIENT)
Dept: GENERAL RADIOLOGY | Facility: HOSPITAL | Age: 66
End: 2021-06-04

## 2021-06-04 ENCOUNTER — APPOINTMENT (OUTPATIENT)
Dept: CT IMAGING | Facility: HOSPITAL | Age: 66
End: 2021-06-04

## 2021-06-04 ENCOUNTER — HOSPITAL ENCOUNTER (OUTPATIENT)
Facility: HOSPITAL | Age: 66
Setting detail: OBSERVATION
Discharge: HOME | End: 2021-06-07
Attending: EMERGENCY MEDICINE | Admitting: INTERNAL MEDICINE

## 2021-06-04 ENCOUNTER — APPOINTMENT (OUTPATIENT)
Dept: ULTRASOUND IMAGING | Facility: HOSPITAL | Age: 66
End: 2021-06-04

## 2021-06-04 ENCOUNTER — OFFICE VISIT (OUTPATIENT)
Dept: INTERNAL MEDICINE | Age: 66
End: 2021-06-04
Payer: MEDICARE

## 2021-06-04 VITALS
WEIGHT: 293 LBS | DIASTOLIC BLOOD PRESSURE: 72 MMHG | RESPIRATION RATE: 18 BRPM | HEART RATE: 85 BPM | BODY MASS INDEX: 53.92 KG/M2 | OXYGEN SATURATION: 94 % | HEIGHT: 62 IN | SYSTOLIC BLOOD PRESSURE: 118 MMHG

## 2021-06-04 DIAGNOSIS — I27.20 PULMONARY HYPERTENSION (HCC): ICD-10-CM

## 2021-06-04 DIAGNOSIS — R09.02 HYPOXIA: ICD-10-CM

## 2021-06-04 DIAGNOSIS — I51.89 DIASTOLIC DYSFUNCTION: ICD-10-CM

## 2021-06-04 DIAGNOSIS — R09.02 HYPOXIC: Primary | ICD-10-CM

## 2021-06-04 DIAGNOSIS — R06.09 DYSPNEA ON EXERTION: ICD-10-CM

## 2021-06-04 DIAGNOSIS — I10 ESSENTIAL HYPERTENSION: ICD-10-CM

## 2021-06-04 DIAGNOSIS — E07.9 THYROID MASS: ICD-10-CM

## 2021-06-04 DIAGNOSIS — I50.42 CHRONIC COMBINED SYSTOLIC AND DIASTOLIC HEART FAILURE (HCC): Primary | ICD-10-CM

## 2021-06-04 DIAGNOSIS — E66.01 MORBID OBESITY (HCC): ICD-10-CM

## 2021-06-04 PROBLEM — G47.33 OSA (OBSTRUCTIVE SLEEP APNEA): Status: ACTIVE | Noted: 2021-06-04

## 2021-06-04 LAB
ALBUMIN SERPL-MCNC: 4.1 G/DL (ref 3.5–5.2)
ALBUMIN/GLOB SERPL: 1.2 G/DL
ALP SERPL-CCNC: 59 U/L (ref 39–117)
ALT SERPL W P-5'-P-CCNC: 10 U/L (ref 1–33)
ANION GAP SERPL CALCULATED.3IONS-SCNC: 10 MMOL/L (ref 5–15)
APTT PPP: 30.1 SECONDS (ref 24.1–35)
ARTERIAL PATENCY WRIST A: POSITIVE
AST SERPL-CCNC: 18 U/L (ref 1–32)
ATMOSPHERIC PRESS: 751 MMHG
BASE EXCESS BLDA CALC-SCNC: 8.3 MMOL/L (ref 0–2)
BASOPHILS # BLD AUTO: 0.04 10*3/MM3 (ref 0–0.2)
BASOPHILS NFR BLD AUTO: 0.6 % (ref 0–1.5)
BDY SITE: ABNORMAL
BILIRUB SERPL-MCNC: 0.4 MG/DL (ref 0–1.2)
BODY TEMPERATURE: 37 C
BUN SERPL-MCNC: 18 MG/DL (ref 8–23)
BUN/CREAT SERPL: 18.8 (ref 7–25)
CALCIUM SPEC-SCNC: 10.1 MG/DL (ref 8.6–10.5)
CHLORIDE SERPL-SCNC: 107 MMOL/L (ref 98–107)
CO2 SERPL-SCNC: 31 MMOL/L (ref 22–29)
CREAT SERPL-MCNC: 0.96 MG/DL (ref 0.57–1)
D DIMER PPP FEU-MCNC: 1.34 MG/L (FEU) (ref 0–0.5)
DEPRECATED RDW RBC AUTO: 59.7 FL (ref 37–54)
EOSINOPHIL # BLD AUTO: 0.32 10*3/MM3 (ref 0–0.4)
EOSINOPHIL NFR BLD AUTO: 5 % (ref 0.3–6.2)
ERYTHROCYTE [DISTWIDTH] IN BLOOD BY AUTOMATED COUNT: 17.8 % (ref 12.3–15.4)
GFR SERPL CREATININE-BSD FRML MDRD: 71 ML/MIN/1.73
GLOBULIN UR ELPH-MCNC: 3.4 GM/DL
GLUCOSE SERPL-MCNC: 96 MG/DL (ref 65–99)
HCO3 BLDA-SCNC: 34.9 MMOL/L (ref 20–26)
HCT VFR BLD AUTO: 35.8 % (ref 34–46.6)
HGB BLD-MCNC: 11.1 G/DL (ref 12–15.9)
HOLD SPECIMEN: NORMAL
IMM GRANULOCYTES # BLD AUTO: 0.03 10*3/MM3 (ref 0–0.05)
IMM GRANULOCYTES NFR BLD AUTO: 0.5 % (ref 0–0.5)
INR PPP: 1.15 (ref 0.91–1.09)
LYMPHOCYTES # BLD AUTO: 1.48 10*3/MM3 (ref 0.7–3.1)
LYMPHOCYTES NFR BLD AUTO: 22.9 % (ref 19.6–45.3)
Lab: ABNORMAL
Lab: ABNORMAL
MAGNESIUM SERPL-MCNC: 1.8 MG/DL (ref 1.6–2.4)
MCH RBC QN AUTO: 28.6 PG (ref 26.6–33)
MCHC RBC AUTO-ENTMCNC: 31 G/DL (ref 31.5–35.7)
MCV RBC AUTO: 92.3 FL (ref 79–97)
MODALITY: ABNORMAL
MONOCYTES # BLD AUTO: 0.39 10*3/MM3 (ref 0.1–0.9)
MONOCYTES NFR BLD AUTO: 6 % (ref 5–12)
NEUTROPHILS NFR BLD AUTO: 4.19 10*3/MM3 (ref 1.7–7)
NEUTROPHILS NFR BLD AUTO: 65 % (ref 42.7–76)
NOTIFIED BY: ABNORMAL
NOTIFIED WHO: ABNORMAL
NRBC BLD AUTO-RTO: 0 /100 WBC (ref 0–0.2)
NT-PROBNP SERPL-MCNC: 225.7 PG/ML (ref 0–900)
PCO2 BLDA: 57.3 MM HG (ref 35–45)
PCO2 TEMP ADJ BLD: 57.3 MM HG (ref 35–45)
PH BLDA: 7.39 PH UNITS (ref 7.35–7.45)
PH, TEMP CORRECTED: 7.39 PH UNITS (ref 7.35–7.45)
PLATELET # BLD AUTO: 277 10*3/MM3 (ref 140–450)
PMV BLD AUTO: 9.2 FL (ref 6–12)
PO2 BLDA: 49 MM HG (ref 83–108)
PO2 TEMP ADJ BLD: 49 MM HG (ref 83–108)
POTASSIUM SERPL-SCNC: 3.9 MMOL/L (ref 3.5–5.2)
PROT SERPL-MCNC: 7.5 G/DL (ref 6–8.5)
PROTHROMBIN TIME: 13.8 SECONDS (ref 11.5–13.4)
RBC # BLD AUTO: 3.88 10*6/MM3 (ref 3.77–5.28)
SAO2 % BLDCOA: 83.6 % (ref 94–99)
SARS-COV-2 RNA PNL SPEC NAA+PROBE: NOT DETECTED
SODIUM SERPL-SCNC: 148 MMOL/L (ref 136–145)
TROPONIN T SERPL-MCNC: <0.01 NG/ML (ref 0–0.03)
VENTILATOR MODE: ABNORMAL
WBC # BLD AUTO: 6.45 10*3/MM3 (ref 3.4–10.8)
WHOLE BLOOD HOLD SPECIMEN: NORMAL
WHOLE BLOOD HOLD SPECIMEN: NORMAL

## 2021-06-04 PROCEDURE — 1123F ACP DISCUSS/DSCN MKR DOCD: CPT | Performed by: INTERNAL MEDICINE

## 2021-06-04 PROCEDURE — 3017F COLORECTAL CA SCREEN DOC REV: CPT | Performed by: INTERNAL MEDICINE

## 2021-06-04 PROCEDURE — 36415 COLL VENOUS BLD VENIPUNCTURE: CPT

## 2021-06-04 PROCEDURE — 76536 US EXAM OF HEAD AND NECK: CPT

## 2021-06-04 PROCEDURE — 87635 SARS-COV-2 COVID-19 AMP PRB: CPT | Performed by: EMERGENCY MEDICINE

## 2021-06-04 PROCEDURE — 25010000002 ENOXAPARIN PER 10 MG: Performed by: INTERNAL MEDICINE

## 2021-06-04 PROCEDURE — 93005 ELECTROCARDIOGRAM TRACING: CPT | Performed by: EMERGENCY MEDICINE

## 2021-06-04 PROCEDURE — G0378 HOSPITAL OBSERVATION PER HR: HCPCS

## 2021-06-04 PROCEDURE — 85610 PROTHROMBIN TIME: CPT | Performed by: EMERGENCY MEDICINE

## 2021-06-04 PROCEDURE — 96374 THER/PROPH/DIAG INJ IV PUSH: CPT

## 2021-06-04 PROCEDURE — 85025 COMPLETE CBC W/AUTO DIFF WBC: CPT | Performed by: EMERGENCY MEDICINE

## 2021-06-04 PROCEDURE — 94799 UNLISTED PULMONARY SVC/PX: CPT

## 2021-06-04 PROCEDURE — 82803 BLOOD GASES ANY COMBINATION: CPT

## 2021-06-04 PROCEDURE — 96372 THER/PROPH/DIAG INJ SC/IM: CPT

## 2021-06-04 PROCEDURE — G8417 CALC BMI ABV UP PARAM F/U: HCPCS | Performed by: INTERNAL MEDICINE

## 2021-06-04 PROCEDURE — G8427 DOCREV CUR MEDS BY ELIG CLIN: HCPCS | Performed by: INTERNAL MEDICINE

## 2021-06-04 PROCEDURE — 85379 FIBRIN DEGRADATION QUANT: CPT | Performed by: EMERGENCY MEDICINE

## 2021-06-04 PROCEDURE — 80053 COMPREHEN METABOLIC PANEL: CPT | Performed by: EMERGENCY MEDICINE

## 2021-06-04 PROCEDURE — C9803 HOPD COVID-19 SPEC COLLECT: HCPCS

## 2021-06-04 PROCEDURE — G8399 PT W/DXA RESULTS DOCUMENT: HCPCS | Performed by: INTERNAL MEDICINE

## 2021-06-04 PROCEDURE — 94640 AIRWAY INHALATION TREATMENT: CPT

## 2021-06-04 PROCEDURE — 83880 ASSAY OF NATRIURETIC PEPTIDE: CPT | Performed by: EMERGENCY MEDICINE

## 2021-06-04 PROCEDURE — 93010 ELECTROCARDIOGRAM REPORT: CPT | Performed by: INTERNAL MEDICINE

## 2021-06-04 PROCEDURE — 71275 CT ANGIOGRAPHY CHEST: CPT

## 2021-06-04 PROCEDURE — 84484 ASSAY OF TROPONIN QUANT: CPT | Performed by: EMERGENCY MEDICINE

## 2021-06-04 PROCEDURE — 1090F PRES/ABSN URINE INCON ASSESS: CPT | Performed by: INTERNAL MEDICINE

## 2021-06-04 PROCEDURE — 99284 EMERGENCY DEPT VISIT MOD MDM: CPT

## 2021-06-04 PROCEDURE — 0 IOPAMIDOL PER 1 ML: Performed by: EMERGENCY MEDICINE

## 2021-06-04 PROCEDURE — 93005 ELECTROCARDIOGRAM TRACING: CPT

## 2021-06-04 PROCEDURE — 1036F TOBACCO NON-USER: CPT | Performed by: INTERNAL MEDICINE

## 2021-06-04 PROCEDURE — 71045 X-RAY EXAM CHEST 1 VIEW: CPT

## 2021-06-04 PROCEDURE — 4040F PNEUMOC VAC/ADMIN/RCVD: CPT | Performed by: INTERNAL MEDICINE

## 2021-06-04 PROCEDURE — 36600 WITHDRAWAL OF ARTERIAL BLOOD: CPT

## 2021-06-04 PROCEDURE — 85730 THROMBOPLASTIN TIME PARTIAL: CPT | Performed by: EMERGENCY MEDICINE

## 2021-06-04 PROCEDURE — 83735 ASSAY OF MAGNESIUM: CPT | Performed by: EMERGENCY MEDICINE

## 2021-06-04 PROCEDURE — 99214 OFFICE O/P EST MOD 30 MIN: CPT | Performed by: INTERNAL MEDICINE

## 2021-06-04 RX ORDER — BUMETANIDE 0.25 MG/ML
2 INJECTION INTRAMUSCULAR; INTRAVENOUS ONCE
Status: COMPLETED | OUTPATIENT
Start: 2021-06-04 | End: 2021-06-04

## 2021-06-04 RX ORDER — DEXAMETHASONE SODIUM PHOSPHATE 4 MG/ML
4 INJECTION, SOLUTION INTRA-ARTICULAR; INTRALESIONAL; INTRAMUSCULAR; INTRAVENOUS; SOFT TISSUE EVERY 6 HOURS PRN
Status: DISCONTINUED | OUTPATIENT
Start: 2021-06-04 | End: 2021-06-07 | Stop reason: HOSPADM

## 2021-06-04 RX ORDER — SODIUM CHLORIDE 0.9 % (FLUSH) 0.9 %
10 SYRINGE (ML) INJECTION AS NEEDED
Status: DISCONTINUED | OUTPATIENT
Start: 2021-06-04 | End: 2021-06-07 | Stop reason: HOSPADM

## 2021-06-04 RX ORDER — IPRATROPIUM BROMIDE AND ALBUTEROL SULFATE 2.5; .5 MG/3ML; MG/3ML
3 SOLUTION RESPIRATORY (INHALATION) ONCE
Status: COMPLETED | OUTPATIENT
Start: 2021-06-04 | End: 2021-06-04

## 2021-06-04 RX ORDER — ACETAMINOPHEN 325 MG/1
650 TABLET ORAL EVERY 4 HOURS PRN
Status: DISCONTINUED | OUTPATIENT
Start: 2021-06-04 | End: 2021-06-07 | Stop reason: HOSPADM

## 2021-06-04 RX ORDER — SODIUM CHLORIDE 0.9 % (FLUSH) 0.9 %
10 SYRINGE (ML) INJECTION EVERY 12 HOURS SCHEDULED
Status: DISCONTINUED | OUTPATIENT
Start: 2021-06-04 | End: 2021-06-07 | Stop reason: HOSPADM

## 2021-06-04 RX ADMIN — SODIUM CHLORIDE, PRESERVATIVE FREE 10 ML: 5 INJECTION INTRAVENOUS at 20:53

## 2021-06-04 RX ADMIN — BUMETANIDE 2 MG: 0.25 INJECTION INTRAMUSCULAR; INTRAVENOUS at 14:41

## 2021-06-04 RX ADMIN — IPRATROPIUM BROMIDE AND ALBUTEROL SULFATE 3 ML: 2.5; .5 SOLUTION RESPIRATORY (INHALATION) at 15:17

## 2021-06-04 RX ADMIN — ENOXAPARIN SODIUM 40 MG: 40 INJECTION SUBCUTANEOUS at 17:21

## 2021-06-04 RX ADMIN — IOPAMIDOL 100 ML: 755 INJECTION, SOLUTION INTRAVENOUS at 15:10

## 2021-06-04 ASSESSMENT — ENCOUNTER SYMPTOMS
WHEEZING: 1
ABDOMINAL PAIN: 0
CHEST TIGHTNESS: 0
SORE THROAT: 0
COUGH: 0
CONSTIPATION: 0
SHORTNESS OF BREATH: 1

## 2021-06-04 NOTE — ED PROVIDER NOTES
Subjective   Patient with ulcer shortness of breath history of CHF came the ER for evaluation of complaints.      Shortness of Breath  Severity:  Moderate  Onset quality:  Gradual  Timing:  Constant  Progression:  Worsening  Chronicity:  New  Context: not activity, not animal exposure, not emotional upset, not occupational exposure, not pollens, not strong odors and not URI    Relieved by:  Nothing  Worsened by:  Exertion and movement  Ineffective treatments:  None tried  Associated symptoms: PND and wheezing    Associated symptoms: no abdominal pain, no chest pain, no claudication, no cough, no diaphoresis, no ear pain, no fever, no headaches, no hemoptysis, no neck pain, no rash, no sore throat, no sputum production, no syncope, no swollen glands and no vomiting    Risk factors: no recent alcohol use, no family hx of DVT, no obesity, no oral contraceptive use, no prolonged immobilization and no recent surgery        Review of Systems   Constitutional: Negative.  Negative for activity change, appetite change, chills, diaphoresis, fatigue and fever.   HENT: Negative for congestion, drooling, ear pain, facial swelling, hearing loss, sinus pressure and sore throat.    Eyes: Negative.  Negative for discharge.   Respiratory: Positive for shortness of breath and wheezing. Negative for cough, hemoptysis and sputum production.    Cardiovascular: Positive for PND. Negative for chest pain, claudication and syncope.   Gastrointestinal: Negative for abdominal distention, abdominal pain, blood in stool, diarrhea, nausea and vomiting.   Endocrine: Negative.  Negative for cold intolerance, heat intolerance, polydipsia, polyphagia and polyuria.   Genitourinary: Negative.  Negative for dysuria, flank pain and urgency.   Musculoskeletal: Negative.  Negative for arthralgias, back pain, myalgias, neck pain and neck stiffness.   Skin: Negative.  Negative for color change, pallor and rash.   Allergic/Immunologic: Negative.     Neurological: Negative.  Negative for dizziness, seizures, speech difficulty, weakness, numbness and headaches.   Hematological: Negative.  Negative for adenopathy.   All other systems reviewed and are negative.      Past Medical History:   Diagnosis Date   • Anemia     iron def   • Anxiety    • Asthma    • Back pain    • CHF (congestive heart failure) (CMS/HCC)    • CTS (carpal tunnel syndrome)    • Depression    • Fibromyalgia    • GERD (gastroesophageal reflux disease)    • Gout    • Hyperlipemia    • Hyperlipidemia    • Hypertension    • Obesity    • Obstructive sleep apnea     could not tolerate machine    • Peripheral neuropathy        Allergies   Allergen Reactions   • Codeine Sulfate Hives   • Lisinopril Swelling and Rash     Facial swelling       Past Surgical History:   Procedure Laterality Date   • BREAST BIOPSY Left    •  SECTION      X 2   • COLONOSCOPY  10/22/2015    Tics repeat exam in 5 years   • COLONOSCOPY  2007    Small hemorrhoids repeat exam in 3 years   • COLONOSCOPY N/A 2020    Procedure: COLONOSCOPY WITH ANESTHESIA;  Surgeon: Denny Francis MD;  Location: [facility] ENDOSCOPY;  Service: Gastroenterology;  Laterality: N/A;  pre: family hx colon ca  post: diverticulosis  Carola Barbosa MD   • HYSTERECTOMY      total   • UMBILICAL HERNIA REPAIR      had 1/2 of it repaired with hysterectomy        Family History   Problem Relation Age of Onset   • Arthritis Mother    • Heart disease Mother    • Hypertension Mother    • Arthritis Father    • Hypertension Father    • Anuerysm Father    • Stroke Father    • Asthma Brother    • Colon polyps Neg Hx    • Colon cancer Neg Hx        Social History     Socioeconomic History   • Marital status:      Spouse name: Not on file   • Number of children: Not on file   • Years of education: Not on file   • Highest education level: Not on file   Tobacco Use   • Smoking status: Never Smoker   • Smokeless tobacco: Never Used    Substance and Sexual Activity   • Alcohol use: No   • Drug use: No   • Sexual activity: Defer     Birth control/protection: Surgical           Objective   Physical Exam  Vitals and nursing note reviewed. Exam conducted with a chaperone present.   Constitutional:       General: She is not in acute distress.     Appearance: Normal appearance. She is well-developed. She is obese. She is ill-appearing. She is not toxic-appearing or diaphoretic.   HENT:      Head: Normocephalic and atraumatic.      Right Ear: External ear normal.      Nose: Nose normal.      Mouth/Throat:      Mouth: Mucous membranes are moist.   Eyes:      Conjunctiva/sclera: Conjunctivae normal.      Pupils: Pupils are equal, round, and reactive to light.   Cardiovascular:      Rate and Rhythm: Normal rate and regular rhythm.      Chest Wall: PMI is not displaced.      Pulses: Normal pulses. No decreased pulses.      Heart sounds: Normal heart sounds. No murmur heard.     Pulmonary:      Effort: Pulmonary effort is normal. Tachypnea present. No accessory muscle usage or respiratory distress.      Breath sounds: No stridor. Examination of the right-middle field reveals rales. Examination of the left-middle field reveals rales. Examination of the right-lower field reveals rales. Examination of the left-lower field reveals rales. Rales present. No decreased breath sounds, wheezing or rhonchi.   Chest:      Chest wall: No tenderness or crepitus.   Abdominal:      General: Bowel sounds are normal. There is no distension.      Palpations: Abdomen is soft.      Tenderness: There is no abdominal tenderness.   Musculoskeletal:         General: No swelling or tenderness. Normal range of motion.      Cervical back: Normal range of motion and neck supple. No rigidity.      Comments: Lower extremity exam bilaterally is unremarkable.  There is no right or left calf tenderness .  There is no palpable venous cord.  No obvious difference in the size of the legs.  No  pitting edema.  The dorsalis pedis and posterior tibial femoral and popliteal pulses are palpable and +2 bilaterally.  Homans sign is negative   Skin:     General: Skin is warm.      Capillary Refill: Capillary refill takes less than 2 seconds.      Coloration: Skin is not jaundiced.      Findings: No erythema or rash.   Neurological:      General: No focal deficit present.      Mental Status: She is alert and oriented to person, place, and time. Mental status is at baseline.      Cranial Nerves: No cranial nerve deficit.      Motor: No weakness.      Coordination: Coordination normal.      Deep Tendon Reflexes: Reflexes are normal and symmetric. Reflexes normal.   Psychiatric:         Mood and Affect: Mood normal.         Procedures           ED Course  ED Course as of Jun 04 1648 Fri Jun 04, 2021   1346 · Left ventricular diastolic dysfunction (grade I) consistent with impaired relaxation.  · Left ventricular systolic function is low normal.  · The left ventricular cavity is mildly dilated.  · Left ventricular wall thickness is consistent with mild concentric hypertrophy.       [TS]   1350 Normal sinus rhythm rate of 60 bpm    [TS]   1550 This was discussed with the patient    [TS]   1616 Patient with progressive worsening shortness of breath.  She has got diastolic dysfunction systolic function is normal ventricle cavity with dilated today CT of the chest shows pulmonary hypertension no evidence any pulmonary emboli and large thyroid gland is the CT findings were discussed the patient admitting physician in the ED her sats were 92% on oxygen.  When he takes oxygen off and ambulate her sats dropped down to 86%.  Will be admitted for further evaluation.    [TS]   1618 Dr. Patel is requesting ABG on room air    [TS]   1648 Patient has been told about the incidental findings on her CT scan including a thyroid and that needs further evaluation    [TS]      ED Course User Index  [TS] Deven Gill MD                                          Wells' Criteria (for pulmonary embolism) reviewed and/or performed as part of the patient evaluation and treatment planning process.  The result associated with this review/performance is: 3       MDM  Number of Diagnoses or Management Options  Diagnosis management comments: Differential Diagnosis:  I considered pulmonary etiology, asthma, chronic obstructive pulmonary disease, pneumonia, pulmonary embolism, adult respiratory distress syndrome, pneumothorax, pleural effusion, pulmonary fibrosis, cardiac etiology, congestive heart failure, myocardial infarction, metabolic etiology, diabetic ketoacidosis, uremia, acidosis, sepsis, anemia, drug related etiology, hyperventilation and CNS disease as a possible cause of dyspnea in this patient. This is a partial list of diagnoses considered.            Amount and/or Complexity of Data Reviewed  Clinical lab tests: reviewed and ordered  Tests in the radiology section of CPT®: ordered and reviewed  Tests in the medicine section of CPT®: ordered and reviewed    Risk of Complications, Morbidity, and/or Mortality  Presenting problems: moderate  Diagnostic procedures: moderate  Management options: moderate        Final diagnoses:   Hypoxic   Pulmonary hypertension (CMS/HCC)   Diastolic dysfunction   Thyroid mass       ED Disposition  ED Disposition     ED Disposition Condition Comment    Decision to Admit  Level of Care: Telemetry [5]   Diagnosis: Hypoxic [730370]   Admitting Physician: MELANIA MADDEN [[id number]]   Attending Physician: MELANIA MADDEN [[id number]]   Certification: I Certify That Inpatient Hospital Services Are Medically Necessary For Greater Than 2 Midnights            No follow-up provider specified.       Medication List      No changes were made to your prescriptions during this visit.          Deven Gill MD  06/04/21 1619       Deven Gill MD  06/04/21 1648       Deven Gill MD  06/04/21 1648

## 2021-06-04 NOTE — PROGRESS NOTES
Chief Complaint   Patient presents with    Follow-up     Discuss Chest XR from 05/28/2021    Shortness of Breath     PT states that she has had SOB for about 1 year, seen Dr. Juliana Huston back in 03/2021, did not have the CHest XR until 05/28/2021 due to husbands health. Pt states that Dr. Juliana Huston stopped the Spironolactone about 3 months ago, and states that this has gotten worse after that. History of presenting illness:  Makenna Macias is a68 y.o. female who presents today for follow up on her chronic medical conditions as noted below.     Complains of increased dyspnea  Seen Dr. Juliana Huston on March 1, 2021 Medicare wellness visit and in November 2020 for follow-up regarding her medical problems  It appears to me is that in November 2020 Dr. Juliana Huston ordered chest x-ray that patient did not have done and it was reordered in March 2021  Patient did not have it done until now, in may 2021  CXR 5/28/21 at [facility] shows streaky infiltrates/vascular prominence consistent with possible congestive heart failure    Patient has not been taking her spironolactone since March 2021, she states the pharmacy told her that her doctor no longer wants her to take it and she discontinued it    She states that her shortness of breath has been progressive over last few months  Her legs are more swollen  Is difficult for her to know sleep in her bed related to shortness of breath and with any activities she is very short of breath    Patient Active Problem List    Diagnosis Date Noted    Benign headache 11/28/2018    Chronic congestive heart failure ([address] 07/08/2017    Depression 07/08/2017    Fibromyalgia 07/08/2017    Gout of multiple sites 07/08/2017    Mixed hyperlipidemia 07/08/2017    Morbid obesity due to excess calories ([address] 07/08/2017    Essential hypertension 07/08/2017    Chronic midline low back pain without sciatica 07/08/2017     Past Medical History:   Diagnosis Date    Abnormal CT of spine     Acute sinusitis     Arthritis     Asthma     Bronchitis     Carpal tunnel syndrome     both    [facility]     Depression     Fibromyalgia     Furuncle     Gout     Hyperlipidemia     Hypertension     Low vitamin D level     Lumbago     Lumbar radiculopathy     Neuropathy     feet    Obesity     Rheumatoid nodule of knee ([facility])     Sleep apnea     UTI (urinary tract infection)     Vertigo     Vitamin D deficiency       Past Surgical History:   Procedure Laterality Date     SECTION      times two    HYSTERECTOMY       Current Outpatient Medications   Medication Sig Dispense Refill    allopurinol (ZYLOPRIM) 300 MG tablet TAKE 1 TABLET BY MOUTH DAILY 30 tablet 5    DULoxetine (CYMBALTA) 60 MG extended release capsule TAKE 1 CAPSULE BY MOUTH ONCE DAILY IN THE MORNING 90 capsule 3    amLODIPine (NORVASC) 10 MG tablet TAKE ONE TABLET BY MOUTH DAILY 90 tablet 3    celecoxib (CELEBREX) 100 MG capsule TAKE 1 CAPSULE BY MOUTH 2 TIMES DAILY 180 capsule 0    fenofibrate (TRICOR) 145 MG tablet TAKE 1 TABLET BY MOUTH DAILY (FOR TRIGLYCERIDES/CHOLESTEROL) 90 tablet 3    amitriptyline (ELAVIL) 25 MG tablet TAKE ONE TABLET BY MOUTH NIGHTLY FOR NERVE DISEASE 90 tablet 3    carvedilol (COREG) 25 MG tablet TAKE 1 TABLET BY MOUTH 2 TIMES DAILY 180 tablet 3    Cholecalciferol (VITAMIN D3) 50 MCG ( UT) TABS TAKE 1 TABLET BY MOUTH DAILY 90 tablet 3    butalbital-acetaminophen-caffeine (FIORICET, ESGIC) -40 MG per tablet TAKE ONE TABLET EVERY 4 HOURS AS NEEDED FOR HEADACHES 10 tablet 3    furosemide (LASIX) 40 MG tablet Take 1.5 tablets by mouth daily 135 tablet 3    meclizine (ANTIVERT) 25 MG tablet Take 1 tablet by mouth 3 times daily as needed for Dizziness 90 tablet 5    azelastine (ASTELIN) 0.1 % nasal spray 2 sprays by Nasal route 2 times daily Use in each nostril as directed 1 Bottle 3    Multiple Vitamins-Minerals (MULTIVITAMIN ADULT PO) Take by mouth      EPINEPHrine (EPIPEN 2-SHAUN) 0.3 MG/0.3ML SOAJ injection Inject 0.3 mLs into the muscle once for 1 dose Use as directed for allergic reaction 0.3 mL 1     No current facility-administered medications for this visit. Allergies   Allergen Reactions    Codeine Hives    Lisinopril Hives     Social History     Tobacco Use    Smoking status: Never Smoker    Smokeless tobacco: Never Used   Substance Use Topics    Alcohol use: No      Family History   Problem Relation Age of Onset    Heart Disease Mother     Hypertension Mother     Stroke Father     Hypertension Father     Hypertension Other     Heart Disease Other     Cancer Other        Review of Systems   Constitutional: Positive for fatigue. Negative for chills and fever. HENT: Positive for congestion. Negative for ear pain, nosebleeds, postnasal drip and sore throat. Respiratory: Positive for shortness of breath and wheezing. Negative for cough and chest tightness. Cardiovascular: Negative for chest pain, palpitations and leg swelling. Gastrointestinal: Negative for abdominal pain and constipation. Genitourinary: Negative for dysuria and urgency. Musculoskeletal: Negative. Negative for arthralgias. Skin: Negative for rash. Neurological: Positive for weakness. Negative for dizziness and headaches. Psychiatric/Behavioral: Negative. Vitals:    06/04/21 1153 06/04/21 1157   BP: 118/72    Site: Left Upper Arm    Position: Sitting    Cuff Size: Large Adult    Pulse: 85 85   Resp: 18    SpO2: (!) 66% 94%   Weight: (!) 351 lb (159.2 kg)    Height: 5' 1.5\" (1.562 m)      Body mass index is 65.25 kg/m². Physical Exam  Constitutional:       Appearance: She is well-developed. HENT:      Right Ear: External ear normal.      Left Ear: External ear normal.      Mouth/Throat:      Pharynx: No oropharyngeal exudate. Eyes:      Conjunctiva/sclera: Conjunctivae normal.      Pupils: Pupils are equal, round, and reactive to light.    Neck:  Triglycerides 03/01/2021 80     HDL 03/01/2021 56*    LDL Calculated 03/01/2021 143     TSH 03/01/2021 1.940     Vit D, 25-Hydroxy 03/01/2021 50.0     Hep C Ab Interp 03/01/2021 Non-Reactive     Rapid HIV 1&2 03/01/2021 Non-reactive     HIV-1 P24 Ag 03/01/2021 Non-reactive            ASSESSMENT/PLAN:    Chronic combined systolic and diastolic heart failure (HCC)    Diastolic dysfunction    Morbid obesity ([address]    Essential hypertension    Dyspnea on exertion    Hypoxia      MDM    Complains of increased dyspnea  Seen Dr. Young Parra on March 1, 2021 Medicare wellness visit and in November 2020 for follow-up regarding her medical problems  It appears to me is that in November 2020 Dr. Young Parra ordered chest x-ray that patient did not have done and it was reordered in March 2021  Patient did not have it done until now, in may 2021  CXR 5/28/21 at [facility] shows streaky infiltrates/vascular prominence consistent with possible congestive heart failure    Patient has not been taking her spironolactone since March 2021, she states the pharmacy told her that her doctor no longer wants her to take it and she discontinued it    She states that her shortness of breath has been progressive over last few months  Her legs are more swollen  Is difficult for her to know sleep in her bed related to shortness of breath and with any activities she is very short of breath    All previous records reviewed  Dr. Young Parra notes reviewed for last year  Cardiology notes and tests reviewed from [facility] most recent echo 6/2019:    · Left ventricular diastolic dysfunction (grade I) consistent with   impaired relaxation. · Left ventricular systolic function is low normal.   · The left ventricular cavity is mildly dilated. · Left ventricular wall thickness is consistent with mild concentric   hypertrophy.      DIFFICULT STUDY AND CONTRAST WOULD HAVE BEEN HELPFUL   LOW NORMAL LV SYSTOLIC FUNCTION   LV DIASTOLIC DYSFUNCTION GRADE 1 NOTED   NORMAL RV FUNCTION   MILD LVE AND MILD LVH    Patient is significantly short of breath  Oxygen levels at office remain low around 90 sitting and dropping too low 80s with any ambulating  Patient at this time needs immediate evaluation/diuresis in controlled setting/labs/possible cardiology evaluation with her symptoms  Likely CHF exacerbation/need to rule out other etiology for significant hypoxia    Case was discussed with emergency room physician  Patient always uses [facility]  [address] [place] [address] her now  No orders of the defined types were placed in this encounter. New Prescriptions    No medications on file         No follow-ups on file. There are no Patient Instructions on file for this visit. EMR Dragon/transcription disclaimer:Significant part of this  encounter note is electronic transcription/translationof spoken language to printed text. The electronic translation of spoken language may be erroneous, or at times, nonsensical words or phrases may be inadvertently transcribed.  Although I have reviewed the note for sucherrors, some may still exist.

## 2021-06-04 NOTE — H&P
[facility] Medicine Services  HISTORY AND PHYSICAL    Date of Admission: 6/4/2021  Primary Care Physician: Carola Barbosa MD    Subjective     Chief Complaint:   Sob/abnormal cxr    History of Present Illness  65-year-old female with significant morbid obesity, prior heart history to include CHF although echoes have not been best quality.  Ported both diastolic and systolic and prior cardiac notes.  History of sleep apnea but noncompliant with CPAP.  History of thyroid mass not followed up on.  Hypertension.  Hyperlipidemia.  Five myalgia.  She presented to the hospital today for shortness of breath.  Saw her provider outside here reportedly chest x-ray was done which questioned some fluid so she was sent here to be seen.  In the ER she generally has no complaints.  No fevers.  No cough or chills.  No chest pain.  No nausea.  Just whenever she walks she gets dyspneic.  She required oxygen with ambulation as she was descending to the 80s.  At rest she is fine on 2 L.  Afebrile.  Normotensive.  No white count elevation.  Elevated D-dimer but CTA negative for PE.  CT does however show thyroid mass inside the chest displacing her trachea as well as enlarged pulmonary artery.  No overt heart failure.  BNP normal.  We been asked to admit her for further care.        Review of Systems   Otherwise complete ROS reviewed and negative except as mentioned in the HPI.    Past Medical History:   Past Medical History:   Diagnosis Date   • Anemia     iron def   • Anxiety    • Asthma    • Back pain    • CHF (congestive heart failure) (CMS/HCC)    • CTS (carpal tunnel syndrome)    • Depression    • Fibromyalgia    • GERD (gastroesophageal reflux disease)    • Gout    • Hyperlipemia    • Hyperlipidemia    • Hypertension    • Obesity    • Obstructive sleep apnea     could not tolerate machine    • Peripheral neuropathy      Past Surgical History:  Past Surgical History:   Procedure  Laterality Date   • BREAST BIOPSY Left    •  SECTION      X 2   • COLONOSCOPY  10/22/2015    Tics repeat exam in 5 years   • COLONOSCOPY  2007    Small hemorrhoids repeat exam in 3 years   • COLONOSCOPY N/A 2020    Procedure: COLONOSCOPY WITH ANESTHESIA;  Surgeon: Denny Francis MD;  Location[facility] ENDOSCOPY;  Service: Gastroenterology;  Laterality: N/A;  pre: family hx colon ca  post: diverticulosis  Carola Barbosa MD   • HYSTERECTOMY      total   • UMBILICAL HERNIA REPAIR      had 1/2 of it repaired with hysterectomy      Social History:  reports that she has never smoked. She has never used smokeless tobacco. She reports that she does not drink alcohol and does not use drugs.    Family History: family history includes Anuerysm in her father; Arthritis in her father and mother; Asthma in her brother; Heart disease in her mother; Hypertension in her father and mother; Stroke in her father.       Allergies:  Allergies   Allergen Reactions   • Codeine Sulfate Hives   • Lisinopril Swelling and Rash     Facial swelling       Medications:  Prior to Admission medications    Medication Sig Start Date End Date Taking? Authorizing Provider   allopurinol (ZYLOPRIM) 300 MG tablet  16  Yes Mamie Chase MD   amitriptyline (ELAVIL) 25 MG tablet Take 25 mg by mouth Every Night.   Yes Mamie Chase MD   amLODIPine (NORVASC) 10 MG tablet Take 10 mg by mouth Daily.   Yes Mamie Chase MD   azelastine (ASTELIN) 0.1 % nasal spray 2 sprays into each nostril 2 (Two) Times a Day. Use in each nostril as directed   Yes Mamie Chase MD   butalbital-acetaminophen-caffeine (FIORICET, ESGIC) -40 MG per tablet Take 1 tablet by mouth Every 4 (Four) Hours As Needed for Headache.   Yes Mamie Chase MD   carvedilol (COREG) 25 MG tablet TAKE 1 TABLET BY MOUTH TWICE A DAY 10/2/17  Yes Yoan Talamantes MD   cholecalciferol (VITAMIN D3) 1000 UNITS tablet  "Take 1,000 Units by mouth daily.   Yes Mamie Chase MD   DULoxetine (CYMBALTA) 30 MG capsule 60 mg. 8/30/16  Yes Mamie Chase MD   EPINEPHrine (ADRENALIN) 0.05 MG/ML syringe Inject 20 mL as directed.   Yes Mamie Chase MD   fenofibrate (TRICOR) 145 MG tablet Take 145 mg by mouth Daily.   Yes Mamie Chase MD   furosemide (LASIX) 40 MG tablet Take 60 mg by mouth Daily. TAKE 1 & 1/2 TABS DAILY   Yes Mamie Chase MD   meclizine (ANTIVERT) 25 MG tablet Take 25 mg by mouth 3 (three) times a day as needed for dizziness.   Yes Mamie Chase MD   Multiple Vitamins-Minerals (MULTIVITAMIN ADULT PO) Take  by mouth.   Yes Mamie Chase MD   spironolactone (ALDACTONE) 25 MG tablet Take 25 mg by mouth daily.    Mamie Chase MD   Suprep Bowel Prep Kit 17.5-3.13-1.6 GM/177ML solution oral solution Take as directed by office instructions provided 10/1/20   Rosa Fernando APRN     I have utilized all available immediate resources to obtain, update, and review the patient's current medications.    Objective     Vital Signs: /74   Pulse 87   Temp 98.9 °F (37.2 °C) (Oral)   Resp 12   Ht 154.9 cm (61\")   Wt (!) 159 kg (351 lb)   SpO2 96%   BMI 66.32 kg/m²   Physical Exam   GEN: Awake, alert, interactive, in NAD  HEENT:  PERRLA, EOMI, Anicteric, difficult to assess thyroid secondary to habitus, potential enlargement/fullness felt on L side. No stridor heard.   Lungs: CTAB, no wheezing/rales/rhonchi  Heart: RRR, +S1/s2, no rub  ABD: obese, soft, nt, +BS, no guarding/rebound  Extremities: atraumatic, no cyanosis, no pitting edema  Skin: no rashes or petechiae  Neuro: AAOx3, no focal deficits  Psych: normal mood & affect        Results Reviewed:  Lab Results (last 24 hours)     Procedure Component Value Units Date/Time    Blood Gas, Arterial - [[id number]]  (Abnormal) Collected: 06/04/21 1700    Specimen: Arterial Blood Updated: 06/04/21 1711     Site " Right Radial     Scott's Test Positive     pH, Arterial 7.393 pH units      pCO2, Arterial 57.3 mm Hg      Comment: 83 Value above reference range        pO2, Arterial 49.0 mm Hg      Comment: 85 Value below critical limit        HCO3, Arterial 34.9 mmol/L      Comment: 83 Value above reference range        Base Excess, Arterial 8.3 mmol/L      Comment: 83 Value above reference range        O2 Saturation, Arterial 83.6 %      Comment: 84 Value below reference range        Temperature 37.0 C      Barometric Pressure for Blood Gas 751 mmHg      Modality Room Air     Ventilator Mode NA     Notified [facility] [id number]     Notified By [id number]     Notified Time 06/04/2021 17:14     Collected by [id number]     Comment: Meter: [id number]     :  [id number]        pCO2, Temperature Corrected 57.3 mm Hg      pH, Temp Corrected 7.393 pH Units      pO2, Temperature Corrected 49.0 mm Hg     COVID PRE-OP / PRE-PROCEDURE SCREENING ORDER (NO ISOLATION) - Swab, Nasal Cavity [[id number]]  (Normal) Collected: 06/04/21 1511    Specimen: Swab from Nasal Cavity Updated: 06/04/21 1554    Narrative:      The following orders were created for panel order COVID PRE-OP / PRE-PROCEDURE SCREENING ORDER (NO ISOLATION) - Swab, Nasal Cavity.  Procedure                               Abnormality         Status                     ---------                               -----------         ------                     COVID-19,[facility] IN-[facility]NA [facility]IE...[[id number]]  Normal              Final result                 Please view results for these tests on the individual orders.    COVID-19,[facility] IN-HOUSE,Nasal Swab No Transport Media 3-4 HR TAT - Swab, Nasal Cavity [[id number]]  (Normal) Collected: 06/04/21 1511    Specimen: Swab from Nasal Cavity Updated: 06/04/21 1554     COVID19 Not Detected    Narrative:      Fact sheet for providers: https://www.fda.gov/media/962798/download     Fact sheet for patients: https://www.fda.gov/media/250913/download    Test  performed by PCR.    Consider negative results in combination with clinical observations, patient history, and epidemiological information.    [place] Draw [[id number]] Collected: 06/04/21 1319    Specimen: Blood Updated: 06/04/21 1430    Narrative:      The following orders were created for panel order [place] Draw.  Procedure                               Abnormality         Status                     ---------                               -----------         ------                     Light Blue Top[[id number]]                                   Final result               Green Top (Gel)[[id number]]                                  Final result               Lavender Top[[id number]]                                     Final result               Red Top[[id number]]                                          Final result               [place] Blood Culture Josesito...[[id number]]                      Final result               Gray Top[[id number]]                                         In process                   Please view results for these tests on the individual orders.    [place] Blood Culture Bottle Set [[id number]] Collected: 06/04/21 1319    Specimen: Blood from Arm, Left Updated: 06/04/21 1430     Extra Tube Hold for add-ons.     Comment: Auto resulted.       Green Top (Gel) [[id number]] Collected: 06/04/21 1319    Specimen: Blood Updated: 06/04/21 1430     Extra Tube Hold for add-ons.     Comment: Auto resulted.       Lavender Top [[id number]] Collected: 06/04/21 1319    Specimen: Blood Updated: 06/04/21 1430     Extra Tube hold for add-on     Comment: Auto resulted       Light Blue Top [[id number]] Collected: 06/04/21 1319    Specimen: Blood Updated: 06/04/21 1430     Extra Tube hold for add-on     Comment: Auto resulted       Red Top [[id number]] Collected: 06/04/21 1319    Specimen: Blood Updated: 06/04/21 1430     Extra Tube Hold for add-ons.     Comment: Auto resulted.       Comprehensive Metabolic Panel [[id number]]   (Abnormal) Collected: 06/04/21 1319    Specimen: Blood Updated: 06/04/21 1413     Glucose 96 mg/dL      BUN 18 mg/dL      Creatinine 0.96 mg/dL      Sodium 148 mmol/L      Potassium 3.9 mmol/L      Chloride 107 mmol/L      CO2 31.0 mmol/L      Calcium 10.1 mg/dL      Total Protein 7.5 g/dL      Albumin 4.10 g/dL      ALT (SGPT) 10 U/L      AST (SGOT) 18 U/L      Alkaline Phosphatase 59 U/L      Total Bilirubin 0.4 mg/dL      eGFR  African Amer 71 mL/min/1.73      Globulin 3.4 gm/dL      A/G Ratio 1.2 g/dL      BUN/Creatinine Ratio 18.8     Anion Gap 10.0 mmol/L     Narrative:      GFR Normal >60  Chronic Kidney Disease <60  Kidney Failure <15      Troponin [[id number]]  (Normal) Collected: 06/04/21 1319    Specimen: Blood Updated: 06/04/21 1409     Troponin T <0.010 ng/mL     Narrative:      Troponin T Reference Range:  <= 0.03 ng/mL-   Negative for AMI  >0.03 ng/mL-     Abnormal for myocardial necrosis.  Clinicians would have to utilize clinical acumen, EKG, Troponin and serial changes to determine if it is an Acute Myocardial Infarction or myocardial injury due to an underlying chronic condition.       Results may be falsely decreased if patient taking Biotin.      BNP [[id number]]  (Normal) Collected: 06/04/21 1319    Specimen: Blood Updated: 06/04/21 1409     proBNP 225.7 pg/mL     Narrative:      Among patients with dyspnea, NT-proBNP is highly sensitive for the detection of acute congestive heart failure. In addition NT-proBNP of <300 pg/ml effectively rules out acute congestive heart failure with 99% negative predictive value.    Results may be falsely decreased if patient taking Biotin.      Magnesium [[id number]]  (Normal) Collected: 06/04/21 1319    Specimen: Blood Updated: 06/04/21 1407     Magnesium 1.8 mg/dL     D-dimer, Quantitative [[id number]]  (Abnormal) Collected: 06/04/21 1319    Specimen: Blood Updated: 06/04/21 1405     D-Dimer, Quantitative 1.34 mg/L (FEU)     Narrative:      Reference Range is  0-0.50 mg/L FEU. However, results <0.50 mg/L FEU tends to rule out DVT or PE. Results >0.50 mg/L FEU are not useful in predicting absence or presence of DVT or PE.      aPTT [[id number]]  (Normal) Collected: 06/04/21 1319    Specimen: Blood Updated: 06/04/21 1405     PTT 30.1 seconds     Protime-INR [[id number]]  (Abnormal) Collected: 06/04/21 1319    Specimen: Blood Updated: 06/04/21 1405     Protime 13.8 Seconds      INR 1.15    CBC & Differential [[id number]]  (Abnormal) Collected: 06/04/21 1319    Specimen: Blood Updated: 06/04/21 1359    Narrative:      The following orders were created for panel order CBC & Differential.  Procedure                               Abnormality         Status                     ---------                               -----------         ------                     CBC Auto Differential[[id number]]        Abnormal            Final result                 Please view results for these tests on the individual orders.    CBC Auto Differential [[id number]]  (Abnormal) Collected: 06/04/21 1319    Specimen: Blood Updated: 06/04/21 1359     WBC 6.45 10*3/mm3      RBC 3.88 10*6/mm3      Hemoglobin 11.1 g/dL      Hematocrit 35.8 %      MCV 92.3 fL      MCH 28.6 pg      MCHC 31.0 g/dL      RDW 17.8 %      RDW-SD 59.7 fl      MPV 9.2 fL      Platelets 277 10*3/mm3      Neutrophil % 65.0 %      Lymphocyte % 22.9 %      Monocyte % 6.0 %      Eosinophil % 5.0 %      Basophil % 0.6 %      Immature Grans % 0.5 %      Neutrophils, Absolute 4.19 10*3/mm3      Lymphocytes, Absolute 1.48 10*3/mm3      Monocytes, Absolute 0.39 10*3/mm3      Eosinophils, Absolute 0.32 10*3/mm3      Basophils, Absolute 0.04 10*3/mm3      Immature Grans, Absolute 0.03 10*3/mm3      nRBC 0.0 /100 WBC     [place] Top [[id number]] Collected: 06/04/21 1319    Specimen: Blood Updated: 06/04/21 1325        Imaging Results (Last 24 Hours)     Procedure Component Value Units Date/Time    CT Angiogram Chest [[id number]] Collected: 06/04/21  1521     Updated: 06/04/21 1537    Narrative:      EXAM: CT chest angiography with 3D MIP images with IV contrast     INDICATION: Source of breath, PE suspected     COMPARISON: None available.     DOSE LENGTH PRODUCT: 803 mGy cm. Automated exposure control was also  utilized to decrease patient radiation dose.     FINDINGS:     Large body habitus and respiratory motion limits evaluation.     No evidence of RIGHT heart strain. The main pulmonary artery is dilated  measuring 3.6 cm diameter. No central or lobar pulmonary embolus.  Evaluation of the distal pulmonary vasculature is severely limited. Some  of the RIGHT lower lobe subsegmental pulmonary arteries are not  visualized. Thoracic aorta is nonaneurysmal. No evidence of aortic  dissection. No pericardial effusion.     The central airways are clear. Atelectasis in the RIGHT lower lobe. No  change in 5 mm LEFT upper lobe pulmonary nodule on image 23. No  consolidation or pleural effusion. No pneumothorax. No enlarged thoracic  lymph nodes.      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 No acute soft  tissue finding. Limited view of the upper abdomen is unremarkable. Large  LEFT upper pole renal cyst. RIGHT glenohumeral joint degenerative  change. Multilevel thoracic spine degenerative change.       Impression:         1.  Limited exam secondary to large body habitus and respiratory motion.  2.  No central or lobar pulmonary embolus. The more distal pulmonary  vasculature is not well evaluated.  3.  Main pulmonary artery is dilated measuring 3.6 cm diameter,  suggesting pulmonary hypertension. No evidence of RIGHT heart strain.  4.  A few areas of bilateral atelectasis are present.  5.  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 Recommend thyroid ultrasound.  This report was finalized on 06/04/2021 15:34 by Dr. Angel Talamantes MD.    XR  Chest 1 View [[id number]] Collected: 06/04/21 1427     Updated: 06/04/21 1432    Narrative:      EXAMINATION:  XR CHEST 1 VW-  6/4/2021 2:17 PM CDT     HISTORY: Shortness of air.     COMPARISON: 11/18/2013.     FINDINGS:  There are stable coarse markings and bronchial wall  thickening. The inspiration is not very deep. There is vascular  crowding. There is cardiomegaly. The trachea is shifted to the right at  the thoracic inlet. This could be due to a thoracic inlet mass, such as  a thyroid mass. No acute appearing bony abnormality is seen.       Impression:      1. Hypoventilation with vascular crowding.  2. Coarse markings and bronchial wall thickening appears stable.  3. Cardiomegaly.  4. Possible thoracic inlet mass on the left with shifting of the trachea  to the right side. This is also present on a cervical spine x-ray on  3/18/2016.        This report was finalized on 06/04/2021 14:29 by Dr. Rajat Peres MD.        I have personally reviewed and interpreted the radiology studies and ECG obtained at time of admission.     Assessment / Plan     Assessment:   Active Hospital Problems    Diagnosis    • **Hypoxia    • Thyroid mass    • CHARLIE (obstructive sleep apnea) - non compliant with cpap    • Morbid obesity due to excess calories (CMS/HCC)    • HTN (hypertension)      NO LONGER MONITORS AT HOME       Plan:   #1 hypoxia -patient with multiple factors likely playing into this.  Mainly dyspnea on exertion and hypoxia on exertion.  Could be in the setting of her morbid obesity.  Could be in the setting of pulmonary hypertension.  Does have prior history of heart failure but no pulmonary edema on CT, normal BNP, no rales.  Doubt a transthoracic echo would be much of diagnostic quality given body habitus.  Does also have mass-effect on trachea from her thyroid but there is no stridor and dyspnea is mainly with exertion not positional.  Shortness of still playing a role.  Will bring her in to get pulmonary and ENT  evaluation.    #2 thyroid mass -as above we will get an ENT evaluation.  Case was discussed with Dr. So.  We will do a thyroid ultrasound.  We will follow up with further recommendations.    #3 CHARLIE -noncompliant with CPAP.  O2 at nighttime.    #4 hypertension -we will need to verify meds and resume as appropriate.  Blood pressure soft on arrival here.  Currently 110/64.    #5 chronic CHF -question combined CHF.  No obvious signs of heart failure here.  Difficult to assess volume status given her body habitus however.  But again normal BMP.  No rales on exam.  No mention of pulmonary edema on CAT scan.    Code Status/Advanced Care Plan: Full code    The patient's surrogate decision maker is her  Emi.     I discussed my findings and recommendations with the patient directly at bedside.    Estimated length of stay is 2+ days.     The patient was seen and examined by me on 6/4/21 at 4:40 PM.    Electronically signed by Mukund Patel DO, 06/04/21, 17:15 CDT.

## 2021-06-04 NOTE — ED TRIAGE NOTES
Presents to ED from Dr. Cole's office.  She states she normally sees Dr. Barbosa but she was out today.  She states she has been SOA for a while but has been getting worse.  She states it is worse with exertion.  Labored breathing noted during conversation.  She states at her appt they told her to come to ED asap due to low oxygen and stated she needed to be put on oxygen as soon as she got here.  O2 at 2L nc applied on arrival.

## 2021-06-05 LAB
ANION GAP SERPL CALCULATED.3IONS-SCNC: 7 MMOL/L (ref 5–15)
BUN SERPL-MCNC: 17 MG/DL (ref 8–23)
BUN/CREAT SERPL: 17.3 (ref 7–25)
CALCIUM SPEC-SCNC: 10.1 MG/DL (ref 8.6–10.5)
CHLORIDE SERPL-SCNC: 101 MMOL/L (ref 98–107)
CO2 SERPL-SCNC: 35 MMOL/L (ref 22–29)
CREAT SERPL-MCNC: 0.98 MG/DL (ref 0.57–1)
GFR SERPL CREATININE-BSD FRML MDRD: 69 ML/MIN/1.73
GLUCOSE SERPL-MCNC: 93 MG/DL (ref 65–99)
POTASSIUM SERPL-SCNC: 3.6 MMOL/L (ref 3.5–5.2)
SODIUM SERPL-SCNC: 143 MMOL/L (ref 136–145)
T4 FREE SERPL-MCNC: 1.07 NG/DL (ref 0.93–1.7)
TSH SERPL DL<=0.05 MIU/L-ACNC: 2.52 UIU/ML (ref 0.27–4.2)

## 2021-06-05 PROCEDURE — 84443 ASSAY THYROID STIM HORMONE: CPT | Performed by: INTERNAL MEDICINE

## 2021-06-05 PROCEDURE — 36415 COLL VENOUS BLD VENIPUNCTURE: CPT | Performed by: INTERNAL MEDICINE

## 2021-06-05 PROCEDURE — 84439 ASSAY OF FREE THYROXINE: CPT | Performed by: INTERNAL MEDICINE

## 2021-06-05 PROCEDURE — 80048 BASIC METABOLIC PNL TOTAL CA: CPT | Performed by: INTERNAL MEDICINE

## 2021-06-05 PROCEDURE — G0378 HOSPITAL OBSERVATION PER HR: HCPCS

## 2021-06-05 PROCEDURE — 25010000002 ENOXAPARIN PER 10 MG: Performed by: INTERNAL MEDICINE

## 2021-06-05 PROCEDURE — 96372 THER/PROPH/DIAG INJ SC/IM: CPT

## 2021-06-05 PROCEDURE — 99204 OFFICE O/P NEW MOD 45 MIN: CPT | Performed by: OTOLARYNGOLOGY

## 2021-06-05 RX ORDER — FUROSEMIDE 40 MG/1
40 TABLET ORAL DAILY
Status: DISCONTINUED | OUTPATIENT
Start: 2021-06-05 | End: 2021-06-07 | Stop reason: HOSPADM

## 2021-06-05 RX ORDER — AMITRIPTYLINE HYDROCHLORIDE 25 MG/1
25 TABLET, FILM COATED ORAL NIGHTLY
Status: DISCONTINUED | OUTPATIENT
Start: 2021-06-05 | End: 2021-06-07 | Stop reason: HOSPADM

## 2021-06-05 RX ORDER — ALLOPURINOL 300 MG/1
300 TABLET ORAL DAILY
Status: DISCONTINUED | OUTPATIENT
Start: 2021-06-05 | End: 2021-06-07 | Stop reason: HOSPADM

## 2021-06-05 RX ORDER — CARVEDILOL 6.25 MG/1
6.25 TABLET ORAL 2 TIMES DAILY WITH MEALS
Status: DISCONTINUED | OUTPATIENT
Start: 2021-06-05 | End: 2021-06-07 | Stop reason: HOSPADM

## 2021-06-05 RX ADMIN — CARVEDILOL 6.25 MG: 6.25 TABLET, FILM COATED ORAL at 18:23

## 2021-06-05 RX ADMIN — SODIUM CHLORIDE, PRESERVATIVE FREE 10 ML: 5 INJECTION INTRAVENOUS at 20:01

## 2021-06-05 RX ADMIN — FUROSEMIDE 40 MG: 40 TABLET ORAL at 16:07

## 2021-06-05 RX ADMIN — AMITRIPTYLINE HYDROCHLORIDE 25 MG: 25 TABLET, FILM COATED ORAL at 20:01

## 2021-06-05 RX ADMIN — ENOXAPARIN SODIUM 40 MG: 40 INJECTION SUBCUTANEOUS at 18:23

## 2021-06-05 RX ADMIN — ALLOPURINOL 300 MG: 300 TABLET ORAL at 16:07

## 2021-06-05 RX ADMIN — SODIUM CHLORIDE, PRESERVATIVE FREE 10 ML: 5 INJECTION INTRAVENOUS at 09:07

## 2021-06-05 NOTE — PROGRESS NOTES
[facility] Medicine Services  INPATIENT PROGRESS NOTE    Patient Name: Pau Cabrera  Date of Admission: 6/4/2021  Today's Date: 06/05/21  Length of Stay: 1  Primary Care Physician: Carola Barbosa MD    Subjective   Chief Complaint:   Follow-up shortness of breath/hypoxia    HPI   Patient resting in bed comfortably this morning on 3 L nasal cannula.  Had a mild headache last night but improving.  No vision changes.  No numbness or tingling.  No chest pain or shortness of breath currently.  Afebrile overnight.        Review of Systems   All pertinent negatives and positives are as above. All other systems have been reviewed and are negative unless otherwise stated.     Objective    Temp:  [98.2 °F (36.8 °C)-98.9 °F (37.2 °C)] 98.2 °F (36.8 °C)  Heart Rate:  [] 75  Resp:  [12-22] 20  BP: (102-147)/(52-81) 134/66  Physical Exam  GEN: Awake, alert, interactive, in NAD  HEENT:  PERRLA, EOMI, Anicteric  Lungs: CTAB, no wheezing/rales/rhonchi  Heart: RRR, +S1/s2, no rub  ABD: obese, soft, nt, +BS, no guarding/rebound  Extremities: atraumatic, no cyanosis, no pitting edema  Skin: no rashes or petechiae  Neuro: AAOx3, no focal deficits  Psych: normal mood & affect         Results Review:  I have reviewed the labs, radiology results, and diagnostic studies.    Laboratory Data:   Results from last 7 days   Lab Units 06/04/21  1319   WBC 10*3/mm3 6.45   HEMOGLOBIN g/dL 11.1*   HEMATOCRIT % 35.8   PLATELETS 10*3/mm3 277        Results from last 7 days   Lab Units 06/05/21  0407 06/04/21  1319   SODIUM mmol/L 143 148*   POTASSIUM mmol/L 3.6 3.9   CHLORIDE mmol/L 101 107   CO2 mmol/L 35.0* 31.0*   BUN mg/dL 17 18   CREATININE mg/dL 0.98 0.96   CALCIUM mg/dL 10.1 10.1   BILIRUBIN mg/dL  --  0.4   ALK PHOS U/L  --  59   ALT (SGPT) U/L  --  10   AST (SGOT) U/L  --  18   GLUCOSE mg/dL 93 96       Culture Data:   No results found for: BLOODCX, URINECX, WOUNDCX, MRSACX, RESPCX,  "STOOLCX    Radiology Data:   Imaging Results (Last 24 Hours)     Procedure Component Value Units Date/Time    US Thyroid [[id number]] Collected: 06/04/21 1916     Updated: 06/04/21 1928    Narrative:      EXAMINATION:  US THYROID-  6/4/2021 5:45 PM CDT     HISTORY: Thyroid mass on CT. Ultrasound was ordered \"stat.\"      COMPARISON: No comparison study.     TECHNIQUE: Grayscale and color flow imaging were performed.     FINDINGS: The right thyroid lobe measures 5.4 x 2.1 x 2.1 cm and the  left thyroid lobe measures 10 x 4.2 x 5.2 cm.  1. There is a 0.7 x 0.5 x 0.6 cm cyst in the midpole region of the right  thyroid.  2. There is a 1.6 x 0.8 x 0.9 cm hypoechoic smoothly marginated nodule  in the lower pole on the right with no calcifications. This is a Ti RADS  category 4 lesion. Biopsy of this lesion is recommended.  3. There is a 1.4 x 0.8 x 0.8 cm isoechoic nodule with some cystic  change in the midpole region on the right. This is a Ti RADS category 3  lesion. No follow-up required for a lesion of this size in this  category.  4. There is a smoothly marginated echogenic nodule that is predominantly  solid with some cystic components in the lower pole on the left  measuring 6 x 4.2 x 4.9 cm. This is a Ti RADS category 3 lesion. Biopsy  is recommended for this lesion given the size of the lesion.  5. There is a 2.5 x 2.5 cm hypoechoic nodule in the left thyroid  laterally with rim calcification. This is a Ti RADS category 4 lesion.  Biopsy is recommended.  6. There is a 4.6 x 2.9 x 3.9 cm isoechoic to slightly hyperechoic mass  in the upper pole on the left containing microcalcifications. The lesion  is smoothly marginated and is a Ti RADS category 4 lesion. Biopsy  recommended.       Impression:      Multinodular thyroid gland, as described. Using the Ti-RADS  scoring system, several of the lesions meet criteria for biopsy. See the  above report. There are too many lesions to biopsy at the same time.  Initially, " biopsy of the dominant nodules in each lobe should be  considered. The other lesions could be biopsied at a later time.  This report was finalized on 06/04/2021 19:25 by Dr. Rajat Peres MD.    CT Angiogram Chest [[id number]] Collected: 06/04/21 1521     Updated: 06/04/21 1537    Narrative:      EXAM: CT chest angiography with 3D MIP images with IV contrast     INDICATION: Source of breath, PE suspected     COMPARISON: None available.     DOSE LENGTH PRODUCT: 803 mGy cm. Automated exposure control was also  utilized to decrease patient radiation dose.     FINDINGS:     Large body habitus and respiratory motion limits evaluation.     No evidence of RIGHT heart strain. The main pulmonary artery is dilated  measuring 3.6 cm diameter. No central or lobar pulmonary embolus.  Evaluation of the distal pulmonary vasculature is severely limited. Some  of the RIGHT lower lobe subsegmental pulmonary arteries are not  visualized. Thoracic aorta is nonaneurysmal. No evidence of aortic  dissection. No pericardial effusion.     The central airways are clear. Atelectasis in the RIGHT lower lobe. No  change in 5 mm LEFT upper lobe pulmonary nodule on image 23. No  consolidation or pleural effusion. No pneumothorax. No enlarged thoracic  lymph nodes.      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 No acute soft  tissue finding. Limited view of the upper abdomen is unremarkable. Large  LEFT upper pole renal cyst. RIGHT glenohumeral joint degenerative  change. Multilevel thoracic spine degenerative change.       Impression:         1.  Limited exam secondary to large body habitus and respiratory motion.  2.  No central or lobar pulmonary embolus. The more distal pulmonary  vasculature is not well evaluated.  3.  Main pulmonary artery is dilated measuring 3.6 cm diameter,  suggesting pulmonary hypertension. No evidence of RIGHT heart strain.  4.  A few  areas of bilateral atelectasis are present.  5.  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 Recommend thyroid ultrasound.  This report was finalized on 06/04/2021 15:34 by Dr. Angel Talamantes MD.    XR Chest 1 View [[id number]] Collected: 06/04/21 1427     Updated: 06/04/21 1432    Narrative:      EXAMINATION:  XR CHEST 1 VW-  6/4/2021 2:17 PM CDT     HISTORY: Shortness of air.     COMPARISON: 11/18/2013.     FINDINGS:  There are stable coarse markings and bronchial wall  thickening. The inspiration is not very deep. There is vascular  crowding. There is cardiomegaly. The trachea is shifted to the right at  the thoracic inlet. This could be due to a thoracic inlet mass, such as  a thyroid mass. No acute appearing bony abnormality is seen.       Impression:      1. Hypoventilation with vascular crowding.  2. Coarse markings and bronchial wall thickening appears stable.  3. Cardiomegaly.  4. Possible thoracic inlet mass on the left with shifting of the trachea  to the right side. This is also present on a cervical spine x-ray on  3/18/2016.        This report was finalized on 06/04/2021 14:29 by Dr. Rajat Peres MD.          I have reviewed the patient's current medications.     Assessment/Plan     Active Hospital Problems    Diagnosis    • **Hypoxia    • Thyroid mass    • CHARLIE (obstructive sleep apnea) - non compliant with cpap    • Morbid obesity due to excess calories (CMS/HCC)    • HTN (hypertension)      NO LONGER MONITORS AT HOME         Her breathing issues are likely multifactorial given OHS/CHARLIE noncompliant with CPAP, obesity, thyroid mass.  Also likely has pulmonary hypertension.  Some reported history of heart failure but difficult to assess her volume status given body habitus and echo was limited again due to body habitus.  Currently comfortable on 3 L.  Afebrile without signs of infection.    Pulmonary and ENT to see today.    Thyroid ultrasound noted.  TFTs  pending.    Continue supportive care and O2.  May potentially be appropriate for discharge home on oxygen with outpatient follow-ups and work-ups once evaluated.  Will follow up on recommendations.    Discussed with nursing try to clarify home med rec blood pressures currently stable off meds.    Discharge Planning: I expect the patient to be discharged to home in the next 24 to 48 hours pending any specialist recommendations.  Will likely need O2 for discharge.    Electronically signed by Mukund Patel DO, 06/05/21, 08:10 CDT.

## 2021-06-05 NOTE — CONSULTS
ENT CONSULT NOTE  2021    Patient Identification:  Name: Pau Cabrera  Age: 65 y.o.  Sex: female  :  1955  MRN: [id number]                     Date of Admission: 2021      CC:    Shortness of breath    Subjective   65-year-old lady with a history of obstructive sleep apnea unable to tolerate CPAP device secondary to inability to utilize a mask.  CTA in the emergency department was negative for PE however suggestive of pulmonary hypertension.  CT scan performed demonstrated findings consistent with thyromegaly with tracheal shift.  Otolaryngologic consultation was obtained for evaluation.    HPI:   Location: Throat  Duration:  6 months ago  Timing:  acute on chronic  Quality:   moderate  Context: History of morbid obesity  Modifying Factors:  nothing  Associated Signs/Symptoms: See above.  Should be noted that the patient denies significant recent increase in dysphagia or odynophagia and denies feeling of choking or feeling as though someone has their hands around her neck.    ROS:  Review of Systems - General ROS: positive for  - weight gain  negative for - chills, fatigue, fever, hot flashes, malaise, night sweats or weight loss  ENT ROS: positive for -sleep disturbance  negative for - epistaxis, headaches, hearing change, nasal congestion, nasal discharge, nasal polyps, oral lesions, sinus pain, sneezing, sore throat, tinnitus, visual changes or vocal changes    HISTORY      Past Medical History:   Diagnosis Date   • Anemia     iron def   • Anxiety    • Asthma    • Back pain    • CHF (congestive heart failure) (CMS/[facility])    • CTS (carpal tunnel syndrome)    • Depression    • Fibromyalgia    • GERD (gastroesophageal reflux disease)    • Gout    • Hyperlipemia    • Hyperlipidemia    • Hypertension    • Obesity    • Obstructive sleep apnea     could not tolerate machine    • Peripheral neuropathy         Past Surgical History:   Procedure Laterality Date   • BREAST BIOPSY Left    •   SECTION      X 2   • COLONOSCOPY  10/22/2015    Tics repeat exam in 5 years   • COLONOSCOPY  06/08/2007    Small hemorrhoids repeat exam in 3 years   • COLONOSCOPY N/A 11/12/2020    Procedure: COLONOSCOPY WITH ANESTHESIA;  Surgeon: Denny Francis MD;  Location: [facility] ENDOSCOPY;  Service: Gastroenterology;  Laterality: N/A;  pre: family hx colon ca  post: diverticulosis  Carola Barbosa MD   • HYSTERECTOMY  2000    total   • UMBILICAL HERNIA REPAIR      had 1/2 of it repaired with hysterectomy         Social History     Socioeconomic History   • Marital status:      Spouse name: Not on file   • Number of children: Not on file   • Years of education: Not on file   • Highest education level: Not on file   Tobacco Use   • Smoking status: Never Smoker   • Smokeless tobacco: Never Used   Substance and Sexual Activity   • Alcohol use: No   • Drug use: No   • Sexual activity: Defer     Birth control/protection: Surgical        Medications Prior to Admission   Medication Sig Dispense Refill Last Dose   • allopurinol (ZYLOPRIM) 300 MG tablet    Past Week at Unknown time   • amitriptyline (ELAVIL) 25 MG tablet Take 25 mg by mouth Every Night.   Past Week at Unknown time   • amLODIPine (NORVASC) 10 MG tablet Take 10 mg by mouth Daily.   Past Week at Unknown time   • azelastine (ASTELIN) 0.1 % nasal spray 2 sprays into each nostril 2 (Two) Times a Day. Use in each nostril as directed   Past Week at Unknown time   • carvedilol (COREG) 25 MG tablet TAKE 1 TABLET BY MOUTH TWICE A DAY 60 tablet 3 Past Week at Unknown time   • cholecalciferol (VITAMIN D3) 1000 UNITS tablet Take 2,000 Units by mouth Daily.   Past Week at Unknown time   • DULoxetine (CYMBALTA) 30 MG capsule 60 mg.   Past Week at Unknown time   • fenofibrate (TRICOR) 145 MG tablet Take 145 mg by mouth Daily.   Past Week at Unknown time   • furosemide (LASIX) 40 MG tablet Take 60 mg by mouth Daily. TAKE 1 & 1/2 TABS DAILY   Past Week at Unknown time   •  meclizine (ANTIVERT) 25 MG tablet Take 25 mg by mouth 3 (three) times a day as needed for dizziness.   Past Month at Unknown time   • Multiple Vitamins-Minerals (MULTIVITAMIN ADULT PO) Take  by mouth.   Past Week at Unknown time   • butalbital-acetaminophen-caffeine (FIORICET, ESGIC) -40 MG per tablet Take 1 tablet by mouth Every 4 (Four) Hours As Needed for Headache.   Unknown at Unknown time   • EPINEPHrine (ADRENALIN) 0.05 MG/ML syringe Inject 20 mL as directed.   Unknown at Unknown time   • Suprep Bowel Prep Kit 17.5-3.13-1.6 GM/177ML solution oral solution Take as directed by office instructions provided 2 bottle 0 Unknown at Unknown time        Allergies   Allergen Reactions   • Codeine Sulfate Hives   • Lisinopril Swelling and Rash     Facial swelling        Immunization History   Administered Date(s) Administered   • COVID-19 (PFIZER) 03/09/2021, 03/30/2021        Family History   Problem Relation Age of Onset   • Arthritis Mother    • Heart disease Mother    • Hypertension Mother    • Arthritis Father    • Hypertension Father    • Anuerysm Father    • Stroke Father    • Asthma Brother    • Colon polyps Neg Hx    • Colon cancer Neg Hx           Objective     PE:    Temp:  [98.2 °F (36.8 °C)-98.5 °F (36.9 °C)] 98.2 °F (36.8 °C)  Heart Rate:  [] 75  Resp:  [12-22] 20  BP: (102-147)/(52-81) 139/71   Body mass index is 66.77 kg/m².     General appearance: [place] obese, alert, well appearing, and in no distress, oriented to person, place, and time and acyanotic, in no respiratory distress.   Ability to Communicate: Patient is able to communicate well without a coarse or breathy voice   Ears - right ear normal, left ear normal.   Nasal exam - normal and patent, no erythema, discharge or polyps.    Facial exam: facial movement was normal and symmetrical, nontender   Oropharyngeal exam - mucous membranes moist, pharynx normal without lesions.   Neck exam - supple, no significant adenopathy.   CVS exam:  "normal rate and regular rhythm.   Chest: no tachypnea, retractions or cyanosis.   No lymphadenopathy in the anterior or posterior neck, supraclavicular areas.    Neurological exam reveals neck supple without rigidity, thyromegaly is noted inferiorly left greater than right.    DATA      MEDICATIONS     Current Facility-Administered Medications   Medication Dose Route Frequency Provider Last Rate Last Admin   • acetaminophen (TYLENOL) tablet 650 mg  650 mg Oral Q4H PRN Mukund Patel DO       • dexamethasone (DECADRON) injection 4 mg  4 mg Intravenous Q6H PRN Mukund Patel DO       • enoxaparin (LOVENOX) syringe 40 mg  40 mg Subcutaneous Q24H Mukund Patel DO   40 mg at 06/04/21 1721   • sodium chloride 0.9 % flush 10 mL  10 mL Intravenous Q12H Mukund Patel DO   10 mL at 06/05/21 0907   • sodium chloride 0.9 % flush 10 mL  10 mL Intravenous PRN Mukund Patel DO                Intake/Output Summary (Last 24 hours) at 6/5/2021 1343  Last data filed at 6/5/2021 1300  Gross per 24 hour   Intake 720 ml   Output 3200 ml   Net -2480 ml               Imaging Results (All)     Procedure Component Value Units Date/Time    US Thyroid [id number] Collected: 06/04/21 1916     Updated: 06/04/21 1928    Narrative:      EXAMINATION:  US THYROID-  6/4/2021 5:45 PM CDT     HISTORY: Thyroid mass on CT. Ultrasound was ordered \"stat.\"      COMPARISON: No comparison study.     TECHNIQUE: Grayscale and color flow imaging were performed.     FINDINGS: The right thyroid lobe measures 5.4 x 2.1 x 2.1 cm and the  left thyroid lobe measures 10 x 4.2 x 5.2 cm.  1. There is a 0.7 x 0.5 x 0.6 cm cyst in the midpole region of the right  thyroid.  2. There is a 1.6 x 0.8 x 0.9 cm hypoechoic smoothly marginated nodule  in the lower pole on the right with no calcifications. This is a Ti RADS  category 4 lesion. Biopsy of this lesion is recommended.  3. There is a 1.4 x 0.8 x 0.8 cm isoechoic nodule with some cystic  change in the " midpole region on the right. This is a Ti RADS category 3  lesion. No follow-up required for a lesion of this size in this  category.  4. There is a smoothly marginated echogenic nodule that is predominantly  solid with some cystic components in the lower pole on the left  measuring 6 x 4.2 x 4.9 cm. This is a Ti RADS category 3 lesion. Biopsy  is recommended for this lesion given the size of the lesion.  5. There is a 2.5 x 2.5 cm hypoechoic nodule in the left thyroid  laterally with rim calcification. This is a Ti RADS category 4 lesion.  Biopsy is recommended.  6. There is a 4.6 x 2.9 x 3.9 cm isoechoic to slightly hyperechoic mass  in the upper pole on the left containing microcalcifications. The lesion  is smoothly marginated and is a Ti RADS category 4 lesion. Biopsy  recommended.       Impression:      Multinodular thyroid gland, as described. Using the Ti-RADS  scoring system, several of the lesions meet criteria for biopsy. See the  above report. There are too many lesions to biopsy at the same time.  Initially, biopsy of the dominant nodules in each lobe should be  considered. The other lesions could be biopsied at a later time.  This report was finalized on 06/04/2021 19:25 by Dr. Rajat Peres MD.    CT Angiogram Chest [id number] Collected: 06/04/21 1521     Updated: 06/04/21 1537    Narrative:      EXAM: CT chest angiography with 3D MIP images with IV contrast     INDICATION: Source of breath, PE suspected     COMPARISON: None available.     DOSE LENGTH PRODUCT: 803 mGy cm. Automated exposure control was also  utilized to decrease patient radiation dose.     FINDINGS:     Large body habitus and respiratory motion limits evaluation.     No evidence of RIGHT heart strain. The main pulmonary artery is dilated  measuring 3.6 cm diameter. No central or lobar pulmonary embolus.  Evaluation of the distal pulmonary vasculature is severely limited. Some  of the RIGHT lower lobe subsegmental pulmonary arteries  are not  visualized. Thoracic aorta is nonaneurysmal. No evidence of aortic  dissection. No pericardial effusion.     The central airways are clear. Atelectasis in the RIGHT lower lobe. No  change in 5 mm LEFT upper lobe pulmonary nodule on image 23. No  consolidation or pleural effusion. No pneumothorax. No enlarged thoracic  lymph nodes.      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 No acute soft  tissue finding. Limited view of the upper abdomen is unremarkable. Large  LEFT upper pole renal cyst. RIGHT glenohumeral joint degenerative  change. Multilevel thoracic spine degenerative change.       Impression:         1.  Limited exam secondary to large body habitus and respiratory motion.  2.  No central or lobar pulmonary embolus. The more distal pulmonary  vasculature is not well evaluated.  3.  Main pulmonary artery is dilated measuring 3.6 cm diameter,  suggesting pulmonary hypertension. No evidence of RIGHT heart strain.  4.  A few areas of bilateral atelectasis are present.  5.  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 Recommend thyroid ultrasound.  This report was finalized on 06/04/2021 15:34 by Dr. Angel Talamantes MD.    XR Chest 1 View [id number] Collected: 06/04/21 1427     Updated: 06/04/21 1432    Narrative:      EXAMINATION:  XR CHEST 1 VW-  6/4/2021 2:17 PM CDT     HISTORY: Shortness of air.     COMPARISON: 11/18/2013.     FINDINGS:  There are stable coarse markings and bronchial wall  thickening. The inspiration is not very deep. There is vascular  crowding. There is cardiomegaly. The trachea is shifted to the right at  the thoracic inlet. This could be due to a thoracic inlet mass, such as  a thyroid mass. No acute appearing bony abnormality is seen.       Impression:      1. Hypoventilation with vascular crowding.  2. Coarse markings and bronchial wall thickening appears  stable.  3. Cardiomegaly.  4. Possible thoracic inlet mass on the left with shifting of the trachea  to the right side. This is also present on a cervical spine x-ray on  3/18/2016.        This report was finalized on 06/04/2021 14:29 by Dr. Rajat Peres MD.             Assessment     ASSESSMENT        Hypoxia    HTN (hypertension)    Morbid obesity due to excess calories (CMS/HCC)    Thyroid mass    CHARLIE (obstructive sleep apnea) - non compliant with cpap  Pulmonary hypertension  Chronic hypoxia  Obesity hypoventilation with chronic hypercapnia  Thyromegaly with tracheal deviation      Plan     PLAN      Agree with pulmonary to to attempt trilogy device.  Would also recommend consideration of an outpatient pulmonary function test with flow volume loop to discern the amount of extrathoracic obstruction secondary to thyromegaly which may be contributing to her respiratory difficulties.      She exhibits and/or complains of a paucity of subjective symptoms of compression.  If thyroidectomy were to be considered it would be secondary to a failure of other interventions to adequately control her respiratory symptoms as well as an indication that thyroid enlargement is progressing combined with her compressive symptoms becoming more significant.    We will follow with you        Milad So MD  6/5/2021  13:43 CDT

## 2021-06-05 NOTE — PLAN OF CARE
Problem: Adult Inpatient Plan of Care  Goal: Plan of Care Review  Outcome: Ongoing, Progressing  Flowsheets (Taken 6/5/2021 1137)  Progress: no change  Plan of Care Reviewed With: patient  Outcome Summary: Pt complained of mild headache which she states got better following breakfast this AM. No further complaints. Up to BSC with assist x1, pt tolerating well. JENNIFER, NSR 70-85 on tele. Will update MD as needed

## 2021-06-05 NOTE — CASE MANAGEMENT/SOCIAL WORK
Discharge Planning Assessment  [facility]     Patient Name: Pau Cabrera  MRN: [id number]  Today's Date: 6/5/2021    Admit Date: 6/4/2021    Discharge Needs Assessment     Row Name 06/05/21 1340       Living Environment    Lives With  spouse    Current Living Arrangements  home/apartment/condo    Primary Care Provided by  self    Provides Primary Care For  no one    Family Caregiver if Needed  spouse    Quality of Family Relationships  involved;helpful;supportive    Able to Return to Prior Arrangements  yes       Resource/Environmental Concerns    Resource/Environmental Concerns  none    Transportation Concerns  car, none       Transition Planning    Patient/Family Anticipates Transition to  home with family    Patient/Family Anticipated Services at Transition  none    Transportation Anticipated  family or friend will provide       Discharge Needs Assessment    Readmission Within the Last 30 Days  no previous admission in last 30 days    Equipment Currently Used at Home  walker, rolling    Concerns to be Addressed  no discharge needs identified;denies needs/concerns at this time    Anticipated Changes Related to Illness  none    Equipment Needed After Discharge  none    Discharge Coordination/Progress  Pt has RX coverage and a PCP. Pt plans on returning home with her . Pt denies any needs or concerns at this time. SW will follow for any needs that may arise.        Discharge Plan    No documentation.       Continued Care and Services - Admitted Since 6/4/2021    Coordination has not been started for this encounter.         Demographic Summary    No documentation.       Functional Status    No documentation.       Psychosocial    No documentation.       Abuse/Neglect    No documentation.       Legal    No documentation.       Substance Abuse    No documentation.       Patient Forms    No documentation.           Milena Becerra

## 2021-06-05 NOTE — PLAN OF CARE
Problem: Adult Inpatient Plan of Care  Goal: Plan of Care Review  Outcome: Ongoing, Progressing  Flowsheets (Taken 6/5/2021 0430)  Plan of Care Reviewed With: patient  Outcome Summary: VSS. Sinus 65-95 w/ PVCs. On 3L nasal cannula w/ sats in high 90s. No c/o pain. Pt's home meds have not been restarted. Up x1 assist to bsc. Safety maintained.

## 2021-06-05 NOTE — CONSULTS
Ras Bhardwaj MD      Subjective   LOS: 1 day     Thank you for this consultation. 65-year-old female presented to the emergency room. Reports shortness of breath. Underlying sleep apnea but noncompliant with CPAP device. CTA was negative for PE. However suggestive for pulmonary hypertension. Thyroid mass also noted. We were consulted for question of pulmonary hypertension. Echocardiogram in 2019 shows grade 1 diastolic dysfunction. LV cavity is mildly dilated. It is reported as being a difficult study. Normal RV function is reported. ABG on admission showed a CO2 of 57 with a PO2 of 49 on room air. This is consistent with [id number] likely caused by sleep apnea and morbid obesity. Patient reports worsening shortness of breath over a year. She has a history of CHF. She reports increased leg swelling. Denies chest pain. Has never been a smoker. States she could not use her CPAP in the past due to mask issues. Has not tried recently. No history of diabetes.    Pau Cabrera  reports no history of alcohol use.,  reports that she has never smoked. She has never used smokeless tobacco.     Past Hx:  has a past medical history of Anemia, Anxiety, Asthma, Back pain, CHF (congestive heart failure) (CMS/[facility]), CTS (carpal tunnel syndrome), Depression, Fibromyalgia, GERD (gastroesophageal reflux disease), Gout, Hyperlipemia, Hyperlipidemia, Hypertension, Obesity, Obstructive sleep apnea, and Peripheral neuropathy.  Surg Hx:  has a past surgical history that includes  section; Hysterectomy (); Breast biopsy (Left); Umbilical hernia repair; Colonoscopy (10/22/2015); Colonoscopy (2007); and Colonoscopy (N/A, 2020).  FH: family history includes Anuerysm in her father; Arthritis in her father and mother; Asthma in her brother; Heart disease in her mother; Hypertension in her father and mother; Stroke in her father.  SH:  reports that she has never smoked. She has never used smokeless  tobacco. She reports that she does not drink alcohol and does not use drugs.    Medications Prior to Admission   Medication Sig Dispense Refill Last Dose   • allopurinol (ZYLOPRIM) 300 MG tablet       • amitriptyline (ELAVIL) 25 MG tablet Take 25 mg by mouth Every Night.      • amLODIPine (NORVASC) 10 MG tablet Take 10 mg by mouth Daily.      • azelastine (ASTELIN) 0.1 % nasal spray 2 sprays into each nostril 2 (Two) Times a Day. Use in each nostril as directed      • butalbital-acetaminophen-caffeine (FIORICET, ESGIC) -40 MG per tablet Take 1 tablet by mouth Every 4 (Four) Hours As Needed for Headache.      • carvedilol (COREG) 25 MG tablet TAKE 1 TABLET BY MOUTH TWICE A DAY 60 tablet 3    • cholecalciferol (VITAMIN D3) 1000 UNITS tablet Take 1,000 Units by mouth daily.      • DULoxetine (CYMBALTA) 30 MG capsule 60 mg.      • EPINEPHrine (ADRENALIN) 0.05 MG/ML syringe Inject 20 mL as directed.      • fenofibrate (TRICOR) 145 MG tablet Take 145 mg by mouth Daily.      • furosemide (LASIX) 40 MG tablet Take 60 mg by mouth Daily. TAKE 1 & 1/2 TABS DAILY      • meclizine (ANTIVERT) 25 MG tablet Take 25 mg by mouth 3 (three) times a day as needed for dizziness.      • Multiple Vitamins-Minerals (MULTIVITAMIN ADULT PO) Take  by mouth.      • spironolactone (ALDACTONE) 25 MG tablet Take 25 mg by mouth daily.      • Suprep Bowel Prep Kit 17.5-3.13-1.6 GM/177ML solution oral solution Take as directed by office instructions provided 2 bottle 0      Allergies   Allergen Reactions   • Codeine Sulfate Hives   • Lisinopril Swelling and Rash     Facial swelling       Review of Systems   Constitutional: Negative for chills and fever.   HENT: Negative for congestion and sore throat.    Respiratory: Positive for shortness of breath. Negative for cough and wheezing.    Cardiovascular: Positive for leg swelling. Negative for palpitations.   Gastrointestinal: Negative for abdominal pain, nausea and vomiting.   Genitourinary:  Negative for dysuria and hematuria.   Musculoskeletal: Negative for arthralgias and back pain.   Skin: Negative for pallor and rash.   Neurological: Negative for seizures and headaches.   Psychiatric/Behavioral: Negative for agitation and confusion.     Vital Signs past 24hrs  BP range: BP: (102-147)/(52-81) 139/71  Pulse range: Heart Rate:  [] 75  Resp rate range: Resp:  [12-22] 20  Temp range: Temp (24hrs), Av.4 °F (36.9 °C), Min:98.2 °F (36.8 °C), Max:98.9 °F (37.2 °C)    Oxygen range: SpO2:  [88 %-96 %] 96 %; Flow (L/min):  [2-3] 3;   Device (Oxygen Therapy): nasal cannula  (!) 160 kg (353 lb 3.2 oz); Body mass index is 66.77 kg/m².  I/O this shift:  In: 120 [P.O.:120]  Out: 300 [Urine:300]    Adult female sitting on the side of the bed. Morbidly obese. No distress. Low-flow oxygen. Pupils equal and react light. Oropharynx class IV. JVP not elevated but difficult to assess because of large neck. Trachea midline thyroid not enlarged. Lungs reveal bilateral air entry. Clear to auscultation. Diminished breath sounds likely due to elevated BMI. Percussion note resonant. Chest expansion equal no chest wall deformity or tenderness. Heart examination S1-S2 present rhythm regular no murmurs. 1+ edema lower extremities. Abdomen is morbidly obese soft nontender bowel sounds present no liver spleen enlargement. No peripheral cyanosis clubbing. Moves all 4 extremities sensorimotor intact. No obvious cervical, axillary, inguinal adenopathy. All exam limited by morbid obesity.    Results Review:    I have reviewed the laboratory and imaging data from current admission. My annotations are as noted in assessment and plan.    Medication Review:  I have reviewed the current MAR. My annotations are as noted in assessment and plan.    Plan   PCCM Problems  pulmonary hypertension noted on CT chest  chronic hypoxia on low-flow oxygen  dyspnea likely worse due to acute on chronic diastolic CHF  obesity hypoventilation with  chronic hypercapnia  morbid obesity  obstructive sleep apnea noncompliant with treatment      Plan of Treatment  I reviewed echocardiogram from 2019 which was limited view. Patient had normal RV at the time. This argues against severe pulmonary hypertension. Milder pulmonary hypertension cannot be entirely excluded and would be difficult to assess in this patient. Possible etiologies are untreated sleep apnea which is the likeliest as well as diastolic congestive heart failure which may improve with optimization of fluid status. Other than treating the sleep apnea I would not intervene further at this point.    Acute diastolic congestive heart failure with worsening dyspnea. Defer to primary team for additional diuretics at this time.    Obesity hypoventilation noted with underlying sleep apnea and elevated carbon dioxide levels. Patient would benefit from a trilogy device. This will likely need to be set up as an outpatient or by CCP on Monday. This would help her sleep apnea as well. Significant improvement in weight would be highly beneficial.    Electronically signed by Ras Bhardwaj MD, 06/05/21, 12:17 PM CDT.      Part of this note may be an electronic transcription/translation of spoken language to printed text using the Dragon Dictation System.

## 2021-06-06 LAB
QT INTERVAL: 376 MS
QTC INTERVAL: 431 MS

## 2021-06-06 PROCEDURE — 96372 THER/PROPH/DIAG INJ SC/IM: CPT

## 2021-06-06 PROCEDURE — 25010000002 FUROSEMIDE PER 20 MG: Performed by: INTERNAL MEDICINE

## 2021-06-06 PROCEDURE — 25010000002 ENOXAPARIN PER 10 MG: Performed by: INTERNAL MEDICINE

## 2021-06-06 PROCEDURE — 96375 TX/PRO/DX INJ NEW DRUG ADDON: CPT

## 2021-06-06 PROCEDURE — G0378 HOSPITAL OBSERVATION PER HR: HCPCS

## 2021-06-06 PROCEDURE — 99213 OFFICE O/P EST LOW 20 MIN: CPT | Performed by: OTOLARYNGOLOGY

## 2021-06-06 RX ORDER — FUROSEMIDE 10 MG/ML
40 INJECTION INTRAMUSCULAR; INTRAVENOUS ONCE
Status: COMPLETED | OUTPATIENT
Start: 2021-06-06 | End: 2021-06-06

## 2021-06-06 RX ADMIN — CARVEDILOL 6.25 MG: 6.25 TABLET, FILM COATED ORAL at 17:00

## 2021-06-06 RX ADMIN — SODIUM CHLORIDE, PRESERVATIVE FREE 10 ML: 5 INJECTION INTRAVENOUS at 08:38

## 2021-06-06 RX ADMIN — ENOXAPARIN SODIUM 40 MG: 40 INJECTION SUBCUTANEOUS at 17:00

## 2021-06-06 RX ADMIN — FUROSEMIDE 40 MG: 10 INJECTION, SOLUTION INTRAVENOUS at 17:00

## 2021-06-06 RX ADMIN — SODIUM CHLORIDE, PRESERVATIVE FREE 10 ML: 5 INJECTION INTRAVENOUS at 20:52

## 2021-06-06 RX ADMIN — ALLOPURINOL 300 MG: 300 TABLET ORAL at 08:38

## 2021-06-06 RX ADMIN — CARVEDILOL 6.25 MG: 6.25 TABLET, FILM COATED ORAL at 08:38

## 2021-06-06 RX ADMIN — AMITRIPTYLINE HYDROCHLORIDE 25 MG: 25 TABLET, FILM COATED ORAL at 20:52

## 2021-06-06 RX ADMIN — FUROSEMIDE 40 MG: 40 TABLET ORAL at 08:38

## 2021-06-06 RX ADMIN — ACETAMINOPHEN 650 MG: 325 TABLET, FILM COATED ORAL at 08:38

## 2021-06-06 NOTE — PLAN OF CARE
Problem: Adult Inpatient Plan of Care  Goal: Plan of Care Review  Outcome: Ongoing, Progressing  Flowsheets (Taken 6/6/2021 6756)  Progress: improving  Plan of Care Reviewed With: patient  Outcome Summary: Headache treated with tylenol with relief. No further complaints of pain, remains on 3L NC, sats WNL. Other VSS. Safety maintained. NSR 68-84 on tele. Will update MD as needed

## 2021-06-06 NOTE — PROGRESS NOTES
[facility]  [phone]  Ras Bhardwaj MD    Subjective:  LOS: 1    She will receive iv lasix. Feels generally better. No cough, wheezing or chest pain.    Objective   Vital Signs past 24hrs    Temp range: Temp (24hrs), Av.5 °F (36.9 °C), Min:98.3 °F (36.8 °C), Max:98.8 °F (37.1 °C)    BP range: BP: (101-142)/(60-79) 142/72  Pulse range: Heart Rate:  [68-87] 87  Resp rate range: Resp:  [18-20] 20    Device (Oxygen Therapy): nasal cannulaFlow (L/min):  [3] 3  Oxygen range:SpO2:  [94 %-98 %] 96 %      (!) 156 kg (344 lb 9.6 oz); Body mass index is 65.15 kg/m².    Intake/Output Summary (Last 24 hours) at 2021 1113  Last data filed at 2021 1023  Gross per 24 hour   Intake 480 ml   Output 2100 ml   Net -1620 ml       Physical Exam  Constitutional:       Appearance: She is obese.   HENT:      Head: Normocephalic.      Mouth/Throat:      Mouth: Mucous membranes are moist.   Eyes:      Pupils: Pupils are equal, round, and reactive to light.   Cardiovascular:      Rate and Rhythm: Normal rate and regular rhythm.      Heart sounds: No murmur heard.     Pulmonary:      Effort: Pulmonary effort is normal.      Breath sounds: Normal breath sounds.   Abdominal:      General: Bowel sounds are normal.      Palpations: Abdomen is soft. There is no mass.      Tenderness: There is no abdominal tenderness.   Musculoskeletal:         General: Swelling present.      Cervical back: Neck supple.   Neurological:      Mental Status: She is alert.       Results Review:    I have reviewed the laboratory and imaging data since the last note by [facility] physician.  My annotations are noted in assessment and plan.    Medication Review:  I have reviewed the current MAR.  My annotations are noted in assessment and plan.       Plan   PCCM Problems  pulmonary hypertension noted on CT chest  chronic hypoxia on low-flow oxygen  dyspnea likely worse due to acute on chronic diastolic CHF  obesity hypoventilation with chronic  hypercapnia  morbid obesity  obstructive sleep apnea noncompliant with treatment      Plan of Treatment    Agree with diuresis. Will help dyspnea as some fluid overload present caused by dCHF.    OHV and CHARLIE causing chronic respiratory failure. Needs use of Trilogy device. Will try to get this arranged once CCP is in on Monday.    This patient has chronic respiratory failure and requires non-invasive ventilation. Other modalities such as CPAP and BIPAP were considered but ruled out due to severity of lung disease and their ineffectiveness in this condition. Non-invasive-ventilation is required to decrease work of breathing, improve pulmonary status and interruption of respiratory support which could lead to serious harm or death.    Settings on Trilogy:  Mode- AVAPS-AE; Rate - Auto; Goal Tidal Volume 500cc; IPAP max 30; EPAP max 20; EPAP min 4;  PS max 16; PS min 4; FiO2 - O2 bled in at 2L    Needs assessment of home O2 needs on discharge.    Ras Bhardwaj MD  06/06/21  11:13 CDT    While in the room and during my examination of the patient I wore gloves, gown, mask, eye protection and followed enhanced droplet/contact isolation protocol and precautions.  I washed my hands before and after this patient encounter.    Part of this note may be an electronic transcription/translation of spoken language to printed text using the Dragon Dictation System.

## 2021-06-06 NOTE — PROGRESS NOTES
[facility]  INPATIENT PROGRESS NOTE    Patient Name: Pau Cabrera  Date of Admission: 6/4/2021  Today's Date: 06/06/21  Length of Stay: 1  Primary Care Physician: Carola Barbosa MD    Subjective   Chief Complaint:   Follow-up shortness of breath/hypoxia    HPI   Patient feels okay today.  She still has a headache but denies any visual changes, focal weakness, numbness, tingling or nausea.  States her feet still feel very swollen and tight.  Still dyspneic with exertion.      ROS:   All pertinent negatives and positives are as above. All other systems have been reviewed and are negative unless otherwise stated.     Objective    Temp:  [98.2 °F (36.8 °C)-98.8 °F (37.1 °C)] 98.6 °F (37 °C)  Heart Rate:  [68-87] 87  Resp:  [18-20] 20  BP: (101-142)/(60-79) 142/72  Physical Exam  GEN: Awake, alert, interactive, in NAD  HEENT:  PERRLA, EOMI, Anicteric  Lungs: diminished b/l but no obvious wheezing or rales  Heart: RRR, +S1/s2, no rub  ABD: obese, soft, nt, +BS, no guarding/rebound  Extremities: atraumatic, no cyanosis, 1+ pitting pedal and LE edema b/l  Skin: no rashes or petechiae  Neuro: AAOx3, no focal deficits  Psych: normal mood & affect         Results Review:  I have reviewed the labs, radiology results, and diagnostic studies.    Laboratory Data:   Results from last 7 days   Lab Units 06/04/21  1319   WBC 10*3/mm3 6.45   HEMOGLOBIN g/dL 11.1*   HEMATOCRIT % 35.8   PLATELETS 10*3/mm3 277        Results from last 7 days   Lab Units 06/05/21  0407 06/04/21  1319   SODIUM mmol/L 143 148*   POTASSIUM mmol/L 3.6 3.9   CHLORIDE mmol/L 101 107   CO2 mmol/L 35.0* 31.0*   BUN mg/dL 17 18   CREATININE mg/dL 0.98 0.96   CALCIUM mg/dL 10.1 10.1   BILIRUBIN mg/dL  --  0.4   ALK PHOS U/L  --  59   ALT (SGPT) U/L  --  10   AST (SGOT) U/L  --  18   GLUCOSE mg/dL 93 96       Culture Data:   No results found for: BLOODCX, URINECX, WOUNDCX, MRSACX, RESPCX,  STOOLCX    Radiology Data:   Imaging Results (Last 24 Hours)     ** No results found for the last 24 hours. **                    I have reviewed the patient's current medications.     Assessment/Plan     Active Hospital Problems    Diagnosis    • **Hypoxia    • Thyroid mass    • CHARLIE (obstructive sleep apnea) - non compliant with cpap    • Morbid obesity due to excess calories (CMS/HCC)    • HTN (hypertension)      NO LONGER MONITORS AT HOME         Her breathing issues are likely multifactorial given OHS/CHARLIE noncompliant with CPAP, obesity, thyroid mass.  Also likely has pulmonary hypertension.  Some reported history of heart failure. Prior echo was limited again due to body habitus. Difficult to assess her overall volume status due to body habitus as well.  She was put back on oral Lasix yesterday.  However her legs and feet look more swollen to me today and she is pitting but she is also up at bedside for the first time with feet hanging eating breakfast so is unclear if this is fluid or venous stasis. Prior echo was limited again due to body habitus.  Will give additional 40 of IV Lasix this afternoon and monitor outputs.  Currently comfortable on 3 L.  Afebrile without signs of infection.    CT imaging above showing tracheal displacement by thyroid mass.  Patient has been seen by pulmonary and ENT.  Plan for outpatient follow-up and testing.  Possible trilogy device given her apnea and chronic c02 retention.  Possible thyroidectomy down the road based on overall clinical course and findings.    Continue supportive care and O2.  Tolerating Coreg dose currently.  It is a smaller dose than her home med though.  Monitor for titration.    Thyroid ultrasound noted.  TFTs normal.  Will likely need biopsy but again will be addressed by ENT in follow-up.      Discharge Planning: I expect the patient to be discharged to home likely next 24 to 48 hours pending volume status.  Will need a formal walking oxygen and  discharge with O2.    Electronically signed by Mukund Patel DO, 06/06/21, 09:45 CDT.

## 2021-06-06 NOTE — PROGRESS NOTES
[facility]  ENT PROGRESS NOTES  2021      Patient Identification:  Name: Pau Cabrera  Age: 65 y.o.  Sex: female  :  1955  MRN: [id number]                     Date of Admission: 2021      CC:    Shortness of breath  Subjective   65-year-old lady with a history of obstructive sleep apnea unable to tolerate CPAP device secondary to inability to utilize a mask.  CTA in the emergency department was negative for PE however suggestive of pulmonary hypertension.  CT scan performed demonstrated findings consistent with thyromegaly with tracheal shift.  She feels much better this morning.  She is eating breakfast with her nasal cannula oxygen in place.  She has no complaints.  HISTORY   HPI, ROS, PMFSHx reviewed:   No changes       Objective     PE:    Temp:  [98.2 °F (36.8 °C)-98.8 °F (37.1 °C)] 98.6 °F (37 °C)  Heart Rate:  [68-87] 87  Resp:  [18-20] 20  BP: (101-142)/(60-79) 142/72   Body mass index is 65.15 kg/m².     General appearance: [place] obese, alert, well appearing, and in no distress, oriented to person, place, and time and acyanotic, in no respiratory distress.              Ability to Communicate: Patient is able to communicate well without a coarse or breathy voice              Ears - right ear normal, left ear normal.              Nasal exam - normal and patent, no erythema, discharge or polyps.              Facial exam: facial movement was normal and symmetrical, nontender              Oropharyngeal exam - mucous membranes moist, pharynx normal without lesions.              Neck exam - supple, no significant adenopathy.              CVS exam: normal rate and regular rhythm.              Chest: no tachypnea, retractions or cyanosis.              No lymphadenopathy in the anterior or posterior neck, supraclavicular areas.               Neurological exam reveals neck supple without rigidity, thyromegaly is noted inferiorly left greater than right.    DATA      MEDICATIONS   I  "have reviewed the current MAR.     LABS AND IMAGING      I have reviewed the labs and imaging data since the patient was last seen.       EXAMINATION:  US THYROID-  6/4/2021 5:45 PM CDT     HISTORY: Thyroid mass on CT. Ultrasound was ordered \"stat.\"      COMPARISON: No comparison study.     TECHNIQUE: Grayscale and color flow imaging were performed.     FINDINGS: The right thyroid lobe measures 5.4 x 2.1 x 2.1 cm and the  left thyroid lobe measures 10 x 4.2 x 5.2 cm.  1. There is a 0.7 x 0.5 x 0.6 cm cyst in the midpole region of the right  thyroid.  2. There is a 1.6 x 0.8 x 0.9 cm hypoechoic smoothly marginated nodule  in the lower pole on the right with no calcifications. This is a Ti RADS  category 4 lesion. Biopsy of this lesion is recommended.  3. There is a 1.4 x 0.8 x 0.8 cm isoechoic nodule with some cystic  change in the midpole region on the right. This is a Ti RADS category 3  lesion. No follow-up required for a lesion of this size in this  category.  4. There is a smoothly marginated echogenic nodule that is predominantly  solid with some cystic components in the lower pole on the left  measuring 6 x 4.2 x 4.9 cm. This is a Ti RADS category 3 lesion. Biopsy  is recommended for this lesion given the size of the lesion.  5. There is a 2.5 x 2.5 cm hypoechoic nodule in the left thyroid  laterally with rim calcification. This is a Ti RADS category 4 lesion.  Biopsy is recommended.  6. There is a 4.6 x 2.9 x 3.9 cm isoechoic to slightly hyperechoic mass  in the upper pole on the left containing microcalcifications. The lesion  is smoothly marginated and is a Ti RADS category 4 lesion. Biopsy  recommended.     Assessment     ASSESSMENT       Hypoxia    HTN (hypertension)    Morbid obesity due to excess calories (CMS/HCC)    Thyroid mass    CHARLIE (obstructive sleep apnea) - non compliant with cpap  Pulmonary hypertension  Chronic hypoxia  Obesity hypoventilation with chronic hypercapnia  Thyromegaly with " tracheal deviation with Ti RADS category 4 lesions on the upper and lower poles of the left thyroid.        Plan     PLAN     -Okay to discharge home from an ENT standpoint.  Would consider fine-needle aspiration of the left thyroid nodules if surgery had to be delayed significantly.  -Otherwise would recommend thyroidectomy and preoperative FNA may not be necessary.  -Outpatient pulmonary function testing and optimization of medical therapy with respect to trilogy device for obstructive sleep apnea is desirable preoperatively  -Follow-up in 3 to 4 weeks in the office          Milad So MD  6/6/2021  09:14 CDT

## 2021-06-06 NOTE — PLAN OF CARE
Problem: Adult Inpatient Plan of Care  Goal: Plan of Care Review  Outcome: Ongoing, Progressing  Flowsheets (Taken 6/6/2021 1945)  Progress: improving  Plan of Care Reviewed With: patient  Outcome Summary: VSS. Sinus 64-74. No c/o pain. 3L nc. No c/o pain. Up to bsc w/ x1 assist. No c/o pain. Safety maintained.

## 2021-06-06 NOTE — CASE MANAGEMENT/SOCIAL WORK
Continued Stay Note   [facility]     Patient Name: Pau Cabrera  MRN: [id number]  Today's Date: 6/6/2021    Admit Date: 6/4/2021    Discharge Plan     Row Name 06/06/21 1238       Plan    Plan Comments  SW received consult in regards to getting a trilogy for discharge. Trilogy is not able to be set up over weekend. SW will address this tomorrow        Discharge Codes    No documentation.             Milena Becerra

## 2021-06-07 ENCOUNTER — TELEPHONE (OUTPATIENT)
Dept: INTERNAL MEDICINE | Age: 66
End: 2021-06-07

## 2021-06-07 VITALS
RESPIRATION RATE: 16 BRPM | WEIGHT: 293 LBS | DIASTOLIC BLOOD PRESSURE: 63 MMHG | TEMPERATURE: 98.6 F | HEIGHT: 61 IN | BODY MASS INDEX: 55.32 KG/M2 | OXYGEN SATURATION: 94 % | SYSTOLIC BLOOD PRESSURE: 148 MMHG | HEART RATE: 91 BPM

## 2021-06-07 LAB
ANION GAP SERPL CALCULATED.3IONS-SCNC: 8 MMOL/L (ref 5–15)
BUN SERPL-MCNC: 19 MG/DL (ref 8–23)
BUN/CREAT SERPL: 18.8 (ref 7–25)
CALCIUM SPEC-SCNC: 10.3 MG/DL (ref 8.6–10.5)
CHLORIDE SERPL-SCNC: 98 MMOL/L (ref 98–107)
CO2 SERPL-SCNC: 37 MMOL/L (ref 22–29)
CREAT SERPL-MCNC: 1.01 MG/DL (ref 0.57–1)
GFR SERPL CREATININE-BSD FRML MDRD: 67 ML/MIN/1.73
GLUCOSE SERPL-MCNC: 97 MG/DL (ref 65–99)
POTASSIUM SERPL-SCNC: 3.8 MMOL/L (ref 3.5–5.2)
SODIUM SERPL-SCNC: 143 MMOL/L (ref 136–145)

## 2021-06-07 PROCEDURE — 80048 BASIC METABOLIC PNL TOTAL CA: CPT | Performed by: INTERNAL MEDICINE

## 2021-06-07 PROCEDURE — G0378 HOSPITAL OBSERVATION PER HR: HCPCS

## 2021-06-07 PROCEDURE — 94618 PULMONARY STRESS TESTING: CPT

## 2021-06-07 RX ORDER — FUROSEMIDE 40 MG/1
40 TABLET ORAL DAILY
Qty: 30 TABLET | Refills: 0 | Status: SHIPPED | OUTPATIENT
Start: 2021-06-07

## 2021-06-07 RX ADMIN — ACETAMINOPHEN 650 MG: 325 TABLET, FILM COATED ORAL at 06:32

## 2021-06-07 RX ADMIN — FUROSEMIDE 40 MG: 40 TABLET ORAL at 09:12

## 2021-06-07 RX ADMIN — CARVEDILOL 6.25 MG: 6.25 TABLET, FILM COATED ORAL at 09:12

## 2021-06-07 RX ADMIN — ALLOPURINOL 300 MG: 300 TABLET ORAL at 09:12

## 2021-06-07 NOTE — CASE MANAGEMENT/SOCIAL WORK
Case Management Discharge Note      Final Note: REC'D ORDER FOR HOME TRILOGY AND O2; PT. CHOSE [facility] AS HER  USES THIS AGENCY;  REFERRAL CALLED AND FAXED TO [facility].  PORTABLE TANK WILL BE DELIVERED TO PT'S ROOM TODAY.         Selected Continued Care - Admitted Since 6/4/2021     Destination    No services have been selected for the patient.              Durable Medical Equipment    No services have been selected for the patient.              Dialysis/Infusion    No services have been selected for the patient.              Home Medical Care    No services have been selected for the patient.              Therapy    No services have been selected for the patient.              Community Resources    No services have been selected for the patient.                       Final Discharge Disposition Code: 01 - home or self-care

## 2021-06-07 NOTE — PROGRESS NOTES
Exercise Oximetry    Patient Name:Pau Cabrera   MRN: [id number]   Date: 06/07/21             ROOM AIR BASELINE   SpO2% 93   Heart Rate 84   Blood Pressure      EXERCISE ON ROOM AIR SpO2% EXERCISE ON O2 @  2 LPM SpO2%   1 MINUTE        88 1 MINUTE        92   2 MINUTES        2 MINUTES         92   3 MINUTES         3 MINUTES    4 MINUTES  4 MINUTES    5 MINUTES  5 MINUTES    6 MINUTES  6 MINUTES               Distance Walked     50 feet Distance Walked     150 feet   Dyspnea (Boy Scale)   Dyspnea (Boy Scale)   Fatigue (Boy Scale)   Fatigue (Boy Scale)   SpO2% Post Exercise   SpO2% Post Exercise      95   HR Post Exercise   HR Post Exercise       90   Time to Recovery   Time to Recovery  Less than one minute     Comments: Pt walked a total of 50 feet on room air and sat dropped to 88%.  Placed pt on nasal cannula at 2 lpm and waited until sat increased back up to 95%.  Pt walked another 150 feet and sat maintained at 92% on 2 lpm nc.  Pt stated she could tell the difference between walking on oxygen compared to without.  RN Mi Daniel notified.

## 2021-06-07 NOTE — DISCHARGE SUMMARY
[facility] Medicine Services  DISCHARGE SUMMARY       Date of Admission: 6/4/2021  Date of Discharge:  6/7/2021  Primary Care Physician: Carola Barbosa MD    Discharge Diagnoses:  Active Hospital Problems    Diagnosis    • **Hypoxia    • Thyroid mass    • CHARLIE (obstructive sleep apnea) - non compliant with cpap    • Morbid obesity due to excess calories (CMS/HCC)    • HTN (hypertension)      NO LONGER MONITORS AT HOME           Presenting Problem/History of Present Illness:  Hypoxic [R09.02]  Hypoxia [R09.02]         Hospital Course  Patient is a 65-year-old morbidly obese woman who is BMI 65 admitted for shortness of breath and abnormal chest x-ray  by Dr. Patel on June 4.  She was hypoxic and requiring 3 L of oxygen.  On this setting she is maintaining in the mid to upper 90s.  She had CT angiogram of chest for further evaluation of elevated D-dimer.  It did not show evidence of PE however showed thyroid mass inside the chest displacing the trachea as well as enlarged pulmonary artery.  She presented without overt heart failure.  Her BNP is normal.    During this hospitalization, she was seen in consult by Dr. So from ENT and Dr. Bhardwaj from pulmonary medicine.    Dr. So saw her in consult due to the abnormality seen on CT angiogram.  Patient underwent ultrasound of the thyroid.   She has a normal TSH and free T4.  She  Cleared her for discharge and mentioned considering fine-needle aspiration of the left thyroid nodules of surgery had to be delayed significantly.  Otherwise, he would recommend thyroidectomy and preoperative FNA may not be necessary.  He recommends outpatient pulmonary function test and optimization of medical treatment with respect to trilogy device for obstructive sleep apnea.  He recommends follow-up in 3 to 4 weeks.     Dr. Bhardwaj in his consult report recommends trilogy and assessing home O2 needs.  Details of report as outlined below:    OHV and  CHARLIE causing chronic respiratory failure. Needs use of Trilogy device. Will try to get this arranged once CCP is in on Monday.     This patient has chronic respiratory failure and requires non-invasive ventilation. Other modalities such as CPAP and BIPAP were considered but ruled out due to severity of lung disease and their ineffectiveness in this condition. Non-invasive-ventilation is required to decrease work of breathing, improve pulmonary status and interruption of respiratory support which could lead to serious harm or death.     Settings on Trilogy:  Mode- AVAPS-AE; Rate - Auto; Goal Tidal Volume 500cc; IPAP max 30; EPAP max 20; EPAP min 4;  PS max 16; PS min 4; FiO2 - O2 bled in at 2L     Needs assessment of home O2 needs on discharge.     Patient is doing well.  She complains of headache but has some nasal congestion.  I noticed that she normally uses Astelin nasal spray and Fioricet at home.  I am wondering if she has this headache in relation to OHV and CHARLIE.  Otherwise, she does not have any focal neurologic deficit.    She also carries a history of fibromyalgia she claims to be hurting from head to toe.  She claims to have some swelling of her legs and shortness of breath particularly with exertion.    During this hospitalization she had a negative fluid balance.  She normally takes Lasix at home (60 mg).  I will continue her on current hospital dose of Lasix (40 mg).  Her creatinine is 1.01 up from 0.96 on admission.    I also noticed that she takes Norvasc which was not continued and her home medication however carvedilol noted to have been decreased to 6.25 mg.  I did not find any hypotensive episode although noting that Norvasc can cause swelling of lower extremities as well therefore I did not can renew this.  Instead, I will continue her at her home dose carvedilol (25 mg).  Her most recent blood pressure is 136/79, heart rate 94, respiratory rate 20.  Telemetry showed sinus rhythm.  O2 saturation  at 3 L is anywhere from 93 to 98%.    I instructed her the importance of following closely with primary care provider to facilitate care with other healthcare provider.  I strongly encourage compliance with medical treatment.  She verbalized adequate understanding.    Procedures Performed: None  Consults:  Dr. Judah Bhardwaj    Pertinent Test Results:   Lab Results (last 72 hours)     Procedure Component Value Units Date/Time    Basic Metabolic Panel [[id number]]  (Abnormal) Collected: 06/07/21 0547    Specimen: Blood Updated: 06/07/21 0659     Glucose 97 mg/dL      BUN 19 mg/dL      Creatinine 1.01 mg/dL      Sodium 143 mmol/L      Potassium 3.8 mmol/L      Chloride 98 mmol/L      CO2 37.0 mmol/L      Calcium 10.3 mg/dL      eGFR  African Amer 67 mL/min/1.73      BUN/Creatinine Ratio 18.8     Anion Gap 8.0 mmol/L     Narrative:      GFR Normal >60  Chronic Kidney Disease <60  Kidney Failure <15      T4, Free [[id number]]  (Normal) Collected: 06/05/21 0407    Specimen: Blood Updated: 06/05/21 0841     Free T4 1.07 ng/dL     Narrative:      Results may be falsely increased if patient taking Biotin.      TSH [[id number]]  (Normal) Collected: 06/05/21 0407    Specimen: Blood Updated: 06/05/21 0840     TSH 2.520 uIU/mL     Basic Metabolic Panel [[id number]]  (Abnormal) Collected: 06/05/21 0407    Specimen: Blood Updated: 06/05/21 0455     Glucose 93 mg/dL      BUN 17 mg/dL      Creatinine 0.98 mg/dL      Sodium 143 mmol/L      Potassium 3.6 mmol/L      Chloride 101 mmol/L      CO2 35.0 mmol/L      Calcium 10.1 mg/dL      eGFR  African Amer 69 mL/min/1.73      BUN/Creatinine Ratio 17.3     Anion Gap 7.0 mmol/L     Narrative:      GFR Normal >60  Chronic Kidney Disease <60  Kidney Failure <15      [place] Draw [[id number]] Collected: 06/04/21 1319    Specimen: Blood Updated: 06/04/21 1730    Narrative:      The following orders were created for panel order [place] Draw.  Procedure                               Abnormality          Status                     ---------                               -----------         ------                     Light Blue Top[[id number]]                                   Final result               Green Top (Gel)[[id number]]                                  Final result               Lavender Top[[id number]]                                     Final result               Red Top[[id number]]                                          Final result               [place] Blood Culture [place]...[[id number]]                      Final result               Gray Top[[id number]]                                         Final result                 Please view results for these tests on the individual orders.    [place] Top [[id number]] Collected: 06/04/21 1319    Specimen: Blood Updated: 06/04/21 1730     Extra Tube Hold for add-ons.     Comment: Auto resulted.       Blood Gas, Arterial - [[id number]]  (Abnormal) Collected: 06/04/21 1700    Specimen: Arterial Blood Updated: 06/04/21 1711     Site Right Radial     Scott's Test Positive     pH, Arterial 7.393 pH units      pCO2, Arterial 57.3 mm Hg      Comment: 83 Value above reference range        pO2, Arterial 49.0 mm Hg      Comment: 85 Value below critical limit        HCO3, Arterial 34.9 mmol/L      Comment: 83 Value above reference range        Base Excess, Arterial 8.3 mmol/L      Comment: 83 Value above reference range        O2 Saturation, Arterial 83.6 %      Comment: 84 Value below reference range        Temperature 37.0 C      Barometric Pressure for Blood Gas 751 mmHg      Modality Room Air     Ventilator Mode NA     Notified [facility] [id number]     Notified By [id number]     Notified Time 06/04/2021 17:14     Collected by [id number]     Comment: Meter: [id number]     :  [id number]        pCO2, Temperature Corrected 57.3 mm Hg      pH, Temp Corrected 7.393 pH Units      pO2, Temperature Corrected 49.0 mm Hg     COVID PRE-OP / PRE-PROCEDURE SCREENING ORDER (NO ISOLATION) -  Swab, Nasal Cavity [[id number]]  (Normal) Collected: 06/04/21 1511    Specimen: Swab from Nasal Cavity Updated: 06/04/21 1554    Narrative:      The following orders were created for panel order COVID PRE-OP / PRE-PROCEDURE SCREENING ORDER (NO ISOLATION) - Swab, Nasal Cavity.  Procedure                               Abnormality         Status                     ---------                               -----------         ------                     COVID-19,[facility] IN-[facility]...[[id number]]  Normal              Final result                 Please view results for these tests on the individual orders.    COVID-19,[facility] IN-HOUSE,Nasal Swab No Transport Media 3-4 HR TAT - Swab, Nasal Cavity [[id number]]  (Normal) Collected: 06/04/21 1511    Specimen: Swab from Nasal Cavity Updated: 06/04/21 1554     COVID19 Not Detected    Narrative:      Fact sheet for providers: https://www.fda.gov/media/318465/download     Fact sheet for patients: https://www.fda.gov/media/747327/download    Test performed by PCR.    Consider negative results in combination with clinical observations, patient history, and epidemiological information.    Banks Blood Culture Bottle Set [[id number]] Collected: 06/04/21 1319    Specimen: Blood from Arm, Left Updated: 06/04/21 1430     Extra Tube Hold for add-ons.     Comment: Auto resulted.       Green Top (Gel) [[id number]] Collected: 06/04/21 1319    Specimen: Blood Updated: 06/04/21 1430     Extra Tube Hold for add-ons.     Comment: Auto resulted.       Lavender Top [[id number]] Collected: 06/04/21 1319    Specimen: Blood Updated: 06/04/21 1430     Extra Tube hold for add-on     Comment: Auto resulted       Light Blue Top [[id number]] Collected: 06/04/21 1319    Specimen: Blood Updated: 06/04/21 1430     Extra Tube hold for add-on     Comment: Auto resulted       Red Top [[id number]] Collected: 06/04/21 1319    Specimen: Blood Updated: 06/04/21 1430     Extra Tube Hold for add-ons.     Comment: Auto  resulted.       Comprehensive Metabolic Panel [[id number]]  (Abnormal) Collected: 06/04/21 1319    Specimen: Blood Updated: 06/04/21 1413     Glucose 96 mg/dL      BUN 18 mg/dL      Creatinine 0.96 mg/dL      Sodium 148 mmol/L      Potassium 3.9 mmol/L      Chloride 107 mmol/L      CO2 31.0 mmol/L      Calcium 10.1 mg/dL      Total Protein 7.5 g/dL      Albumin 4.10 g/dL      ALT (SGPT) 10 U/L      AST (SGOT) 18 U/L      Alkaline Phosphatase 59 U/L      Total Bilirubin 0.4 mg/dL      eGFR  African Amer 71 mL/min/1.73      Globulin 3.4 gm/dL      A/G Ratio 1.2 g/dL      BUN/Creatinine Ratio 18.8     Anion Gap 10.0 mmol/L     Narrative:      GFR Normal >60  Chronic Kidney Disease <60  Kidney Failure <15      Troponin [[id number]]  (Normal) Collected: 06/04/21 1319    Specimen: Blood Updated: 06/04/21 1409     Troponin T <0.010 ng/mL     Narrative:      Troponin T Reference Range:  <= 0.03 ng/mL-   Negative for AMI  >0.03 ng/mL-     Abnormal for myocardial necrosis.  Clinicians would have to utilize clinical acumen, EKG, Troponin and serial changes to determine if it is an Acute Myocardial Infarction or myocardial injury due to an underlying chronic condition.       Results may be falsely decreased if patient taking Biotin.      BNP [[id number]]  (Normal) Collected: 06/04/21 1319    Specimen: Blood Updated: 06/04/21 1409     proBNP 225.7 pg/mL     Narrative:      Among patients with dyspnea, NT-proBNP is highly sensitive for the detection of acute congestive heart failure. In addition NT-proBNP of <300 pg/ml effectively rules out acute congestive heart failure with 99% negative predictive value.    Results may be falsely decreased if patient taking Biotin.      Magnesium [[id number]]  (Normal) Collected: 06/04/21 1319    Specimen: Blood Updated: 06/04/21 1407     Magnesium 1.8 mg/dL     D-dimer, Quantitative [[id number]]  (Abnormal) Collected: 06/04/21 1319    Specimen: Blood Updated: 06/04/21 1405     D-Dimer, Quantitative  1.34 mg/L (FEU)     Narrative:      Reference Range is 0-0.50 mg/L FEU. However, results <0.50 mg/L FEU tends to rule out DVT or PE. Results >0.50 mg/L FEU are not useful in predicting absence or presence of DVT or PE.      aPTT [[id number]]  (Normal) Collected: 06/04/21 1319    Specimen: Blood Updated: 06/04/21 1405     PTT 30.1 seconds     Protime-INR [[id number]]  (Abnormal) Collected: 06/04/21 1319    Specimen: Blood Updated: 06/04/21 1405     Protime 13.8 Seconds      INR 1.15    CBC & Differential [[id number]]  (Abnormal) Collected: 06/04/21 1319    Specimen: Blood Updated: 06/04/21 1359    Narrative:      The following orders were created for panel order CBC & Differential.  Procedure                               Abnormality         Status                     ---------                               -----------         ------                     CBC Auto Differential[[id number]]        Abnormal            Final result                 Please view results for these tests on the individual orders.    CBC Auto Differential [[id number]]  (Abnormal) Collected: 06/04/21 1319    Specimen: Blood Updated: 06/04/21 1359     WBC 6.45 10*3/mm3      RBC 3.88 10*6/mm3      Hemoglobin 11.1 g/dL      Hematocrit 35.8 %      MCV 92.3 fL      MCH 28.6 pg      MCHC 31.0 g/dL      RDW 17.8 %      RDW-SD 59.7 fl      MPV 9.2 fL      Platelets 277 10*3/mm3      Neutrophil % 65.0 %      Lymphocyte % 22.9 %      Monocyte % 6.0 %      Eosinophil % 5.0 %      Basophil % 0.6 %      Immature Grans % 0.5 %      Neutrophils, Absolute 4.19 10*3/mm3      Lymphocytes, Absolute 1.48 10*3/mm3      Monocytes, Absolute 0.39 10*3/mm3      Eosinophils, Absolute 0.32 10*3/mm3      Basophils, Absolute 0.04 10*3/mm3      Immature Grans, Absolute 0.03 10*3/mm3      nRBC 0.0 /100 WBC         Imaging Results (Last 7 Days)     Procedure Component Value Units Date/Time    US Thyroid [[id number]] Collected: 06/04/21 1916     Updated: 06/04/21 1928    Narrative:    "   EXAMINATION:  US THYROID-  6/4/2021 5:45 PM CDT     HISTORY: Thyroid mass on CT. Ultrasound was ordered \"stat.\"      COMPARISON: No comparison study.     TECHNIQUE: Grayscale and color flow imaging were performed.     FINDINGS: The right thyroid lobe measures 5.4 x 2.1 x 2.1 cm and the  left thyroid lobe measures 10 x 4.2 x 5.2 cm.  1. There is a 0.7 x 0.5 x 0.6 cm cyst in the midpole region of the right  thyroid.  2. There is a 1.6 x 0.8 x 0.9 cm hypoechoic smoothly marginated nodule  in the lower pole on the right with no calcifications. This is a Ti RADS  category 4 lesion. Biopsy of this lesion is recommended.  3. There is a 1.4 x 0.8 x 0.8 cm isoechoic nodule with some cystic  change in the midpole region on the right. This is a Ti RADS category 3  lesion. No follow-up required for a lesion of this size in this  category.  4. There is a smoothly marginated echogenic nodule that is predominantly  solid with some cystic components in the lower pole on the left  measuring 6 x 4.2 x 4.9 cm. This is a Ti RADS category 3 lesion. Biopsy  is recommended for this lesion given the size of the lesion.  5. There is a 2.5 x 2.5 cm hypoechoic nodule in the left thyroid  laterally with rim calcification. This is a Ti RADS category 4 lesion.  Biopsy is recommended.  6. There is a 4.6 x 2.9 x 3.9 cm isoechoic to slightly hyperechoic mass  in the upper pole on the left containing microcalcifications. The lesion  is smoothly marginated and is a Ti RADS category 4 lesion. Biopsy  recommended.       Impression:      Multinodular thyroid gland, as described. Using the Ti-RADS  scoring system, several of the lesions meet criteria for biopsy. See the  above report. There are too many lesions to biopsy at the same time.  Initially, biopsy of the dominant nodules in each lobe should be  considered. The other lesions could be biopsied at a later time.  This report was finalized on 06/04/2021 19:25 by Dr. Rajat Peres MD.    CT " Angiogram Chest [[id number]] Collected: 06/04/21 1521     Updated: 06/04/21 1537    Narrative:      EXAM: CT chest angiography with 3D MIP images with IV contrast     INDICATION: Source of breath, PE suspected     COMPARISON: None available.     DOSE LENGTH PRODUCT: 803 mGy cm. Automated exposure control was also  utilized to decrease patient radiation dose.     FINDINGS:     Large body habitus and respiratory motion limits evaluation.     No evidence of RIGHT heart strain. The main pulmonary artery is dilated  measuring 3.6 cm diameter. No central or lobar pulmonary embolus.  Evaluation of the distal pulmonary vasculature is severely limited. Some  of the RIGHT lower lobe subsegmental pulmonary arteries are not  visualized. Thoracic aorta is nonaneurysmal. No evidence of aortic  dissection. No pericardial effusion.     The central airways are clear. Atelectasis in the RIGHT lower lobe. No  change in 5 mm LEFT upper lobe pulmonary nodule on image 23. No  consolidation or pleural effusion. No pneumothorax. No enlarged thoracic  lymph nodes.      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 No acute soft  tissue finding. Limited view of the upper abdomen is unremarkable. Large  LEFT upper pole renal cyst. RIGHT glenohumeral joint degenerative  change. Multilevel thoracic spine degenerative change.       Impression:         1.  Limited exam secondary to large body habitus and respiratory motion.  2.  No central or lobar pulmonary embolus. The more distal pulmonary  vasculature is not well evaluated.  3.  Main pulmonary artery is dilated measuring 3.6 cm diameter,  suggesting pulmonary hypertension. No evidence of RIGHT heart strain.  4.  A few areas of bilateral atelectasis are present.  5.  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 Recommend thyroid ultrasound.  This report was finalized on  "06/04/2021 15:34 by Dr. Angel Talamantes MD.    XR Chest 1 View [[id number]] Collected: 06/04/21 1427     Updated: 06/04/21 1432    Narrative:      EXAMINATION:  XR CHEST 1 VW-  6/4/2021 2:17 PM CDT     HISTORY: Shortness of air.     COMPARISON: 11/18/2013.     FINDINGS:  There are stable coarse markings and bronchial wall  thickening. The inspiration is not very deep. There is vascular  crowding. There is cardiomegaly. The trachea is shifted to the right at  the thoracic inlet. This could be due to a thoracic inlet mass, such as  a thyroid mass. No acute appearing bony abnormality is seen.       Impression:      1. Hypoventilation with vascular crowding.  2. Coarse markings and bronchial wall thickening appears stable.  3. Cardiomegaly.  4. Possible thoracic inlet mass on the left with shifting of the trachea  to the right side. This is also present on a cervical spine x-ray on  3/18/2016.        This report was finalized on 06/04/2021 14:29 by Dr. Rajat Peres MD.          Condition on Discharge:   Stable  Physical Exam on Discharge:  /79 (BP Location: Right arm, Patient Position: Lying)   Pulse 94   Temp 98.8 °F (37.1 °C) (Oral)   Resp 20   Ht 154.9 cm (60.98\")   Wt (!) 156 kg (344 lb)   SpO2 98%   BMI 65.03 kg/m²   Physical Exam  Vitals reviewed.   Constitutional:       General: She is not in acute distress.     Appearance: She is obese. She is not ill-appearing or toxic-appearing.   HENT:      Head: Normocephalic and atraumatic.      Nose: Nose normal.      Mouth/Throat:      Mouth: Mucous membranes are moist.      Pharynx: No oropharyngeal exudate.   Eyes:      General: No scleral icterus.     Extraocular Movements: Extraocular movements intact.      Conjunctiva/sclera: Conjunctivae normal.      Pupils: Pupils are equal, round, and reactive to light.   Cardiovascular:      Rate and Rhythm: Normal rate.      Comments: Tele sinus rhythm  Abdominal:      Tenderness: There is no abdominal tenderness. "      Comments: Morbid obesity; hard to feel for any organomegaly   Musculoskeletal:         General: Swelling present.      Cervical back: Normal range of motion and neck supple.   Skin:     General: Skin is warm and dry.   Neurological:      General: No focal deficit present.      Mental Status: She is alert. Mental status is at baseline.      Cranial Nerves: No cranial nerve deficit.      Motor: No weakness.      Gait: Gait normal.   Psychiatric:         Mood and Affect: Mood normal.         Behavior: Behavior normal.         Thought Content: Thought content normal.         Judgment: Judgment normal.           Discharge Disposition:  Home or Self Care    Discharge Medications:     Discharge Medications      Changes to Medications      Instructions Start Date   furosemide 40 MG tablet  Commonly known as: LASIX  What changed:   · how much to take  · additional instructions   40 mg, Oral, Daily         Continue These Medications      Instructions Start Date   allopurinol 300 MG tablet  Commonly known as: ZYLOPRIM   No dose, route, or frequency recorded.      amitriptyline 25 MG tablet  Commonly known as: ELAVIL   25 mg, Oral, Nightly      azelastine 0.1 % nasal spray  Commonly known as: ASTELIN   2 sprays, Nasal, 2 Times Daily, Use in each nostril as directed       butalbital-acetaminophen-caffeine -40 MG per tablet  Commonly known as: FIORICET, ESGIC   1 tablet, Oral, Every 4 Hours PRN      carvedilol 25 MG tablet  Commonly known as: COREG   TAKE 1 TABLET BY MOUTH TWICE A DAY      cholecalciferol 25 MCG (1000 UT) tablet  Commonly known as: VITAMIN D3   2,000 Units, Oral, Daily      DULoxetine 30 MG capsule  Commonly known as: CYMBALTA   60 mg      EPINEPHrine 0.05 MG/ML syringe  Commonly known as: ADRENALIN   20 mL, Injection      fenofibrate 145 MG tablet  Commonly known as: TRICOR   145 mg, Oral, Daily      meclizine 25 MG tablet  Commonly known as: ANTIVERT   25 mg, Oral, 3 Times Daily PRN      multivitamin  with minerals tablet tablet   Oral         Stop These Medications    amLODIPine 10 MG tablet  Commonly known as: NORVASC     Suprep Bowel Prep Kit 17.5-3.13-1.6 GM/177ML solution oral solution  Generic drug: sodium-potassium-magnesium sulfates            Discharge Diet:   Diet Instructions     Diet: Cardiac      Discharge Diet: Cardiac          Discharge Care Plan / Instructions: Instructed importance of follow up   Activity at Discharge:   Activity Instructions     Gradually Increase Activity Until at Pre-Hospitalization Level            Follow-up Appointments:   Primary care provider within 1 week to facilitate care  ENT/Dr. So per his recommendation -3 to 4 weeks  Pulmonary per his recommendation  Test Results Pending at Discharge:      Electronically signed by David Elkins MD, 6/7/2021, 08:50 CDT.    Time: > 30 mins      Part of this note may be an electronic transcription/translation of spoken language to printed text using the Dragon Dictation System.

## 2021-06-08 ENCOUNTER — READMISSION MANAGEMENT (OUTPATIENT)
Dept: CALL CENTER | Facility: HOSPITAL | Age: 66
End: 2021-06-08

## 2021-06-08 ENCOUNTER — TELEPHONE (OUTPATIENT)
Dept: INTERNAL MEDICINE | Age: 66
End: 2021-06-08

## 2021-06-08 DIAGNOSIS — G47.33 OSA (OBSTRUCTIVE SLEEP APNEA): ICD-10-CM

## 2021-06-08 DIAGNOSIS — I10 ESSENTIAL HYPERTENSION: ICD-10-CM

## 2021-06-08 DIAGNOSIS — R09.02 HYPOXIC: ICD-10-CM

## 2021-06-08 DIAGNOSIS — R09.02 HYPOXIA: ICD-10-CM

## 2021-06-08 DIAGNOSIS — I50.42 CHRONIC COMBINED SYSTOLIC AND DIASTOLIC CONGESTIVE HEART FAILURE (HCC): Primary | ICD-10-CM

## 2021-06-08 DIAGNOSIS — E66.01 MORBID OBESITY DUE TO EXCESS CALORIES (HCC): ICD-10-CM

## 2021-06-08 NOTE — TELEPHONE ENCOUNTER
[facility] Transitions Initial Follow Up Call    Outreach made within 2 business days of discharge: Yes    Patient: Britney Morse   Patient : 1955  MRN: [id number]   Reason for Admission: Admitted 21 for shortness of breath   Discharge Date: 21   Discharge Diagnoses:   Hypoxia    Thyroid mass    TAURUS (obstructive sleep apnea) - non compliant with cpap    Morbid obesity due to excess calories (CMS/[facility])    HTN (hypertension)      Spoke with: patient     Discharge department/facility: [facility] Patient Contact:  Was patient able to fill all prescriptions: Yes  Was patient instructed to bring all medications to the follow-up visit: Yes  Is patient taking all medications as directed in the discharge summary? Yes  Does patient understand their discharge instructions: Yes  Does patient have questions or concerns that need addressed prior to 7-14 day follow up office visit: no    I spoke with Mrs. Praneeth Smith. She states her breathing is a lot better, but she is sore all over. She is having pain in both feet and legs, as well as her left shoulder. She reports it isn't more than she can bear, but is more than she usually deals with. She states she is taking tylenol to help with the pain. She is getting around ok in her home. She reports she was able to  the furosemide at the pharmacy and is taking it as directed. She stopped the amlodipine as directed. She states her bowels and bladder are working as usual. She is urinating more often since starting the furosemide. Her appetite is ok. She states she is able to cook for herself. She denies any needs at this time and will follow up on 06/10/21 as scheduled. I verified the appointment time with her. She states she does have transportation to this visit.      Changes to Medications   Start: Furosemide 40 MG tablet once daily  Stop These Medications: Amlodipine 10 MG tablet      Scheduled appointment with PCP

## 2021-06-08 NOTE — OUTREACH NOTE
Prep Survey      Responses   Denominational facility patient discharged from?  [place]   Is LACE score < 7 ?  No   Emergency Room discharge w/ pulse ox?  No   Eligibility  Readm Mgmt   Discharge diagnosis  Hypoxia   Does the patient have one of the following disease processes/diagnoses(primary or secondary)?  Other   Does the patient have Home health ordered?  No   Is there a DME ordered?  Yes   What DME was ordered?  [facility] [facility] [employer] [facility] home [facility]   Prep survey completed?  Yes          Rosa Calhoun RN

## 2021-06-09 ENCOUNTER — TRANSCRIBE ORDERS (OUTPATIENT)
Dept: LAB | Facility: HOSPITAL | Age: 66
End: 2021-06-09

## 2021-06-09 DIAGNOSIS — Z11.59 SCREENING FOR VIRAL DISEASE: Primary | ICD-10-CM

## 2021-06-10 ENCOUNTER — OFFICE VISIT (OUTPATIENT)
Dept: INTERNAL MEDICINE | Age: 66
End: 2021-06-10
Payer: MEDICARE

## 2021-06-10 VITALS
DIASTOLIC BLOOD PRESSURE: 82 MMHG | HEIGHT: 62 IN | HEART RATE: 108 BPM | SYSTOLIC BLOOD PRESSURE: 134 MMHG | OXYGEN SATURATION: 92 % | WEIGHT: 293 LBS | BODY MASS INDEX: 53.92 KG/M2

## 2021-06-10 DIAGNOSIS — E07.9 THYROID MASS: ICD-10-CM

## 2021-06-10 DIAGNOSIS — I10 ESSENTIAL HYPERTENSION: ICD-10-CM

## 2021-06-10 DIAGNOSIS — N28.9 RENAL INSUFFICIENCY: ICD-10-CM

## 2021-06-10 DIAGNOSIS — R06.02 SHORTNESS OF BREATH: ICD-10-CM

## 2021-06-10 DIAGNOSIS — F32.89 OTHER DEPRESSION: ICD-10-CM

## 2021-06-10 DIAGNOSIS — G47.33 OSA (OBSTRUCTIVE SLEEP APNEA): ICD-10-CM

## 2021-06-10 DIAGNOSIS — R06.02 SHORTNESS OF BREATH: Primary | ICD-10-CM

## 2021-06-10 PROBLEM — Z80.0 FAMILY HX OF COLON CANCER: Status: ACTIVE | Noted: 2020-10-05

## 2021-06-10 PROBLEM — R09.02 HYPOXIA: Status: ACTIVE | Noted: 2021-06-04

## 2021-06-10 LAB
ALBUMIN SERPL-MCNC: 4.6 G/DL (ref 3.5–5.2)
ALP BLD-CCNC: 62 U/L (ref 35–104)
ALT SERPL-CCNC: 11 U/L (ref 5–33)
ANION GAP SERPL CALCULATED.3IONS-SCNC: 11 MMOL/L (ref 7–19)
AST SERPL-CCNC: 19 U/L (ref 5–32)
BASOPHILS ABSOLUTE: 0.1 K/UL (ref 0–0.2)
BASOPHILS RELATIVE PERCENT: 0.9 % (ref 0–1)
BILIRUB SERPL-MCNC: 0.4 MG/DL (ref 0.2–1.2)
BUN BLDV-MCNC: 22 MG/DL (ref 8–23)
CALCIUM SERPL-MCNC: 10.5 MG/DL (ref 8.8–10.2)
CHLORIDE BLD-SCNC: 101 MMOL/L (ref 98–111)
CO2: 32 MMOL/L (ref 22–29)
CREAT SERPL-MCNC: 1.1 MG/DL (ref 0.5–0.9)
EOSINOPHILS ABSOLUTE: 0.3 K/UL (ref 0–0.6)
EOSINOPHILS RELATIVE PERCENT: 4.6 % (ref 0–5)
GFR AFRICAN AMERICAN: >59
GFR NON-AFRICAN AMERICAN: 50
GLUCOSE BLD-MCNC: 86 MG/DL (ref 74–109)
HCT VFR BLD CALC: 38.8 % (ref 37–47)
HEMOGLOBIN: 11.7 G/DL (ref 12–16)
IMMATURE GRANULOCYTES #: 0 K/UL
LYMPHOCYTES ABSOLUTE: 1.8 K/UL (ref 1.1–4.5)
LYMPHOCYTES RELATIVE PERCENT: 26 % (ref 20–40)
MCH RBC QN AUTO: 28.5 PG (ref 27–31)
MCHC RBC AUTO-ENTMCNC: 30.2 G/DL (ref 33–37)
MCV RBC AUTO: 94.4 FL (ref 81–99)
MONOCYTES ABSOLUTE: 0.5 K/UL (ref 0–0.9)
MONOCYTES RELATIVE PERCENT: 7.4 % (ref 0–10)
NEUTROPHILS ABSOLUTE: 4.3 K/UL (ref 1.5–7.5)
NEUTROPHILS RELATIVE PERCENT: 61 % (ref 50–65)
PDW BLD-RTO: 17.2 % (ref 11.5–14.5)
PLATELET # BLD: 299 K/UL (ref 130–400)
PMV BLD AUTO: 10 FL (ref 9.4–12.3)
POTASSIUM SERPL-SCNC: 4.3 MMOL/L (ref 3.5–5)
RBC # BLD: 4.11 M/UL (ref 4.2–5.4)
SODIUM BLD-SCNC: 144 MMOL/L (ref 136–145)
TOTAL PROTEIN: 7.7 G/DL (ref 6.6–8.7)
WBC # BLD: 7 K/UL (ref 4.8–10.8)

## 2021-06-10 PROCEDURE — 99496 TRANSJ CARE MGMT HIGH F2F 7D: CPT | Performed by: INTERNAL MEDICINE

## 2021-06-10 PROCEDURE — 1111F DSCHRG MED/CURRENT MED MERGE: CPT | Performed by: INTERNAL MEDICINE

## 2021-06-10 RX ORDER — SPIRONOLACTONE 25 MG/1
25 TABLET ORAL DAILY
COMMUNITY
End: 2022-01-04 | Stop reason: SDUPTHER

## 2021-06-10 NOTE — PROGRESS NOTES
complaints    Admission 21  Discharge 21    Past Medical History:   Diagnosis Date    Abnormal CT of spine     Acute sinusitis     Arthritis     Asthma     Bronchitis     Carpal tunnel syndrome     both    [facility]ive heart failure[facility])     Depression     Fibromyalgia     Furuncle     Gout     Hyperlipidemia     Hypertension     Low vitamin D level     Lumbago     Lumbar radiculopathy     Neuropathy     feet    Obesity     Rheumatoid nodule of knee ([facility])     Sleep apnea     UTI (urinary tract infection)     Vertigo     Vitamin D deficiency       Past Surgical History:   Procedure Laterality Date     SECTION      times two    HYSTERECTOMY        Social History     Socioeconomic History    Marital status:      Spouse name: None    Number of children: None    Years of education: None    Highest education level: None   Occupational History    None   Tobacco Use    Smoking status: Never Smoker    Smokeless tobacco: Never Used   Vaping Use    Vaping Use: Never used   Substance and Sexual Activity    Alcohol use: No    Drug use: No    Sexual activity: None   Other Topics Concern    None   Social History Narrative    None     Social Determinants of Health     Financial Resource Strain: Low Risk     Difficulty of Paying Living Expenses: Not hard at all   Food Insecurity: No Food Insecurity    Worried About Running Out of Food in the Last Year: Never true    Chani of Food in the Last Year: Never true   Transportation Needs: No Transportation Needs    Lack of Transportation (Medical): No    Lack of Transportation (Non-Medical):  No   Physical Activity:     Days of Exercise per Week:     Minutes of Exercise per Session:    Stress:     Feeling of Stress :    Social Connections:     Frequency of Communication with Friends and Family:     Frequency of Social Gatherings with Friends and Family:     Attends Protestant Services:     Active Member of [employer] or Organizations:     Attends Club or Organization Meetings:     Marital Status:    Intimate Partner Violence:     Fear of Current or Ex-Partner:     Emotionally Abused:     Physically Abused:     Sexually Abused:       Family History   Problem Relation Age of Onset    Heart Disease Mother     Hypertension Mother     Stroke Father     Hypertension Father     Hypertension Other     Heart Disease Other     Cancer Other         Current Outpatient Medications   Medication Sig Dispense Refill    Cholecalciferol 50 MCG (2000 UT) TABS TAKE 1 TABLET BY MOUTH DAILY      vitamin D (CHOLECALCIFEROL) 25 MCG (1000 UT) TABS tablet Take 2,000 Units by mouth daily      spironolactone (ALDACTONE) 25 MG tablet Take 25 mg by mouth daily      OXYGEN Inhale into the lungs      allopurinol (ZYLOPRIM) 300 MG tablet TAKE 1 TABLET BY MOUTH DAILY 30 tablet 5    DULoxetine (CYMBALTA) 60 MG extended release capsule TAKE 1 CAPSULE BY MOUTH ONCE DAILY IN THE MORNING 90 capsule 3    celecoxib (CELEBREX) 100 MG capsule TAKE 1 CAPSULE BY MOUTH 2 TIMES DAILY 180 capsule 0    fenofibrate (TRICOR) 145 MG tablet TAKE 1 TABLET BY MOUTH DAILY (FOR TRIGLYCERIDES/CHOLESTEROL) 90 tablet 3    amitriptyline (ELAVIL) 25 MG tablet TAKE ONE TABLET BY MOUTH NIGHTLY FOR NERVE DISEASE 90 tablet 3    carvedilol (COREG) 25 MG tablet TAKE 1 TABLET BY MOUTH 2 TIMES DAILY 180 tablet 3    butalbital-acetaminophen-caffeine (FIORICET, ESGIC) -40 MG per tablet TAKE ONE TABLET EVERY 4 HOURS AS NEEDED FOR HEADACHES 10 tablet 3    furosemide (LASIX) 40 MG tablet Take 1.5 tablets by mouth daily (Patient taking differently: Take 40 mg by mouth daily ) 135 tablet 3    meclizine (ANTIVERT) 25 MG tablet Take 1 tablet by mouth 3 times daily as needed for Dizziness 90 tablet 5    azelastine (ASTELIN) 0.1 % nasal spray 2 sprays by Nasal route 2 times daily Use in each nostril as directed 1 Bottle 3    Multiple Vitamins-Minerals (MULTIVITAMIN ADULT PO) Take by mouth      EPINEPHrine (EPIPEN 2-SHAUN) 0.3 MG/0.3ML SOAJ injection Inject 0.3 mLs into the muscle once for 1 dose Use as directed for allergic reaction 0.3 mL 1    amLODIPine (NORVASC) 10 MG tablet TAKE ONE TABLET BY MOUTH DAILY (Patient not taking: Reported on 6/10/2021) 90 tablet 3     No current facility-administered medications for this visit. Patient Active Problem List   Diagnosis    Chronic congestive heart failure ([address]    Depression    Fibromyalgia    Gout of multiple sites    Mixed hyperlipidemia    Morbid obesity due to excess calories ([address]    Essential hypertension    Chronic midline low back pain without sciatica    Benign headache    Family hx of colon cancer    Hypoxia    TAURUS (obstructive sleep apnea)    Thyroid mass        Review of Systems   Constitutional: Negative for activity change, appetite change, chills, diaphoresis, fatigue, fever and unexpected weight change. HENT: Negative for congestion, ear discharge, postnasal drip, sinus pressure, sneezing, sore throat, tinnitus, trouble swallowing and voice change. Loss of taste and smell following COVID-19 infection. Eyes: Negative for photophobia, pain, discharge, redness, itching and visual disturbance. Respiratory: Positive for shortness of breath. Negative for cough, chest tightness and wheezing. Some mild shortness of breath following a COVID-19 infection, but has overall improved since last OV   Cardiovascular: Negative for chest pain, palpitations and leg swelling. Gastrointestinal: Negative for abdominal distention, abdominal pain, blood in stool, constipation, diarrhea and nausea. Endocrine: Negative for cold intolerance, heat intolerance, polydipsia, polyphagia and polyuria. Genitourinary: Negative for difficulty urinating, dysuria, flank pain, frequency, hematuria and urgency. Musculoskeletal: Positive for arthralgias and back pain.  Negative for gait problem, joint swelling, myalgias, neck pain and neck stiffness. She does complain of neck pain and hip pain. She has difficulty standing after long periods of time. Skin: Negative for color change, pallor, rash and wound. Allergic/Immunologic: Negative for environmental allergies, food allergies and immunocompromised state. Neurological: Negative for dizziness, tremors, seizures, syncope, facial asymmetry, speech difficulty, weakness, light-headedness, numbness and headaches. Hematological: Negative for adenopathy. Does not bruise/bleed easily. Psychiatric/Behavioral: Negative for agitation, confusion, decreased concentration, dysphoric mood, hallucinations, self-injury, sleep disturbance and suicidal ideas. The patient is not nervous/anxious and is not hyperactive. /82   Pulse 108   Ht 5' 1.5\" (1.562 m)   Wt (!) 343 lb 14.4 oz (156 kg)   SpO2 92%   BMI 63.93 kg/m²   Physical Exam  Vitals and nursing note reviewed. Constitutional:       General: She is not in acute distress. Appearance: Normal appearance. She is well-developed and normal weight. She is not ill-appearing, toxic-appearing or diaphoretic. HENT:      Head: Normocephalic and atraumatic. Right Ear: Tympanic membrane, ear canal and external ear normal. There is no impacted cerumen. Left Ear: Tympanic membrane, ear canal and external ear normal. There is no impacted cerumen. Nose: Nose normal. No congestion or rhinorrhea. Mouth/Throat:      Mouth: Mucous membranes are moist.      Pharynx: Oropharynx is clear. No oropharyngeal exudate or posterior oropharyngeal erythema. Eyes:      General: No scleral icterus. Right eye: No discharge. Left eye: No discharge. Extraocular Movements: Extraocular movements intact. Conjunctiva/sclera: Conjunctivae normal.      Pupils: Pupils are equal, round, and reactive to light. Neck:      Thyroid: No thyromegaly. Vascular: No carotid bruit or JVD. Trachea: No tracheal deviation. Comments: Palpable thyroid nodules noted  Cardiovascular:      Rate and Rhythm: Normal rate and regular rhythm. Pulses: Normal pulses. Heart sounds: Normal heart sounds. No murmur heard. No friction rub. No gallop. Pulmonary:      Effort: Pulmonary effort is normal. No respiratory distress. Breath sounds: Normal breath sounds. No stridor. No wheezing, rhonchi or rales. Chest:      Chest wall: No tenderness. Abdominal:      General: Abdomen is flat. Bowel sounds are normal. There is no distension. Palpations: Abdomen is soft. There is no mass. Tenderness: There is no abdominal tenderness. There is no right CVA tenderness, left CVA tenderness, guarding or rebound. Hernia: No hernia is present. Musculoskeletal:         General: No swelling, tenderness, deformity or signs of injury. Normal range of motion. Cervical back: Normal range of motion and neck supple. No rigidity. No muscular tenderness. Right lower leg: No edema. Left lower leg: No edema. Comments: Gait is antalgic. There is tenderness on palpation of the lower lumbar spine. Lymphadenopathy:      Cervical: No cervical adenopathy. Skin:     General: Skin is warm and dry. Capillary Refill: Capillary refill takes less than 2 seconds. Coloration: Skin is not jaundiced or pale. Findings: No bruising, erythema, lesion or rash. Neurological:      General: No focal deficit present. Mental Status: She is alert and oriented to person, place, and time. Mental status is at baseline. Cranial Nerves: No cranial nerve deficit. Sensory: No sensory deficit. Motor: No weakness or abnormal muscle tone. Coordination: Coordination normal.      Gait: Gait normal.      Deep Tendon Reflexes: Reflexes are normal and symmetric.  Reflexes normal.   Psychiatric:         Mood and Affect: Mood normal.         Behavior: Behavior normal.

## 2021-06-11 ENCOUNTER — READMISSION MANAGEMENT (OUTPATIENT)
Dept: CALL CENTER | Facility: HOSPITAL | Age: 66
End: 2021-06-11

## 2021-06-11 NOTE — OUTREACH NOTE
Medical Week 1 Survey      Responses   [facility] patient discharged from?  [place]   Does the patient have one of the following disease processes/diagnoses(primary or secondary)?  Other   Week 1 attempt successful?  No   Unsuccessful attempts  Attempt 1          Marcelle Tavares RN

## 2021-06-12 ASSESSMENT — ENCOUNTER SYMPTOMS
WHEEZING: 0
BACK PAIN: 1
EYE ITCHING: 0
SHORTNESS OF BREATH: 1
NAUSEA: 0
DIARRHEA: 0
SINUS PRESSURE: 0
CONSTIPATION: 0
ABDOMINAL PAIN: 0
CHEST TIGHTNESS: 0
SORE THROAT: 0
COLOR CHANGE: 0
TROUBLE SWALLOWING: 0
EYE DISCHARGE: 0
BLOOD IN STOOL: 0
PHOTOPHOBIA: 0
VOICE CHANGE: 0
ABDOMINAL DISTENTION: 0
EYE PAIN: 0
EYE REDNESS: 0
COUGH: 0

## 2021-06-14 ENCOUNTER — LAB (OUTPATIENT)
Dept: LAB | Facility: HOSPITAL | Age: 66
End: 2021-06-14

## 2021-06-14 LAB — SARS-COV-2 ORF1AB RESP QL NAA+PROBE: NOT DETECTED

## 2021-06-14 PROCEDURE — U0004 COV-19 TEST NON-CDC HGH THRU: HCPCS | Performed by: OTOLARYNGOLOGY

## 2021-06-14 PROCEDURE — C9803 HOPD COVID-19 SPEC COLLECT: HCPCS | Performed by: OTOLARYNGOLOGY

## 2021-06-14 NOTE — TELEPHONE ENCOUNTER
Ria Capron called requesting a refill of the below medication which has been pended for you:     Requested Prescriptions     Pending Prescriptions Disp Refills    butalbital-acetaminophen-caffeine (FIORICET, ESGIC) -40 MG per tablet [Pharmacy Med Name: BUTALBITAL/APAP/CAFFEINE TB -40 TAB] 10 tablet 3     Sig: TAKE ONE TABLET EVERY 4 HOURS AS NEEDED FOR HEADACHES       Last Appointment Date: 4/23/2020  Next Appointment Date: 10/23/2020    Allergies   Allergen Reactions    Codeine Hives    Lisinopril Hives Burow's Graft Text: The defect edges were debeveled with a #15 scalpel blade.  Given the location of the defect, shape of the defect, the proximity to free margins and the presence of a standing cone deformity a Burow's skin graft was deemed most appropriate. The standing cone was removed and this tissue was then trimmed to the shape of the primary defect. The adipose tissue was also removed until only dermis and epidermis were left.  The skin margins of the secondary defect were undermined to an appropriate distance in all directions utilizing iris scissors.  The secondary defect was closed with interrupted buried subcutaneous sutures.  The skin edges were then re-apposed with running  sutures.  The skin graft was then placed in the primary defect and oriented appropriately.

## 2021-06-16 ENCOUNTER — HOSPITAL ENCOUNTER (EMERGENCY)
Facility: HOSPITAL | Age: 66
Discharge: HOME | End: 2021-06-16
Admitting: EMERGENCY MEDICINE

## 2021-06-16 ENCOUNTER — TELEPHONE (OUTPATIENT)
Dept: OTOLARYNGOLOGY | Facility: CLINIC | Age: 66
End: 2021-06-16

## 2021-06-16 ENCOUNTER — APPOINTMENT (OUTPATIENT)
Dept: GENERAL RADIOLOGY | Facility: HOSPITAL | Age: 66
End: 2021-06-16

## 2021-06-16 ENCOUNTER — HOSPITAL ENCOUNTER (OUTPATIENT)
Dept: PULMONOLOGY | Facility: HOSPITAL | Age: 66
Discharge: HOME | End: 2021-06-16
Admitting: OTOLARYNGOLOGY

## 2021-06-16 VITALS
RESPIRATION RATE: 18 BRPM | WEIGHT: 293 LBS | BODY MASS INDEX: 55.32 KG/M2 | HEIGHT: 61 IN | DIASTOLIC BLOOD PRESSURE: 69 MMHG | TEMPERATURE: 98.3 F | HEART RATE: 98 BPM | SYSTOLIC BLOOD PRESSURE: 140 MMHG | OXYGEN SATURATION: 98 %

## 2021-06-16 DIAGNOSIS — J96.10 CHRONIC RESPIRATORY FAILURE, UNSPECIFIED WHETHER WITH HYPOXIA OR HYPERCAPNIA (HCC): Primary | ICD-10-CM

## 2021-06-16 DIAGNOSIS — E66.01 MORBID OBESITY DUE TO EXCESS CALORIES (HCC): ICD-10-CM

## 2021-06-16 DIAGNOSIS — Z99.81 OXYGEN DEPENDENT: ICD-10-CM

## 2021-06-16 DIAGNOSIS — G47.33 OSA (OBSTRUCTIVE SLEEP APNEA): ICD-10-CM

## 2021-06-16 DIAGNOSIS — I50.42 CHRONIC COMBINED SYSTOLIC AND DIASTOLIC CONGESTIVE HEART FAILURE (HCC): ICD-10-CM

## 2021-06-16 DIAGNOSIS — R09.02 HYPOXIA: ICD-10-CM

## 2021-06-16 DIAGNOSIS — R09.02 HYPOXIC: ICD-10-CM

## 2021-06-16 DIAGNOSIS — E07.9 THYROID MASS: ICD-10-CM

## 2021-06-16 DIAGNOSIS — I10 ESSENTIAL HYPERTENSION: ICD-10-CM

## 2021-06-16 LAB
ALBUMIN SERPL-MCNC: 4.1 G/DL (ref 3.5–5.2)
ALBUMIN/GLOB SERPL: 1.3 G/DL
ALP SERPL-CCNC: 54 U/L (ref 39–117)
ALT SERPL W P-5'-P-CCNC: 8 U/L (ref 1–33)
ANION GAP SERPL CALCULATED.3IONS-SCNC: 8 MMOL/L (ref 5–15)
ARTERIAL PATENCY WRIST A: ABNORMAL
ARTERIAL PATENCY WRIST A: POSITIVE
AST SERPL-CCNC: 15 U/L (ref 1–32)
ATMOSPHERIC PRESS: 750 MMHG
ATMOSPHERIC PRESS: 750 MMHG
BASE EXCESS BLDA CALC-SCNC: 6.5 MMOL/L (ref 0–2)
BASE EXCESS BLDA CALC-SCNC: 7.9 MMOL/L (ref 0–2)
BASOPHILS # BLD AUTO: 0.04 10*3/MM3 (ref 0–0.2)
BASOPHILS NFR BLD AUTO: 0.6 % (ref 0–1.5)
BDY SITE: ABNORMAL
BDY SITE: ABNORMAL
BILIRUB SERPL-MCNC: 0.3 MG/DL (ref 0–1.2)
BODY TEMPERATURE: 37 C
BODY TEMPERATURE: 37 C
BUN SERPL-MCNC: 22 MG/DL (ref 8–23)
BUN/CREAT SERPL: 19 (ref 7–25)
CALCIUM SPEC-SCNC: 10.6 MG/DL (ref 8.6–10.5)
CHLORIDE SERPL-SCNC: 100 MMOL/L (ref 98–107)
CO2 SERPL-SCNC: 33 MMOL/L (ref 22–29)
CREAT SERPL-MCNC: 1.16 MG/DL (ref 0.57–1)
DEPRECATED RDW RBC AUTO: 57.4 FL (ref 37–54)
EOSINOPHIL # BLD AUTO: 0.23 10*3/MM3 (ref 0–0.4)
EOSINOPHIL NFR BLD AUTO: 3.5 % (ref 0.3–6.2)
ERYTHROCYTE [DISTWIDTH] IN BLOOD BY AUTOMATED COUNT: 16.8 % (ref 12.3–15.4)
GFR SERPL CREATININE-BSD FRML MDRD: 57 ML/MIN/1.73
GLOBULIN UR ELPH-MCNC: 3.2 GM/DL
GLUCOSE SERPL-MCNC: 82 MG/DL (ref 65–99)
HCO3 BLDA-SCNC: 32.2 MMOL/L (ref 20–26)
HCO3 BLDA-SCNC: 33.4 MMOL/L (ref 20–26)
HCT VFR BLD AUTO: 35.5 % (ref 34–46.6)
HGB BLD-MCNC: 10.7 G/DL (ref 12–15.9)
HOLD SPECIMEN: NORMAL
IMM GRANULOCYTES # BLD AUTO: 0.01 10*3/MM3 (ref 0–0.05)
IMM GRANULOCYTES NFR BLD AUTO: 0.2 % (ref 0–0.5)
INR PPP: 1.11 (ref 0.91–1.09)
LYMPHOCYTES # BLD AUTO: 1.66 10*3/MM3 (ref 0.7–3.1)
LYMPHOCYTES NFR BLD AUTO: 25.6 % (ref 19.6–45.3)
Lab: ABNORMAL
MCH RBC QN AUTO: 28.5 PG (ref 26.6–33)
MCHC RBC AUTO-ENTMCNC: 30.1 G/DL (ref 31.5–35.7)
MCV RBC AUTO: 94.4 FL (ref 79–97)
MODALITY: ABNORMAL
MODALITY: ABNORMAL
MONOCYTES # BLD AUTO: 0.4 10*3/MM3 (ref 0.1–0.9)
MONOCYTES NFR BLD AUTO: 6.2 % (ref 5–12)
NEUTROPHILS NFR BLD AUTO: 4.14 10*3/MM3 (ref 1.7–7)
NEUTROPHILS NFR BLD AUTO: 63.9 % (ref 42.7–76)
NOTIFIED BY: ABNORMAL
NOTIFIED BY: ABNORMAL
NOTIFIED WHO: ABNORMAL
NOTIFIED WHO: ABNORMAL
NRBC BLD AUTO-RTO: 0 /100 WBC (ref 0–0.2)
NT-PROBNP SERPL-MCNC: 289.1 PG/ML (ref 0–900)
PCO2 BLDA: 50.2 MM HG (ref 35–45)
PCO2 BLDA: 50.2 MM HG (ref 35–45)
PCO2 TEMP ADJ BLD: 50.2 MM HG (ref 35–45)
PCO2 TEMP ADJ BLD: 50.2 MM HG (ref 35–45)
PH BLDA: 7.42 PH UNITS (ref 7.35–7.45)
PH BLDA: 7.43 PH UNITS (ref 7.35–7.45)
PH, TEMP CORRECTED: 7.42 PH UNITS (ref 7.35–7.45)
PH, TEMP CORRECTED: 7.43 PH UNITS (ref 7.35–7.45)
PLATELET # BLD AUTO: 317 10*3/MM3 (ref 140–450)
PMV BLD AUTO: 10.8 FL (ref 6–12)
PO2 BLDA: 52.8 MM HG (ref 83–108)
PO2 BLDA: 54.6 MM HG (ref 83–108)
PO2 TEMP ADJ BLD: 52.8 MM HG (ref 83–108)
PO2 TEMP ADJ BLD: 54.6 MM HG (ref 83–108)
POTASSIUM SERPL-SCNC: 4.1 MMOL/L (ref 3.5–5.2)
PROT SERPL-MCNC: 7.3 G/DL (ref 6–8.5)
PROTHROMBIN TIME: 13.4 SECONDS (ref 11.5–13.4)
RBC # BLD AUTO: 3.76 10*6/MM3 (ref 3.77–5.28)
SAO2 % BLDCOA: 88.2 % (ref 94–99)
SAO2 % BLDCOA: 88.7 % (ref 94–99)
SARS-COV-2 RNA PNL SPEC NAA+PROBE: NOT DETECTED
SODIUM SERPL-SCNC: 141 MMOL/L (ref 136–145)
TROPONIN T SERPL-MCNC: <0.01 NG/ML (ref 0–0.03)
VENTILATOR MODE: ABNORMAL
VENTILATOR MODE: ABNORMAL
WBC # BLD AUTO: 6.48 10*3/MM3 (ref 3.4–10.8)
WHOLE BLOOD HOLD SPECIMEN: NORMAL

## 2021-06-16 PROCEDURE — 80053 COMPREHEN METABOLIC PANEL: CPT | Performed by: NURSE PRACTITIONER

## 2021-06-16 PROCEDURE — 94726 PLETHYSMOGRAPHY LUNG VOLUMES: CPT | Performed by: INTERNAL MEDICINE

## 2021-06-16 PROCEDURE — 99283 EMERGENCY DEPT VISIT LOW MDM: CPT

## 2021-06-16 PROCEDURE — 94060 EVALUATION OF WHEEZING: CPT

## 2021-06-16 PROCEDURE — 85610 PROTHROMBIN TIME: CPT | Performed by: NURSE PRACTITIONER

## 2021-06-16 PROCEDURE — 94060 EVALUATION OF WHEEZING: CPT | Performed by: INTERNAL MEDICINE

## 2021-06-16 PROCEDURE — 94729 DIFFUSING CAPACITY: CPT | Performed by: INTERNAL MEDICINE

## 2021-06-16 PROCEDURE — 94729 DIFFUSING CAPACITY: CPT

## 2021-06-16 PROCEDURE — 85025 COMPLETE CBC W/AUTO DIFF WBC: CPT | Performed by: NURSE PRACTITIONER

## 2021-06-16 PROCEDURE — 83880 ASSAY OF NATRIURETIC PEPTIDE: CPT | Performed by: NURSE PRACTITIONER

## 2021-06-16 PROCEDURE — 87635 SARS-COV-2 COVID-19 AMP PRB: CPT | Performed by: NURSE PRACTITIONER

## 2021-06-16 PROCEDURE — 71045 X-RAY EXAM CHEST 1 VIEW: CPT

## 2021-06-16 PROCEDURE — 82803 BLOOD GASES ANY COMBINATION: CPT

## 2021-06-16 PROCEDURE — 94726 PLETHYSMOGRAPHY LUNG VOLUMES: CPT

## 2021-06-16 PROCEDURE — 84484 ASSAY OF TROPONIN QUANT: CPT | Performed by: NURSE PRACTITIONER

## 2021-06-16 PROCEDURE — 36600 WITHDRAWAL OF ARTERIAL BLOOD: CPT

## 2021-06-16 PROCEDURE — 93010 ELECTROCARDIOGRAM REPORT: CPT | Performed by: INTERNAL MEDICINE

## 2021-06-16 PROCEDURE — 93005 ELECTROCARDIOGRAM TRACING: CPT

## 2021-06-16 RX ORDER — ALBUTEROL SULFATE 2.5 MG/3ML
2.5 SOLUTION RESPIRATORY (INHALATION) ONCE
Status: COMPLETED | OUTPATIENT
Start: 2021-06-16 | End: 2021-06-16

## 2021-06-16 RX ORDER — SODIUM CHLORIDE 0.9 % (FLUSH) 0.9 %
10 SYRINGE (ML) INJECTION AS NEEDED
Status: DISCONTINUED | OUTPATIENT
Start: 2021-06-16 | End: 2021-06-16 | Stop reason: HOSPADM

## 2021-06-16 RX ADMIN — ALBUTEROL SULFATE 2.5 MG: 2.5 SOLUTION RESPIRATORY (INHALATION) at 15:16

## 2021-06-16 NOTE — TELEPHONE ENCOUNTER
Anthony is doing a PFT on patient and her PO2 is 52.8. I have contacted Dr. So and he states it is not her thyroid which is why he ordered the test. She needs to be readmitted or seen by respiratory. I have spoken with respiratory and they will send patient to ER

## 2021-06-16 NOTE — TELEPHONE ENCOUNTER
----- Message from Milad So MD sent at 6/16/2021  3:52 PM CDT -----  Patient instructed to go to ED for evaluation.

## 2021-06-16 NOTE — ED PROVIDER NOTES
Subjective   Patient is a 65-year-old white female presents the emergency department from the ENT office with hypoxia.  Evidently the patient had a pulmonary function test today without her oxygen and she had a PO2 of 52.8.  She states she is not any more short of breath than usual.  She denies any chest pain.  She was sent over to the emergency department for further evaluation and treatment.  Patient was admitted to the hospital June 4 through June 7 per hospitalist medicine for hypoxia, thyroid mass, hypertension, chronic respiratory failure.  She was sent home on trilogy and oxygen.  She states that she has been tolerating that well and was not short of breath today when she was sent over here for further evaluation.  She denies any complaints.  Her O2 sat on room air here is 96% on room air.      History provided by:  Patient   used: No        Review of Systems   Constitutional: Negative.    HENT: Negative.    Eyes: Negative.    Respiratory:        Patient is a 65-year-old white female presents the emergency department from the ENT office with hypoxia.  Evidently the patient had a pulmonary function test today without her oxygen and she had a PO2 of 52.8.  She states she is not any more short of breath than usual.  She denies any chest pain.  She was sent over to the emergency department for further evaluation and treatment.  Patient was admitted to the hospital June 4 through June 7 per hospitalist medicine for hypoxia, thyroid mass, hypertension, chronic respiratory failure.  She was sent home on trilogy and oxygen.  She states that she has been tolerating that well and was not short of breath today when she was sent over here for further evaluation.  She denies any complaints.  Her O2 sat on room air here is 96% on room air.     Cardiovascular: Negative.    Gastrointestinal: Negative.    Endocrine: Negative.    Genitourinary: Negative.    Musculoskeletal: Negative.    Skin: Negative.     Allergic/Immunologic: Negative.    Neurological: Negative.    Hematological: Negative.    Psychiatric/Behavioral: Negative.    All other systems reviewed and are negative.      Past Medical History:   Diagnosis Date   • Anemia     iron def   • Anxiety    • Asthma    • Back pain    • CHF (congestive heart failure) (CMS/HCC)    • CTS (carpal tunnel syndrome)    • Depression    • Fibromyalgia    • GERD (gastroesophageal reflux disease)    • Gout    • Hyperlipemia    • Hyperlipidemia    • Hypertension    • Obesity    • Obstructive sleep apnea     could not tolerate machine    • Peripheral neuropathy        Allergies   Allergen Reactions   • Codeine Sulfate Hives   • Lisinopril Swelling and Rash     Facial swelling       Past Surgical History:   Procedure Laterality Date   • BREAST BIOPSY Left    •  SECTION      X 2   • COLONOSCOPY  10/22/2015    Tics repeat exam in 5 years   • COLONOSCOPY  2007    Small hemorrhoids repeat exam in 3 years   • COLONOSCOPY N/A 2020    Procedure: COLONOSCOPY WITH ANESTHESIA;  Surgeon: Denny Francis MD;  Location: [facility] ENDOSCOPY;  Service: Gastroenterology;  Laterality: N/A;  pre: family hx colon ca  post: diverticulosis  Carola Barbosa MD   • HYSTERECTOMY      total   • UMBILICAL HERNIA REPAIR      had 1/2 of it repaired with hysterectomy        Family History   Problem Relation Age of Onset   • Arthritis Mother    • Heart disease Mother    • Hypertension Mother    • Arthritis Father    • Hypertension Father    • Anuerysm Father    • Stroke Father    • Asthma Brother    • Colon polyps Neg Hx    • Colon cancer Neg Hx        Social History     Socioeconomic History   • Marital status:      Spouse name: Not on file   • Number of children: Not on file   • Years of education: Not on file   • Highest education level: Not on file   Tobacco Use   • Smoking status: Never Smoker   • Smokeless tobacco: Never Used   Substance and Sexual Activity   •  "Alcohol use: No   • Drug use: No   • Sexual activity: Defer     Birth control/protection: Surgical       Prior to Admission medications    Medication Sig Start Date End Date Taking? Authorizing Provider   allopurinol (ZYLOPRIM) 300 MG tablet  8/30/16   Mamie Chase MD   amitriptyline (ELAVIL) 25 MG tablet Take 25 mg by mouth Every Night.    Mamie Chase MD   azelastine (ASTELIN) 0.1 % nasal spray 2 sprays into each nostril 2 (Two) Times a Day. Use in each nostril as directed    Mamie Chase MD   butalbital-acetaminophen-caffeine (FIORICET, ESGIC) -40 MG per tablet Take 1 tablet by mouth Every 4 (Four) Hours As Needed for Headache.    Mamie Chase MD   carvedilol (COREG) 25 MG tablet TAKE 1 TABLET BY MOUTH TWICE A DAY 10/2/17   Yoan Talamantes MD   cholecalciferol (VITAMIN D3) 1000 UNITS tablet Take 2,000 Units by mouth Daily.    Mamie Chase MD   DULoxetine (CYMBALTA) 30 MG capsule 60 mg. 8/30/16   Mamie Chase MD   EPINEPHrine (ADRENALIN) 0.05 MG/ML syringe Inject 20 mL as directed.    Mamie Chase MD   fenofibrate (TRICOR) 145 MG tablet Take 145 mg by mouth Daily.    Mamie Chase MD   furosemide (LASIX) 40 MG tablet Take 1 tablet by mouth Daily. 6/7/21   David Elkins MD   meclizine (ANTIVERT) 25 MG tablet Take 25 mg by mouth 3 (three) times a day as needed for dizziness.    Mamie Chase MD   Multiple Vitamins-Minerals (MULTIVITAMIN ADULT PO) Take  by mouth.    Mamie Chase MD       /69   Pulse 98   Temp 98.3 °F (36.8 °C) (Oral)   Resp 18   Ht 154.9 cm (61\")   Wt (!) 156 kg (343 lb)   SpO2 98%   BMI 64.81 kg/m²     Objective   Physical Exam  Vitals and nursing note reviewed.   Constitutional:       Appearance: She is well-developed.      Comments: No acute distress.  Respirations are even and unlabored.  O2 sat is 96% on room air.  Heart rate is 70   HENT:      Head: Normocephalic and " atraumatic.   Eyes:      Conjunctiva/sclera: Conjunctivae normal.      Pupils: Pupils are equal, round, and reactive to light.   Neck:      Thyroid: No thyromegaly.      Trachea: No tracheal deviation.   Cardiovascular:      Rate and Rhythm: Normal rate and regular rhythm.      Heart sounds: Normal heart sounds.   Pulmonary:      Effort: Pulmonary effort is normal. No respiratory distress.      Breath sounds: Normal breath sounds. No wheezing or rales.   Chest:      Chest wall: No tenderness.   Abdominal:      General: Bowel sounds are normal.      Palpations: Abdomen is soft.   Musculoskeletal:         General: Normal range of motion.      Cervical back: Normal range of motion and neck supple.   Skin:     General: Skin is warm and dry.   Neurological:      Mental Status: She is alert and oriented to person, place, and time.      Cranial Nerves: No cranial nerve deficit.      Deep Tendon Reflexes: Reflexes are normal and symmetric.   Psychiatric:         Behavior: Behavior normal.         Thought Content: Thought content normal.         Judgment: Judgment normal.         Procedures         Lab Results (last 24 hours)     Procedure Component Value Units Date/Time    Blood Gas, Arterial - [[id number]]  (Abnormal) Collected: 06/16/21 1445    Specimen: Arterial Blood Updated: 06/16/21 1545     Site Right Radial     Scott's Test Positive     pH, Arterial 7.431 pH units      pCO2, Arterial 50.2 mm Hg      Comment: 83 Value above reference range        pO2, Arterial 52.8 mm Hg      Comment: 85 Value below critical limit        HCO3, Arterial 33.4 mmol/L      Comment: 83 Value above reference range        Base Excess, Arterial 7.9 mmol/L      Comment: 83 Value above reference range        O2 Saturation, Arterial 88.2 %      Comment: 84 Value below reference range        Temperature 37.0 C      Barometric Pressure for Blood Gas 750 mmHg      Modality Room Air     Ventilator Mode NA     Notified Who TIFFANIE FELIZ     Notified  By [id number]     Notified Time 06/16/2021 15:01     Collected by [id number]     Comment: Meter: [id number]     :  [id number]        pCO2, Temperature Corrected 50.2 mm Hg      pH, Temp Corrected 7.431 pH Units      pO2, Temperature Corrected 52.8 mm Hg     CBC & Differential [[id number]]  (Abnormal) Collected: 06/16/21 1629    Specimen: Blood Updated: 06/16/21 1577    Narrative:      The following orders were created for panel order CBC & Differential.  Procedure                               Abnormality         Status                     ---------                               -----------         ------                     CBC Auto Differential[[id number]]        Abnormal            Final result                 Please view results for these tests on the individual orders.    Comprehensive Metabolic Panel [[id number]]  (Abnormal) Collected: 06/16/21 1629    Specimen: Blood Updated: 06/16/21 1810     Glucose 82 mg/dL      BUN 22 mg/dL      Creatinine 1.16 mg/dL      Sodium 141 mmol/L      Potassium 4.1 mmol/L      Chloride 100 mmol/L      CO2 33.0 mmol/L      Calcium 10.6 mg/dL      Total Protein 7.3 g/dL      Albumin 4.10 g/dL      ALT (SGPT) 8 U/L      AST (SGOT) 15 U/L      Alkaline Phosphatase 54 U/L      Total Bilirubin 0.3 mg/dL      eGFR  African Amer 57 mL/min/1.73      Globulin 3.2 gm/dL      A/G Ratio 1.3 g/dL      BUN/Creatinine Ratio 19.0     Anion Gap 8.0 mmol/L     Narrative:      GFR Normal >60  Chronic Kidney Disease <60  Kidney Failure <15      BNP [[id number]]  (Normal) Collected: 06/16/21 1629    Specimen: Blood Updated: 06/16/21 1808     proBNP 289.1 pg/mL     Narrative:      Among patients with dyspnea, NT-proBNP is highly sensitive for the detection of acute congestive heart failure. In addition NT-proBNP of <300 pg/ml effectively rules out acute congestive heart failure with 99% negative predictive value.    Results may be falsely decreased if patient taking Biotin.      Troponin [[id number]]   (Normal) Collected: 06/16/21 1629    Specimen: Blood Updated: 06/16/21 1808     Troponin T <0.010 ng/mL     Narrative:      Troponin T Reference Range:  <= 0.03 ng/mL-   Negative for AMI  >0.03 ng/mL-     Abnormal for myocardial necrosis.  Clinicians would have to utilize clinical acumen, EKG, Troponin and serial changes to determine if it is an Acute Myocardial Infarction or myocardial injury due to an underlying chronic condition.       Results may be falsely decreased if patient taking Biotin.      CBC Auto Differential [[id number]]  (Abnormal) Collected: 06/16/21 1629    Specimen: Blood Updated: 06/16/21 1757     WBC 6.48 10*3/mm3      RBC 3.76 10*6/mm3      Hemoglobin 10.7 g/dL      Hematocrit 35.5 %      MCV 94.4 fL      MCH 28.5 pg      MCHC 30.1 g/dL      RDW 16.8 %      RDW-SD 57.4 fl      MPV 10.8 fL      Platelets 317 10*3/mm3      Neutrophil % 63.9 %      Lymphocyte % 25.6 %      Monocyte % 6.2 %      Eosinophil % 3.5 %      Basophil % 0.6 %      Immature Grans % 0.2 %      Neutrophils, Absolute 4.14 10*3/mm3      Lymphocytes, Absolute 1.66 10*3/mm3      Monocytes, Absolute 0.40 10*3/mm3      Eosinophils, Absolute 0.23 10*3/mm3      Basophils, Absolute 0.04 10*3/mm3      Immature Grans, Absolute 0.01 10*3/mm3      nRBC 0.0 /100 WBC     [facility] Blood Culture Bottle Set [[id number]] Collected: 06/16/21 1630    Specimen: Blood from Arm, Left Updated: 06/16/21 1732     Extra Tube Hold for add-ons.     Comment: Auto resulted.       [place] Blood Culture Bottle Set [[id number]] Collected: 06/16/21 1635    Specimen: Blood from Arm, Right Updated: 06/16/21 1746     Extra Tube Hold for add-ons.     Comment: Auto resulted.       COVID PRE-OP / PRE-PROCEDURE SCREENING ORDER (NO ISOLATION) - Swab, Nasal Cavity [[id number]]  (Normal) Collected: 06/16/21 1815    Specimen: Swab from Nasal Cavity Updated: 06/16/21 1912    Narrative:      The following orders were created for panel order COVID PRE-OP / PRE-PROCEDURE  SCREENING ORDER (NO ISOLATION) - Swab, Nasal Cavity.  Procedure                               Abnormality         Status                     ---------                               -----------         ------                     COVID-19,[facility]...[[id number]]  Normal              Final result                 Please view results for these tests on the individual orders.    COVID-19,[facility] IN-HOUSE,Nasal Swab No Transport Media 3-4 HR TAT - Swab, Nasal Cavity [[id number]]  (Normal) Collected: 06/16/21 1815    Specimen: Swab from Nasal Cavity Updated: 06/16/21 1912     COVID19 Not Detected    Narrative:      Fact sheet for providers: https://www.fda.gov/media/166747/download     Fact sheet for patients: https://www.fda.gov/media/767377/download    Test performed by PCR.    Consider negative results in combination with clinical observations, patient history, and epidemiological information.    Blood Gas, Arterial - [[id number]]  (Abnormal) Collected: 06/16/21 1825    Specimen: Arterial Blood Updated: 06/16/21 1829     Site Arterial Line     Scott's Test N/A     pH, Arterial 7.415 pH units      pCO2, Arterial 50.2 mm Hg      Comment: 83 Value above reference range        pO2, Arterial 54.6 mm Hg      Comment: 85 Value below critical limit        HCO3, Arterial 32.2 mmol/L      Comment: 83 Value above reference range        Base Excess, Arterial 6.5 mmol/L      Comment: 83 Value above reference range        O2 Saturation, Arterial 88.7 %      Comment: 84 Value below reference range        Temperature 37.0 C      Barometric Pressure for Blood Gas 750 mmHg      Modality Room Air     Ventilator Mode NA     Comment: Meter: [id number]     :  [email or web address]        Notified Who APRN     Notified By [email or web address]     Notified Time 06/16/2021 18:34     pCO2, Temperature Corrected 50.2 mm Hg      pH, Temp Corrected 7.415 pH Units      pO2, Temperature Corrected 54.6 mm Hg     Protime-INR [[id number]]  (Abnormal)  Collected: 06/16/21 1905    Specimen: Blood Updated: 06/16/21 1923     Protime 13.4 Seconds      INR 1.11          XR Chest 1 View   Final Result          ED Course  ED Course as of Jun 16 2029   Wed Jun 16, 2021   1849 Pending covid results at this time     [CW]   1913 Reviewed pt and pt care plan with dr marroquin- also in agreement with pt care plan.  Patient's ABG shows a PO2 of 54.6 pH 7.415 O2 saturation 88.7.  Patient was placed on 2 L O2 while in the emergency department.  She has been running 99% 2 L.  She denies any increase in shortness of breath.  Her troponin is negative as well as her BNP.  Her chest x-ray is stable.  Covid is negative as well.  Patient she states she has oxygen and trilogy at home and wants to go home.  Believe this hypoxic episode was due to her not having her oxygen on during this testing.  Patient is resting and respirations are even and unlabored at this time.  Patient be discharged home shortly to follow-up with pulmonology and Dr. Barbosa as well.  Advised to return if symptoms worsen    [CW]      ED Course User Index  [CW] Joan Carranza, DONOVAN          MDM  Number of Diagnoses or Management Options  Chronic respiratory failure, unspecified whether with hypoxia or hypercapnia (CMS/HCC): minor  Oxygen dependent: minor  Thyroid mass: minor     Amount and/or Complexity of Data Reviewed  Clinical lab tests: ordered and reviewed  Tests in the radiology section of CPT®: ordered and reviewed  Decide to obtain previous medical records or to obtain history from someone other than the patient: yes    Patient Progress  Patient progress: stable      Final diagnoses:   Chronic respiratory failure, unspecified whether with hypoxia or hypercapnia (CMS/HCC)   Oxygen dependent   Thyroid mass          Joan Carranza, DONOVAN  06/16/21 2029

## 2021-06-17 LAB
QT INTERVAL: 394 MS
QTC INTERVAL: 399 MS

## 2021-06-18 ENCOUNTER — READMISSION MANAGEMENT (OUTPATIENT)
Dept: CALL CENTER | Facility: HOSPITAL | Age: 66
End: 2021-06-18

## 2021-06-18 NOTE — OUTREACH NOTE
Medical Week 2 Survey      Responses   [facility], [place] patient discharged from?  [place]   Does the patient have one of the following disease processes/diagnoses(primary or secondary)?  Other   Week 2 attempt successful?  Yes   Call start time  1706   Discharge diagnosis  Hypoxia   Call end time  1713   Is patient permission given to speak with other caregiver?  No   Meds reviewed with patient/caregiver?  Yes   Is the patient having any side effects they believe may be caused by any medication additions or changes?  No   Does the patient have all medications ordered at discharge?  Yes   Is the patient taking all medications as directed (includes completed medication regime)?  Yes   Does the patient have a primary care provider?   Yes   Comments regarding PCP  PCP Dr Barbosa. Patient has seen since discharge.    Has the patient kept scheduled appointments due by today?  Yes   Has home health visited the patient within 72 hours of discharge?  N/A   What DME was ordered?  [facility] and home O2   Has all DME been delivered?  Yes   DME comments  Patient wearing home O23L. Does not have pulse ox, but may get one.    Psychosocial issues?  No   Did the patient receive a copy of their discharge instructions?  Yes   Nursing interventions  Reviewed instructions with patient   What is the patient's perception of their health status since discharge?  Worsening [Patient had return ER visit 6/16 with shortness of breath. Patient states has been improving since. ]   Is the patient/caregiver able to teach back signs and symptoms related to disease process for when to call PCP?  Yes   Is the patient/caregiver able to teach back signs and symptoms related to disease process for when to call 911?  Yes   Is the patient/caregiver able to teach back the hierarchy of who to call/visit for symptoms/problems? PCP, Specialist, Home health nurse, Urgent Care, ED, 911  Yes   If the patient is a current smoker, are they able to teach  back resources for cessation?  Not a smoker   Week 2 Call Completed?  Yes          [place] RN

## 2021-06-25 ENCOUNTER — READMISSION MANAGEMENT (OUTPATIENT)
Dept: CALL CENTER | Facility: HOSPITAL | Age: 66
End: 2021-06-25

## 2021-06-25 NOTE — OUTREACH NOTE
Medical Week 3 Survey      Responses   [facility] patient discharged from?  [place]   Does the patient have one of the following disease processes/diagnoses(primary or secondary)?  Other   Week 3 attempt successful?  Yes   Call start time  1255   Call end time  1257   Discharge diagnosis  Hypoxia   Is patient permission given to speak with other caregiver?  No   Meds reviewed with patient/caregiver?  Yes   Is the patient having any side effects they believe may be caused by any medication additions or changes?  No   Does the patient have all medications ordered at discharge?  Yes   Is the patient taking all medications as directed (includes completed medication regime)?  Yes   Does the patient have a primary care provider?   Yes   Comments regarding PCP  Dr. Barbosa    Does the patient have an appointment with their PCP within 7 days of discharge?  Yes   Has the patient kept scheduled appointments due by today?  Yes   Did the patient receive a copy of their discharge instructions?  Yes   Nursing interventions  Reviewed instructions with patient   What is the patient's perception of their health status since discharge?  Improving [02 at 2 l per NC  sat 96% ]   Is the patient/caregiver able to teach back signs and symptoms related to disease process for when to call PCP?  Yes   Is the patient/caregiver able to teach back signs and symptoms related to disease process for when to call 911?  Yes   Is the patient/caregiver able to teach back the hierarchy of who to call/visit for symptoms/problems? PCP, Specialist, Home health nurse, Urgent Care, ED, 911  Yes   If the patient is a current smoker, are they able to teach back resources for cessation?  Not a smoker   Week 3 Call Completed?  Yes          Miri Najera RN

## 2021-07-01 NOTE — PROGRESS NOTES
"YOB: 1955  Location: [facility]  Location Address: [address] [place] [address], [facility], [address] [place] [address]  Location Phone: [phone]    Chief Complaint   Patient presents with   • Breathing Difficulty     hospital follow up        History of Present Illness  Pau Cabrera is a 65 y.o. female.  Pau Cabrera is here for follow up of ENT complaints. The patient has had problems with hypoxia. Patient is on 2 liters of oxygen and has difficulty breathing when up. She sees respiratory tomorrow. She has no other complaints. Patient has had a PFT.     Her primary problem has been hypoxia however she also notes that she feels well when resting or when sedentary but when she becomes more active she feels as though she cannot \"catch her breath\".    PFTs were reviewed the flow volume loop appears normal.    I have personally reviewed the information imported into the chart during this visit.      I have personally reviewed the review of systems.       Past Medical History:   Diagnosis Date   • Anemia     iron def   • Anxiety    • Asthma    • Back pain    • CHF (congestive heart failure) (CMS/[facility])    • COVID-19 vaccine series completed     pfizer   • CTS (carpal tunnel syndrome)    • Depression    • Fibromyalgia    • GERD (gastroesophageal reflux disease)    • Gout    • Hyperlipemia    • Hyperlipidemia    • Hypertension    • Obesity    • Obstructive sleep apnea     could not tolerate machine    • Peripheral neuropathy        Past Surgical History:   Procedure Laterality Date   • BREAST BIOPSY Left    •  SECTION      X 2   • COLONOSCOPY  10/22/2015    Tics repeat exam in 5 years   • COLONOSCOPY  2007    Small hemorrhoids repeat exam in 3 years   • COLONOSCOPY N/A 2020    Procedure: COLONOSCOPY WITH ANESTHESIA;  Surgeon: Denny Francis MD;  Location: [facility] ENDOSCOPY;  Service: Gastroenterology;  Laterality: N/A;  pre: family hx colon ca  post: diverticulosis  Chelsey, Carola " MD Jennifer   • HYSTERECTOMY  2000    total   • UMBILICAL HERNIA REPAIR      had 1/2 of it repaired with hysterectomy        Outpatient Medications Marked as Taking for the 7/6/21 encounter (Office Visit) with Milad So MD   Medication Sig Dispense Refill   • allopurinol (ZYLOPRIM) 300 MG tablet      • amitriptyline (ELAVIL) 25 MG tablet Take 25 mg by mouth Every Night.     • amLODIPine (NORVASC) 10 MG tablet TAKE ONE TABLET BY MOUTH DAILY     • azelastine (ASTELIN) 0.1 % nasal spray 2 sprays into each nostril 2 (Two) Times a Day. Use in each nostril as directed     • butalbital-acetaminophen-caffeine (FIORICET, ESGIC) -40 MG per tablet Take 1 tablet by mouth Every 4 (Four) Hours As Needed for Headache.     • carvedilol (COREG) 25 MG tablet TAKE 1 TABLET BY MOUTH TWICE A DAY 60 tablet 3   • celecoxib (CeleBREX) 100 MG capsule Take 100 mg by mouth.     • cholecalciferol (VITAMIN D3) 1000 UNITS tablet Take 2,000 Units by mouth Daily.     • Cholecalciferol 50 MCG (2000 UT) tablet TAKE 1 TABLET BY MOUTH DAILY     • DULoxetine (CYMBALTA) 30 MG capsule 60 mg.     • EPINEPHrine (ADRENALIN) 0.05 MG/ML syringe Inject 20 mL as directed.     • fenofibrate (TRICOR) 145 MG tablet Take 145 mg by mouth Daily.     • furosemide (LASIX) 40 MG tablet Take 1 tablet by mouth Daily. 30 tablet 0   • meclizine (ANTIVERT) 25 MG tablet Take 25 mg by mouth 3 (three) times a day as needed for dizziness.     • Multiple Vitamins-Minerals (MULTIVITAMIN ADULT PO) Take  by mouth.     • OXYGEN-HELIUM IN Inhale.     • spironolactone (ALDACTONE) 25 MG tablet Take 25 mg by mouth.         Codeine sulfate and Lisinopril    Family History   Problem Relation Age of Onset   • Arthritis Mother    • Heart disease Mother    • Hypertension Mother    • Thyroid disease Mother    • Arthritis Father    • Hypertension Father    • Anuerysm Father    • Stroke Father    • Asthma Brother    • Colon polyps Neg Hx    • Colon cancer Neg Hx        Social  History     Socioeconomic History   • Marital status:      Spouse name: Not on file   • Number of children: Not on file   • Years of education: Not on file   • Highest education level: Not on file   Tobacco Use   • Smoking status: Never Smoker   • Smokeless tobacco: Never Used   Vaping Use   • Vaping Use: Never used   Substance and Sexual Activity   • Alcohol use: No   • Drug use: No   • Sexual activity: Defer     Birth control/protection: Surgical       Review of Systems   Constitutional: Negative.    HENT: Negative.    Eyes: Negative.    Respiratory: Positive for shortness of breath.    Cardiovascular: Negative.    Gastrointestinal: Negative.    Endocrine: Negative.    Genitourinary: Negative.    Musculoskeletal: Negative.    Skin: Negative.    Allergic/Immunologic: Negative.    Neurological: Negative.    Hematological: Negative.    Psychiatric/Behavioral: Negative.        Vitals:    07/06/21 1200   BP: 158/93   Pulse: 96   Temp: 98 °F (36.7 °C)       Body mass index is 64.81 kg/m².    Objective     Physical Exam  CONSTITUTIONAL: Morbidly obese, well nourished, well-developed, alert, oriented, in no acute distress     COMMUNICATION AND VOICE: able to communicate normally, normal voice quality    HEAD: normocephalic, no lesions, atraumatic, no tenderness, no masses     FACE: appearance normal, no lesions, no tenderness, no deformities, facial motion symmetric    EYES: ocular motility normal, eyelids normal, orbits normal, no proptosis, conjunctiva normal , pupils equal, round     EARS:  Hearing: hearing to conversational voice intact bilaterally   External Ears: normal bilaterally, no lesions    NOSE:  External Nose: external nasal structure normal, no tenderness on palpation, no nasal discharge, no lesions, no evidence of trauma, nostrils patent     ORAL:  Lips: upper and lower lips without lesion     NECK:  Inspection and Palpation: neck appearance normal, no masses or tenderness  Fullness bilaterally in  the area of the thyroid is noted with no palpable mass.    CHEST/RESPIRATORY: normal respiratory effort     CARDIOVASCULAR: no cyanosis or edema     NEUROLOGICAL/PSYCHIATRIC: oriented to time, place and person, mood normal, affect appropriate, CN II-XII intact grossly    Assessment/Plan   Diagnoses and all orders for this visit:    1. Thyroid mass (Primary)    2. CHARLIE (obstructive sleep apnea) - non compliant with cpap    3. Morbid obesity due to excess calories (CMS/HCC)      * Surgery not found *  No orders of the defined types were placed in this encounter.    Return in about 3 months (around 10/6/2021).       Patient Instructions   Follow-up with Dr. Crews and pulmonary tomorrow    We will discuss with him following her appointment to determine if there is a ventilatory component to her respiratory difficulty    Options for thyroidectomy were discussed  Follow-up in 3 months

## 2021-07-02 ENCOUNTER — READMISSION MANAGEMENT (OUTPATIENT)
Dept: CALL CENTER | Facility: HOSPITAL | Age: 66
End: 2021-07-02

## 2021-07-02 NOTE — OUTREACH NOTE
Medical Week 4 Survey      Responses   [facility] patient discharged from?  [place]   Does the patient have one of the following disease processes/diagnoses(primary or secondary)?  Other   Week 4 attempt successful?  Yes   Call start time  1352   Call end time  1353   Discharge diagnosis  Hypoxia   Meds reviewed with patient/caregiver?  Yes   Is the patient having any side effects they believe may be caused by any medication additions or changes?  No   Is the patient taking all medications as directed (includes completed medication regime)?  N/A   Has the patient kept scheduled appointments due by today?  Yes   Is the patient still receiving Home Health Services?  N/A   Psychosocial issues?  No   What is the patient's perception of their health status since discharge?  Improving   Is the patient/caregiver able to teach back signs and symptoms related to disease process for when to call PCP?  Yes   Is the patient/caregiver able to teach back signs and symptoms related to disease process for when to call 911?  Yes   Is the patient/caregiver able to teach back the hierarchy of who to call/visit for symptoms/problems? PCP, Specialist, Home health nurse, Urgent Care, ED, 911  Yes   If the patient is a current smoker, are they able to teach back resources for cessation?  Not a smoker   Week 4 Call Completed?  Yes   Would the patient like one additional call?  No   Graduated  Yes   Is the patient interested in additional calls from an ambulatory ?  NOTE:  applies to high risk patients requiring additional follow-up.  No   Did the patient feel the follow up calls were helpful during their recovery period?  Yes   Was the number of calls appropriate?  Yes          Thi Saucedo LPN

## 2021-07-06 ENCOUNTER — OFFICE VISIT (OUTPATIENT)
Dept: OTOLARYNGOLOGY | Facility: CLINIC | Age: 66
End: 2021-07-06

## 2021-07-06 VITALS
HEART RATE: 96 BPM | DIASTOLIC BLOOD PRESSURE: 93 MMHG | TEMPERATURE: 98 F | HEIGHT: 61 IN | BODY MASS INDEX: 55.32 KG/M2 | WEIGHT: 293 LBS | SYSTOLIC BLOOD PRESSURE: 158 MMHG

## 2021-07-06 DIAGNOSIS — E07.9 THYROID MASS: Primary | ICD-10-CM

## 2021-07-06 DIAGNOSIS — E66.01 MORBID OBESITY DUE TO EXCESS CALORIES (HCC): ICD-10-CM

## 2021-07-06 DIAGNOSIS — G47.33 OSA (OBSTRUCTIVE SLEEP APNEA): ICD-10-CM

## 2021-07-06 PROCEDURE — 99213 OFFICE O/P EST LOW 20 MIN: CPT | Performed by: OTOLARYNGOLOGY

## 2021-07-06 RX ORDER — CELECOXIB 100 MG/1
100 CAPSULE ORAL
COMMUNITY
Start: 2021-03-23

## 2021-07-06 RX ORDER — AMLODIPINE BESYLATE 10 MG/1
TABLET ORAL
COMMUNITY
Start: 2021-03-23 | End: 2022-07-11

## 2021-07-06 RX ORDER — CITALOPRAM 20 MG/1
TABLET ORAL EVERY 24 HOURS
COMMUNITY
End: 2021-07-06

## 2021-07-06 RX ORDER — SPIRONOLACTONE 25 MG/1
25 TABLET ORAL
COMMUNITY

## 2021-07-06 NOTE — PATIENT INSTRUCTIONS
Follow-up with Dr. Crews and pulmonary tomorrow    We will discuss with him following her appointment to determine if there is a ventilatory component to her respiratory difficulty    Options for thyroidectomy were discussed  Follow-up in 3 months

## 2021-07-07 ENCOUNTER — OFFICE VISIT (OUTPATIENT)
Dept: PULMONOLOGY | Facility: CLINIC | Age: 66
End: 2021-07-07

## 2021-07-07 VITALS
WEIGHT: 293 LBS | BODY MASS INDEX: 55.32 KG/M2 | OXYGEN SATURATION: 96 % | HEIGHT: 61 IN | SYSTOLIC BLOOD PRESSURE: 146 MMHG | DIASTOLIC BLOOD PRESSURE: 74 MMHG | HEART RATE: 88 BPM

## 2021-07-07 DIAGNOSIS — R09.02 HYPOXEMIA REQUIRING SUPPLEMENTAL OXYGEN: ICD-10-CM

## 2021-07-07 DIAGNOSIS — R06.02 SOB (SHORTNESS OF BREATH): ICD-10-CM

## 2021-07-07 DIAGNOSIS — E66.01 MORBID OBESITY DUE TO EXCESS CALORIES (HCC): ICD-10-CM

## 2021-07-07 DIAGNOSIS — G47.33 OSA (OBSTRUCTIVE SLEEP APNEA): Primary | ICD-10-CM

## 2021-07-07 DIAGNOSIS — I27.20 PULMONARY HYPERTENSION (HCC): ICD-10-CM

## 2021-07-07 DIAGNOSIS — Z99.81 HYPOXEMIA REQUIRING SUPPLEMENTAL OXYGEN: ICD-10-CM

## 2021-07-07 DIAGNOSIS — J45.20 MILD INTERMITTENT ASTHMA WITHOUT COMPLICATION: ICD-10-CM

## 2021-07-07 DIAGNOSIS — J30.89 NON-SEASONAL ALLERGIC RHINITIS, UNSPECIFIED TRIGGER: ICD-10-CM

## 2021-07-07 PROCEDURE — 99215 OFFICE O/P EST HI 40 MIN: CPT | Performed by: INTERNAL MEDICINE

## 2021-07-07 RX ORDER — AZELASTINE HYDROCHLORIDE 137 UG/1
2 SPRAY, METERED NASAL 2 TIMES DAILY
Qty: 30 ML | Refills: 5 | Status: ON HOLD | OUTPATIENT
Start: 2021-07-07 | End: 2022-07-13

## 2021-07-07 RX ORDER — LORATADINE 10 MG/1
10 TABLET ORAL DAILY
Qty: 90 TABLET | Refills: 3 | Status: SHIPPED | OUTPATIENT
Start: 2021-07-07 | End: 2022-05-18 | Stop reason: SDUPTHER

## 2021-07-07 RX ORDER — ALBUTEROL SULFATE 90 UG/1
2 AEROSOL, METERED RESPIRATORY (INHALATION) EVERY 4 HOURS PRN
Qty: 6.7 G | Refills: 5 | Status: SHIPPED | OUTPATIENT
Start: 2021-07-07 | End: 2022-05-18 | Stop reason: SDUPTHER

## 2021-07-07 RX ORDER — FLUTICASONE PROPIONATE 50 MCG
2 SPRAY, SUSPENSION (ML) NASAL DAILY
Qty: 16 G | Refills: 5 | Status: SHIPPED | OUTPATIENT
Start: 2021-07-07 | End: 2022-05-18 | Stop reason: SDUPTHER

## 2021-07-07 NOTE — PROGRESS NOTES
RESPIRATORY DISEASE CLINIC OUTPATIENT PROGRESS NOTE    Patient: Pau Cabrera  : 1955  Age: 65 y.o.  Date of Service: 2021    REASON FOR CLINIC VISIT:  Chief Complaint   Patient presents with   • Hospital Follow Up Visit     WAS AT [facility], BLOOD O2 WAS LOW, WAS ADMITTED IN         Subjective: On home trilogy .  Still getting shortness of breath on exertion.    History of Present Illness:  Pau Cabrera is a 65 y.o. female who presents to the office today to be seen for    Diagnosis Plan   1. CHARLIE (obstructive sleep apnea) - non compliant with cpap     2. Morbid obesity due to excess calories (CMS/HCC)     3. Hypoxemia requiring supplemental oxygen     4. Non-seasonal allergic rhinitis, unspecified trigger     5. Pulmonary hypertension (CMS/HCC)     6. SOB (shortness of breath)     .  Other problems per record.  Patient is a very pleasant morbidly obese -American female but in the pulmonary clinic with her daughter-in-law for a follow-up visit after hospitalization.  She was seen during his hospitalization by Dr. Ras Bhardwaj local pulmonologist.    Patient has morbid obesity and obstructive sleep apnea.  She is on home trilogy.  She is having difficulty tolerating the trilogy.  He is working with the DME company for different fitting mask to make it work.  She is on 2 days of oxygen at rest.  She is a lifelong non-smoker.  She had a pulmonary function test done which showed severe obstructive dysfunction and probably has some restrictive dysfunction as well.  She lives at home with her .  She did not smoke but was exposed to passive smoking from her brothers and her  was a former smoker.  She is not on any regular inhalers or nebulizer.  She is requesting oxygen concentrator.    She did not get her Covid vaccination.  She had never worked up for sleep apnea.  During her last hospitalization she was diagnosed with obstructive sleep apnea and was unable in  syndrome and chronic respiratory failure with pulmonary hypertension.  She also has allergy symptoms with postnasal drip but not on any regular allergy medications.  She reported having cough and clear nasal drainage.    PFT done today:    Results for orders placed during the hospital encounter of 06/16/21    Full Pulmonary Function Test With Bronchodilator & ABG    Narrative  [facility] - [facility]    [address]  [place]  [place]  [address]  [phone]    Patient : Pau Cabrera  MRN : [id number]  CSN : [id number]  Pulmonologist : Saman Kwon MD  Date : 6/16/2021    ______________________________________________________________________    Interpretation :  1.  Spirometry is consistent with a severe obstructive ventilatory defect although the decrease in the patient's FEV1 is just into the severe range at 49% of predicted..  2.  There is significant improvement in the patient's spirometry postbronchodilator so that postbronchodilator spirometry is consistent with a moderate obstructive ventilatory defect.  3.  Lung volumes reveal a decrease in vital capacity second to obstruction.  In addition significant hyperinflation is present.  There is also a decrease in inspiratory capacity.  4.  There is a moderate diffusion impairment which when corrected for alveolar volume is normalized.      Saman Kwon MD         Bronchodilator therapy: None     Smoking Status:   Social History     Tobacco Use   Smoking Status Never Smoker   Smokeless Tobacco Never Used     Pulm Rehab: no  Sleep: yes    Support System: lives with their spouse    Code Status:   There are no questions and answers to display.        Review of Systems:  A complete review of systems is performed and all other systems were reviewed and negative as note above in the HPI.  Review of Systems   Constitutional: Positive for fatigue and unexpected weight gain.   HENT: Positive for congestion, nosebleeds, postnasal  drip and rhinorrhea.    Eyes: Negative.    Respiratory: Positive for cough, chest tightness and shortness of breath.    Cardiovascular: Positive for leg swelling. Negative for chest pain.   Gastrointestinal: Negative.    Endocrine: Negative.    Genitourinary: Negative.    Musculoskeletal: Positive for arthralgias and back pain.   Skin: Negative.    Allergic/Immunologic: Positive for environmental allergies.   Neurological: Negative.    Hematological: Negative.    Psychiatric/Behavioral: Negative.        CAT/ACT Score:  Not done today    Medications:  Outpatient Encounter Medications as of 7/7/2021   Medication Sig Dispense Refill   • allopurinol (ZYLOPRIM) 300 MG tablet      • amitriptyline (ELAVIL) 25 MG tablet Take 25 mg by mouth Every Night.     • azelastine (ASTELIN) 0.1 % nasal spray 2 sprays into each nostril 2 (Two) Times a Day. Use in each nostril as directed     • butalbital-acetaminophen-caffeine (FIORICET, ESGIC) -40 MG per tablet Take 1 tablet by mouth Every 4 (Four) Hours As Needed for Headache.     • carvedilol (COREG) 25 MG tablet TAKE 1 TABLET BY MOUTH TWICE A DAY 60 tablet 3   • celecoxib (CeleBREX) 100 MG capsule Take 100 mg by mouth.     • cholecalciferol (VITAMIN D3) 1000 UNITS tablet Take 2,000 Units by mouth Daily.     • Cholecalciferol 50 MCG (2000 UT) tablet TAKE 1 TABLET BY MOUTH DAILY     • DULoxetine (CYMBALTA) 30 MG capsule 60 mg.     • EPINEPHrine (ADRENALIN) 0.05 MG/ML syringe Inject 20 mL as directed.     • fenofibrate (TRICOR) 145 MG tablet Take 145 mg by mouth Daily.     • furosemide (LASIX) 40 MG tablet Take 1 tablet by mouth Daily. 30 tablet 0   • meclizine (ANTIVERT) 25 MG tablet Take 25 mg by mouth 3 (three) times a day as needed for dizziness.     • Multiple Vitamins-Minerals (MULTIVITAMIN ADULT PO) Take  by mouth.     • OXYGEN-HELIUM IN Inhale.     • amLODIPine (NORVASC) 10 MG tablet TAKE ONE TABLET BY MOUTH DAILY     • spironolactone (ALDACTONE) 25 MG tablet Take 25 mg  "by mouth.       No facility-administered encounter medications on file as of 7/7/2021.       Allergies:  Allergies   Allergen Reactions   • Codeine Sulfate Hives   • Lisinopril Swelling and Rash     Facial swelling       Immunizations:  Immunization History   Administered Date(s) Administered   • COVID-19 (PFIZER) 03/09/2021, 03/30/2021   • Pneumococcal Conjugate 13-Valent (PCV13) 11/02/2018   • Pneumococcal Polysaccharide (PPSV23) 11/30/2020   • Tdap 10/11/2011       Objective:    Vitals:  /74   Pulse 88   Ht 154.9 cm (61\")   Wt (!) 153 kg (338 lb)   SpO2 96% Comment: 2 L  BMI 63.86 kg/m²     Physical Exam:  General: Patient is a 65 y.o. morbidly obese  female. Looks stated age. Appears to be in no acute distress.  Eyes: EOMI. PERRLA. Vision intact. No scleral icterus.  Ear, Nose, Mouth and Throat: Hearing is grossly intact. No Leukoplakia, pharyngitis, stomatitis or thrush. Swollen nasal mucosa with post nasal drop.  Neck: Range of motion of neck normal. No thyromegaly or masses. Mallampati Class 4, no jvd  Respiratory: Clear to auscultation bilaterally. No use of accessory muscles. Decreased breath sounds.  Cardiovascular: Normal heart sounds. Regularly regular rhythm without murmur.  Gastrointestinal: Non tender, non distended, soft. Bowel sounds positive in all four quadrants. No organomegaly.  Skin: No obvious rashes, lesions, ulcers or large amount of bruising. No edema.   Neurological: No new motor deficits. Cranial nerves appear intact.  Psychiatric: Patient is alert and oriented to person, place and time.    Chest Imaging:   CT angiography done in June 2021 showed    IMPRESSION:     1.  Limited exam secondary to large body habitus and respiratory motion.  2.  No central or lobar pulmonary embolus. The more distal pulmonary  vasculature is not well evaluated.  3.  Main pulmonary artery is dilated measuring 3.6 cm diameter,  suggesting pulmonary hypertension. No evidence of RIGHT " heart strain.  4.  A few areas of bilateral atelectasis are present.  5.  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 Recommend thyroid ultrasound.    This report was finalized on 06/04/2021 15:34 by Dr. Angel Talamantes MD.    Assessment:  1. CHARLIE (obstructive sleep apnea) - non compliant with cpap    2. Morbid obesity due to excess calories (CMS/[facility])    3. Hypoxemia requiring supplemental oxygen    4. Non-seasonal allergic rhinitis, unspecified trigger    5. Pulmonary hypertension (CMS/HCC)    6. SOB (shortness of breath)        Plan/Recommendations:    1.  As patient shows significant obstruction in the pulmonary function test I started her on Wixela and albuterol rescue inhaler and prescription to the pharmacy.  Showed her the CT scan of the chest and PFT and explained the results to her.  2.  She has significant pulmonary hypertension and obesity with obesity hypertension syndrome and obstructive sleep apnea although she did not have any official sleep study done she already did quite well for trilogy at this time to work with home trilogy but has some compliance issues I told her to continue using the trilogy for underlying sleep-related problems and pulmonary hypertension.  3.  For hypoxemia: Respiratory she should continue oxygen 2 L all the time and as requested we will order a oxygen concentrator for portable oxygen at home.  Request will be sent to the medical company.  4.  Patient will continue allergy medications with Flonase Claritin and Astelin.  She is already present for COVID-19.  Flu continue other medications as before and follow-up with a primary care provider and return to the pulmonary clinic in 3 months time for a follow-up visit evaluate if needed.    Follow up:  3 Months    Time Spent:  40 minutes    I appreciate the opportunity of participating in this patient's care. I would like to thank the PCP for the referral.  Please feel free to contact me  with any other questions.    Peter Crews MD   Pulmonologist/Intensivist     Electronically signed by: Peter Crews MD, 7/7/2021 15:22 CDT

## 2021-07-08 ENCOUNTER — TELEPHONE (OUTPATIENT)
Dept: INTERNAL MEDICINE | Age: 66
End: 2021-07-08

## 2021-07-09 NOTE — TELEPHONE ENCOUNTER
Spoke with [facility] and their weight limit is 450. Pt weighs 343. Scheduled LMP 7/14/21 at 10:45. Pt notified.

## 2021-07-21 ENCOUNTER — PATIENT ROUNDING (BHMG ONLY) (OUTPATIENT)
Dept: PULMONOLOGY | Facility: CLINIC | Age: 66
End: 2021-07-21

## 2021-07-21 NOTE — PROGRESS NOTES
July 21, 2021    Hello, may I speak with Pau Pao Cabrera?    My name is Leny Richardson      I am  with [facility] PULMONARY & CRITICAL CARE MEDICINE  [facility] [facility] [address] [phone].    Before we get started may I verify your date of birth? 1955    I am calling to officially welcome you to our practice and ask about your recent visit. Is this a good time to talk? yes    Tell me about your visit with us. What things went well?  Everything.  Patient was treated nice.       We're always looking for ways to make our patients' experiences even better. Do you have recommendations on ways we may improve?  no    Overall were you satisfied with your first visit to our practice? yes       I appreciate you taking the time to speak with me today. Is there anything else I can do for you? no      Thank you, and have a great day.

## 2021-08-02 RX ORDER — CELECOXIB 100 MG/1
100 CAPSULE ORAL 2 TIMES DAILY
Qty: 180 CAPSULE | Refills: 0 | Status: SHIPPED | OUTPATIENT
Start: 2021-08-02 | End: 2021-11-10

## 2021-09-08 ENCOUNTER — TELEPHONE (OUTPATIENT)
Dept: INTERNAL MEDICINE | Age: 66
End: 2021-09-08

## 2021-09-08 NOTE — TELEPHONE ENCOUNTER
----- Message from Huseyin Duran sent at 8/30/2021 12:23 PM CDT -----  Subject: Message to Provider    QUESTIONS  Information for Provider? patient wants to make her appt for 9/2 at 130 a   telephone visit since  is really sick  ---------------------------------------------------------------------------  --------------  CALL BACK INFO  What is the best way for the office to contact you? OK to leave message on   voicemail  Preferred Call Back Phone Number? [phone]  ---------------------------------------------------------------------------  --------------  SCRIPT ANSWERS  Relationship to Patient?  Self

## 2021-09-23 RX ORDER — CHOLECALCIFEROL (VITAMIN D3) 125 MCG
CAPSULE ORAL
Qty: 90 TABLET | Refills: 3 | Status: SHIPPED | OUTPATIENT
Start: 2021-09-23 | End: 2021-10-12 | Stop reason: SDUPTHER

## 2021-09-27 ENCOUNTER — TELEPHONE (OUTPATIENT)
Dept: INTERNAL MEDICINE | Age: 66
End: 2021-09-27

## 2021-09-27 NOTE — LETTER
[facility]  [address] [facility] [place] [address] [id number]  Phone: [phone]  Fax: [phone]    Ghada Freeman MD        September 28, 2021    [address]  [address]      To Whom It May Concern:    Ms. Tiffanie Lion should be considered for a ground floor apartment due to multiple medical conditions including: CHF, HTN, gout, fibromyalgia, and chronic back pain. If you have any questions or concerns, please don't hesitate to call.     Sincerely,        Ghada Freeman MD   sh

## 2021-10-12 ENCOUNTER — VIRTUAL VISIT (OUTPATIENT)
Dept: INTERNAL MEDICINE | Age: 66
End: 2021-10-12
Payer: MEDICARE

## 2021-10-12 DIAGNOSIS — G43.809 OTHER MIGRAINE WITHOUT STATUS MIGRAINOSUS, NOT INTRACTABLE: ICD-10-CM

## 2021-10-12 DIAGNOSIS — M19.90 OSTEOARTHRITIS, UNSPECIFIED OSTEOARTHRITIS TYPE, UNSPECIFIED SITE: ICD-10-CM

## 2021-10-12 DIAGNOSIS — I10 PRIMARY HYPERTENSION: Primary | ICD-10-CM

## 2021-10-12 DIAGNOSIS — M10.9 GOUT, UNSPECIFIED CAUSE, UNSPECIFIED CHRONICITY, UNSPECIFIED SITE: ICD-10-CM

## 2021-10-12 DIAGNOSIS — F32.89 OTHER DEPRESSION: ICD-10-CM

## 2021-10-12 DIAGNOSIS — J44.9 CHRONIC OBSTRUCTIVE PULMONARY DISEASE, UNSPECIFIED COPD TYPE (HCC): ICD-10-CM

## 2021-10-12 PROBLEM — Z99.81 HYPOXEMIA REQUIRING SUPPLEMENTAL OXYGEN: Status: ACTIVE | Noted: 2021-06-04

## 2021-10-12 PROBLEM — R09.02 HYPOXEMIA REQUIRING SUPPLEMENTAL OXYGEN: Status: ACTIVE | Noted: 2021-06-04

## 2021-10-12 PROBLEM — I27.20 PULMONARY HYPERTENSION (HCC): Status: ACTIVE | Noted: 2021-07-07

## 2021-10-12 PROBLEM — J45.20 MILD INTERMITTENT ASTHMA WITHOUT COMPLICATION: Status: ACTIVE | Noted: 2021-07-07

## 2021-10-12 PROBLEM — J30.89 NON-SEASONAL ALLERGIC RHINITIS: Status: ACTIVE | Noted: 2021-07-07

## 2021-10-12 PROBLEM — R06.02 SOB (SHORTNESS OF BREATH): Status: ACTIVE | Noted: 2021-07-07

## 2021-10-12 PROCEDURE — G8417 CALC BMI ABV UP PARAM F/U: HCPCS | Performed by: INTERNAL MEDICINE

## 2021-10-12 PROCEDURE — 3017F COLORECTAL CA SCREEN DOC REV: CPT | Performed by: INTERNAL MEDICINE

## 2021-10-12 PROCEDURE — G8399 PT W/DXA RESULTS DOCUMENT: HCPCS | Performed by: INTERNAL MEDICINE

## 2021-10-12 PROCEDURE — 99214 OFFICE O/P EST MOD 30 MIN: CPT | Performed by: INTERNAL MEDICINE

## 2021-10-12 PROCEDURE — 1090F PRES/ABSN URINE INCON ASSESS: CPT | Performed by: INTERNAL MEDICINE

## 2021-10-12 PROCEDURE — 1123F ACP DISCUSS/DSCN MKR DOCD: CPT | Performed by: INTERNAL MEDICINE

## 2021-10-12 PROCEDURE — 3023F SPIROM DOC REV: CPT | Performed by: INTERNAL MEDICINE

## 2021-10-12 PROCEDURE — G8484 FLU IMMUNIZE NO ADMIN: HCPCS | Performed by: INTERNAL MEDICINE

## 2021-10-12 PROCEDURE — 1036F TOBACCO NON-USER: CPT | Performed by: INTERNAL MEDICINE

## 2021-10-12 PROCEDURE — G8427 DOCREV CUR MEDS BY ELIG CLIN: HCPCS | Performed by: INTERNAL MEDICINE

## 2021-10-12 PROCEDURE — 4040F PNEUMOC VAC/ADMIN/RCVD: CPT | Performed by: INTERNAL MEDICINE

## 2021-10-12 PROCEDURE — G8926 SPIRO NO PERF OR DOC: HCPCS | Performed by: INTERNAL MEDICINE

## 2021-10-12 RX ORDER — HYDRALAZINE HYDROCHLORIDE 10 MG/1
10 TABLET, FILM COATED ORAL 3 TIMES DAILY
Qty: 30 TABLET | Refills: 5 | Status: SHIPPED | OUTPATIENT
Start: 2021-10-12 | End: 2021-10-27

## 2021-10-12 RX ORDER — ALBUTEROL SULFATE 90 UG/1
AEROSOL, METERED RESPIRATORY (INHALATION)
COMMUNITY
Start: 2021-08-09

## 2021-10-12 RX ORDER — FLUTICASONE PROPIONATE 50 MCG
SPRAY, SUSPENSION (ML) NASAL
COMMUNITY
Start: 2021-08-05

## 2021-10-12 ASSESSMENT — ENCOUNTER SYMPTOMS
EYE REDNESS: 0
SINUS PRESSURE: 0
VOICE CHANGE: 0
WHEEZING: 0
PHOTOPHOBIA: 0
ABDOMINAL DISTENTION: 0
NAUSEA: 0
CONSTIPATION: 0
SHORTNESS OF BREATH: 1
TROUBLE SWALLOWING: 0
COUGH: 0
ABDOMINAL PAIN: 0
BLOOD IN STOOL: 0
DIARRHEA: 0
COLOR CHANGE: 0
EYE ITCHING: 0
SORE THROAT: 0
EYE PAIN: 0
CHEST TIGHTNESS: 0
BACK PAIN: 1
EYE DISCHARGE: 0

## 2021-10-12 NOTE — PROGRESS NOTES
10/12/2021    TELEHEALTH EVALUATION -- Audio/Visual (During BKMKO-17 public health emergency)    HPI:    Mando Martinez (:  1955) has requested an audio/video evaluation for the following concern(s):    Ms. Minda Li is a 77-year-old woman who presents to the office via doxy. me for follow-up of COPD. She still on O2 at home. However, she states that her breathing is better. She states that she does get short of breath if she gets out too much. She also has a history of arthritis. She states her arthritis is relatively stable. However, cold fronts and weather changes do exacerbate her symptoms. She states that this morning, her ankles feel weak. However, she has not yet been out of bed. She has not had any flares of her gout. Her blood pressure tends to fluctuate. She states that she try to call her office after her   on  about elevated blood pressure readings. She never received a call back. She states that yesterday it was running about 140/87. She states that she only takes her Lasix every other day because it causes cramps otherwise. She did not get her lab work done as of yet. Her migraines are better. Her depression is stable. Review of Systems   Constitutional: Negative for activity change, appetite change, chills, diaphoresis, fatigue, fever and unexpected weight change. HENT: Negative for congestion, ear discharge, postnasal drip, sinus pressure, sneezing, sore throat, tinnitus, trouble swallowing and voice change. Loss of taste and smell following COVID-19 infection. Eyes: Negative for photophobia, pain, discharge, redness, itching and visual disturbance. Respiratory: Positive for shortness of breath. Negative for cough, chest tightness and wheezing. Some mild shortness of breath following a COVID-19 infection, but has overall improved since last OV   Cardiovascular: Negative for chest pain, palpitations and leg swelling. Gastrointestinal: Negative for abdominal distention, abdominal pain, blood in stool, constipation, diarrhea and nausea. Endocrine: Negative for cold intolerance, heat intolerance, polydipsia, polyphagia and polyuria. Genitourinary: Negative for difficulty urinating, dysuria, flank pain, frequency, hematuria and urgency. Musculoskeletal: Positive for arthralgias and back pain. Negative for gait problem, joint swelling, myalgias, neck pain and neck stiffness. She does complain of neck pain and hip pain. She has difficulty standing after long periods of time. Skin: Negative for color change, pallor, rash and wound. Allergic/Immunologic: Negative for environmental allergies, food allergies and immunocompromised state. Neurological: Negative for dizziness, tremors, seizures, syncope, facial asymmetry, speech difficulty, weakness, light-headedness, numbness and headaches. Hematological: Negative for adenopathy. Does not bruise/bleed easily. Psychiatric/Behavioral: Negative for agitation, confusion, decreased concentration, dysphoric mood, hallucinations, self-injury, sleep disturbance and suicidal ideas. The patient is not nervous/anxious and is not hyperactive. Prior to Visit Medications    Medication Sig Taking? Authorizing Provider   Cholecalciferol 50 MCG (2000 UT) TABS TAKE 1 TABLET BY MOUTH DAILY Yes Historical Provider, MD   albuterol sulfate  (90 Base) MCG/ACT inhaler INHALE 2 PUFFS EVERY 4 (FOUR) HOURS AS NEEDED FOR WHEEZING. Yes Historical Provider, MD   fluticasone (FLONASE) 50 MCG/ACT nasal spray USE 2 SPRAYS IN EACH NOSTRIL ONCE DAILY Yes Historical Provider, MD   fluticasone-salmeterol (ADVAIR) 250-50 MCG/DOSE AEPB INHALE 1 PUFF 2 (TWO) TIMES A DAY.  Yes Historical Provider, MD   hydrALAZINE (APRESOLINE) 10 MG tablet Take 1 tablet by mouth 3 times daily Yes Jeremías Miranda MD   celecoxib (CELEBREX) 100 MG capsule TAKE 1 CAPSULE BY MOUTH 2 TIMES DAILY Yes Tere MALAGON Fermin Madera MD   OXYGEN Inhale into the lungs Yes Historical Provider, MD   allopurinol (ZYLOPRIM) 300 MG tablet TAKE 1 TABLET BY MOUTH DAILY Yes Allison Rivera MD   DULoxetine (CYMBALTA) 60 MG extended release capsule TAKE 1 CAPSULE BY MOUTH ONCE DAILY IN THE MORNING Yes Allison Rivera MD   amLODIPine (NORVASC) 10 MG tablet TAKE ONE TABLET BY MOUTH DAILY Yes Allison Rivera MD   fenofibrate (TRICOR) 145 MG tablet TAKE 1 TABLET BY MOUTH DAILY (FOR TRIGLYCERIDES/CHOLESTEROL) Yes Allison Rivera MD   amitriptyline (ELAVIL) 25 MG tablet TAKE ONE TABLET BY MOUTH NIGHTLY FOR NERVE DISEASE Yes Allison Rivera MD   carvedilol (COREG) 25 MG tablet TAKE 1 TABLET BY MOUTH 2 TIMES DAILY Yes Allison Rivera MD   butalbital-acetaminophen-caffeine (FIORICET, ESGIC) -40 MG per tablet TAKE ONE TABLET EVERY 4 HOURS AS NEEDED FOR HEADACHES Yes Allison Rivera MD   furosemide (LASIX) 40 MG tablet Take 1.5 tablets by mouth daily  Patient taking differently: Take 40 mg by mouth daily  Yes Allison Rivera MD   meclizine (ANTIVERT) 25 MG tablet Take 1 tablet by mouth 3 times daily as needed for Dizziness Yes Allison Rivera MD   azelastine (ASTELIN) 0.1 % nasal spray 2 sprays by Nasal route 2 times daily Use in each nostril as directed Yes Allison Rivera MD   Multiple Vitamins-Minerals (MULTIVITAMIN ADULT PO) Take by mouth Yes Historical Provider, MD   EPINEPHrine (EPIPEN 2-SHAUN) 0.3 MG/0.3ML SOAJ injection Inject 0.3 mLs into the muscle once for 1 dose Use as directed for allergic reaction Yes Allison Rivera MD   spironolactone (ALDACTONE) 25 MG tablet Take 25 mg by mouth daily  Patient not taking: Reported on 10/12/2021  Historical Provider, MD       Social History     Tobacco Use    Smoking status: Never Smoker    Smokeless tobacco: Never Used   Vaping Use    Vaping Use: Never used   Substance Use Topics    Alcohol use: No    Drug use: No        Allergies   Allergen Reactions    Codeine Hives    Lisinopril Hives   ,   Past Medical History:   Diagnosis Date    Abnormal CT of spine     Acute sinusitis     Arthritis     Asthma     Bronchitis     Carpal tunnel syndrome     both    CHF (congestive heart failure) (HCC)     Depression     Fibromyalgia     Furuncle     Gout     Hyperlipidemia     Hypertension     Low vitamin D level     Lumbago     Lumbar radiculopathy     Neuropathy     feet    Obesity     Rheumatoid nodule of knee (HCC)     Sleep apnea     UTI (urinary tract infection)     Vertigo     Vitamin D deficiency    ,   Past Surgical History:   Procedure Laterality Date     SECTION      times two    HYSTERECTOMY     ,   Social History     Tobacco Use    Smoking status: Never Smoker    Smokeless tobacco: Never Used   Vaping Use    Vaping Use: Never used   Substance Use Topics    Alcohol use: No    Drug use: No   ,   Family History   Problem Relation Age of Onset    Heart Disease Mother     Hypertension Mother     Stroke Father     Hypertension Father     Hypertension Other     Heart Disease Other     Cancer Other    ,   Immunization History   Administered Date(s) Administered    COVID-19, Pfizer, PF, 30mcg/0.3mL 2021, 2021    Pneumococcal Conjugate 13-valent ([id number]) 2018    Pneumococcal Polysaccharide ([id number]) 2020    Tdap (Boostrix, Adacel) 10/11/2011   ,   Health Maintenance   Topic Date Due    DTaP/Tdap/Td vaccine (2 - Td or Tdap) 10/11/2021    Shingles Vaccine (1 of 2) 2022 (Originally 2005)    Flu vaccine (1) 10/12/2022 (Originally 2021)    Annual Wellness Visit (AWV)  2022    Potassium monitoring  06/10/2022    Creatinine monitoring  06/10/2022    Breast cancer screen  2023    Colon cancer screen colonoscopy  2025    Lipid screen  2026    DEXA (modify frequency per FRAX score)  Completed    Pneumococcal 65+ years Vaccine  Completed    COVID-19 Vaccine  Completed    Hepatitis C screen  Completed    Hepatitis A vaccine  Aged Out    Hepatitis B vaccine  Aged Out    Hib vaccine  Aged Out    Meningococcal (ACWY) vaccine  Aged Out       PHYSICAL EXAMINATION:  [ INSTRUCTIONS:  \"[x]\" Indicates a positive item  \"[]\" Indicates a negative item  -- DELETE ALL ITEMS NOT EXAMINED]  Vital Signs: (As obtained by patient/caregiver or practitioner observation)    Blood pressure-  Heart rate-    Respiratory rate-    Temperature-  Pulse oximetry-     Constitutional: [x] Appears well-developed and well-nourished [x] No apparent distress      [] Abnormal-   Mental status  [x] Alert and awake  [x] Oriented to person/place/time [x]Able to follow commands      Eyes:  EOM    [x]  Normal  [] Abnormal-  Sclera  [x]  Normal  [] Abnormal -         Discharge [x]  None visible  [] Abnormal -    HENT:   [x] Normocephalic, atraumatic. [] Abnormal   [x] Mouth/Throat: Mucous membranes are moist.     External Ears [x] Normal  [] Abnormal-     Neck: [x] No visualized mass     Pulmonary/Chest: [x] Respiratory effort normal.  [x] No visualized signs of difficulty breathing or respiratory distress        [] Abnormal-      Musculoskeletal:   [x] Normal gait with no signs of ataxia         [x] Normal range of motion of neck        [] Abnormal-       Neurological:        [x] No Facial Asymmetry (Cranial nerve 7 motor function) (limited exam to video visit)          [x] No gaze palsy        [] Abnormal-         Skin:        [x] No significant exanthematous lesions or discoloration noted on facial skin         [] Abnormal-            Psychiatric:       [x] Normal Affect [x] No Hallucinations        [] Abnormal-     Other pertinent observable physical exam findings-     ASSESSMENT/PLAN:     79-year-old woman here for follow-up    1. COPD: Relatively stable at this time. Continue medications as prescribed    2. Arthritis: Continue Celebrex    3. Gout: No flares    4.  Hypertension: Hydralazine 10 mg p.o. daily to the medication regimen    5. Migraines: Much improved. Continue medications as needed. She takes Fioricet as needed    6  Depression: Stable      Return in about 5 months (around 3/12/2022), or medicare wellness. Cassi Cervantes, was evaluated through a synchronous (real-time) audio-video encounter. The patient (or guardian if applicable) is aware that this is a billable service. Verbal consent to proceed has been obtained within the past 12 months. The visit was conducted pursuant to the emergency declaration under the [address] [place] [address], [address] [place] [address] authority and the [employer] General Act. Patient identification was verified, and a caregiver was present when appropriate. The patient was located in a state where the provider was credentialed to provide care. Total time spent on this encounter: Not billed by time    --Kishan Martini MD on 10/12/2021 at 11:12 AM    An electronic signature was used to authenticate this note.

## 2021-10-27 ENCOUNTER — OFFICE VISIT (OUTPATIENT)
Dept: INTERNAL MEDICINE | Age: 66
End: 2021-10-27
Payer: MEDICARE

## 2021-10-27 VITALS — HEART RATE: 86 BPM | DIASTOLIC BLOOD PRESSURE: 80 MMHG | OXYGEN SATURATION: 95 % | SYSTOLIC BLOOD PRESSURE: 170 MMHG

## 2021-10-27 DIAGNOSIS — N32.81 OAB (OVERACTIVE BLADDER): ICD-10-CM

## 2021-10-27 DIAGNOSIS — R09.02 HYPOXIA: ICD-10-CM

## 2021-10-27 DIAGNOSIS — I10 HYPERTENSION, UNSPECIFIED TYPE: ICD-10-CM

## 2021-10-27 PROCEDURE — 1123F ACP DISCUSS/DSCN MKR DOCD: CPT | Performed by: NURSE PRACTITIONER

## 2021-10-27 PROCEDURE — 1036F TOBACCO NON-USER: CPT | Performed by: NURSE PRACTITIONER

## 2021-10-27 PROCEDURE — G8427 DOCREV CUR MEDS BY ELIG CLIN: HCPCS | Performed by: NURSE PRACTITIONER

## 2021-10-27 PROCEDURE — 4040F PNEUMOC VAC/ADMIN/RCVD: CPT | Performed by: NURSE PRACTITIONER

## 2021-10-27 PROCEDURE — 99214 OFFICE O/P EST MOD 30 MIN: CPT | Performed by: NURSE PRACTITIONER

## 2021-10-27 PROCEDURE — G8417 CALC BMI ABV UP PARAM F/U: HCPCS | Performed by: NURSE PRACTITIONER

## 2021-10-27 PROCEDURE — G8399 PT W/DXA RESULTS DOCUMENT: HCPCS | Performed by: NURSE PRACTITIONER

## 2021-10-27 PROCEDURE — G8484 FLU IMMUNIZE NO ADMIN: HCPCS | Performed by: NURSE PRACTITIONER

## 2021-10-27 PROCEDURE — 3017F COLORECTAL CA SCREEN DOC REV: CPT | Performed by: NURSE PRACTITIONER

## 2021-10-27 PROCEDURE — 1090F PRES/ABSN URINE INCON ASSESS: CPT | Performed by: NURSE PRACTITIONER

## 2021-10-27 RX ORDER — HYDRALAZINE HYDROCHLORIDE 25 MG/1
25 TABLET, FILM COATED ORAL 4 TIMES DAILY
Qty: 90 TABLET | Refills: 1 | Status: SHIPPED | OUTPATIENT
Start: 2021-10-27 | End: 2022-01-04 | Stop reason: SDUPTHER

## 2021-10-27 ASSESSMENT — ENCOUNTER SYMPTOMS
VOMITING: 0
CHOKING: 0
TROUBLE SWALLOWING: 0
BLOOD IN STOOL: 0
CONSTIPATION: 0
WHEEZING: 0
EYE DISCHARGE: 0
STRIDOR: 0
ABDOMINAL PAIN: 0
NAUSEA: 0
EYE ITCHING: 0
COLOR CHANGE: 0
DIARRHEA: 0
SHORTNESS OF BREATH: 0
ABDOMINAL DISTENTION: 0
COUGH: 0
SORE THROAT: 0

## 2021-10-27 NOTE — PROGRESS NOTES
[facility] PHYSICIAN SERVICES  [facility] INTERNAL MEDICINE  [address] [address] [place] [address]  Dept: [phone]  Dept Fax: [phone]: [phone]    Nadya Monge (:  1955) is a 77 y.o. female,Established patient, here for evaluation of the following chief complaint(s): Hypertension      Nadya Monge is a 77 y.o. female who presents today for her medical conditions/complaints as noted below. Nadya Monge is c/shan Hypertension        HPI:     Chief Complaint   Patient presents with    Hypertension     HPI   1. Hypertension  148/72 ; she has had some lethargy as well; She has had this 2 week had video with Dr Jin Giraldo   She was started on hydraliaine 10 TID:    This has not helped at all   Routine meds   Amlodipine 10 mg daily   Coreg 25 BID  She takes lasix 40  1/2 pill every other day because she it makes her urinate and she cant stay out of the bathroom         2 Hypoixa; With COPD Dr Ulysses Hickory   She was started on  Night time oxygen then prn daytime; She did not tolerate trilogy or Cpap     #3 OAB she had issues with the multiple trips to the bathroom before she even started on the increased dose of Lasix.   She has never taken any medications for this  Past Medical History:   Diagnosis Date    Abnormal CT of spine     Acute sinusitis     Arthritis     Asthma     Bronchitis     Carpal tunnel syndrome     both    CHF (congestive heart failure) (HCC)     Depression     Fibromyalgia     Furuncle     Gout     Hyperlipidemia     Hypertension     Low vitamin D level     Lumbago     Lumbar radiculopathy     Neuropathy     feet    Obesity     Rheumatoid nodule of knee (HCC)     Sleep apnea     UTI (urinary tract infection)     Vertigo     Vitamin D deficiency       Past Surgical History:   Procedure Laterality Date     SECTION      times two    HYSTERECTOMY         Vitals 10/27/2021 10/27/2021 6/10/2021 2021 2021 3/1/2021 SYSTOLIC [phone] - [phone] -   DIASTOLIC 80 90 82 - 72 -   Site - - - - Left Upper Arm -   Position - - - - Sitting -   Cuff Size - - - - Large Adult -   Pulse - 86 108 85 85 -   Temp - - - - - -   Resp - - - - 18 -   SpO2 - 95 92 94 66 93   Weight - - 343 lb 14.4 oz - 351 lb -   Height - - 5' 1.5\" - 5' 1.5\" -   Body mass index - - 63.92 kg/m2 - 65.24 kg/m2 -   Some recent data might be hidden       Family History   Problem Relation Age of Onset    Heart Disease Mother     Hypertension Mother     Stroke Father     Hypertension Father     Hypertension Other     Heart Disease Other     Cancer Other        Social History     Tobacco Use    Smoking status: Never Smoker    Smokeless tobacco: Never Used   Substance Use Topics    Alcohol use: No      Current Outpatient Medications   Medication Sig Dispense Refill    hydrALAZINE (APRESOLINE) 25 MG tablet Take 1 tablet by mouth 4 times daily 90 tablet 1    butalbital-acetaminophen-caffeine (FIORICET, ESGIC) -40 MG per tablet TAKE ONE TABLET EVERY 4 HOURS AS NEEDED FOR HEADACHES 10 tablet 3    Cholecalciferol 50 MCG (2000 UT) TABS TAKE 1 TABLET BY MOUTH DAILY      albuterol sulfate  (90 Base) MCG/ACT inhaler INHALE 2 PUFFS EVERY 4 (FOUR) HOURS AS NEEDED FOR WHEEZING.  fluticasone (FLONASE) 50 MCG/ACT nasal spray USE 2 SPRAYS IN EACH NOSTRIL ONCE DAILY      fluticasone-salmeterol (ADVAIR) 250-50 MCG/DOSE AEPB INHALE 1 PUFF 2 (TWO) TIMES A DAY.       celecoxib (CELEBREX) 100 MG capsule TAKE 1 CAPSULE BY MOUTH 2 TIMES DAILY 180 capsule 0    spironolactone (ALDACTONE) 25 MG tablet Take 25 mg by mouth daily (Patient not taking: Reported on 10/12/2021)      OXYGEN Inhale into the lungs      allopurinol (ZYLOPRIM) 300 MG tablet TAKE 1 TABLET BY MOUTH DAILY 30 tablet 5    DULoxetine (CYMBALTA) 60 MG extended release capsule TAKE 1 CAPSULE BY MOUTH ONCE DAILY IN THE MORNING 90 capsule 3    amLODIPine (NORVASC) 10 MG tablet TAKE ONE TABLET BY MOUTH DAILY 90 tablet 3    fenofibrate (TRICOR) 145 MG tablet TAKE 1 TABLET BY MOUTH DAILY (FOR TRIGLYCERIDES/CHOLESTEROL) 90 tablet 3    amitriptyline (ELAVIL) 25 MG tablet TAKE ONE TABLET BY MOUTH NIGHTLY FOR NERVE DISEASE 90 tablet 3    carvedilol (COREG) 25 MG tablet TAKE 1 TABLET BY MOUTH 2 TIMES DAILY 180 tablet 3    furosemide (LASIX) 40 MG tablet Take 1.5 tablets by mouth daily (Patient taking differently: Take 40 mg by mouth daily ) 135 tablet 3    meclizine (ANTIVERT) 25 MG tablet Take 1 tablet by mouth 3 times daily as needed for Dizziness 90 tablet 5    azelastine (ASTELIN) 0.1 % nasal spray 2 sprays by Nasal route 2 times daily Use in each nostril as directed 1 Bottle 3    Multiple Vitamins-Minerals (MULTIVITAMIN ADULT PO) Take by mouth      EPINEPHrine (EPIPEN 2-SHAUN) 0.3 MG/0.3ML SOAJ injection Inject 0.3 mLs into the muscle once for 1 dose Use as directed for allergic reaction 0.3 mL 1     No current facility-administered medications for this visit.      Allergies   Allergen Reactions    Codeine Hives    Lisinopril Hives       Health Maintenance   Topic Date Due    COVID-19 Vaccine (3 - Pfizer booster) 09/30/2021    DTaP/Tdap/Td vaccine (2 - Td or Tdap) 10/11/2021    Shingles Vaccine (1 of 2) 06/04/2022 (Originally 8/12/2005)    Flu vaccine (1) 10/12/2022 (Originally 9/1/2021)    Annual Wellness Visit (AWV)  03/02/2022    Potassium monitoring  06/10/2022    Creatinine monitoring  06/10/2022    Breast cancer screen  06/11/2023    Colon cancer screen colonoscopy  11/12/2025    Lipid screen  03/01/2026    DEXA (modify frequency per FRAX score)  Completed    Pneumococcal 65+ years Vaccine  Completed    Hepatitis C screen  Completed    Hepatitis A vaccine  Aged Out    Hepatitis B vaccine  Aged Out    Hib vaccine  Aged Out    Meningococcal (ACWY) vaccine  Aged Out       Lab Results   Component Value Date    LABA1C 5.6 07/07/2017     No results found for: PSA, PSADIA  TSH   Date Value Ref Range Status   03/01/2021 1.940 0.270 - 4.200 uIU/mL Final   ]  Lab Results   Component Value Date     06/10/2021    K 4.3 06/10/2021     06/10/2021    CO2 32 (H) 06/10/2021    BUN 22 06/10/2021    CREATININE 1.1 (H) 06/10/2021    GLUCOSE 86 06/10/2021    CALCIUM 10.5 (H) 06/10/2021    PROT 7.7 06/10/2021    LABALBU 4.6 06/10/2021    BILITOT 0.4 06/10/2021    ALKPHOS 62 06/10/2021    AST 19 06/10/2021    ALT 11 06/10/2021    LABGLOM 50 (A) 06/10/2021    GFRAA >59 06/10/2021     Lab Results   Component Value Date    CHOL 215 (H) 03/01/2021    CHOL 192 11/30/2020    CHOL 182 08/02/2019     Lab Results   Component Value Date    TRIG 80 03/01/2021    TRIG 61 11/30/2020    TRIG 43 08/02/2019     Lab Results   Component Value Date    HDL 56 (L) 03/01/2021    HDL 60 (L) 11/30/2020    HDL 59 (L) 08/02/2019     Lab Results   Component Value Date    LDLCALC 143 03/01/2021    LDLCALC 120 11/30/2020    LDLCALC 114 08/02/2019     Lab Results   Component Value Date     06/10/2021    K 4.3 06/10/2021     06/10/2021    CO2 32 06/10/2021    BUN 22 06/10/2021    CREATININE 1.1 06/10/2021    GLUCOSE 86 06/10/2021    CALCIUM 10.5 06/10/2021      Lab Results   Component Value Date    WBC 7.0 06/10/2021    HGB 11.7 (L) 06/10/2021    HCT 38.8 06/10/2021    MCV 94.4 06/10/2021     06/10/2021    LYMPHOPCT 26.0 06/10/2021    RBC 4.11 (L) 06/10/2021    MCH 28.5 06/10/2021    MCHC 30.2 (L) 06/10/2021    RDW 17.2 (H) 06/10/2021     Lab Results   Component Value Date    VITD25 50.0 03/01/2021     Labs reviewed from recent CMP's for renal function which is adequate  \  Subjective:      Review of Systems   Constitutional: Negative for fatigue, fever and unexpected weight change. HENT: Negative for ear discharge, ear pain, mouth sores, sore throat and trouble swallowing. Eyes: Negative for discharge, itching and visual disturbance.    Respiratory: Negative for cough, choking, shortness of breath, wheezing and stridor. Cardiovascular: Negative for chest pain, palpitations and leg swelling. Gastrointestinal: Negative for abdominal distention, abdominal pain, blood in stool, constipation, diarrhea, nausea and vomiting. Endocrine: Negative for cold intolerance, polydipsia and polyuria. Genitourinary: Negative for difficulty urinating, dysuria, frequency and urgency. Musculoskeletal: Negative for arthralgias and gait problem. Skin: Negative for color change and rash. Allergic/Immunologic: Negative for food allergies and immunocompromised state. Neurological: Negative for dizziness, tremors, syncope, speech difficulty, weakness and headaches. Nocturnal hypoxia on oxygen 2 L   Hematological: Negative for adenopathy. Does not bruise/bleed easily. Psychiatric/Behavioral: Negative for confusion and hallucinations. Objective:     Physical Exam  Constitutional:       General: She is not in acute distress. Appearance: She is well-developed. She is obese. HENT:      Head: Normocephalic and atraumatic. Eyes:      General: No scleral icterus. Right eye: No discharge. Left eye: No discharge. Pupils: Pupils are equal, round, and reactive to light. Neck:      Thyroid: No thyromegaly. Vascular: No JVD. Cardiovascular:      Rate and Rhythm: Normal rate and regular rhythm. Heart sounds: Normal heart sounds. No murmur heard. Pulmonary:      Effort: Pulmonary effort is normal. No respiratory distress. Breath sounds: Normal breath sounds. No wheezing or rales. Abdominal:      General: Bowel sounds are normal. There is no distension. Palpations: Abdomen is soft. There is no mass. Tenderness: There is no abdominal tenderness. There is no guarding or rebound. Musculoskeletal:         General: No tenderness. Normal range of motion. Cervical back: Normal range of motion and neck supple. Skin:     General: Skin is warm and dry. occasion  Take lasxi 40 daily ; Blood work before next tutu   2.  OAB;  myrbetriq 25  Daily    #3 hypoxia continue with oxygen as needed    Electronically signed by PATRICK Pendleton on 10/27/2021 at 5:18 PM    @On this date 10/27/2021 I have spent 24 minutes reviewing previous notes, test results and face to face with the patient discussing the diagnosis and importance of compliance with the treatment plan as well as documenting on the day of the visit. EMR[employer]/transcription disclaimer:  Much of this encounter note is electronic transcription/translation of spoken language to printed texts. The electronic translation of spoken language may be erroneous, or at times,nonsensical words or phrases may be inadvertently transcribed.   Although I have reviewed the note for such errors, some may still exist.

## 2021-10-27 NOTE — PATIENT INSTRUCTIONS
1.  Hypertension   Stop the hydralizine 10 mg   Start the new script of 25 mg 3 times a day;    ramon the 10's we can use those for really highs on occasion  Take lasxi 40 daily ;    Blood work before next tutu   2.  OAB;  myrbetriq 25  Daily    #3 hypoxia continue with oxygen as needed

## 2021-11-08 ENCOUNTER — OFFICE VISIT (OUTPATIENT)
Dept: PULMONOLOGY | Facility: CLINIC | Age: 66
End: 2021-11-08

## 2021-11-08 VITALS
BODY MASS INDEX: 55.32 KG/M2 | WEIGHT: 293 LBS | HEART RATE: 118 BPM | OXYGEN SATURATION: 94 % | DIASTOLIC BLOOD PRESSURE: 84 MMHG | HEIGHT: 61 IN | SYSTOLIC BLOOD PRESSURE: 148 MMHG

## 2021-11-08 DIAGNOSIS — J30.89 NON-SEASONAL ALLERGIC RHINITIS, UNSPECIFIED TRIGGER: Primary | ICD-10-CM

## 2021-11-08 DIAGNOSIS — Z78.9 NON-SMOKER: ICD-10-CM

## 2021-11-08 DIAGNOSIS — J98.4 PULMONARY SCARRING: ICD-10-CM

## 2021-11-08 DIAGNOSIS — R09.02 HYPOXEMIA REQUIRING SUPPLEMENTAL OXYGEN: ICD-10-CM

## 2021-11-08 DIAGNOSIS — J45.20 MILD INTERMITTENT ASTHMA WITHOUT COMPLICATION: ICD-10-CM

## 2021-11-08 DIAGNOSIS — R63.5 WEIGHT GAIN: ICD-10-CM

## 2021-11-08 DIAGNOSIS — I27.20 PULMONARY HYPERTENSION (HCC): ICD-10-CM

## 2021-11-08 DIAGNOSIS — R06.02 SOB (SHORTNESS OF BREATH): ICD-10-CM

## 2021-11-08 DIAGNOSIS — Z99.81 HYPOXEMIA REQUIRING SUPPLEMENTAL OXYGEN: ICD-10-CM

## 2021-11-08 DIAGNOSIS — G47.33 OSA (OBSTRUCTIVE SLEEP APNEA): ICD-10-CM

## 2021-11-08 DIAGNOSIS — Z91.199 NON COMPLIANCE WITH MEDICAL TREATMENT: ICD-10-CM

## 2021-11-08 DIAGNOSIS — E66.01 MORBID OBESITY DUE TO EXCESS CALORIES (HCC): ICD-10-CM

## 2021-11-08 DIAGNOSIS — I50.42 CHRONIC COMBINED SYSTOLIC AND DIASTOLIC CONGESTIVE HEART FAILURE (HCC): ICD-10-CM

## 2021-11-08 PROCEDURE — 99214 OFFICE O/P EST MOD 30 MIN: CPT | Performed by: INTERNAL MEDICINE

## 2021-11-08 RX ORDER — HYDRALAZINE HYDROCHLORIDE 25 MG/1
25 TABLET, FILM COATED ORAL 4 TIMES DAILY
COMMUNITY
Start: 2021-10-27 | End: 2022-07-11

## 2021-11-08 RX ORDER — HYDRALAZINE HYDROCHLORIDE 10 MG/1
10 TABLET, FILM COATED ORAL 3 TIMES DAILY
COMMUNITY
Start: 2021-10-21 | End: 2021-11-08

## 2021-11-08 NOTE — PROGRESS NOTES
[facility] OUTPATIENT PROGRESS NOTE    Patient: Pau Cabrera  : 1955  Age: 66 y.o.  Date of Service: 2021    REASON FOR CLINIC VISIT:  Chief Complaint   Patient presents with   • CHARLIE (obstructive sleep apnea) - non compliant with cpap     no new scans       Subjective:    History of Present Illness:  Pau Cabrera is a 66 y.o. female who presents to the office today to be seen for    Diagnosis Plan   1. Non-seasonal allergic rhinitis, unspecified trigger     2. Chronic combined systolic and diastolic congestive heart failure (HCC)     3. Morbid obesity due to excess calories (HCC)     4. Hypoxemia requiring supplemental oxygen     5. Pulmonary hypertension (HCC)     6. Mild intermittent asthma without complication     7. SOB (shortness of breath)     8. CHARLIE (obstructive sleep apnea) - non compliant with cpap     9. Non-smoker     10. Non compliance with medical treatment     11. Pulmonary scarring     12. Weight gain     .  Other problems per record. Patient is a morbidly obese middle aged -American female who was seen in the pulmonary clinic for a follow-up visit.    She has severe obstructive sleep apnea but she is noncompliant with the CPAP and is unable to tolerate any mask. She is currently using oxygen 2 L. She carries a portable tank but complains the tank is too heavy and she wanted a lighter tank but the DME company told her that they do not have a lighter oxygen tank. She recently lost her  in September and is very depressed. She admits she is eating too much and had significant weight gain in the last few months and currently weighs 331 pounds. She feels tired and exhausted all the time and has back pain and knee pain and is not very active and mobile.    She has asthma and she is using Advair or Wixela with albuterol rescue inhaler and for her nasal allergies she is using Flonase nasal spray Astelin nasal spray and loratadine. She is  already vaccinated for Covid. She has bilateral lymphedema and venous stasis and she is using Lasix 40 mg once a day. Last chest x-ray showed some chronic changes in the lung but no follow-up CT or x-ray was done.      PFT done today:  Not done today      Results for orders placed during the hospital encounter of 06/16/21    Full Pulmonary Function Test With Bronchodilator & ABG    Narrative  [facility] - Pulmonary Function Test    [facility]  [id number]  [phone]    Patient : Pau Cabrera  MRN : [id number]  CSN : [id number]  Pulmonologist : Saman Kwon MD  Date : 6/16/2021    ______________________________________________________________________    Interpretation :  1.  Spirometry is consistent with a severe obstructive ventilatory defect although the decrease in the patient's FEV1 is just into the severe range at 49% of predicted..  2.  There is significant improvement in the patient's spirometry postbronchodilator so that postbronchodilator spirometry is consistent with a moderate obstructive ventilatory defect.  3.  Lung volumes reveal a decrease in vital capacity second to obstruction.  In addition significant hyperinflation is present.  There is also a decrease in inspiratory capacity.  4.  There is a moderate diffusion impairment which when corrected for alveolar volume is normalized.      Saman Kwon MD         Bronchodilator therapy: Wixela and Albuterol rescue inhaler      Smoking Status:   Social History     Tobacco Use   Smoking Status Never Smoker   Smokeless Tobacco Never Used     Pulm Rehab: no  Sleep: yes    Support System: lives alone    Code Status:   There are no questions and answers to display.        Review of Systems:  A complete review of systems is performed and all other systems were reviewed and negative as note above in the HPI.  Review of Systems   Constitutional: Positive for fatigue and unexpected weight gain.   HENT: Positive for  congestion and postnasal drip.    Respiratory: Positive for cough, chest tightness and shortness of breath.    Cardiovascular: Positive for leg swelling.   Gastrointestinal: Negative.    Endocrine: Negative.    Genitourinary: Negative.    Musculoskeletal: Positive for arthralgias.   Allergic/Immunologic: Positive for environmental allergies.   Neurological: Negative.    Hematological: Negative.    Psychiatric/Behavioral: Positive for depressed mood.       CAT/ACT Score:  Not done today    Medications:  Outpatient Encounter Medications as of 11/8/2021   Medication Sig Dispense Refill   • albuterol sulfate  (90 Base) MCG/ACT inhaler Inhale 2 puffs Every 4 (Four) Hours As Needed for Wheezing. 6.7 g 5   • allopurinol (ZYLOPRIM) 300 MG tablet      • amitriptyline (ELAVIL) 25 MG tablet Take 25 mg by mouth Every Night.     • amLODIPine (NORVASC) 10 MG tablet TAKE ONE TABLET BY MOUTH DAILY     • azelastine (ASTELIN) 0.1 % nasal spray 2 sprays into each nostril 2 (Two) Times a Day. Use in each nostril as directed     • Azelastine HCl 137 MCG/SPRAY solution 2 sprays into the nostril(s) as directed by provider 2 (two) times a day. 30 mL 5   • butalbital-acetaminophen-caffeine (FIORICET, ESGIC) -40 MG per tablet Take 1 tablet by mouth Every 4 (Four) Hours As Needed for Headache.     • carvedilol (COREG) 25 MG tablet TAKE 1 TABLET BY MOUTH TWICE A DAY 60 tablet 3   • celecoxib (CeleBREX) 100 MG capsule Take 100 mg by mouth.     • cholecalciferol (VITAMIN D3) 1000 UNITS tablet Take 2,000 Units by mouth Daily.     • Cholecalciferol 50 MCG (2000 UT) tablet TAKE 1 TABLET BY MOUTH DAILY     • DULoxetine (CYMBALTA) 30 MG capsule 60 mg.     • EPINEPHrine (ADRENALIN) 0.05 MG/ML syringe Inject 20 mL as directed.     • fenofibrate (TRICOR) 145 MG tablet Take 145 mg by mouth Daily.     • fluticasone (Flonase Allergy Relief) 50 MCG/ACT nasal spray 2 sprays into the nostril(s) as directed by provider Daily. 16 g 5   •  "fluticasone-salmeterol (Wixela Inhub) 250-50 MCG/DOSE DISKUS Inhale 1 puff 2 (Two) Times a Day. 1 each 11   • furosemide (LASIX) 40 MG tablet Take 1 tablet by mouth Daily. 30 tablet 0   • hydrALAZINE (APRESOLINE) 25 MG tablet Take 25 mg by mouth 4 (Four) Times a Day.     • loratadine (CLARITIN) 10 MG tablet Take 1 tablet by mouth Daily. 90 tablet 3   • meclizine (ANTIVERT) 25 MG tablet Take 25 mg by mouth 3 (three) times a day as needed for dizziness.     • Multiple Vitamins-Minerals (MULTIVITAMIN ADULT PO) Take  by mouth.     • OXYGEN-HELIUM IN Inhale.     • spironolactone (ALDACTONE) 25 MG tablet Take 25 mg by mouth.     • [DISCONTINUED] hydrALAZINE (APRESOLINE) 10 MG tablet Take 10 mg by mouth 3 (Three) Times a Day.       No facility-administered encounter medications on file as of 11/8/2021.       Allergies:  Allergies   Allergen Reactions   • Codeine Sulfate Hives   • Lisinopril Swelling and Rash     Facial swelling       Immunizations:  Immunization History   Administered Date(s) Administered   • COVID-19 (PFIZER) 03/09/2021, 03/30/2021   • Pneumococcal Conjugate 13-Valent (PCV13) 11/02/2018   • Pneumococcal Polysaccharide (PPSV23) 11/30/2020   • Tdap 10/11/2011       Objective:    Vitals:  /84   Pulse 118   Ht 154.9 cm (61\")   Wt (!) 150 kg (331 lb)   SpO2 94% Comment: 2L  BMI 62.54 kg/m²     Physical Exam:  General: Patient is a 66 y.o. middle aged morbidly obese  female. Looks stated age. Appears to be in no acute distress.  Eyes: EOMI. PERRLA. Vision intact. No scleral icterus.  Ear, Nose, Mouth and Throat: Hearing is grossly intact. No Leukoplakia, pharyngitis, stomatitis or thrush. Swollen nasal mucosa with post nasal drop.  Neck: Range of motion of neck normal. No thyromegaly or masses. Mallampati Class 3, No JVD  Respiratory: Clear to auscultation bilaterally. No use of accessory muscles. Decreased breath sounds.  Cardiovascular: Normal heart sounds. Regularly regular rhythm " without murmur.  Gastrointestinal: Non tender, non distended, soft. Bowel sounds positive in all four quadrants. No organomegaly.  Skin: No obvious rashes, lesions, ulcers or large amount of bruising.Bilateral  edema.   Neurological: No new motor deficits. Cranial nerves appear intact.  Psychiatric: Patient is alert and oriented to person, place and time.    Chest Imaging:     Last CXR done showed     Summary:  1. Chronic lung changes with increased prominence of lower lobe lung  markings giving the appearance of patchy bibasilar infiltrate or  Atelectasis.    This report was finalized on 06/16/2021 18:17 by Dr. Esteban Gill MD.    Assessment:  1. Non-seasonal allergic rhinitis, unspecified trigger    2. Chronic combined systolic and diastolic congestive heart failure (HCC)    3. Morbid obesity due to excess calories (HCC)    4. Hypoxemia requiring supplemental oxygen    5. Pulmonary hypertension (HCC)    6. Mild intermittent asthma without complication    7. SOB (shortness of breath)    8. CHARLIE (obstructive sleep apnea) - non compliant with cpap    9. Non-smoker    10. Non compliance with medical treatment    11. Pulmonary scarring    12. Weight gain        Plan/Recommendations:    1. I did talk to the patient in great details about this CPAP usage but she is unable to use it and did not want to go back on CPAP. She will continue on home oxygen 2 L all the time and I told her to talk to DME company to get a letter machine from a different DME company if the current company doesn't have one.  2. Patient should continue Flonase nasal spray Astelin and loratadine for nasal allergy. Continue 2 L oxygen for chronic hypoxemia.  3. She is advised to lose some weight and active healthy lifestyle was recommended. Patient is overeating and admits having junk foods which cause further weight gain and further problem. She is pulmonary hypertension and has some cor pulmonale with some fluid retention and she should continue  Lasix with close monitoring of renal function.  4. She is already vaccinated for Covid and will get influenza vaccine. She is advised into the pulmonary clinic in 6 months time with a follow-up CT scan of the chest for the pulmonary scarring or earlier if needed.    Follow up:  6 Months    Time Spent:  30 minutes    I appreciate the opportunity of participating in this patient's care. I would like to thank the PCP for the referral.  Please feel free to contact me with any other questions.    Peter Crews MD   Pulmonologist/Intensivist     Electronically signed by: Peter Crews MD, 11/8/2021 16:56 CST

## 2021-11-10 DIAGNOSIS — M19.90 OSTEOARTHRITIS, UNSPECIFIED OSTEOARTHRITIS TYPE, UNSPECIFIED SITE: Primary | ICD-10-CM

## 2021-11-10 RX ORDER — CELECOXIB 100 MG/1
100 CAPSULE ORAL 2 TIMES DAILY
Qty: 180 CAPSULE | Refills: 0 | Status: SHIPPED | OUTPATIENT
Start: 2021-11-10 | End: 2022-02-17

## 2021-11-23 ENCOUNTER — TELEPHONE (OUTPATIENT)
Dept: INTERNAL MEDICINE | Age: 66
End: 2021-11-23

## 2021-11-23 DIAGNOSIS — N32.81 OAB (OVERACTIVE BLADDER): Primary | ICD-10-CM

## 2021-11-23 RX ORDER — AMITRIPTYLINE HYDROCHLORIDE 25 MG/1
TABLET, FILM COATED ORAL
Qty: 90 TABLET | Refills: 3 | Status: SHIPPED | OUTPATIENT
Start: 2021-11-23 | End: 2022-10-20

## 2021-11-23 RX ORDER — CARVEDILOL 25 MG/1
25 TABLET ORAL 2 TIMES DAILY
Qty: 180 TABLET | Refills: 3 | Status: SHIPPED | OUTPATIENT
Start: 2021-11-23

## 2021-11-23 NOTE — TELEPHONE ENCOUNTER
S/w pt, states she was given samples of Myrbetriq and would like a Rx sent to [facility].

## 2021-11-23 NOTE — TELEPHONE ENCOUNTER
Tk Barnes called requesting a refill of the below medication which has been pended for you:     Requested Prescriptions     Pending Prescriptions Disp Refills    carvedilol (COREG) 25 MG tablet [Pharmacy Med Name: CARVEDILOL 25 MG TABLET 25 Tablet] 180 tablet 3     Sig: TAKE 1 TABLET BY MOUTH 2 TIMES DAILY    amitriptyline (ELAVIL) 25 MG tablet [Pharmacy Med Name: AMITRIPTYLINE HCL 25 MG TAB 25 Tablet] 90 tablet 3     Sig: TAKE ONE TABLET BY MOUTH NIGHTLY FOR NERVE DISEASE       Last Appointment Date: 10/12/2021  Next Appointment Date: 12/2/2021    Allergies   Allergen Reactions    Codeine Hives    Lisinopril Hives

## 2021-12-01 DIAGNOSIS — Z01.818 PREOPERATIVE TESTING: Primary | ICD-10-CM

## 2021-12-07 ENCOUNTER — LAB (OUTPATIENT)
Dept: LAB | Facility: HOSPITAL | Age: 66
End: 2021-12-07

## 2021-12-07 DIAGNOSIS — Z01.818 PREOPERATIVE TESTING: ICD-10-CM

## 2021-12-07 LAB — SARS-COV-2 ORF1AB RESP QL NAA+PROBE: NOT DETECTED

## 2021-12-07 PROCEDURE — U0004 COV-19 TEST NON-CDC HGH THRU: HCPCS

## 2021-12-07 PROCEDURE — C9803 HOPD COVID-19 SPEC COLLECT: HCPCS

## 2021-12-08 NOTE — PROGRESS NOTES
YOB: 1955  Location: [facility]  Location Address: [address] [place] [address], [facility], [address] [place] [address]  Location Phone: [phone]    Chief Complaint   Patient presents with   • Follow-up       History of Present Illness  Pau Cabrera is a 66 y.o. female.  Pau Cabrera is here for follow up of ENT complaints.   She was seen here in the past for breathing evaluation. At the time she had an appointment with pulmonology. They have placed her on an inhaler and some nasal sprays which she feels like has helped. She has not had success with cpap in the past, but is currently on 2 l nc at all times which seems to help   She has also had thyroid nodules in the past that were followed by Dr. Callahan and biopsied in 2019, but states since he left she has not had these evaluated. She denies difficulty swallowing, globus sensation, fatigue, hoarseness, or voice changes.     TSH (2021 04:07)      US Thyroid (2021 18:28)       Past Medical History:   Diagnosis Date   • Anemia     iron def   • Anxiety    • Asthma    • Back pain    • CHF (congestive heart failure) (HCC)    • COVID-19 vaccine series completed     pfizer   • COVID-19 vaccine series completed    • CTS (carpal tunnel syndrome)    • Depression    • Fibromyalgia    • GERD (gastroesophageal reflux disease)    • Gout    • Hyperlipemia    • Hyperlipidemia    • Hypertension    • Obesity    • Obstructive sleep apnea     could not tolerate machine    • Peripheral neuropathy        Past Surgical History:   Procedure Laterality Date   • BREAST BIOPSY Left    •  SECTION      X 2   • COLONOSCOPY  10/22/2015    Tics repeat exam in 5 years   • COLONOSCOPY  2007    Small hemorrhoids repeat exam in 3 years   • COLONOSCOPY N/A 2020    Procedure: COLONOSCOPY WITH ANESTHESIA;  Surgeon: Denny Francis MD;  Location: [facility] ENDOSCOPY;  Service: Gastroenterology;  Laterality: N/A;  pre: family hx colon ca  post:  diverticulosis  Carola Barbosa MD   • HYSTERECTOMY  2000    total   • UMBILICAL HERNIA REPAIR      had 1/2 of it repaired with hysterectomy        Outpatient Medications Marked as Taking for the 12/9/21 encounter (Office Visit) with Milad So MD   Medication Sig Dispense Refill   • albuterol sulfate  (90 Base) MCG/ACT inhaler Inhale 2 puffs Every 4 (Four) Hours As Needed for Wheezing. 6.7 g 5   • allopurinol (ZYLOPRIM) 300 MG tablet      • amitriptyline (ELAVIL) 25 MG tablet Take 25 mg by mouth Every Night.     • amLODIPine (NORVASC) 10 MG tablet TAKE ONE TABLET BY MOUTH DAILY     • azelastine (ASTELIN) 0.1 % nasal spray 2 sprays into each nostril 2 (Two) Times a Day. Use in each nostril as directed     • Azelastine HCl 137 MCG/SPRAY solution 2 sprays into the nostril(s) as directed by provider 2 (two) times a day. 30 mL 5   • butalbital-acetaminophen-caffeine (FIORICET, ESGIC) -40 MG per tablet Take 1 tablet by mouth Every 4 (Four) Hours As Needed for Headache.     • carvedilol (COREG) 25 MG tablet TAKE 1 TABLET BY MOUTH TWICE A DAY 60 tablet 3   • celecoxib (CeleBREX) 100 MG capsule Take 100 mg by mouth.     • cholecalciferol (VITAMIN D3) 1000 UNITS tablet Take 2,000 Units by mouth Daily.     • Cholecalciferol 50 MCG (2000 UT) tablet TAKE 1 TABLET BY MOUTH DAILY     • DULoxetine (CYMBALTA) 60 MG capsule Take 60 mg by mouth Every Morning.     • EPINEPHrine (ADRENALIN) 0.05 MG/ML syringe Inject 20 mL as directed.     • fenofibrate (TRICOR) 145 MG tablet Take 145 mg by mouth Daily.     • fluticasone (Flonase Allergy Relief) 50 MCG/ACT nasal spray 2 sprays into the nostril(s) as directed by provider Daily. 16 g 5   • fluticasone-salmeterol (Wixela Inhub) 250-50 MCG/DOSE DISKUS Inhale 1 puff 2 (Two) Times a Day. 1 each 11   • furosemide (LASIX) 40 MG tablet Take 1 tablet by mouth Daily. 30 tablet 0   • hydrALAZINE (APRESOLINE) 25 MG tablet Take 25 mg by mouth 4 (Four) Times a Day.     •  loratadine (CLARITIN) 10 MG tablet Take 1 tablet by mouth Daily. 90 tablet 3   • meclizine (ANTIVERT) 25 MG tablet Take 25 mg by mouth 3 (three) times a day as needed for dizziness.     • Multiple Vitamins-Minerals (MULTIVITAMIN ADULT PO) Take  by mouth.     • Myrbetriq 25 MG tablet sustained-release 24 hour 24 hr tablet Take 25 mg by mouth Daily.     • OXYGEN-HELIUM IN Inhale.     • spironolactone (ALDACTONE) 25 MG tablet Take 25 mg by mouth.     • [DISCONTINUED] DULoxetine (CYMBALTA) 30 MG capsule 60 mg.         Codeine sulfate and Lisinopril    Family History   Problem Relation Age of Onset   • Arthritis Mother    • Heart disease Mother    • Hypertension Mother    • Thyroid disease Mother    • Arthritis Father    • Hypertension Father    • Anuerysm Father    • Stroke Father    • Asthma Brother    • Colon polyps Neg Hx    • Colon cancer Neg Hx        Social History     Socioeconomic History   • Marital status:    Tobacco Use   • Smoking status: Never Smoker   • Smokeless tobacco: Never Used   Vaping Use   • Vaping Use: Never used   Substance and Sexual Activity   • Alcohol use: No   • Drug use: No   • Sexual activity: Defer     Birth control/protection: Surgical       Review of Systems   Constitutional: Negative.    HENT: Negative for trouble swallowing and voice change.    Eyes: Negative.    Respiratory: Negative.    Cardiovascular: Negative.    Gastrointestinal: Negative.    Endocrine: Negative.    Genitourinary: Negative.    Musculoskeletal: Negative.        Vitals:    12/09/21 1519   BP: 149/78   Pulse: 83   Temp: 98 °F (36.7 °C)       Body mass index is 63.86 kg/m².    Objective     Physical Exam  Vitals reviewed.   Constitutional:       Appearance: She is obese.   HENT:      Head: Normocephalic.      Right Ear: Hearing, tympanic membrane, ear canal and external ear normal.      Left Ear: Hearing, tympanic membrane, ear canal and external ear normal.      Nose: Nose normal.      Comments: Nasal cannula  in place        Mouth/Throat:      Lips: Pink.      Mouth: Mucous membranes are moist.      Comments: Andrey III  Neck:      Comments: Full, thick neck with fullness; no overt thyromegaly     Musculoskeletal:      Cervical back: Full passive range of motion without pain.   Neurological:      Mental Status: She is alert.         Assessment/Plan   Diagnoses and all orders for this visit:    1. Thyroid nodule (Primary)    2. Multinodular goiter    3. CHARLIE (obstructive sleep apnea) - non compliant with cpap    4. Morbid obesity due to excess calories (HCC)      * Surgery not found *  No orders of the defined types were placed in this encounter.    Return in about 4 months (around 2022) for Recheck thyroid .       Patient Instructions   CONTACT INFORMATION:  The main office phone number is [phone]. For emergencies after hours and on weekends, this number will convert over to our answering service and the on call provider will answer. Please try to keep non emergent phone calls/ questions to office hours 9am-5pm Monday through Friday.      [employer]  As an alternative, you can sign up and use the Epic MyChart system for more direct and quicker access for non emergent questions/ problems.  [employer] allows you to send messages to your doctor, view your test results, renew your prescriptions, schedule appointments, and more. To sign up, go to [employer] and click on the Sign Up Now link in the New User? box. Enter your [employer] Activation Code exactly as it appears below along with the last four digits of your Social Security Number and your Date of Birth () to complete the sign-up process. If you do not sign up before the expiration date, you must request a new code.     [employer] Activation Code: Activation code not generated  Current [employer] Status: Active     If you have questions, you can email [email or web address] or call [phone] to talk to our [employer] staff. Remember,  MyChart is NOT to be used for urgent needs. For medical emergencies, dial 911.     IF YOU SMOKE OR USE TOBACCO PLEASE READ THE FOLLOWING:  Why is smoking bad for me?  Smoking increases the risk of heart disease, lung disease, vascular disease, stroke, and cancer. If you smoke, STOP!        IF YOU SMOKE OR USE TOBACCO PLEASE READ THE FOLLOWING:  Why is smoking bad for me?  Smoking increases the risk of heart disease, lung disease, vascular disease, stroke, and cancer. If you smoke, STOP!     For more information:  Quit Now [place]  1-800-QUIT-NOW  https://[email or web address].[email or web address]/en-[place]/    Repeat ultrasound   Will continue to follow

## 2021-12-09 ENCOUNTER — OFFICE VISIT (OUTPATIENT)
Dept: OTOLARYNGOLOGY | Facility: CLINIC | Age: 66
End: 2021-12-09

## 2021-12-09 VITALS
BODY MASS INDEX: 55.32 KG/M2 | WEIGHT: 293 LBS | SYSTOLIC BLOOD PRESSURE: 149 MMHG | HEART RATE: 83 BPM | HEIGHT: 61 IN | TEMPERATURE: 98 F | DIASTOLIC BLOOD PRESSURE: 78 MMHG

## 2021-12-09 DIAGNOSIS — E04.2 MULTINODULAR GOITER: ICD-10-CM

## 2021-12-09 DIAGNOSIS — G47.33 OSA (OBSTRUCTIVE SLEEP APNEA): ICD-10-CM

## 2021-12-09 DIAGNOSIS — E04.1 THYROID NODULE: Primary | ICD-10-CM

## 2021-12-09 DIAGNOSIS — E66.01 MORBID OBESITY DUE TO EXCESS CALORIES (HCC): ICD-10-CM

## 2021-12-09 PROCEDURE — 99213 OFFICE O/P EST LOW 20 MIN: CPT | Performed by: NURSE PRACTITIONER

## 2021-12-09 RX ORDER — MIRABEGRON 25 MG/1
25 TABLET, FILM COATED, EXTENDED RELEASE ORAL DAILY
COMMUNITY
Start: 2021-11-23

## 2021-12-09 RX ORDER — DULOXETIN HYDROCHLORIDE 60 MG/1
60 CAPSULE, DELAYED RELEASE ORAL EVERY MORNING
COMMUNITY
Start: 2021-11-10

## 2021-12-09 NOTE — PATIENT INSTRUCTIONS
CONTACT INFORMATION:  The main office phone number is [phone]. For emergencies after hours and on weekends, this number will convert over to our answering service and the on call provider will answer. Please try to keep non emergent phone calls/ questions to office hours 9am-5pm Monday through Friday.      [employer]  As an alternative, you can sign up and use the Epic MyChart system for more direct and quicker access for non emergent questions/ problems.  [employer] allows you to send messages to your doctor, view your test results, renew your prescriptions, schedule appointments, and more. To sign up, go to [employer] and click on the Sign Up Now link in the New User? box. Enter your [employer] Activation Code exactly as it appears below along with the last four digits of your Social Security Number and your Date of Birth () to complete the sign-up process. If you do not sign up before the expiration date, you must request a new code.     [employer] Activation Code: Activation code not generated  Current [employer] Status: Active     If you have questions, you can email [email or web address] or call [phone] to talk to our [employer] staff. Remember, [employer] is NOT to be used for urgent needs. For medical emergencies, dial 911.     IF YOU SMOKE OR USE TOBACCO PLEASE READ THE FOLLOWING:  Why is smoking bad for me?  Smoking increases the risk of heart disease, lung disease, vascular disease, stroke, and cancer. If you smoke, STOP!        IF YOU SMOKE OR USE TOBACCO PLEASE READ THE FOLLOWING:  Why is smoking bad for me?  Smoking increases the risk of heart disease, lung disease, vascular disease, stroke, and cancer. If you smoke, STOP!     For more information:  [employer]uit Now [place]  -QUIT-NOW  [email or web address]    Repeat ultrasound   Will continue to follow

## 2021-12-28 RX ORDER — ALLOPURINOL 300 MG/1
300 TABLET ORAL DAILY
Qty: 30 TABLET | Refills: 5 | Status: SHIPPED | OUTPATIENT
Start: 2021-12-28 | End: 2022-07-21

## 2022-01-04 ENCOUNTER — OFFICE VISIT (OUTPATIENT)
Dept: INTERNAL MEDICINE | Age: 67
End: 2022-01-04
Payer: MEDICARE

## 2022-01-04 VITALS
DIASTOLIC BLOOD PRESSURE: 82 MMHG | BODY MASS INDEX: 57.52 KG/M2 | OXYGEN SATURATION: 90 % | SYSTOLIC BLOOD PRESSURE: 138 MMHG | HEART RATE: 95 BPM | WEIGHT: 293 LBS | HEIGHT: 60 IN

## 2022-01-04 DIAGNOSIS — E79.0 HYPERURICEMIA: ICD-10-CM

## 2022-01-04 DIAGNOSIS — M25.50 ARTHRALGIA, UNSPECIFIED JOINT: ICD-10-CM

## 2022-01-04 DIAGNOSIS — L98.9 SKIN LESION: ICD-10-CM

## 2022-01-04 DIAGNOSIS — N32.81 OAB (OVERACTIVE BLADDER): ICD-10-CM

## 2022-01-04 DIAGNOSIS — G47.00 INSOMNIA, UNSPECIFIED TYPE: ICD-10-CM

## 2022-01-04 DIAGNOSIS — L91.8 SKIN TAG: ICD-10-CM

## 2022-01-04 DIAGNOSIS — J44.9 CHRONIC OBSTRUCTIVE PULMONARY DISEASE, UNSPECIFIED COPD TYPE (HCC): ICD-10-CM

## 2022-01-04 DIAGNOSIS — R42 VERTIGO: ICD-10-CM

## 2022-01-04 DIAGNOSIS — F32.89 OTHER DEPRESSION: ICD-10-CM

## 2022-01-04 DIAGNOSIS — I27.20 PULMONARY HYPERTENSION (HCC): ICD-10-CM

## 2022-01-04 DIAGNOSIS — E66.01 MORBID OBESITY DUE TO EXCESS CALORIES (HCC): ICD-10-CM

## 2022-01-04 DIAGNOSIS — I50.9 CHRONIC CONGESTIVE HEART FAILURE, UNSPECIFIED HEART FAILURE TYPE (HCC): ICD-10-CM

## 2022-01-04 DIAGNOSIS — G43.809 OTHER MIGRAINE WITHOUT STATUS MIGRAINOSUS, NOT INTRACTABLE: ICD-10-CM

## 2022-01-04 DIAGNOSIS — I10 PRIMARY HYPERTENSION: Primary | ICD-10-CM

## 2022-01-04 DIAGNOSIS — I50.42 CHRONIC COMBINED SYSTOLIC AND DIASTOLIC CONGESTIVE HEART FAILURE (HCC): ICD-10-CM

## 2022-01-04 PROCEDURE — 1090F PRES/ABSN URINE INCON ASSESS: CPT | Performed by: INTERNAL MEDICINE

## 2022-01-04 PROCEDURE — G8484 FLU IMMUNIZE NO ADMIN: HCPCS | Performed by: INTERNAL MEDICINE

## 2022-01-04 PROCEDURE — 3017F COLORECTAL CA SCREEN DOC REV: CPT | Performed by: INTERNAL MEDICINE

## 2022-01-04 PROCEDURE — 1123F ACP DISCUSS/DSCN MKR DOCD: CPT | Performed by: INTERNAL MEDICINE

## 2022-01-04 PROCEDURE — 99214 OFFICE O/P EST MOD 30 MIN: CPT | Performed by: INTERNAL MEDICINE

## 2022-01-04 PROCEDURE — G8399 PT W/DXA RESULTS DOCUMENT: HCPCS | Performed by: INTERNAL MEDICINE

## 2022-01-04 PROCEDURE — 1036F TOBACCO NON-USER: CPT | Performed by: INTERNAL MEDICINE

## 2022-01-04 PROCEDURE — G8417 CALC BMI ABV UP PARAM F/U: HCPCS | Performed by: INTERNAL MEDICINE

## 2022-01-04 PROCEDURE — 4040F PNEUMOC VAC/ADMIN/RCVD: CPT | Performed by: INTERNAL MEDICINE

## 2022-01-04 PROCEDURE — 3023F SPIROM DOC REV: CPT | Performed by: INTERNAL MEDICINE

## 2022-01-04 PROCEDURE — G8427 DOCREV CUR MEDS BY ELIG CLIN: HCPCS | Performed by: INTERNAL MEDICINE

## 2022-01-04 RX ORDER — SPIRONOLACTONE 25 MG/1
25 TABLET ORAL DAILY
Qty: 90 TABLET | Refills: 3 | Status: SHIPPED | OUTPATIENT
Start: 2022-01-04

## 2022-01-04 RX ORDER — FENOFIBRATE 145 MG/1
TABLET, COATED ORAL
Qty: 90 TABLET | Refills: 3 | Status: SHIPPED | OUTPATIENT
Start: 2022-01-04

## 2022-01-04 RX ORDER — FUROSEMIDE 40 MG/1
40 TABLET ORAL DAILY
Qty: 90 TABLET | Refills: 3 | Status: SHIPPED | OUTPATIENT
Start: 2022-01-04 | End: 2022-07-18 | Stop reason: SDUPTHER

## 2022-01-04 RX ORDER — HYDRALAZINE HYDROCHLORIDE 25 MG/1
25 TABLET, FILM COATED ORAL 4 TIMES DAILY
Qty: 90 TABLET | Refills: 1 | Status: SHIPPED | OUTPATIENT
Start: 2022-01-04

## 2022-01-04 RX ORDER — FUROSEMIDE 40 MG/1
40 TABLET ORAL DAILY
Qty: 90 TABLET | Refills: 3 | Status: SHIPPED | OUTPATIENT
Start: 2022-01-04 | End: 2022-01-04 | Stop reason: SDUPTHER

## 2022-01-04 NOTE — PROGRESS NOTES
Chief Complaint   Patient presents with    1 Month Follow-Up       HPI: Vic Pulse is a 77 y.o. female is here for follow-up of hypertension, hyperuricemia, overactive bladder, arthritis, environmental allergies, COPD and depression. Her blood pressure is currently well controlled. She denies any complaints of chest pain, chest pressure. She does have chronic shortness of breath secondary to her COPD. Her COPD is currently stable at this time. She has not had any gout flares. She sleeps well at night with Elavil. Myrbetriq is helpful for her overactive bladder. Celebrex is helpful for joint pain. She does take Fioricet as needed for migraines. She does have a history of vitamin D deficiency and is on cholecalciferol. She is on oxygen. She is on 2 L per nasal cannula. She has not had any flares of vertigo. She does have a couple skin lesions that she is started about. She has a skin tag to her right neck. She also has a dark spot to her left breast. She is agreeable to seeing a dermatologist. Martina Cunha a diagnosis of congestive heart failure.  She has not had any swelling in her lower extremities    Past Medical History:   Diagnosis Date    Abnormal CT of spine     Acute sinusitis     Arthritis     Asthma     Bronchitis     Carpal tunnel syndrome     both    CHF (congestive heart failure) (HCC)     Depression     Fibromyalgia     Furuncle     Gout     Hyperlipidemia     Hypertension     Low vitamin D level     Lumbago     Lumbar radiculopathy     Neuropathy     feet    Obesity     Rheumatoid nodule of knee (HCC)     Sleep apnea     UTI (urinary tract infection)     Vertigo     Vitamin D deficiency       Past Surgical History:   Procedure Laterality Date     SECTION      times two    HYSTERECTOMY        Social History     Socioeconomic History    Marital status:      Spouse name: None    Number of children: None    Years of education: None    Highest education level: None   Occupational History    None   Tobacco Use    Smoking status: Never Smoker    Smokeless tobacco: Never Used   Vaping Use    Vaping Use: Never used   Substance and Sexual Activity    Alcohol use: No    Drug use: No    Sexual activity: None   Other Topics Concern    None   Social History Narrative    None     Social Determinants of Health     Financial Resource Strain: Low Risk     Difficulty of Paying Living Expenses: Not hard at all   Food Insecurity: No Food Insecurity    Worried About Running Out of Food in the Last Year: Never true    Chani of Food in the Last Year: Never true   Transportation Needs: No Transportation Needs    Lack of Transportation (Medical): No    Lack of Transportation (Non-Medical):  No   Physical Activity:     Days of Exercise per Week: Not on file    Minutes of Exercise per Session: Not on file   Stress:     Feeling of Stress : Not on file   Social Connections:     Frequency of Communication with Friends and Family: Not on file    Frequency of Social Gatherings with Friends and Family: Not on file    Attends Ca[employer] Services: Not on file    Active Member of [address] [place] [address] or Organizations: Not on file    Attends Club or Organization Meetings: Not on file    Marital Status: Not on file   Intimate Partner Violence:     Fear of Current or Ex-Partner: Not on file    Emotionally Abused: Not on file    Physically Abused: Not on file    Sexually Abused: Not on file   Housing Stability:     Unable to Pay for Housing in the Last Year: Not on file    Number of [place] in the Last Year: Not on file    Unstable Housing in the Last Year: Not on file      Family History   Problem Relation Age of Onset    Heart Disease Mother     Hypertension Mother     Stroke Father     Hypertension Father     Hypertension Other     Heart Disease Other     Cancer Other         Current Outpatient Medications   Medication Sig Dispense Refill    spironolactone (ALDACTONE) 25 MG tablet Take 1 tablet by mouth daily 90 tablet 3    fenofibrate (TRICOR) 145 MG tablet TAKE 1 TABLET BY MOUTH DAILY (FOR TRIGLYCERIDES/CHOLESTEROL) 90 tablet 3    hydrALAZINE (APRESOLINE) 25 MG tablet Take 1 tablet by mouth 4 times daily 90 tablet 1    furosemide (LASIX) 40 MG tablet Take 1 tablet by mouth daily 90 tablet 3    allopurinol (ZYLOPRIM) 300 MG tablet TAKE 1 TABLET BY MOUTH DAILY 30 tablet 5    carvedilol (COREG) 25 MG tablet TAKE 1 TABLET BY MOUTH 2 TIMES DAILY 180 tablet 3    amitriptyline (ELAVIL) 25 MG tablet TAKE ONE TABLET BY MOUTH NIGHTLY FOR NERVE DISEASE 90 tablet 3    mirabegron (MYRBETRIQ) 25 MG TB24 Take 1 tablet by mouth daily 30 tablet 5    celecoxib (CELEBREX) 100 MG capsule TAKE 1 CAPSULE BY MOUTH 2 TIMES DAILY 180 capsule 0    butalbital-acetaminophen-caffeine (FIORICET, ESGIC) -40 MG per tablet TAKE ONE TABLET EVERY 4 HOURS AS NEEDED FOR HEADACHES 10 tablet 3    Cholecalciferol 50 MCG (2000 UT) TABS TAKE 1 TABLET BY MOUTH DAILY      albuterol sulfate  (90 Base) MCG/ACT inhaler INHALE 2 PUFFS EVERY 4 (FOUR) HOURS AS NEEDED FOR WHEEZING.  fluticasone (FLONASE) 50 MCG/ACT nasal spray USE 2 SPRAYS IN EACH NOSTRIL ONCE DAILY      fluticasone-salmeterol (ADVAIR) 250-50 MCG/DOSE AEPB INHALE 1 PUFF 2 (TWO) TIMES A DAY.       OXYGEN Inhale into the lungs      DULoxetine (CYMBALTA) 60 MG extended release capsule TAKE 1 CAPSULE BY MOUTH ONCE DAILY IN THE MORNING 90 capsule 3    meclizine (ANTIVERT) 25 MG tablet Take 1 tablet by mouth 3 times daily as needed for Dizziness 90 tablet 5    azelastine (ASTELIN) 0.1 % nasal spray 2 sprays by Nasal route 2 times daily Use in each nostril as directed 1 Bottle 3    Multiple Vitamins-Minerals (MULTIVITAMIN ADULT PO) Take by mouth      amLODIPine (NORVASC) 10 MG tablet TAKE ONE TABLET BY MOUTH DAILY (Patient not taking: Reported on 1/4/2022) 90 tablet 3    EPINEPHrine (EPIPEN 2-SHAUN) 0.3 MG/0.3ML SOAJ injection Inject 0.3 mLs into the muscle once for 1 dose Use as directed for allergic reaction 0.3 mL 1     No current facility-administered medications for this visit. Patient Active Problem List   Diagnosis    [facility] congest[facility] failure ([facility]    Depression    Fibromyalgia    Gout of multiple sites    Mixed hyperlipidemia    Morbid obesity due to excess calories ([facility]    HTN (hypertension)    Chronic midline low back pain without sciatica    Benign headache    Family hx of colon cancer    Hypoxia    TAURUS (obstructive sleep apnea)    Thyroid mass    Chronic combined systolic and diastolic congestive heart failure (HCC)    Hypoxemia requiring supplemental oxygen    Mild intermittent asthma without complication    Non-seasonal allergic rhinitis    Pulmonary hypertension (HCC)    SOB (shortness of breath)    OAB (overactive bladder)    Chronic obstructive pulmonary disease (HCC)        Review of Systems   Constitutional: Negative for activity change, appetite change, chills, diaphoresis, fatigue, fever and unexpected weight change. HENT: Negative for congestion, ear discharge, postnasal drip, sinus pressure, sneezing, sore throat, tinnitus, trouble swallowing and voice change. Loss of taste and smell following COVID-19 infection. Eyes: Negative for photophobia, pain, discharge, redness, itching and visual disturbance. Respiratory: Positive for shortness of breath. Negative for cough, chest tightness and wheezing. Some mild shortness of breath following a COVID-19 infection, but has overall improved since last OV   Cardiovascular: Negative for chest pain, palpitations and leg swelling. Gastrointestinal: Negative for abdominal distention, abdominal pain, blood in stool, constipation, diarrhea and nausea. Endocrine: Negative for cold intolerance, heat intolerance, polydipsia, polyphagia and polyuria.    Genitourinary: Negative for difficulty urinating, dysuria, flank pain, frequency, equal, round, and reactive to light. Neck:      Thyroid: No thyromegaly. Vascular: No carotid bruit or JVD. Trachea: No tracheal deviation. Comments: Palpable thyroid nodules noted  Cardiovascular:      Rate and Rhythm: Normal rate and regular rhythm. Pulses: Normal pulses. Heart sounds: Normal heart sounds. No murmur heard. No friction rub. No gallop. Pulmonary:      Effort: Pulmonary effort is normal. No respiratory distress. Breath sounds: Normal breath sounds. No stridor. No wheezing, rhonchi or rales. Chest:      Chest wall: No tenderness. Abdominal:      General: Abdomen is flat. Bowel sounds are normal. There is no distension. Palpations: Abdomen is soft. There is no mass. Tenderness: There is no abdominal tenderness. There is no right CVA tenderness, left CVA tenderness, guarding or rebound. Hernia: No hernia is present. Musculoskeletal:         General: No swelling, tenderness, deformity or signs of injury. Normal range of motion. Cervical back: Normal range of motion and neck supple. No rigidity. No muscular tenderness. Right lower leg: No edema. Left lower leg: No edema. Comments: Gait is antalgic. There is tenderness on palpation of the lower lumbar spine. Lymphadenopathy:      Cervical: No cervical adenopathy. Skin:     General: Skin is warm and dry. Capillary Refill: Capillary refill takes less than 2 seconds. Coloration: Skin is not jaundiced or pale. Findings: No bruising, erythema, lesion or rash. Comments: Has a small skin tag to the right side of her neck. She has a brown irregularly pigmented mole to her right breast.   Neurological:      General: No focal deficit present. Mental Status: She is alert and oriented to person, place, and time. Mental status is at baseline. Cranial Nerves: No cranial nerve deficit. Sensory: No sensory deficit.       Motor: No weakness or abnormal muscle tone.      Coordination: Coordination normal.      Gait: Gait normal.      Deep Tendon Reflexes: Reflexes are normal and symmetric. Reflexes normal.   Psychiatric:         Mood and Affect: Mood normal.         Behavior: Behavior normal.         Thought Content: Thought content normal.         Judgment: Judgment normal.         1. Primary hypertension    2. Chronic congestive heart failure, unspecified heart failure type ([address]    3. Morbid obesity due to excess calories ([address]    4. Chronic combined systolic and diastolic congestive heart failure ([address]    5. Pulmonary hypertension ([address]    6. Skin tag    7. Skin lesion    8. OAB (overactive bladder)    9. Hyperuricemia    10. Insomnia, unspecified type    11. Arthralgia, unspecified joint    12. Other migraine without status migrainosus, not intractable    13. Chronic obstructive pulmonary disease, unspecified COPD type ([address]    14. Vertigo    15. Other depression        ASSESSMENT/PLAN:    59-year-old woman here for follow-up    1. Hypertension: Blood pressure much improved. Continue medications as prescribed    2. Overactive bladder: Doing well with Myrbetriq    3. Skin tag/skin lesion: Refer to dermatology    4. Congestive heart failure: Well-controlled with current medications    5. Hyperuricemia: No gout flares on allopurinol    6. Obesity: Stable    7. Insomnia: Elavil as needed    8. Joint pain: Continue Celebrex as prescribed    9. Migraines: Continue Fioricet as needed    10. COPD/Pulmonary Hypertension: Stable    11. Vertigo: Continue Antivert as prescribed. 12. Depression: Doing well with Cymbalta    Evertorina Birdie was seen today for 1 month follow-up.     Diagnoses and all orders for this visit:    Primary hypertension    Chronic congestive heart failure, unspecified heart failure type ([address]    Morbid obesity due to excess calories (HCC)    Chronic combined systolic and diastolic congestive heart failure ([address]    Pulmonary hypertension (HCC)    Skin tag  - External Referral To Dermatology    Skin lesion  -     External Referral To Dermatology    OAB (overactive bladder)    Hyperuricemia    Insomnia, unspecified type    Arthralgia, unspecified joint    Other migraine without status migrainosus, not intractable    Chronic obstructive pulmonary disease, unspecified COPD type ([address]    Vertigo    Other depression    Other orders  -     spironolactone (ALDACTONE) 25 MG tablet; Take 1 tablet by mouth daily  -     fenofibrate (TRICOR) 145 MG tablet; TAKE 1 TABLET BY MOUTH DAILY (FOR TRIGLYCERIDES/CHOLESTEROL)  -     hydrALAZINE (APRESOLINE) 25 MG tablet; Take 1 tablet by mouth 4 times daily  -     Discontinue: furosemide (LASIX) 40 MG tablet; Take 1 tablet by mouth daily  -     furosemide (LASIX) 40 MG tablet; Take 1 tablet by mouth daily          Return as scheduled.      Orders Placed This Encounter   Procedures    External Referral To Dermatology     Referral Priority:   Routine     Referral Type:   Eval and Treat     Referral Reason:   Specialty Services Required     Requested Specialty:   Dermatology     Number of Visits Requested:   1       Memo Downing MD

## 2022-01-06 PROBLEM — J44.9 CHRONIC OBSTRUCTIVE PULMONARY DISEASE (HCC): Status: ACTIVE | Noted: 2022-01-06

## 2022-01-06 ASSESSMENT — ENCOUNTER SYMPTOMS
SHORTNESS OF BREATH: 1
BLOOD IN STOOL: 0
WHEEZING: 0
CONSTIPATION: 0
ABDOMINAL DISTENTION: 0
EYE PAIN: 0
COUGH: 0
SINUS PRESSURE: 0
EYE DISCHARGE: 0
DIARRHEA: 0
CHEST TIGHTNESS: 0
NAUSEA: 0
PHOTOPHOBIA: 0
TROUBLE SWALLOWING: 0
SORE THROAT: 0
COLOR CHANGE: 0
EYE ITCHING: 0
EYE REDNESS: 0
BACK PAIN: 1
VOICE CHANGE: 0
ABDOMINAL PAIN: 0

## 2022-02-16 DIAGNOSIS — M19.90 OSTEOARTHRITIS, UNSPECIFIED OSTEOARTHRITIS TYPE, UNSPECIFIED SITE: ICD-10-CM

## 2022-02-17 RX ORDER — CELECOXIB 100 MG/1
100 CAPSULE ORAL 2 TIMES DAILY
Qty: 180 CAPSULE | Refills: 0 | Status: SHIPPED | OUTPATIENT
Start: 2022-02-17 | End: 2022-05-23

## 2022-04-25 NOTE — PROGRESS NOTES
YOB: 1955  Location: [facility]  Location Address: [address] [place] [address], [facility], [address] [place] [address]  Location Phone: [phone]    Chief Complaint   Patient presents with   • Follow-up       History of Present Illness  Pau Cabrera is a 66 y.o. female.  Pau Cabrera is here for follow up of ENT complaints. The patient has had problems with thyroid nodules  The symptoms are localized to the bilateral thyroid. The patient has had no obvious clinical symptoms symptoms. The nodules have been present for the last several years There have been no identified factors that aggravate the symptoms. There have been no factors that have improved the symptoms.  Patient states thyroid nodule was biopsied at some point in the past when she was seeing Dr. Callahan. She is not sure what the results were.   She denies difficulty swallowing, globus sensation or neck pain.   She does have chronic shortness of breath.    she is on 2l nasal canula oxygen throughout the day and at night. She is not currently wearing cpap. She feels as if the oxygen has improved symptoms     US Thyroid (2022 09:33)    US Thyroid (2021 18:28)    TSH (2021 04:07)    T4, Free (2021 04:07)      Past Medical History:   Diagnosis Date   • Anemia     iron def   • Anxiety    • Asthma    • Back pain    • CHF (congestive heart failure) ([facility])    • COVID-19 vaccine series completed     pfizer   • COVID-19 vaccine series completed    • CTS (carpal tunnel syndrome)    • Depression    • Fibromyalgia    • GERD (gastroesophageal reflux disease)    • Gout    • Hyperlipemia    • Hyperlipidemia    • Hypertension    • Obesity    • Obstructive sleep apnea     could not tolerate machine    • Peripheral neuropathy        Past Surgical History:   Procedure Laterality Date   • BREAST BIOPSY Left    •  SECTION      X 2   • COLONOSCOPY  10/22/2015    Tics repeat exam in 5 years   • COLONOSCOPY  2007    Small  hemorrhoids repeat exam in 3 years   • COLONOSCOPY N/A 11/12/2020    Procedure: COLONOSCOPY WITH ANESTHESIA;  Surgeon: Denny Francis MD;  Location: [facility] ENDOSCOPY;  Service: Gastroenterology;  Laterality: N/A;  pre: family hx colon ca  post: diverticulosis  Carola Barbosa MD   • HYSTERECTOMY  2000    total   • UMBILICAL HERNIA REPAIR      had 1/2 of it repaired with hysterectomy        Outpatient Medications Marked as Taking for the 4/26/22 encounter (Office Visit) with Milad So MD   Medication Sig Dispense Refill   • albuterol sulfate  (90 Base) MCG/ACT inhaler Inhale 2 puffs Every 4 (Four) Hours As Needed for Wheezing. 6.7 g 5   • allopurinol (ZYLOPRIM) 300 MG tablet      • amitriptyline (ELAVIL) 25 MG tablet Take 25 mg by mouth Every Night.     • amLODIPine (NORVASC) 10 MG tablet TAKE ONE TABLET BY MOUTH DAILY     • azelastine (ASTELIN) 0.1 % nasal spray 2 sprays into the nostril(s) as directed by provider 2 (Two) Times a Day. Use in each nostril as directed     • Azelastine HCl 137 MCG/SPRAY solution 2 sprays into the nostril(s) as directed by provider 2 (two) times a day. 30 mL 5   • butalbital-acetaminophen-caffeine (FIORICET, ESGIC) -40 MG per tablet Take 1 tablet by mouth Every 4 (Four) Hours As Needed for Headache.     • carvedilol (COREG) 25 MG tablet TAKE 1 TABLET BY MOUTH TWICE A DAY 60 tablet 3   • celecoxib (CeleBREX) 100 MG capsule Take 100 mg by mouth.     • cholecalciferol (VITAMIN D3) 1000 UNITS tablet Take 2,000 Units by mouth Daily.     • Cholecalciferol 50 MCG (2000 UT) tablet TAKE 1 TABLET BY MOUTH DAILY     • DULoxetine (CYMBALTA) 60 MG capsule Take 60 mg by mouth Every Morning.     • EPINEPHrine (ADRENALIN) 0.05 MG/ML syringe Inject 20 mL as directed.     • fenofibrate (TRICOR) 145 MG tablet Take 145 mg by mouth Daily.     • fluticasone (Flonase Allergy Relief) 50 MCG/ACT nasal spray 2 sprays into the nostril(s) as directed by provider Daily. 16 g 5   •  fluticasone-salmeterol (Wixela Inhub) 250-50 MCG/DOSE DISKUS Inhale 1 puff 2 (Two) Times a Day. 1 each 11   • furosemide (LASIX) 40 MG tablet Take 1 tablet by mouth Daily. 30 tablet 0   • hydrALAZINE (APRESOLINE) 25 MG tablet Take 25 mg by mouth 4 (Four) Times a Day.     • loratadine (CLARITIN) 10 MG tablet Take 1 tablet by mouth Daily. 90 tablet 3   • meclizine (ANTIVERT) 25 MG tablet Take 25 mg by mouth 3 (three) times a day as needed for dizziness.     • Multiple Vitamins-Minerals (MULTIVITAMIN ADULT PO) Take  by mouth.     • Myrbetriq 25 MG tablet sustained-release 24 hour 24 hr tablet Take 25 mg by mouth Daily.     • OXYGEN-HELIUM IN Inhale.     • spironolactone (ALDACTONE) 25 MG tablet Take 25 mg by mouth.         Codeine sulfate and Lisinopril    Family History   Problem Relation Age of Onset   • Arthritis Mother    • Heart disease Mother    • Hypertension Mother    • Thyroid disease Mother    • Arthritis Father    • Hypertension Father    • Anuerysm Father    • Stroke Father    • Asthma Brother    • Colon polyps Neg Hx    • Colon cancer Neg Hx        Social History     Socioeconomic History   • Marital status:    Tobacco Use   • Smoking status: Never Smoker   • Smokeless tobacco: Never Used   Vaping Use   • Vaping Use: Never used   Substance and Sexual Activity   • Alcohol use: No   • Drug use: No   • Sexual activity: Defer     Birth control/protection: Surgical       Review of Systems   Constitutional: Positive for fatigue.   HENT: Positive for postnasal drip. Negative for sore throat, trouble swallowing and voice change.    Eyes: Negative.    Respiratory: Positive for shortness of breath.    Cardiovascular: Negative.    Gastrointestinal: Negative.    Endocrine: Negative.    Genitourinary: Negative.    Musculoskeletal: Positive for arthralgias.   Allergic/Immunologic: Positive for environmental allergies.   Neurological: Negative.        Vitals:    04/26/22 0932   BP: 155/92   Pulse: 86   Temp: 98.2  °F (36.8 °C)       Body mass index is 65.57 kg/m².    Objective     Physical Exam  Vitals reviewed.   Constitutional:       Appearance: She is obese.   HENT:      Head: Normocephalic.      Right Ear: Hearing, tympanic membrane, ear canal and external ear normal. There is impacted cerumen (non occluding ).      Left Ear: Hearing, tympanic membrane, ear canal and external ear normal. There is impacted cerumen (non occluding ).      Nose: Nose normal.      Mouth/Throat:      Lips: Pink.      Mouth: Mucous membranes are moist.      Pharynx: Uvula midline.      Comments: Balderas III     Neck:      Thyroid: No thyroid tenderness.      Comments: Palpable thyroid nodules bilateral   Left thyromegaly and firmness    Full, thick neck     Lymphadenopathy:      Cervical: No cervical adenopathy.   Neurological:      Mental Status: She is alert.         Assessment/Plan   Diagnoses and all orders for this visit:    1. Morbid obesity due to excess calories (HCC) (Primary)    2. Thyroid nodule  -     TSH; Future  -     Thyroglobulin With Anti-TG; Future  -     T4; Future  -     CT Soft Tissue Neck With & Without Contrast; Future  -     US Guided Thyroid Biopsy; Future    3. Multinodular goiter  -     TSH; Future  -     Thyroglobulin With Anti-TG; Future  -     T4; Future  -     CT Soft Tissue Neck With & Without Contrast; Future  -     US Guided Thyroid Biopsy; Future    4. CHARLIE (obstructive sleep apnea) - non compliant with cpap    5. Thyroid mass  -     TSH; Future  -     Thyroglobulin With Anti-TG; Future  -     T4; Future  -     CT Soft Tissue Neck With & Without Contrast; Future  -     US Guided Thyroid Biopsy; Future    6. Chronic combined systolic and diastolic congestive heart failure (HCC)      * Surgery not found *  Orders Placed This Encounter   Procedures   • CT Soft Tissue Neck With & Without Contrast     Standing Status:   Future     Standing Expiration Date:   4/26/2023     Order Specific Question:   Release to  patient     Answer:   Immediate   • US Guided Thyroid Biopsy     Standing Status:   Future     Standing Expiration Date:   2023     Scheduling Instructions:      Dominant mass     Order Specific Question:   Reason for Exam:     Answer:   left thyroid nodule   • TSH     Standing Status:   Future     Standing Expiration Date:   2023     Order Specific Question:   Release to patient     Answer:   Immediate   • Thyroglobulin With Anti-TG     Standing Status:   Future     Standing Expiration Date:   2023     Order Specific Question:   Release to patient     Answer:   Immediate   • T4     Standing Status:   Future     Standing Expiration Date:   2023     Order Specific Question:   Release to patient     Answer:   Immediate     Return in about 3 months (around 2022) for Recheck.     Due to enlargement of thyroid mass will repeat biopsy   Unable to locate complete pathology report from 2018     Patient Instructions   CONTACT INFORMATION:  The main office phone number is [phone]. For emergencies after hours and on weekends, this number will convert over to our answering service and the on call provider will answer. Please try to keep non emergent phone calls/ questions to office hours 9am-5pm Monday through Friday.      [facility]  As an alternative, you can sign up and use the Epic MyChart system for more direct and quicker access for non emergent questions/ problems.  [facility] allows you to send messages to your doctor, view your test results, renew your prescriptions, schedule appointments, and more. To sign up, go to [employer] and click on the Sign Up Now link in the New User? box. Enter your [facility] Activation Code exactly as it appears below along with the last four digits of your Social Security Number and your Date of Birth () to complete the sign-up process. If you do not sign up before the expiration date, you must request a new code.     [facility]  Activation Code: Activation code not generated  Current codesyhart Status: Active     If you have questions, you can email [email or web address] or call [phone] to talk to our MyChart staff. Remember, MyChart is NOT to be used for urgent needs. For medical emergencies, dial 911.     IF YOU SMOKE OR USE TOBACCO PLEASE READ THE FOLLOWING:  Why is smoking bad for me?  Smoking increases the risk of heart disease, lung disease, vascular disease, stroke, and cancer. If you smoke, STOP!        IF YOU SMOKE OR USE TOBACCO PLEASE READ THE FOLLOWING:  Why is smoking bad for me?  Smoking increases the risk of heart disease, lung disease, vascular disease, stroke, and cancer. If you smoke, STOP!     For more information:  [employer]uit Now [place]  1-800-QUIT-NOW  [email or web address]/en-US/ The patient has a thyroid nodule. I explained the pathology of thyroid nodules including the risks of cancer. The options of surgery versus an observational course were discussed. Recommendation was made for a FINE NEEDLE ASPIRATION of the nodule/mass

## 2022-04-26 ENCOUNTER — OFFICE VISIT (OUTPATIENT)
Dept: OTOLARYNGOLOGY | Facility: CLINIC | Age: 67
End: 2022-04-26

## 2022-04-26 VITALS
TEMPERATURE: 98.2 F | HEIGHT: 61 IN | WEIGHT: 293 LBS | HEART RATE: 86 BPM | BODY MASS INDEX: 55.32 KG/M2 | DIASTOLIC BLOOD PRESSURE: 92 MMHG | SYSTOLIC BLOOD PRESSURE: 155 MMHG

## 2022-04-26 DIAGNOSIS — E04.1 THYROID NODULE: ICD-10-CM

## 2022-04-26 DIAGNOSIS — G47.33 OSA (OBSTRUCTIVE SLEEP APNEA): ICD-10-CM

## 2022-04-26 DIAGNOSIS — E07.9 THYROID MASS: ICD-10-CM

## 2022-04-26 DIAGNOSIS — E04.2 MULTINODULAR GOITER: ICD-10-CM

## 2022-04-26 DIAGNOSIS — I50.42 CHRONIC COMBINED SYSTOLIC AND DIASTOLIC CONGESTIVE HEART FAILURE: ICD-10-CM

## 2022-04-26 DIAGNOSIS — E66.01 MORBID OBESITY DUE TO EXCESS CALORIES: Primary | ICD-10-CM

## 2022-04-26 PROCEDURE — 99214 OFFICE O/P EST MOD 30 MIN: CPT | Performed by: NURSE PRACTITIONER

## 2022-04-26 NOTE — PATIENT INSTRUCTIONS
CONTACT INFORMATION:  The main office phone number is [phone]. For emergencies after hours and on weekends, this number will convert over to our answering service and the on call provider will answer. Please try to keep non emergent phone calls/ questions to office hours 9am-5pm Monday through Friday.      [employer]  As an alternative, you can sign up and use the Epic MyChart system for more direct and quicker access for non emergent questions/ problems.  [employer] allows you to send messages to your doctor, view your test results, renew your prescriptions, schedule appointments, and more. To sign up, go to [employer] and click on the Sign Up Now link in the New User? box. Enter your [employer] Activation Code exactly as it appears below along with the last four digits of your Social Security Number and your Date of Birth () to complete the sign-up process. If you do not sign up before the expiration date, you must request a new code.     [employer] Activation Code: Activation code not generated  Current [employer] Status: Active     If you have questions, you can email [email or web address] or call [phone] to talk to our [employer] staff. Remember, [employer] is NOT to be used for urgent needs. For medical emergencies, dial 911.     IF YOU SMOKE OR USE TOBACCO PLEASE READ THE FOLLOWING:  Why is smoking bad for me?  Smoking increases the risk of heart disease, lung disease, vascular disease, stroke, and cancer. If you smoke, STOP!        IF YOU SMOKE OR USE TOBACCO PLEASE READ THE FOLLOWING:  Why is smoking bad for me?  Smoking increases the risk of heart disease, lung disease, vascular disease, stroke, and cancer. If you smoke, STOP!     For more information:  Quit Now [place]  -QUIT-NOW  [email or web address]/ The patient has a thyroid nodule. I explained the pathology of thyroid nodules including the risks of cancer. The options of surgery versus an  observational course were discussed. Recommendation was made for a FINE NEEDLE ASPIRATION of the nodule/mass

## 2022-05-10 ENCOUNTER — LAB (OUTPATIENT)
Dept: LAB | Facility: HOSPITAL | Age: 67
End: 2022-05-10

## 2022-05-10 DIAGNOSIS — E07.9 THYROID MASS: ICD-10-CM

## 2022-05-10 DIAGNOSIS — E04.2 MULTINODULAR GOITER: ICD-10-CM

## 2022-05-10 DIAGNOSIS — E04.1 THYROID NODULE: ICD-10-CM

## 2022-05-10 LAB
T4 SERPL-MCNC: 7.57 MCG/DL (ref 4.5–11.7)
TSH SERPL DL<=0.05 MIU/L-ACNC: 2.91 UIU/ML (ref 0.27–4.2)

## 2022-05-10 PROCEDURE — 84432 ASSAY OF THYROGLOBULIN: CPT

## 2022-05-10 PROCEDURE — 36415 COLL VENOUS BLD VENIPUNCTURE: CPT

## 2022-05-10 PROCEDURE — 84436 ASSAY OF TOTAL THYROXINE: CPT

## 2022-05-10 PROCEDURE — 86800 THYROGLOBULIN ANTIBODY: CPT

## 2022-05-10 PROCEDURE — 84443 ASSAY THYROID STIM HORMONE: CPT

## 2022-05-11 ENCOUNTER — TELEPHONE (OUTPATIENT)
Dept: OTOLARYNGOLOGY | Facility: CLINIC | Age: 67
End: 2022-05-11

## 2022-05-11 LAB
THYROGLOB AB SERPL-ACNC: <1 IU/ML (ref 0–0.9)
THYROGLOB SERPL-MCNC: 61.6 NG/ML (ref 1.5–38.5)

## 2022-05-12 ENCOUNTER — HOSPITAL ENCOUNTER (OUTPATIENT)
Dept: CT IMAGING | Facility: HOSPITAL | Age: 67
Discharge: HOME | End: 2022-05-12

## 2022-05-12 ENCOUNTER — HOSPITAL ENCOUNTER (OUTPATIENT)
Dept: ULTRASOUND IMAGING | Facility: HOSPITAL | Age: 67
Discharge: HOME | End: 2022-05-12

## 2022-05-12 DIAGNOSIS — E04.2 MULTINODULAR GOITER: ICD-10-CM

## 2022-05-12 DIAGNOSIS — E07.9 THYROID MASS: ICD-10-CM

## 2022-05-12 DIAGNOSIS — E04.1 THYROID NODULE: ICD-10-CM

## 2022-05-12 DIAGNOSIS — J98.4 PULMONARY SCARRING: ICD-10-CM

## 2022-05-12 LAB — CREAT BLDA-MCNC: 1.5 MG/DL (ref 0.6–1.3)

## 2022-05-12 PROCEDURE — 82565 ASSAY OF CREATININE: CPT

## 2022-05-12 PROCEDURE — 76942 ECHO GUIDE FOR BIOPSY: CPT

## 2022-05-12 PROCEDURE — 71250 CT THORAX DX C-: CPT

## 2022-05-12 PROCEDURE — 25010000002 IOPAMIDOL 61 % SOLUTION: Performed by: NURSE PRACTITIONER

## 2022-05-12 PROCEDURE — 70492 CT SFT TSUE NCK W/O & W/DYE: CPT

## 2022-05-12 PROCEDURE — 88172 CYTP DX EVAL FNA 1ST EA SITE: CPT | Performed by: NURSE PRACTITIONER

## 2022-05-12 PROCEDURE — 88112 CYTOPATH CELL ENHANCE TECH: CPT | Performed by: NURSE PRACTITIONER

## 2022-05-12 RX ORDER — LIDOCAINE HYDROCHLORIDE 10 MG/ML
10 INJECTION, SOLUTION INFILTRATION; PERINEURAL ONCE
Status: DISCONTINUED | OUTPATIENT
Start: 2022-05-12 | End: 2022-05-18

## 2022-05-12 RX ADMIN — IOPAMIDOL 100 ML: 612 INJECTION, SOLUTION INTRAVENOUS at 12:12

## 2022-05-13 LAB
BEAKER LAB AP INTRAOPERATIVE CONSULTATION: NORMAL
CYTO UR: NORMAL
LAB AP CASE REPORT: NORMAL
LAB AP CLINICAL INFORMATION: NORMAL
LAB AP DIAGNOSIS COMMENT: NORMAL
Lab: NORMAL
PATH REPORT.FINAL DX SPEC: NORMAL
PATH REPORT.GROSS SPEC: NORMAL

## 2022-05-18 ENCOUNTER — OFFICE VISIT (OUTPATIENT)
Dept: INTERNAL MEDICINE | Age: 67
End: 2022-05-18
Payer: MEDICARE

## 2022-05-18 ENCOUNTER — OFFICE VISIT (OUTPATIENT)
Dept: PULMONOLOGY | Facility: CLINIC | Age: 67
End: 2022-05-18

## 2022-05-18 VITALS
WEIGHT: 293 LBS | OXYGEN SATURATION: 95 % | DIASTOLIC BLOOD PRESSURE: 74 MMHG | SYSTOLIC BLOOD PRESSURE: 136 MMHG | HEART RATE: 70 BPM | HEIGHT: 61 IN | BODY MASS INDEX: 55.32 KG/M2

## 2022-05-18 VITALS
TEMPERATURE: 98.4 F | HEART RATE: 88 BPM | DIASTOLIC BLOOD PRESSURE: 90 MMHG | SYSTOLIC BLOOD PRESSURE: 164 MMHG | OXYGEN SATURATION: 94 %

## 2022-05-18 DIAGNOSIS — I27.20 PULMONARY HYPERTENSION: ICD-10-CM

## 2022-05-18 DIAGNOSIS — Z86.16 HISTORY OF COVID-19: ICD-10-CM

## 2022-05-18 DIAGNOSIS — R09.02 HYPOXEMIA REQUIRING SUPPLEMENTAL OXYGEN: ICD-10-CM

## 2022-05-18 DIAGNOSIS — G47.33 OSA (OBSTRUCTIVE SLEEP APNEA): Primary | ICD-10-CM

## 2022-05-18 DIAGNOSIS — R21 RASH: ICD-10-CM

## 2022-05-18 DIAGNOSIS — E04.9 GOITER: ICD-10-CM

## 2022-05-18 DIAGNOSIS — Z99.81 HYPOXEMIA REQUIRING SUPPLEMENTAL OXYGEN: ICD-10-CM

## 2022-05-18 DIAGNOSIS — S81.802A OPEN WOUND OF LEFT LOWER LEG, INITIAL ENCOUNTER: ICD-10-CM

## 2022-05-18 DIAGNOSIS — J30.89 NON-SEASONAL ALLERGIC RHINITIS, UNSPECIFIED TRIGGER: ICD-10-CM

## 2022-05-18 DIAGNOSIS — Z91.199 NON COMPLIANCE WITH MEDICAL TREATMENT: ICD-10-CM

## 2022-05-18 DIAGNOSIS — J98.4 PULMONARY SCARRING: ICD-10-CM

## 2022-05-18 DIAGNOSIS — E66.01 MORBID OBESITY DUE TO EXCESS CALORIES: ICD-10-CM

## 2022-05-18 DIAGNOSIS — R06.02 SOB (SHORTNESS OF BREATH): ICD-10-CM

## 2022-05-18 DIAGNOSIS — R09.02 HYPOXIA: ICD-10-CM

## 2022-05-18 DIAGNOSIS — E07.9 THYROID MASS: ICD-10-CM

## 2022-05-18 DIAGNOSIS — J45.20 MILD INTERMITTENT ASTHMA WITHOUT COMPLICATION: ICD-10-CM

## 2022-05-18 DIAGNOSIS — Z78.9 NON-SMOKER: ICD-10-CM

## 2022-05-18 PROCEDURE — 1123F ACP DISCUSS/DSCN MKR DOCD: CPT | Performed by: NURSE PRACTITIONER

## 2022-05-18 PROCEDURE — G8399 PT W/DXA RESULTS DOCUMENT: HCPCS | Performed by: NURSE PRACTITIONER

## 2022-05-18 PROCEDURE — 1090F PRES/ABSN URINE INCON ASSESS: CPT | Performed by: NURSE PRACTITIONER

## 2022-05-18 PROCEDURE — 4040F PNEUMOC VAC/ADMIN/RCVD: CPT | Performed by: NURSE PRACTITIONER

## 2022-05-18 PROCEDURE — G8417 CALC BMI ABV UP PARAM F/U: HCPCS | Performed by: NURSE PRACTITIONER

## 2022-05-18 PROCEDURE — 99214 OFFICE O/P EST MOD 30 MIN: CPT | Performed by: NURSE PRACTITIONER

## 2022-05-18 PROCEDURE — 3017F COLORECTAL CA SCREEN DOC REV: CPT | Performed by: NURSE PRACTITIONER

## 2022-05-18 PROCEDURE — 99214 OFFICE O/P EST MOD 30 MIN: CPT | Performed by: INTERNAL MEDICINE

## 2022-05-18 PROCEDURE — 1036F TOBACCO NON-USER: CPT | Performed by: NURSE PRACTITIONER

## 2022-05-18 PROCEDURE — G8427 DOCREV CUR MEDS BY ELIG CLIN: HCPCS | Performed by: NURSE PRACTITIONER

## 2022-05-18 RX ORDER — FLUTICASONE PROPIONATE 50 MCG
2 SPRAY, SUSPENSION (ML) NASAL DAILY
Qty: 16 G | Refills: 11 | Status: SHIPPED | OUTPATIENT
Start: 2022-05-18 | End: 2022-11-28 | Stop reason: SDUPTHER

## 2022-05-18 RX ORDER — LORATADINE 10 MG/1
10 TABLET ORAL DAILY
Qty: 90 TABLET | Refills: 3 | Status: SHIPPED | OUTPATIENT
Start: 2022-05-18 | End: 2022-11-28 | Stop reason: SDUPTHER

## 2022-05-18 RX ORDER — ALBUTEROL SULFATE 90 UG/1
2 AEROSOL, METERED RESPIRATORY (INHALATION) EVERY 4 HOURS PRN
Qty: 6.7 G | Refills: 5 | Status: SHIPPED | OUTPATIENT
Start: 2022-05-18 | End: 2022-11-28 | Stop reason: SDUPTHER

## 2022-05-18 RX ORDER — AZELASTINE 1 MG/ML
2 SPRAY, METERED NASAL 2 TIMES DAILY
Qty: 60 ML | Refills: 11 | Status: SHIPPED | OUTPATIENT
Start: 2022-05-18 | End: 2022-11-28 | Stop reason: SDUPTHER

## 2022-05-18 RX ORDER — FLUTICASONE PROPIONATE AND SALMETEROL 250; 50 UG/1; UG/1
1 POWDER RESPIRATORY (INHALATION) 2 TIMES DAILY
Qty: 1 EACH | Refills: 11 | Status: SHIPPED | OUTPATIENT
Start: 2022-05-18 | End: 2022-11-28 | Stop reason: SDUPTHER

## 2022-05-18 ASSESSMENT — ENCOUNTER SYMPTOMS
CHOKING: 0
EYE DISCHARGE: 0
VOMITING: 0
COUGH: 0
ABDOMINAL DISTENTION: 0
CONSTIPATION: 0
NAUSEA: 0
STRIDOR: 0
TROUBLE SWALLOWING: 0
BLOOD IN STOOL: 0
SHORTNESS OF BREATH: 1
EYE ITCHING: 0
ABDOMINAL PAIN: 0
DIARRHEA: 0
SORE THROAT: 0
COLOR CHANGE: 0
WHEEZING: 0

## 2022-05-18 NOTE — PATIENT INSTRUCTIONS
1,   Rash   Mix benadryl cream and hydrocortisone cream and use 3-4 times daily for the rash    #2 hypoxia you do need to keep your oxygen with you at all times has been getting hypoxic is not good for your health General  3. Thyroid nodule we will get appoint with Dr. Traci Ramos for her to follow-up from the diagnosis from Dr. Reyna Grewal  4. Upper limb of left leg keep clean and dry clean twice daily with Dial soap and apply Neosporin ointment and a dressing Dr. Traci Ramos can recheck it when she sees you for your goiter.   If you should start getting more swollen or drainage becomes thick then we would need to see you sooner

## 2022-05-18 NOTE — PROGRESS NOTES
[facility] OUTPATIENT PROGRESS NOTE    Patient: aPu Cabrera  : 1955  Age: 66 y.o.  Date of Service: May 18, 2022    REASON FOR CLINIC VISIT:  Chief Complaint   Patient presents with   • Non-seasonal allergic rhinitis, unspecified triggerNon-seas   • CHARLIE (obstructive sleep apnea) - non compliant with cpap     5/2 chest CT and neck CT        Subjective:    History of Present Illness:  Pau Cabrera is a 66 y.o. female who presents to the office today to be seen for    Diagnosis Plan   1. CHARLIE (obstructive sleep apnea) - non compliant with cpap     2. Mild intermittent asthma without complication     3. Pulmonary hypertension (HCC)     4. Pulmonary scarring     5. SOB (shortness of breath)     6. Non-smoker     7. Morbid obesity due to excess calories (HCC)     8. Non-seasonal allergic rhinitis, unspecified trigger     9. Non compliance with medical treatment     10. Hypoxemia requiring supplemental oxygen     .  Other problems per record.  Patient is a very pleasant middle aged -American female who was seen in the pulmonary clinic for a follow-up visit.    She was seen by me in pulmonary clinic a few months ago.  She has severe obstructive sleep apnea but is noncompliant to CPAP.  She did not have an recent sleep study done but is willing to do 1 to start the CPAP again.  She is using 2 L of oxygen.  She also has asthma and she is using Wixela and uses albuterol rescue inhaler.  Medication refill was requested and was given.  She has nasal allergy and uses fluticasone nasal spray Astelin nasal spray and loratadine.  Her allergy symptoms are better.  She is using 2 L oxygen at night and does not use it during activity and exercise but does not use it all the time.  She is mostly homebound.  She still feels tired and sleepy during the daytime.    She lives at home independently.  She did not have any recent hospitalization and ER visit an urgent care visit.  She is doing  well overall.  She did not have much change in her health status.  But has lost little weight since her last visit.  Already vaccinated for COVID and received 3 dose of COVID-vaccine.  He has a recent CT scan of the chest done which shows pulmonary scarring and stable lung nodule and follow-up CT will be done later as needed.      PFT done today:  Not done today      Results for orders placed during the hospital encounter of 06/16/21    Full Pulmonary Function Test With Bronchodilator & ABG    Narrative  [facility] - Pulmonary Function Test    [address]  [place]  [place]  [address]  [phone]    Patient : Pau Cabrera  MRN : [id number]  CSN : [id number]  Pulmonologist : Saman Kwon MD  Date : 6/16/2021    ______________________________________________________________________    Interpretation :  1.  Spirometry is consistent with a severe obstructive ventilatory defect although the decrease in the patient's FEV1 is just into the severe range at 49% of predicted..  2.  There is significant improvement in the patient's spirometry postbronchodilator so that postbronchodilator spirometry is consistent with a moderate obstructive ventilatory defect.  3.  Lung volumes reveal a decrease in vital capacity second to obstruction.  In addition significant hyperinflation is present.  There is also a decrease in inspiratory capacity.  4.  There is a moderate diffusion impairment which when corrected for alveolar volume is normalized.      Saman Kwon MD         Bronchodilator therapy: Advair and Albuterol rescue inhaler    Smoking Status:   Social History     Tobacco Use   Smoking Status Never Smoker   Smokeless Tobacco Never Used     Pulm Rehab: no  Sleep: yes    Support System: lives alone    Code Status:   There are no questions and answers to display.        Review of Systems:  A complete review of systems is performed and all other systems were reviewed and negative as note above in the  HPI.  Review of Systems   Constitutional: Positive for fatigue and unexpected weight gain.   HENT: Positive for congestion and postnasal drip.    Eyes: Negative.    Respiratory: Positive for cough and chest tightness.    Cardiovascular: Positive for leg swelling.   Gastrointestinal: Negative.    Endocrine: Negative.    Genitourinary: Negative.    Musculoskeletal: Positive for arthralgias and back pain.   Skin: Negative.    Allergic/Immunologic: Positive for environmental allergies.   Neurological: Negative.    Hematological: Negative.    Psychiatric/Behavioral: Negative.        CAT/ACT Score:  Not done today    Medications:  Outpatient Encounter Medications as of 5/18/2022   Medication Sig Dispense Refill   • albuterol sulfate  (90 Base) MCG/ACT inhaler Inhale 2 puffs Every 4 (Four) Hours As Needed for Wheezing. 6.7 g 5   • allopurinol (ZYLOPRIM) 300 MG tablet      • amitriptyline (ELAVIL) 25 MG tablet Take 25 mg by mouth Every Night.     • amLODIPine (NORVASC) 10 MG tablet TAKE ONE TABLET BY MOUTH DAILY     • azelastine (ASTELIN) 0.1 % nasal spray 2 sprays into the nostril(s) as directed by provider 2 (Two) Times a Day. Use in each nostril as directed     • Azelastine HCl 137 MCG/SPRAY solution 2 sprays into the nostril(s) as directed by provider 2 (two) times a day. 30 mL 5   • butalbital-acetaminophen-caffeine (FIORICET, ESGIC) -40 MG per tablet Take 1 tablet by mouth Every 4 (Four) Hours As Needed for Headache.     • carvedilol (COREG) 25 MG tablet TAKE 1 TABLET BY MOUTH TWICE A DAY 60 tablet 3   • celecoxib (CeleBREX) 100 MG capsule Take 100 mg by mouth.     • cholecalciferol (VITAMIN D3) 1000 UNITS tablet Take 2,000 Units by mouth Daily.     • Cholecalciferol 50 MCG (2000 UT) tablet TAKE 1 TABLET BY MOUTH DAILY     • DULoxetine (CYMBALTA) 60 MG capsule Take 60 mg by mouth Every Morning.     • EPINEPHrine (ADRENALIN) 0.05 MG/ML syringe Inject 20 mL as directed.     • fenofibrate (TRICOR) 145 MG  "tablet Take 145 mg by mouth Daily.     • fluticasone (Flonase Allergy Relief) 50 MCG/ACT nasal spray 2 sprays into the nostril(s) as directed by provider Daily. 16 g 5   • fluticasone-salmeterol (Wixela Inhub) 250-50 MCG/DOSE DISKUS Inhale 1 puff 2 (Two) Times a Day. 1 each 11   • furosemide (LASIX) 40 MG tablet Take 1 tablet by mouth Daily. 30 tablet 0   • hydrALAZINE (APRESOLINE) 25 MG tablet Take 25 mg by mouth 4 (Four) Times a Day.     • loratadine (CLARITIN) 10 MG tablet Take 1 tablet by mouth Daily. 90 tablet 3   • meclizine (ANTIVERT) 25 MG tablet Take 25 mg by mouth 3 (three) times a day as needed for dizziness.     • Multiple Vitamins-Minerals (MULTIVITAMIN ADULT PO) Take  by mouth.     • Myrbetriq 25 MG tablet sustained-release 24 hour 24 hr tablet Take 25 mg by mouth Daily.     • OXYGEN-HELIUM IN Inhale.     • spironolactone (ALDACTONE) 25 MG tablet Take 25 mg by mouth.       Facility-Administered Encounter Medications as of 5/18/2022   Medication Dose Route Frequency Provider Last Rate Last Admin   • [DISCONTINUED] lidocaine (XYLOCAINE) 1 % injection 10 mL  10 mL Subcutaneous Once Juvenal Sanchez MD           Allergies:  Allergies   Allergen Reactions   • Codeine Sulfate Hives   • Lisinopril Swelling and Rash     Facial swelling       Immunizations:  Immunization History   Administered Date(s) Administered   • COVID-19 (PFIZER) PURPLE CAP 03/09/2021, 03/30/2021, 12/29/2021   • Pneumococcal Conjugate 13-Valent (PCV13) 11/02/2018   • Pneumococcal Polysaccharide (PPSV23) 11/30/2020   • Tdap 10/11/2011       Objective:    Vitals:  /74   Pulse 70   Ht 154.9 cm (61\")   Wt (!) 152 kg (335 lb)   SpO2 95% Comment: 2L  Breastfeeding No   BMI 63.30 kg/m²     Physical Exam:  General: Patient is a 66 y.o. pleasant middle aged  Morbidly obese  female. Looks stated age. Appears to be in no acute distress.  Eyes: EOMI. PERRLA. Vision intact. No scleral icterus.  Ear, Nose, Mouth and " Throat: Hearing is grossly intact. No Leukoplakia, pharyngitis, stomatitis or thrush. Swollen nasal mucosa with post nasal drop.  Neck: Range of motion of neck normal. No thyromegaly or masses. Mallampati Class 3  Respiratory: Clear to auscultation bilaterally. No use of accessory muscles. Decreased breath sounds.  Cardiovascular: Normal heart sounds. Regularly regular rhythm without murmur.  Gastrointestinal: Non tender, non distended, soft. Bowel sounds positive in all four quadrants. No organomegaly.  Skin: No obvious rashes, lesions, ulcers or large amount of bruising.  Trace bilateral ankle edema.   Neurological: No new motor deficits. Cranial nerves appear intact.  Psychiatric: Patient is alert and oriented to person, place and time.    Chest Imaging:     Recent CT scan of chest showed     IMPRESSION:  1. Linear right upper and bilateral lower lobe scarring with no acute  consolidation. Stable 6 mm subpleural left upper lobe nodule, unchanged  from 6/4/2021 and 1/14/2011 strongly favoring a benign process. No  peripheral interstitial lung disease.   2. Mild cardiomegaly with moderate left coronary artery calcification.  No thoracic aortic aneurysm.  3. Enlarged multinodular left thyroid lobe. Please refer to CT neck  Report.    This report was finalized on 05/12/2022 15:19 by Dr. Rosa Johnson MD    Assessment:  1. CHARLIE (obstructive sleep apnea) - non compliant with cpap    2. Mild intermittent asthma without complication    3. Pulmonary hypertension (HCC)    4. Pulmonary scarring    5. SOB (shortness of breath)    6. Non-smoker    7. Morbid obesity due to excess calories (HCC)    8. Non-seasonal allergic rhinitis, unspecified trigger    9. Non compliance with medical treatment    10. Hypoxemia requiring supplemental oxygen        Plan/Recommendations:    1.  Patient has untreated obstructive sleep apnea and she has problem in tolerating the CPAP mask and is noncompliant.  She told me she has 2 CPAP machines  at home.  She is using only 2 L of oxygen.  She will need to get another repeat sleep study and I again encouraged her to start using CPAP again because she still continues to have sleep disturbances.  2.  Her last pulmonary function test shows significant bronchial hyperactivity or asthma and she will need to continue using her Wixela and albuterol rescue Chapman as before and prescription refill sent to the pharmacy.  3.  Patient has significant morbid obesity with BMI of 63 weight loss and healthy lifestyle was recommended.  Patient will continue using her allergy medication including Astelin nasal spray fluticasone nasal spray and loratadine and prescription refills are sent to the pharmacy.  She is also on Lasix for fluid overload.  I explained the CT scan results to the patient.  4.  Patient has oxygen 2 L which she is using mostly at night and during activity and I advised her to continue monitoring her oxygen saturation at home and if it drops below 88 she should start using oxygen at rest as well.  She is already vaccinated for COVID.  She will get other vaccinations in the winter.  She is going to see Carola Barbosa MD the primary care provider today for her worsening goiter.  She will return to pulmonary clinic in 4 to 6 months time for a follow-up visit or earlier if needed.    Follow up:  3 Months    Time Spent:  30 minutes    I appreciate the opportunity of participating in this patient's care. I would like to thank the PCP for the referral.  Please feel free to contact me with any other questions.    Peter Crews MD   Pulmonologist/Intensivist     Electronically signed by: Peter Crews MD, 5/18/2022 14:58 CDT

## 2022-05-18 NOTE — ASSESSMENT & PLAN NOTE
She has an open wound on the lateral aspect of her left leg right above the ankle. She said that it was itching and she scratched it and it busted open and started draining fluid that was clear. It was just like a open wound to that area just single sit thickness. She does have a dressing placed has been using Neosporin. I think this is mostly because of her severely edematous lower extremities.   I we did explain the mechanisms of being able to get cellulitis due to the extreme swelling

## 2022-05-18 NOTE — ASSESSMENT & PLAN NOTE
2 very small red raised areas on the right chest it does not appear like a local reaction to something getting been insect bite radius can use hydrocortisone and Benadryl cream

## 2022-05-18 NOTE — ASSESSMENT & PLAN NOTE
Mary Anne Lawrence told her of this but told her to get with Dr. Misa Briggs for treatment so I will get her an appointment within the next 2 weeks to discuss this with her

## 2022-05-18 NOTE — PROGRESS NOTES
[address] [facility]  [address] [address] [place] [address]  [address] [address]  Dept: [phone]  Dept Fax: [phone]: [phone]    Richard Cooley (:  1955) is a 77 y.o. female,Established patient  with opal , here for evaluation of the following chief complaint(s): Other (itchy rash)      Richard Cooley is a 77 y.o. female who presents today for her medical conditions/complaints as noted below. Richard Cooley is c/shan Other (itchy rash)        HPI:     Chief Complaint   Patient presents with    Other     itchy rash     HPI   1.  2 smal bumps on right chest   They are itching and red;    2. Hypoxia; She was rcently started on oxygen but didn't bring with her today   3. Goiter;  Dr William Medina; He told her to check with Dr. Ruby Harris about surgery / referral   #4 open wound of left leg that she developed after scratching her leg.     Past Medical History:   Diagnosis Date    Abnormal CT of spine     Acute sinusitis     Arthritis     Asthma     Bronchitis     Carpal tunnel syndrome     both    CHF (congestive heart failure) (HCC)     Depression     Fibromyalgia     Furuncle     Gout     Hyperlipidemia     Hypertension     Low vitamin D level     Lumbago     Lumbar radiculopathy     Neuropathy     feet    Obesity     Rheumatoid nodule of knee (HCC)     Sleep apnea     UTI (urinary tract infection)     Vertigo     Vitamin D deficiency       Past Surgical History:   Procedure Laterality Date     SECTION      times two    HYSTERECTOMY         Vitals 2022 2022 10/27/2021 10/27/2021 6/10/2021 [id number]   SYSTOLIC 972 [phone] -   DIASTOLIC 90 82 80 90 82 -   Site - - - - - -   Position - - - - - -   Cuff Size - - - - - -   Pulse 88 95 - 86 108 85   Temp 98.4 - - - - -   Resp - - - - - -   SpO2 94 90 - 95 92 94   Weight - 340 lb 12.8 oz - - 343 lb 14.4 oz -   Height - 5' 0\" - - 5' 1.5\" -   Body mass index - 66.55 kg/m2 - - 63.92 kg/m2 -   Some recent data might be hidden       Family History   Problem Relation Age of Onset    Heart Disease Mother     Hypertension Mother     Stroke Father     Hypertension Father     Hypertension Other     Heart Disease Other     Cancer Other        Social History     Tobacco Use    Smoking status: Never Smoker    Smokeless tobacco: Never Used   Substance Use Topics    Alcohol use: No      Current Outpatient Medications   Medication Sig Dispense Refill    celecoxib (CELEBREX) 100 MG capsule TAKE 1 CAPSULE BY MOUTH 2 TIMES DAILY 180 capsule 0    spironolactone (ALDACTONE) 25 MG tablet Take 1 tablet by mouth daily 90 tablet 3    fenofibrate (TRICOR) 145 MG tablet TAKE 1 TABLET BY MOUTH DAILY (FOR TRIGLYCERIDES/CHOLESTEROL) 90 tablet 3    hydrALAZINE (APRESOLINE) 25 MG tablet Take 1 tablet by mouth 4 times daily 90 tablet 1    furosemide (LASIX) 40 MG tablet Take 1 tablet by mouth daily 90 tablet 3    allopurinol (ZYLOPRIM) 300 MG tablet TAKE 1 TABLET BY MOUTH DAILY 30 tablet 5    carvedilol (COREG) 25 MG tablet TAKE 1 TABLET BY MOUTH 2 TIMES DAILY 180 tablet 3    amitriptyline (ELAVIL) 25 MG tablet TAKE ONE TABLET BY MOUTH NIGHTLY FOR NERVE DISEASE 90 tablet 3    mirabegron (MYRBETRIQ) 25 MG TB24 Take 1 tablet by mouth daily 30 tablet 5    butalbital-acetaminophen-caffeine (FIORICET, ESGIC) -40 MG per tablet TAKE ONE TABLET EVERY 4 HOURS AS NEEDED FOR HEADACHES 10 tablet 3    Cholecalciferol 50 MCG (2000 UT) TABS TAKE 1 TABLET BY MOUTH DAILY      albuterol sulfate  (90 Base) MCG/ACT inhaler INHALE 2 PUFFS EVERY 4 (FOUR) HOURS AS NEEDED FOR WHEEZING.  fluticasone (FLONASE) 50 MCG/ACT nasal spray USE 2 SPRAYS IN EACH NOSTRIL ONCE DAILY      fluticasone-salmeterol (ADVAIR) 250-50 MCG/DOSE AEPB INHALE 1 PUFF 2 (TWO) TIMES A DAY.       OXYGEN Inhale into the lungs      DULoxetine (CYMBALTA) 60 MG extended release capsule TAKE 1 CAPSULE BY MOUTH ONCE DAILY IN THE MORNING 90 capsule 3    amLODIPine (NORVASC) 10 MG tablet TAKE ONE TABLET BY MOUTH DAILY (Patient not taking: Reported on 1/4/2022) 90 tablet 3    meclizine (ANTIVERT) 25 MG tablet Take 1 tablet by mouth 3 times daily as needed for Dizziness 90 tablet 5    azelastine (ASTELIN) 0.1 % nasal spray 2 sprays by Nasal route 2 times daily Use in each nostril as directed 1 Bottle 3    Multiple Vitamins-Minerals (MULTIVITAMIN ADULT PO) Take by mouth      EPINEPHrine (EPIPEN 2-SHAUN) 0.3 MG/0.3ML SOAJ injection Inject 0.3 mLs into the muscle once for 1 dose Use as directed for allergic reaction 0.3 mL 1     No current facility-administered medications for this visit.      Allergies   Allergen Reactions    Codeine Hives    Lisinopril Hives       Health Maintenance   Topic Date Due    DTaP/Tdap/Td vaccine (2 - Td or Tdap) 10/11/2021    Depression Monitoring  03/01/2022    Annual Wellness Visit (AWV)  03/02/2022    Shingles vaccine (1 of 2) 06/04/2022 (Originally 8/12/2005)    Flu vaccine (Season Ended) 10/12/2022 (Originally 9/1/2022)    Breast cancer screen  06/11/2023    Colorectal Cancer Screen  11/12/2025    Lipids  03/01/2026    DEXA (modify frequency per FRAX score)  Completed    Pneumococcal 65+ years Vaccine  Completed    COVID-19 Vaccine  Completed    Hepatitis C screen  Completed    Hepatitis A vaccine  Aged Out    Hepatitis B vaccine  Aged Out    Hib vaccine  Aged Out    Meningococcal (ACWY) vaccine  Aged Out       Lab Results   Component Value Date    LABA1C 5.6 07/07/2017     No results found for: PSA, PSADIA  TSH   Date Value Ref Range Status   03/01/2021 1.940 0.270 - 4.200 uIU/mL Final   ]  Lab Results   Component Value Date     06/10/2021    K 4.3 06/10/2021     06/10/2021    CO2 32 (H) 06/10/2021    BUN 22 06/10/2021    CREATININE 1.1 (H) 06/10/2021    GLUCOSE 86 06/10/2021    CALCIUM 10.5 (H) 06/10/2021    PROT 7.7 06/10/2021    LABALBU 4.6 06/10/2021    BILITOT 0.4 06/10/2021    ALKPHOS 62 06/10/2021    AST 19 06/10/2021    ALT 11 06/10/2021    LABGLOM 50 (A) 06/10/2021    GFRAA >59 06/10/2021     Lab Results   Component Value Date    CHOL 215 (H) 03/01/2021    CHOL 192 11/30/2020    CHOL 182 08/02/2019     Lab Results   Component Value Date    TRIG 80 03/01/2021    TRIG 61 11/30/2020    TRIG 43 08/02/2019     Lab Results   Component Value Date    HDL 56 (L) 03/01/2021    HDL 60 (L) 11/30/2020    HDL 59 (L) 08/02/2019     Lab Results   Component Value Date    LDLCALC 143 03/01/2021    LDLCALC 120 11/30/2020    LDLCALC 114 08/02/2019     Lab Results   Component Value Date     06/10/2021    K 4.3 06/10/2021     06/10/2021    CO2 32 06/10/2021    BUN 22 06/10/2021    CREATININE 1.1 06/10/2021    GLUCOSE 86 06/10/2021    CALCIUM 10.5 06/10/2021      Lab Results   Component Value Date    WBC 7.0 06/10/2021    HGB 11.7 (L) 06/10/2021    HCT 38.8 06/10/2021    MCV 94.4 06/10/2021     06/10/2021    LYMPHOPCT 26.0 06/10/2021    RBC 4.11 (L) 06/10/2021    MCH 28.5 06/10/2021    MCHC 30.2 (L) 06/10/2021    RDW 17.2 (H) 06/10/2021     Lab Results   Component Value Date    VITD25 50.0 03/01/2021       Subjective:      Review of Systems   Constitutional: Negative for fatigue, fever and unexpected weight change. HENT: Negative for ear discharge, ear pain, mouth sores, sore throat and trouble swallowing. Eyes: Negative for discharge, itching and visual disturbance. Respiratory: Positive for shortness of breath. Negative for cough, choking, wheezing and stridor. Cardiovascular: Negative for chest pain, palpitations and leg swelling. Gastrointestinal: Negative for abdominal distention, abdominal pain, blood in stool, constipation, diarrhea, nausea and vomiting. Endocrine: Negative for cold intolerance, polydipsia and polyuria. Genitourinary: Negative for difficulty urinating, dysuria, frequency and urgency.    Musculoskeletal: Negative for arthralgias and gait problem. Skin: Positive for rash and wound. Negative for color change. Allergic/Immunologic: Negative for food allergies and immunocompromised state. Neurological: Negative for dizziness, tremors, syncope, speech difficulty, weakness and headaches. Hematological: Negative for adenopathy. Does not bruise/bleed easily. Psychiatric/Behavioral: Negative for confusion and hallucinations. Objective:     Physical Exam  Constitutional:       General: She is not in acute distress. Appearance: She is well-developed. HENT:      Head: Normocephalic and atraumatic. Eyes:      General: No scleral icterus. Right eye: No discharge. Left eye: No discharge. Pupils: Pupils are equal, round, and reactive to light. Neck:      Thyroid: No thyromegaly. Vascular: No JVD. Cardiovascular:      Rate and Rhythm: Normal rate and regular rhythm. Heart sounds: Normal heart sounds. No murmur heard. Pulmonary:      Effort: Pulmonary effort is normal. No respiratory distress. Breath sounds: Normal breath sounds. No wheezing or rales. Abdominal:      General: Bowel sounds are normal. There is no distension. Palpations: Abdomen is soft. There is no mass. Tenderness: There is no abdominal tenderness. There is no guarding or rebound. Musculoskeletal:         General: No tenderness. Normal range of motion. Cervical back: Normal range of motion and neck supple. Right lower leg: Edema present. Left lower leg: Edema present. Skin:     General: Skin is warm and dry. Findings: Rash and wound present. No erythema. Neurological:      Mental Status: She is alert and oriented to person, place, and time. Cranial Nerves: No cranial nerve deficit. Coordination: Coordination normal.      Deep Tendon Reflexes: Reflexes are normal and symmetric. Reflexes normal.   Psychiatric:         Mood and Affect: Mood is not depressed.          Behavior: Behavior normal.         Thought Content: Thought content normal.         Judgment: Judgment normal.       BP (!) 164/90   Pulse 88   Temp 98.4 °F (36.9 °C)   SpO2 94%           Assessment:      Problem List     Goiter     Teodoro Santoyo told her of this but told her to get with Dr. Aparna Morales for treatment so I will get her an appointment within the next 2 weeks to discuss this with her         Hypoxia     She has been started on home O2 but she did not bring it with her today sat is 94%          Open wound of left lower leg     She has an open wound on the lateral aspect of her left leg right above the ankle. She said that it was itching and she scratched it and it busted open and started draining fluid that was clear. It was just like a open wound to that area just single sit thickness. She does have a dressing placed has been using Neosporin. I think this is mostly because of her severely edematous lower extremities. I we did explain the mechanisms of being able to get cellulitis due to the extreme swelling         Rash     2 very small red raised areas on the right chest it does not appear like a local reaction to something getting been insect bite radius can use hydrocortisone and Benadryl cream                Plan:        Patient given educational materials - see patient instructions. Discussed use, benefit, and side effects of prescribed medications. Allpatient questions answered. Pt voiced understanding. Reviewed health maintenance. Instructed to continue current medications, diet and exercise. Patient agreed with treatment plan. Follow up as directed. MEDICATIONS:  No orders of the defined types were placed in this encounter. ORDERS:  No orders of the defined types were placed in this encounter. Follow-up:  Return in about 2 weeks (around 6/1/2022).     PATIENT INSTRUCTIONS:  Patient Instructions   1,   Rash   Mix benadryl cream and hydrocortisone cream and use 3-4 times daily for the rash    #2 hypoxia you do need to keep your oxygen with you at all times has been getting hypoxic is not good for your health General  3. Thyroid nodule we will get appoint with Dr. Maribell Araya for her to follow-up from the diagnosis from Dr. Tamera Mccormick  4. Upper limb of left leg keep clean and dry clean twice daily with Dial soap and apply Neosporin ointment and a dressing Dr. Maribell Araya can recheck it when she sees you for your goiter. If you should start getting more swollen or drainage becomes thick then we would need to see you sooner    Electronically signed by PATRICK Ziegler on 5/18/2022 at 5:10 PM        EMRDragon/transcription disclaimer:  Much of this encounter note is electronic transcription/translation of spoken language to printed texts. The electronic translation of spoken language may be erroneous, or at times,nonsensical words or phrases may be inadvertently transcribed.   Although I have reviewed the note for such errors, some may still exist.

## 2022-05-21 DIAGNOSIS — M19.90 OSTEOARTHRITIS, UNSPECIFIED OSTEOARTHRITIS TYPE, UNSPECIFIED SITE: ICD-10-CM

## 2022-05-23 RX ORDER — CELECOXIB 100 MG/1
100 CAPSULE ORAL 2 TIMES DAILY
Qty: 180 CAPSULE | Refills: 1 | Status: SHIPPED | OUTPATIENT
Start: 2022-05-23

## 2022-05-23 RX ORDER — DULOXETIN HYDROCHLORIDE 60 MG/1
CAPSULE, DELAYED RELEASE ORAL
Qty: 90 CAPSULE | Refills: 3 | Status: SHIPPED | OUTPATIENT
Start: 2022-05-23

## 2022-05-23 NOTE — TELEPHONE ENCOUNTER
[facility] called requesting a refill of the below medication which has been pended for you:     Requested Prescriptions     Pending Prescriptions Disp Refills    DULoxetine (CYMBALTA) 60 MG extended release capsule [Pharmacy Med Name: DULOXETINE HCL 60 MG CAP 60 Capsule] 90 capsule 3     Sig: TAKE 1 CAPSULE BY MOUTH ONCE DAILY IN THE MORNING    celecoxib (CELEBREX) 100 MG capsule [Pharmacy Med Name: CELECOXIB 100 MG CAPS 100 Capsule] 180 capsule 1     Sig: TAKE 1 CAPSULE BY MOUTH 2 TIMES DAILY       Last Appointment Date: 1/4/2022  Next Appointment Date: 6/16/2022    Allergies   Allergen Reactions    Codeine Hives    Lisinopril Hives

## 2022-05-24 ENCOUNTER — TELEPHONE (OUTPATIENT)
Dept: OTOLARYNGOLOGY | Facility: CLINIC | Age: 67
End: 2022-05-24

## 2022-05-24 NOTE — TELEPHONE ENCOUNTER
----- Message from DONOVAN Esposito sent at 5/23/2022 10:12 AM CDT -----  Thyroid nodule is causing some tracheal compression, this possibility was discussed at last visit. Need to schedule appointment to discuss surgical options

## 2022-06-04 DIAGNOSIS — N32.81 OAB (OVERACTIVE BLADDER): ICD-10-CM

## 2022-06-06 RX ORDER — MIRABEGRON 25 MG/1
TABLET, FILM COATED, EXTENDED RELEASE ORAL
Qty: 30 TABLET | Refills: 5 | Status: SHIPPED | OUTPATIENT
Start: 2022-06-06

## 2022-06-14 DIAGNOSIS — Z01.812 ENCOUNTER FOR PREOPERATIVE SCREENING LABORATORY TESTING FOR COVID-19 VIRUS: Primary | ICD-10-CM

## 2022-06-14 DIAGNOSIS — Z20.822 ENCOUNTER FOR PREOPERATIVE SCREENING LABORATORY TESTING FOR COVID-19 VIRUS: Primary | ICD-10-CM

## 2022-06-15 NOTE — PROGRESS NOTES
YOB: 1955  Location: [facility]  Location Address: [address] [place] [address],  3, [address] [place] [address]  Location Phone: [phone]    Chief Complaint   Patient presents with   • thyroid nodule       History of Present Illness  Pau Cabrera is a 66 y.o. female.  Pau Cabrera is here for follow up of ENT complaints. The patient has had problems with thyroid nodules  The symptoms are localized to the bilateral thyroid. The nodules have been present for the last several years There have been no identified factors that aggravate the symptoms. There have been no factors that have improved the symptoms. Patient has had issues with feeling like something is moving in throat when singing. She denies dysphagia, hoarseness, fatigue, insomnia, anxiety and globus sensation. She is on 2 L nasal canula oxygen throughout the day and at night. She is not currently wearing cpap. She feels as if the oxygen has improved symptoms. Patient has had CT scan, ultrasound, labs and FNA.     I have personally reviewed the information imported into the chart during this visit.      I have personally reviewed the review of systems.          Study Result    Narrative & Impression   US THYROID- 2022 9:11 AM CDT     REASON FOR EXAM: THYROID DISEASE       COMPARISON: Ultrasound dated 2021      TECHNIQUE: Multiple longitudinal and transverse real-time sonographic  images of the thyroid are obtained.      FINDINGS:   The right lobe of the thyroid measures 5.4 x 2.3 x 1.8 cm. The left lobe  of the thyroid measures approximately 9.5 x 4.9 x 4.2 cm. The thyroid  isthmus measures 0.8 cm in thickness.      There is a 1.2 cm round isoechoic right lobe solid nodule, previously  measured at 1.4 cm. Additional right inferior pole cystic nodule  measuring 1 cm, previously measured at 1.6 cm. Additional subcentimeter  right lobe cystic nodule, near completely cystic, measuring 0.8 cm.  Prominent left lobe of  multinodular goiter with several large isoechoic  solid nodules. The largest of these measures approximately 6.7 cm in  long axis, previously measured at 6.0 cm in long axis. Second upper pole  isoechoic solid nodule measuring 4.8 cm in long axis, previously 4.6 cm  in long axis. There is an additional rim calcified nodule measuring 2.5  cm which is stable.     IMPRESSION:  1. Left lobe multinodular goiter, difficult to image completely due to  its substernal extension. There are 3 dominant left lobe nodules, none  of which are significantly change. This includes 6.7 and 4.8 cm  isoechoic solid nodules which are TI RADS category 3 nodules (mildly  suspicious), with current recommendation being needle aspiration given  their size greater than 2.5 cm. There is an additional 2.5 cm left lobe  rim calcified nodule which is stable, likely difficult to aspirate given  its dense rim calcification. There are no suspicious right lobe nodules.     This report was finalized on 04/26/2022 11:28 by Dr Brenton Phillips, .     Fine Needle Aspiration: [id number]  Order: [id number]   Status: Final result     Visible to patient: Yes (seen)     Next appt: 06/20/2022 at 02:30 PM in Lab (COVID LAB [facility])    Specimen Information: Thyroid; Fine Needle Aspirate         0 Result Notes    Component    Note to Patients    This report may contain a detailed description of human tissue sent by a health care provider to the laboratory for pathologic evaluation. The content of this report is essential for diagnosis and may provide important critical findings. This information may be unfamiliar to patients to review without a medical professional present. It is advised that the patient review this report in the presence of a health care provider who can answer questions and explain the results.   Case Report   Medical Cytology Report                           Case: [id number]                                  Authorizing Provider:  Beba Robles  APRN       Collected:           05/12/2022 11:37 AM           Ordering Location:     [facility]  Received:            05/12/2022 11:55 AM           Pathologist:           Guillermina Concepcion MD                                                         Specimen:    Thyroid, left thyroid nodule                                                               Final Diagnosis   Left upper thyroid nodule, smears (2) and ThinPrep preparation (1): Consistent with a benign follicular nodule ([place] category II).   Electronically signed by Guillermina Concepcion MD on 5/13/2022 at 1441            Study Result    Narrative & Impression   CT SOFT TISSUE NECK W WO CONTRAST- 5/12/2022 12:19 PM CDT     HISTORY: thyroid mass; E04.1-Nontoxic single thyroid nodule;  E04.2-Nontoxic multinodular goiter; E07.9-Disorder of thyroid,  unspecified      COMPARISON: CT chest 6/4/2021 and 1/14/2011     DOSE LENGTH PRODUCT: 600 mGy cm. Automated exposure control was also  utilized to decrease patient radiation dose.     TECHNIQUE: Axial images of the neck are obtained pre- and post-IV  contrast.      FINDINGS:  There is a markedly enlarged heterogeneous multinodular left  thyroid lobe measuring 10.4 x 5.4 x 5.0 cm resulting in right-sided  deviation of the trachea. A peripherally calcified 2.5 cm nodule  positioned laterally within the enlarged left thyroid lobe. The left  lower lobe extends posteromedial into the prevertebral soft tissues with  mild substernal extension inferiorly. Right thyroid lobe is unremarkable  in its appearance.     No pathologic lymphadenopathy. Jugulodigastric lymph nodes measuring up  to 10 mm in short axis. The parapharyngeal spaces are preserved. No  enhancing nodules seen within the base of tongue/base of mouth. Mucous  secretions considered within the vallecula. Laryngeal structures are  symmetrical.     Moderate diffuse degenerative disc change of the cervical spine with  mild reversal normal cervical  lordosis. Moderate central cervical canal  stenosis with severe right C5/C6 and right C6/C7 foramen narrowing with  moderate neural foramen narrowing on the right at the C3/C4 and C4/C5  levels. No suspicious apical pulmonary nodule. Linear scarring within  the posterior right upper lobe. No pneumothorax.     IMPRESSION:  1. An enlarged 10.4 x 5.4 x 5.0 cm heterogeneous multinodular left  thyroid lobe extending inferiorly into the substernal region creating  mass effect on the trachea which does remain patent. No pathologic  lymphadenopathy.  2. Moderate to advanced degenerative change of the cervical spine  described above resulting in scattered right greater than left foraminal  stenosis and moderate central canal narrowing.  This report was finalized on 05/12/2022 15:14 by Dr. Rosa Johnson MD.           Fine Needle Aspiration: [id number]  Order: [id number]   Status: Final result     Visible to patient: Yes (seen)     Next appt: 06/16/2022 at 03:30 PM in Otolaryngology (Milad So MD)    Specimen Information: Thyroid; Fine Needle Aspirate         0 Result Notes    Component    Note to Patients    This report may contain a detailed description of human tissue sent by a health care provider to the laboratory for pathologic evaluation. The content of this report is essential for diagnosis and may provide important critical findings. This information may be unfamiliar to patients to review without a medical professional present. It is advised that the patient review this report in the presence of a health care provider who can answer questions and explain the results.   Case Report   Medical Cytology Report                           Case: [id number]                                  Authorizing Provider:  Beba Robles APRN       Collected:           05/12/2022 11:37 AM           Ordering Location:     [facility]  Received:            05/12/2022 11:55 AM           Pathologist:            Guillermina Concepcion MD                                                         Specimen:    Thyroid, left thyroid nodule                                                               Final Diagnosis   Left upper thyroid nodule, smears (2) and ThinPrep preparation (1): Consistent with a benign follicular nodule ([place] category II).   Electronically signed by Guillermina Concepcion MD on 2022 at 1441   Clinical Information    Hx: TIRADS  3 X 2, nodules   Comment    The specimen is of adequate cellularity for cytologic evaluation.  Please refer to the microscopic description.  Clinical correlation and follow-up are recommended.   Gross Description    1. Thyroid.  Received in cytolyt in the laboratory in a container labeled with patient name and  designated as left upper thyroid.  30 mls with blood tinged aspirate.  1 smear fixe din 95% alcohol, 1 smear air dried and 1 thin prep slide prepared.               Intraoperative Consultation    1. Thyroid.  Ultrasound guided Fine Needle Aspiration, Left Upper Thyroid:          Pass 1: Adequate       Pass 2: Cytolyt       Pass 3: Not reviewed       Pass 4: Cytolyt          Courtney HERNÁNDEZ CT, ASCP              2022   Microscopic Description    Smears and ThinPrep preparation of the fine-needle aspiration of the left upper thyroid nodule reveal blood and colloid.  Adequate groups of follicular epithelial cells are seen for cytologic evaluation.  The nuclei are uniform and nuclear grooves or nuclear inclusions are not seen.  The follicular epithelial cells are primarily arranged in small monolayer sheets or macro follicles.  Microfollicle formation is not increased.  The cytologic findings are consistent with a benign follicular nodule.   Resulting Agency [facility]              Specimen Collected: 22 11:37 Last Resulted: 22 14:41       Order Details     View Encounter     Lab and Collection Details     Routing     Result History             Scans on Order  [id number]    Document on 5/13/2022 1541 by Guillermina Concepcion MD         Result Care Coordination      Patient Communication    Released Seen Back to Top             Order Questions    Question Answer Comment   SP Thyroid left thyroid nodule   Note: Enter a source for each collected specimen   Release to patient Immediate             Other Results from 5/12/2022       Contains abnormal data POC Creatinine  Order: [id number]   Status: Final result       Visible to patient: Yes (seen)       Next appt: 06/16/2022 at 03:30 PM in Otolaryngology (Milad So MD)      Specimen Information: Blood         0 Result Notes      Component   Ref Range & Units 1 mo ago   (5/12/22)   [place] 12 mo ago   (6/16/21)   [place] 1 yr ago   (6/10/21)   [place] 1 yr ago   (6/7/21)   [place] 1 yr ago   (6/5/21)   [place] 1 yr ago   (6/4/21)   [place] 1 yr ago   (3/1/21)   [place]   Creatinine   0.60 - 1.30 mg/dL 1.50 High   1.16 High  R  1.1 High  R  1.01 High  R  0.98 R  0.96 R  1.3 High  R    Comment: Serial Number: [id number]:  [id number]   Resulting Agency [facility] [place] [facility] [place] [facility]                Specimen Collected: 05/12/22 12:17 Last Resulted: 05/12/22 13:05        Lab Flowsheet       Order Details       View Encounter       Lab and Collection Details       Routing       Result History          R=Reference range differs from displayed range          Result Care Coordination      Patient Communication    Released Seen Back to Top           Order Questions    Question Answer   Release to patient Immediate                Provider Comment To Patient    Released Seen     Routing History    Priority Sent On From To Message Type    5/12/2022  1:05 PM Lab, Background User Beba Robles APRN Results    5/12/2022 12:44 PM Interface, Rad Results [place] In Beba Robles APRN  Results         All Reviewers List    Beba Robles APRN on 5/23/2022 10:13       Lab Component SmartPhrase Guide    Fine Needle Aspiration (Order #[id number]) on 5/12/22       Other Results from 5/12/2022     POC Creatinine  Final result 5/12/2022       Study Result    Narrative & Impression   CT SOFT TISSUE NECK W WO CONTRAST- 5/12/2022 12:19 PM CDT     HISTORY: thyroid mass; E04.1-Nontoxic single thyroid nodule;  E04.2-Nontoxic multinodular goiter; E07.9-Disorder of thyroid,  unspecified      COMPARISON: CT chest 6/4/2021 and 1/14/2011     DOSE LENGTH PRODUCT: 600 mGy cm. Automated exposure control was also  utilized to decrease patient radiation dose.     TECHNIQUE: Axial images of the neck are obtained pre- and post-IV  contrast.      FINDINGS:  There is a markedly enlarged heterogeneous multinodular left  thyroid lobe measuring 10.4 x 5.4 x 5.0 cm resulting in right-sided  deviation of the trachea. A peripherally calcified 2.5 cm nodule  positioned laterally within the enlarged left thyroid lobe. The left  lower lobe extends posteromedial into the prevertebral soft tissues with  mild substernal extension inferiorly. Right thyroid lobe is unremarkable  in its appearance.     No pathologic lymphadenopathy. Jugulodigastric lymph nodes measuring up  to 10 mm in short axis. The parapharyngeal spaces are preserved. No  enhancing nodules seen within the base of tongue/base of mouth. Mucous  secretions considered within the vallecula. Laryngeal structures are  symmetrical.     Moderate diffuse degenerative disc change of the cervical spine with  mild reversal normal cervical lordosis. Moderate central cervical canal  stenosis with severe right C5/C6 and right C6/C7 foramen narrowing with  moderate neural foramen narrowing on the right at the C3/C4 and C4/C5  levels. No suspicious apical pulmonary nodule. Linear scarring within  the posterior right upper lobe. No pneumothorax.     IMPRESSION:  1. An enlarged 10.4 x  5.4 x 5.0 cm heterogeneous multinodular left  thyroid lobe extending inferiorly into the substernal region creating  mass effect on the trachea which does remain patent. No pathologic  lymphadenopathy.  2. Moderate to advanced degenerative change of the cervical spine  described above resulting in scattered right greater than left foraminal  stenosis and moderate central canal narrowing.  This report was finalized on 2022 15:14 by Dr. Rosa Johnson MD.          Past Medical History:   Diagnosis Date   • Anemia     iron def   • Anxiety    • Asthma    • Back pain    • CHF (congestive heart failure) (HCC)    • Chronic bronchitis (HCC)    • Coronary artery disease    • COVID-19 vaccine series completed     pfizer   • COVID-19 vaccine series completed    • CTS (carpal tunnel syndrome)    • Depression    • Fibromyalgia    • GERD (gastroesophageal reflux disease)    • Gout    • Hyperlipemia    • Hyperlipidemia    • Hypertension    • Obesity    • Obstructive sleep apnea     could not tolerate machine    • Peripheral neuropathy    • Primary central sleep apnea    • Pulmonary arterial hypertension (HCC)    • Rheumatoid arthritis (HCC)    • Sinusitis        Past Surgical History:   Procedure Laterality Date   • BREAST BIOPSY Left    •  SECTION      X 2   • COLONOSCOPY  10/22/2015    Tics repeat exam in 5 years   • COLONOSCOPY  2007    Small hemorrhoids repeat exam in 3 years   • COLONOSCOPY N/A 2020    Procedure: COLONOSCOPY WITH ANESTHESIA;  Surgeon: Denny Francis MD;  Location: [facility];  Service: Gastroenterology;  Laterality: N/A;  pre: family hx colon ca  post: diverticulosis  Carola Barbosa MD   • HYSTERECTOMY      total   • UMBILICAL HERNIA REPAIR      had 1/2 of it repaired with hysterectomy        Outpatient Medications Marked as Taking for the 22 encounter (Office Visit) with Milad So MD   Medication Sig Dispense Refill   • albuterol sulfate   (90 Base) MCG/ACT inhaler Inhale 2 puffs Every 4 (Four) Hours As Needed for Wheezing. 6.7 g 5   • allopurinol (ZYLOPRIM) 300 MG tablet      • amitriptyline (ELAVIL) 25 MG tablet Take 25 mg by mouth Every Night.     • azelastine (ASTELIN) 0.1 % nasal spray 2 sprays into the nostril(s) as directed by provider 2 (Two) Times a Day. Use in each nostril as directed 60 mL 11   • Azelastine HCl 137 MCG/SPRAY solution 2 sprays into the nostril(s) as directed by provider 2 (two) times a day. 30 mL 5   • butalbital-acetaminophen-caffeine (FIORICET, ESGIC) -40 MG per tablet Take 1 tablet by mouth Every 4 (Four) Hours As Needed for Headache.     • carvedilol (COREG) 25 MG tablet TAKE 1 TABLET BY MOUTH TWICE A DAY 60 tablet 3   • celecoxib (CeleBREX) 100 MG capsule Take 100 mg by mouth.     • DULoxetine (CYMBALTA) 60 MG capsule Take 60 mg by mouth Every Morning.     • EPINEPHrine (ADRENALIN) 0.05 MG/ML syringe Inject 20 mL as directed.     • fenofibrate (TRICOR) 145 MG tablet Take 145 mg by mouth Daily.     • fluticasone (Flonase Allergy Relief) 50 MCG/ACT nasal spray 2 sprays into the nostril(s) as directed by provider Daily. 16 g 11   • Fluticasone-Salmeterol (Wixela Inhub) 250-50 MCG/ACT DISKUS Inhale 1 puff 2 (Two) Times a Day. 1 each 11   • fluticasone-salmeterol (Wixela Inhub) 250-50 MCG/DOSE DISKUS Inhale 1 puff 2 (Two) Times a Day. 1 each 11   • furosemide (LASIX) 40 MG tablet Take 1 tablet by mouth Daily. 30 tablet 0   • loratadine (CLARITIN) 10 MG tablet Take 1 tablet by mouth Daily. 90 tablet 3   • meclizine (ANTIVERT) 25 MG tablet Take 25 mg by mouth 3 (three) times a day as needed for dizziness.     • Multiple Vitamins-Minerals (MULTIVITAMIN ADULT PO) Take  by mouth.     • Myrbetriq 25 MG tablet sustained-release 24 hour 24 hr tablet Take 25 mg by mouth Daily.     • OXYGEN-HELIUM IN Inhale.     • spironolactone (ALDACTONE) 25 MG tablet Take 25 mg by mouth.         Codeine sulfate and Lisinopril    Family  History   Problem Relation Age of Onset   • Arthritis Mother    • Heart disease Mother    • Hypertension Mother    • Thyroid disease Mother    • Arthritis Father    • Hypertension Father    • Anuerysm Father    • Stroke Father    • Asthma Brother    • Colon polyps Neg Hx    • Colon cancer Neg Hx        Social History     Socioeconomic History   • Marital status:    Tobacco Use   • Smoking status: Never Smoker   • Smokeless tobacco: Never Used   Vaping Use   • Vaping Use: Never used   Substance and Sexual Activity   • Alcohol use: No   • Drug use: No   • Sexual activity: Not Currently     Partners: Male     Birth control/protection: Surgical       Review of Systems   Constitutional: Negative.    HENT: Negative.    Eyes: Negative.    Respiratory: Negative.    Cardiovascular: Negative.    Gastrointestinal: Negative.    Endocrine: Negative.    Genitourinary: Negative.    Musculoskeletal: Negative.    Skin: Negative.    Allergic/Immunologic: Negative.    Neurological: Negative.    Hematological: Negative.    Psychiatric/Behavioral: Negative.        Vitals:    06/16/22 1606   BP: 136/78   Pulse: 77   Resp: 16   Temp: 97.8 °F (36.6 °C)       Body mass index is 63.11 kg/m².    Objective     Physical Exam  CONSTITUTIONAL: Morbid obesity well nourished, well-developed, alert, oriented, in no acute distress     COMMUNICATION AND VOICE: able to communicate normally, normal voice quality    HEAD: normocephalic, no lesions, atraumatic, no tenderness, no masses     FACE: appearance normal, no lesions, no tenderness, no deformities, facial motion symmetric    EYES: ocular motility normal, eyelids normal, orbits normal, no proptosis, conjunctiva normal , pupils equal, round     EARS:  Hearing: hearing to conversational voice intact bilaterally   External Ears: normal bilaterally, no lesions  TMs-clear intact TMs with well aligned middle ear spaces    NOSE:  External Nose: external nasal structure normal, no tenderness on  palpation, no nasal discharge, no lesions, no evidence of trauma, nostrils patent     ORAL:  Lips: upper and lower lips without lesion   OC/OP-clear    NECK:  Inspection and Palpation: neck appearance normal, no masses or tenderness  Thyroid: Palpable left thyroid mass which extends substernally    CHEST/RESPIRATORY: normal respiratory effort     CARDIOVASCULAR: no cyanosis or edema     NEUROLOGICAL/PSYCHIATRIC: oriented to time, place and person, mood normal, affect appropriate, CN II-XII intact grossly    Assessment & Plan   Diagnoses and all orders for this visit:    1. Thyroid mass of unclear etiology (Primary)  -     COVID PRE-OP / PRE-PROCEDURE SCREENING ORDER (NO ISOLATION) - Swab, Nasopharynx; Future  -     Case Request; Standing  -     Comprehensive Metabolic Panel; Future  -     CBC (No Diff); Future  -     ECG 12 Lead; Future  -     XR Chest 2 View; Future  -     Case Request    2. CHARLIE (obstructive sleep apnea) - non compliant with cpap  -     COVID PRE-OP / PRE-PROCEDURE SCREENING ORDER (NO ISOLATION) - Swab, Nasopharynx; Future  -     Case Request; Standing  -     Comprehensive Metabolic Panel; Future  -     CBC (No Diff); Future  -     ECG 12 Lead; Future  -     XR Chest 2 View; Future  -     Case Request    3. Morbid obesity due to excess calories (HCC)  -     COVID PRE-OP / PRE-PROCEDURE SCREENING ORDER (NO ISOLATION) - Swab, Nasopharynx; Future  -     Case Request; Standing  -     Comprehensive Metabolic Panel; Future  -     CBC (No Diff); Future  -     ECG 12 Lead; Future  -     XR Chest 2 View; Future  -     Case Request    4. Chronic combined systolic and diastolic congestive heart failure (HCC)  -     COVID PRE-OP / PRE-PROCEDURE SCREENING ORDER (NO ISOLATION) - Swab, Nasopharynx; Future  -     Case Request; Standing  -     Comprehensive Metabolic Panel; Future  -     CBC (No Diff); Future  -     ECG 12 Lead; Future  -     XR Chest 2 View; Future  -     Case Request    Other orders  -     Follow  Anesthesia Guidelines / Protocol; Future  -     Obtain Informed Consent; Future      Left thyroidectomy with isthmusectomy (Left)  Orders Placed This Encounter   Procedures   • COVID PRE-OP / PRE-PROCEDURE SCREENING ORDER (NO ISOLATION) - Swab, Nasopharynx     Standing Status:   Future     Standing Expiration Date:   6/16/2023     Order Specific Question:   Please select your location:     Answer:   [facility] [place]     Order Specific Question:   COVID Screening Order:     Answer:   In-House: APTIMA PANTHER, 24 HR TAT [[id number]]     Order Specific Question:   Previously tested for COVID-19?     Answer:   Yes     Order Specific Question:   Employed in healthcare setting?     Answer:   No     Order Specific Question:   Symptomatic for COVID-19 as defined by CDC?     Answer:   No     Order Specific Question:   Hospitalized for COVID-19?     Answer:   No     Order Specific Question:   Admitted to ICU for COVID-19?     Answer:   No     Order Specific Question:   Resident in a congregate (group) care setting?     Answer:   No     Order Specific Question:   Pregnant?     Answer:   No     Order Specific Question:   Release to patient     Answer:   Immediate   • XR Chest 2 View     Standing Status:   Future     Standing Expiration Date:   6/16/2023     Order Specific Question:   Reason for Exam:     Answer:   Left thyroidectomy with isthmusectomy     Order Specific Question:   Release to patient     Answer:   Immediate   • Comprehensive Metabolic Panel     Standing Status:   Future     Standing Expiration Date:   6/16/2023     Order Specific Question:   Release to patient     Answer:   Immediate   • CBC (No Diff)     Standing Status:   Future     Standing Expiration Date:   6/16/2023     Order Specific Question:   Release to patient     Answer:   Immediate   • Follow Anesthesia Guidelines / Protocol     Standing Status:   Future   • Obtain Informed Consent     Standing Status:   Future     Order Specific Question:   Informed  Consent Given For     Answer:   Left thyroidectomy with isthmusectomy   • ECG 12 Lead     Standing Status:   Future     Standing Expiration Date:   6/16/2023     Order Specific Question:   Reason for Exam:     Answer:   Left thyroidectomy with isthmusectomy     Order Specific Question:   Release to patient     Answer:   Immediate     Return for postop.       Patient Instructions   LEFT THYROIDECTOMY: A thyroidectomy was recommended. The risks and benefits were explained including but not limited to bleeding, infection, persistent and/or recurrent disease, risks of the general anesthesia, pain, recurrent laryngeal nerve injury with hoarseness and airway loss, parathyroid injury and hypocalcemia. Operative possibilities including hemithyroidectomy, total thyroidectomy and byron dissections were discussed. Possibilities for delayed need for total thyroidectomy pending pathologic diagnosis were also discussed. Alternatives were discussed. No guarantees were made or implied. Questions were asked appropriately answered.       Patient and family were instructed on the proper use of scheduled prescription drugs including their impact on driving and the potential effects during pregnancy.  The potential for overdose was discussed and their safe storage and proper disposal.  The website www.[employer].gov which contains other materials in this regard.    Will need clearance with respect to congestive heart failure from Dr. Talamantes preoperatively and will await findings of sleep study on June 21 prior to scheduling definitively.    Tentatively will consider July 13

## 2022-06-16 ENCOUNTER — OFFICE VISIT (OUTPATIENT)
Dept: INTERNAL MEDICINE | Age: 67
End: 2022-06-16
Payer: MEDICARE

## 2022-06-16 ENCOUNTER — OFFICE VISIT (OUTPATIENT)
Dept: OTOLARYNGOLOGY | Facility: CLINIC | Age: 67
End: 2022-06-16

## 2022-06-16 VITALS
SYSTOLIC BLOOD PRESSURE: 118 MMHG | OXYGEN SATURATION: 92 % | RESPIRATION RATE: 24 BRPM | HEIGHT: 60 IN | WEIGHT: 293 LBS | BODY MASS INDEX: 57.52 KG/M2 | DIASTOLIC BLOOD PRESSURE: 66 MMHG | HEART RATE: 85 BPM

## 2022-06-16 VITALS
HEIGHT: 61 IN | TEMPERATURE: 97.8 F | HEART RATE: 77 BPM | WEIGHT: 293 LBS | DIASTOLIC BLOOD PRESSURE: 78 MMHG | SYSTOLIC BLOOD PRESSURE: 136 MMHG | BODY MASS INDEX: 55.32 KG/M2 | RESPIRATION RATE: 16 BRPM

## 2022-06-16 DIAGNOSIS — M25.561 CHRONIC PAIN OF RIGHT KNEE: ICD-10-CM

## 2022-06-16 DIAGNOSIS — E66.01 MORBID OBESITY DUE TO EXCESS CALORIES: ICD-10-CM

## 2022-06-16 DIAGNOSIS — Z23 NEED FOR SHINGLES VACCINE: ICD-10-CM

## 2022-06-16 DIAGNOSIS — I50.9 CHRONIC CONGESTIVE HEART FAILURE, UNSPECIFIED HEART FAILURE TYPE (HCC): ICD-10-CM

## 2022-06-16 DIAGNOSIS — G47.30 SLEEP APNEA, UNSPECIFIED TYPE: ICD-10-CM

## 2022-06-16 DIAGNOSIS — Z12.31 ENCOUNTER FOR SCREENING MAMMOGRAM FOR MALIGNANT NEOPLASM OF BREAST: ICD-10-CM

## 2022-06-16 DIAGNOSIS — G89.29 CHRONIC PAIN OF RIGHT KNEE: ICD-10-CM

## 2022-06-16 DIAGNOSIS — Z76.89 ENCOUNTER FOR POWER MOBILITY DEVICE ASSESSMENT: ICD-10-CM

## 2022-06-16 DIAGNOSIS — I50.42 CHRONIC COMBINED SYSTOLIC AND DIASTOLIC CONGESTIVE HEART FAILURE: ICD-10-CM

## 2022-06-16 DIAGNOSIS — R73.9 HYPERGLYCEMIA: ICD-10-CM

## 2022-06-16 DIAGNOSIS — E79.0 HYPERURICEMIA: ICD-10-CM

## 2022-06-16 DIAGNOSIS — G47.00 INSOMNIA, UNSPECIFIED TYPE: ICD-10-CM

## 2022-06-16 DIAGNOSIS — Z91.09 ENVIRONMENTAL ALLERGIES: ICD-10-CM

## 2022-06-16 DIAGNOSIS — Z74.09 IMPAIRED MOBILITY: ICD-10-CM

## 2022-06-16 DIAGNOSIS — I27.20 PULMONARY HYPERTENSION (HCC): ICD-10-CM

## 2022-06-16 DIAGNOSIS — N32.81 OAB (OVERACTIVE BLADDER): ICD-10-CM

## 2022-06-16 DIAGNOSIS — F32.89 OTHER DEPRESSION: ICD-10-CM

## 2022-06-16 DIAGNOSIS — Z23 NEED FOR TDAP VACCINATION: ICD-10-CM

## 2022-06-16 DIAGNOSIS — G47.33 OSA (OBSTRUCTIVE SLEEP APNEA): ICD-10-CM

## 2022-06-16 DIAGNOSIS — Z78.0 MENOPAUSE: ICD-10-CM

## 2022-06-16 DIAGNOSIS — G43.809 OTHER MIGRAINE WITHOUT STATUS MIGRAINOSUS, NOT INTRACTABLE: ICD-10-CM

## 2022-06-16 DIAGNOSIS — I10 PRIMARY HYPERTENSION: ICD-10-CM

## 2022-06-16 DIAGNOSIS — E55.9 VITAMIN D DEFICIENCY: ICD-10-CM

## 2022-06-16 DIAGNOSIS — E66.01 MORBID OBESITY DUE TO EXCESS CALORIES (HCC): ICD-10-CM

## 2022-06-16 DIAGNOSIS — J44.9 CHRONIC OBSTRUCTIVE PULMONARY DISEASE, UNSPECIFIED COPD TYPE (HCC): ICD-10-CM

## 2022-06-16 DIAGNOSIS — E07.89 THYROID MASS OF UNCLEAR ETIOLOGY: Primary | ICD-10-CM

## 2022-06-16 DIAGNOSIS — I50.42 CHRONIC COMBINED SYSTOLIC AND DIASTOLIC CONGESTIVE HEART FAILURE (HCC): ICD-10-CM

## 2022-06-16 DIAGNOSIS — M54.50 CHRONIC LOW BACK PAIN, UNSPECIFIED BACK PAIN LATERALITY, UNSPECIFIED WHETHER SCIATICA PRESENT: ICD-10-CM

## 2022-06-16 DIAGNOSIS — Z00.00 ROUTINE ADULT HEALTH MAINTENANCE: Primary | ICD-10-CM

## 2022-06-16 DIAGNOSIS — E78.5 HYPERLIPIDEMIA, UNSPECIFIED HYPERLIPIDEMIA TYPE: ICD-10-CM

## 2022-06-16 DIAGNOSIS — M19.90 OSTEOARTHRITIS, UNSPECIFIED OSTEOARTHRITIS TYPE, UNSPECIFIED SITE: ICD-10-CM

## 2022-06-16 DIAGNOSIS — G89.29 CHRONIC LOW BACK PAIN, UNSPECIFIED BACK PAIN LATERALITY, UNSPECIFIED WHETHER SCIATICA PRESENT: ICD-10-CM

## 2022-06-16 DIAGNOSIS — E04.9 GOITER: ICD-10-CM

## 2022-06-16 PROCEDURE — 99214 OFFICE O/P EST MOD 30 MIN: CPT | Performed by: OTOLARYNGOLOGY

## 2022-06-16 PROCEDURE — G0439 PPPS, SUBSEQ VISIT: HCPCS | Performed by: INTERNAL MEDICINE

## 2022-06-16 PROCEDURE — 1123F ACP DISCUSS/DSCN MKR DOCD: CPT | Performed by: INTERNAL MEDICINE

## 2022-06-16 PROCEDURE — 3017F COLORECTAL CA SCREEN DOC REV: CPT | Performed by: INTERNAL MEDICINE

## 2022-06-16 RX ORDER — ZOSTER VACCINE RECOMBINANT, ADJUVANTED 50 MCG/0.5
0.5 KIT INTRAMUSCULAR SEE ADMIN INSTRUCTIONS
Qty: 0.5 ML | Refills: 1 | Status: SHIPPED | OUTPATIENT
Start: 2022-06-16 | End: 2022-12-13

## 2022-06-16 RX ORDER — ZOSTER VACCINE RECOMBINANT, ADJUVANTED 50 MCG/0.5
0.5 KIT INTRAMUSCULAR SEE ADMIN INSTRUCTIONS
Qty: 0.5 ML | Refills: 1 | Status: CANCELLED | OUTPATIENT
Start: 2022-06-16 | End: 2022-12-13

## 2022-06-16 SDOH — ECONOMIC STABILITY: FOOD INSECURITY: WITHIN THE PAST 12 MONTHS, THE FOOD YOU BOUGHT JUST DIDN'T LAST AND YOU DIDN'T HAVE MONEY TO GET MORE.: NEVER TRUE

## 2022-06-16 SDOH — ECONOMIC STABILITY: FOOD INSECURITY: WITHIN THE PAST 12 MONTHS, YOU WORRIED THAT YOUR FOOD WOULD RUN OUT BEFORE YOU GOT MONEY TO BUY MORE.: SOMETIMES TRUE

## 2022-06-16 SDOH — ECONOMIC STABILITY: TRANSPORTATION INSECURITY
IN THE PAST 12 MONTHS, HAS LACK OF TRANSPORTATION KEPT YOU FROM MEETINGS, WORK, OR FROM GETTING THINGS NEEDED FOR DAILY LIVING?: NO

## 2022-06-16 ASSESSMENT — PATIENT HEALTH QUESTIONNAIRE - PHQ9
7. TROUBLE CONCENTRATING ON THINGS, SUCH AS READING THE NEWSPAPER OR WATCHING TELEVISION: 0
5. POOR APPETITE OR OVEREATING: 1
3. TROUBLE FALLING OR STAYING ASLEEP: 0
6. FEELING BAD ABOUT YOURSELF - OR THAT YOU ARE A FAILURE OR HAVE LET YOURSELF OR YOUR FAMILY DOWN: 0
SUM OF ALL RESPONSES TO PHQ QUESTIONS 1-9: 6
SUM OF ALL RESPONSES TO PHQ QUESTIONS 1-9: 6
8. MOVING OR SPEAKING SO SLOWLY THAT OTHER PEOPLE COULD HAVE NOTICED. OR THE OPPOSITE, BEING SO FIGETY OR RESTLESS THAT YOU HAVE BEEN MOVING AROUND A LOT MORE THAN USUAL: 0
SUM OF ALL RESPONSES TO PHQ QUESTIONS 1-9: 6
9. THOUGHTS THAT YOU WOULD BE BETTER OFF DEAD, OR OF HURTING YOURSELF: 0
1. LITTLE INTEREST OR PLEASURE IN DOING THINGS: 3
SUM OF ALL RESPONSES TO PHQ QUESTIONS 1-9: 6
4. FEELING TIRED OR HAVING LITTLE ENERGY: 1
10. IF YOU CHECKED OFF ANY PROBLEMS, HOW DIFFICULT HAVE THESE PROBLEMS MADE IT FOR YOU TO DO YOUR WORK, TAKE CARE OF THINGS AT HOME, OR GET ALONG WITH OTHER PEOPLE: 1
2. FEELING DOWN, DEPRESSED OR HOPELESS: 1
SUM OF ALL RESPONSES TO PHQ9 QUESTIONS 1 & 2: 4

## 2022-06-16 ASSESSMENT — SOCIAL DETERMINANTS OF HEALTH (SDOH): HOW HARD IS IT FOR YOU TO PAY FOR THE VERY BASICS LIKE FOOD, HOUSING, MEDICAL CARE, AND HEATING?: SOMEWHAT HARD

## 2022-06-16 ASSESSMENT — LIFESTYLE VARIABLES: HOW OFTEN DO YOU HAVE A DRINK CONTAINING ALCOHOL: NEVER

## 2022-06-16 NOTE — PATIENT INSTRUCTIONS
LEFT THYROIDECTOMY: A thyroidectomy was recommended. The risks and benefits were explained including but not limited to bleeding, infection, persistent and/or recurrent disease, risks of the general anesthesia, pain, recurrent laryngeal nerve injury with hoarseness and airway loss, parathyroid injury and hypocalcemia. Operative possibilities including hemithyroidectomy, total thyroidectomy and byron dissections were discussed. Possibilities for delayed need for total thyroidectomy pending pathologic diagnosis were also discussed. Alternatives were discussed. No guarantees were made or implied. Questions were asked appropriately answered.       Patient and family were instructed on the proper use of scheduled prescription drugs including their impact on driving and the potential effects during pregnancy.  The potential for overdose was discussed and their safe storage and proper disposal.  The website www.[employer].gov which contains other materials in this regard.    Will need clearance with respect to congestive heart failure from Dr. Talamantes preoperatively and will await findings of sleep study on June 21 prior to scheduling definitively.    Tentatively will consider July 13

## 2022-06-16 NOTE — PROGRESS NOTES
OT  Chief Complaint   Patient presents with    Medicare AWV    Knee Pain     Patient complains of intermittent (R) knee. She says sometimes it feels like it is going to give out on her. HPI: Mauricio Weir is a 77 y.o. female is here for her annual Medicare wellness exam.  Her functional status is good. She denies any history of falls. She has no concerns in regards to safety, hearing, or cognition. She is seeing Dr. Gilberto Palmer for goiter. She also sees Dr. Makenna Michael for history of COPD. At this time, she is doing well in terms of her COPD. She is not requiring any additional use of her inhalers. She is due for a Tdap and shingles vaccine, these were ordered today. She is taking Myrbetriq for her overactive bladder, which does work well for her. She feels that Cymbalta is working well for depression. Her labs are currently pending. She is taking Celebrex as needed for joint pain. Her blood pressure is well controlled. She denies any complaints of chest pain, chest pressure or increased shortness of breath. She is having some right knee pain. Is getting more difficult for her to ambulate secondary to pain. Sometimes, she feels like her knee will give out on her. She is interested in a power mobility device. I did explain to her that Medicare requirements are pretty stringent in terms of getting a power mobility device. She states that her arthritis makes it difficult for her to use a walker. She states that her knee gives her so much difficulty is difficult for her to walk for long periods of time. She is agreeable to doing physical therapy to be evaluated to see if she qualifies for a power mobility device. She also has a history of congestive heart failure. She denies any complaints of swelling. Her mood is stable on her current dose of Elavil. She has not had any gout flares. She does take Fioricet as needed for migraines.   She does take Antivert as needed for dizziness. Routine screening is as follows:  Eye exam yearly  Flu vaccine up to date  Pap declined  DEXA ordered  Pneumovax up to date  Colonoscopy 2020  Mammogram ordered    Past Medical History:   Diagnosis Date    Abnormal CT of spine     Acute sinusitis     Arthritis     Asthma     Bronchitis     Carpal tunnel syndrome     both    CHF (congestive heart failure) (HCC)     Depression     Fibromyalgia     Furuncle     Gout     Hyperlipidemia     Hypertension     Low vitamin D level     Lumbago     Lumbar radiculopathy     Neuropathy     feet    Obesity     Rheumatoid nodule of knee (HCC)     Sleep apnea     UTI (urinary tract infection)     Vertigo     Vitamin D deficiency       Past Surgical History:   Procedure Laterality Date     SECTION      times two    HYSTERECTOMY (CERVIX STATUS UNKNOWN)        Social History     Socioeconomic History    Marital status:      Spouse name: None    Number of children: None    Years of education: None    Highest education level: None   Occupational History    None   Tobacco Use    Smoking status: Never Smoker    Smokeless tobacco: Never Used   Vaping Use    Vaping Use: Never used   Substance and Sexual Activity    Alcohol use: No    Drug use: No    Sexual activity: None   Other Topics Concern    None   Social History Narrative    None     Social Determinants of Health     Financial Resource Strain: Medium Risk    Difficulty of Paying Living Expenses: Somewhat hard   Food Insecurity: Food Insecurity Present    Worried About Running Out of Food in the Last Year: Sometimes true    Chani of Food in the Last Year: Never true   Transportation Needs: No Transportation Needs    Lack of Transportation (Medical): No    Lack of Transportation (Non-Medical):  No   Physical Activity: Insufficiently Active    Days of Exercise per Week: 3 days    Minutes of Exercise per Session: 20 min   Stress:     Feeling of Stress : Not on file   Social Connections:     Frequency of Communication with Friends and Family: Not on file    Frequency of Social Gatherings with Friends and Family: Not on file    Attends Yarsani Services: Not on file    Active Member of Clubs or Organizations: Not on file    Attends Club or Organization Meetings: Not on file    Marital Status: Not on file   Intimate Partner Violence:     Fear of Current or Ex-Partner: Not on file    Emotionally Abused: Not on file    Physically Abused: Not on file    Sexually Abused: Not on file   Housing Stability:     Unable to Pay for Housing in the Last Year: Not on file    Number of Places Lived in the Last Year: Not on file    Unstable Housing in the Last Year: Not on file      Family History   Problem Relation Age of Onset    Heart Disease Mother     Hypertension Mother     Stroke Father     Hypertension Father     Hypertension Other     Heart Disease Other     Cancer Other         Current Outpatient Medications   Medication Sig Dispense Refill    Tdap (ADACEL) 5-2-15.5 LF-MCG/0.5 injection Inject 0.5 mLs into the muscle once for 1 dose 0.5 mL 0    zoster recombinant adjuvanted vaccine (SHINGRIX) 50 MCG/0.5ML SUSR injection Inject 0.5 mLs into the muscle See Admin Instructions 1 dose now and repeat in 2-6 months 0.5 mL 1    MYRBETRIQ 25 MG TB24 TAKE 1 TABLET BY MOUTH DAILY 30 tablet 5    DULoxetine (CYMBALTA) 60 MG extended release capsule TAKE 1 CAPSULE BY MOUTH ONCE DAILY IN THE MORNING 90 capsule 3    celecoxib (CELEBREX) 100 MG capsule TAKE 1 CAPSULE BY MOUTH 2 TIMES DAILY 180 capsule 1    spironolactone (ALDACTONE) 25 MG tablet Take 1 tablet by mouth daily 90 tablet 3    fenofibrate (TRICOR) 145 MG tablet TAKE 1 TABLET BY MOUTH DAILY (FOR TRIGLYCERIDES/CHOLESTEROL) 90 tablet 3    hydrALAZINE (APRESOLINE) 25 MG tablet Take 1 tablet by mouth 4 times daily 90 tablet 1    furosemide (LASIX) 40 MG tablet Take 1 tablet by mouth daily 90 tablet 3    allopurinol (ZYLOPRIM) 300 MG tablet TAKE 1 TABLET BY MOUTH DAILY 30 tablet 5    carvedilol (COREG) 25 MG tablet TAKE 1 TABLET BY MOUTH 2 TIMES DAILY 180 tablet 3    amitriptyline (ELAVIL) 25 MG tablet TAKE ONE TABLET BY MOUTH NIGHTLY FOR NERVE DISEASE 90 tablet 3    butalbital-acetaminophen-caffeine (FIORICET, ESGIC) -40 MG per tablet TAKE ONE TABLET EVERY 4 HOURS AS NEEDED FOR HEADACHES 10 tablet 3    Cholecalciferol 50 MCG (2000 UT) TABS TAKE 1 TABLET BY MOUTH DAILY      albuterol sulfate  (90 Base) MCG/ACT inhaler INHALE 2 PUFFS EVERY 4 (FOUR) HOURS AS NEEDED FOR WHEEZING.  fluticasone (FLONASE) 50 MCG/ACT nasal spray USE 2 SPRAYS IN EACH NOSTRIL ONCE DAILY      fluticasone-salmeterol (ADVAIR) 250-50 MCG/DOSE AEPB INHALE 1 PUFF 2 (TWO) TIMES A DAY.  OXYGEN Inhale into the lungs 2LPM PRN and QHS LEGACY      meclizine (ANTIVERT) 25 MG tablet Take 1 tablet by mouth 3 times daily as needed for Dizziness 90 tablet 5    azelastine (ASTELIN) 0.1 % nasal spray 2 sprays by Nasal route 2 times daily Use in each nostril as directed 1 Bottle 3    Multiple Vitamins-Minerals (MULTIVITAMIN ADULT PO) Take by mouth      amLODIPine (NORVASC) 10 MG tablet TAKE ONE TABLET BY MOUTH DAILY (Patient not taking: Reported on 1/4/2022) 90 tablet 3    EPINEPHrine (EPIPEN 2-SHAUN) 0.3 MG/0.3ML SOAJ injection Inject 0.3 mLs into the muscle once for 1 dose Use as directed for allergic reaction 0.3 mL 1     No current facility-administered medications for this visit.         Patient Active Problem List   Diagnosis    Chronic congestive heart failure ([address]    Depression    Fibromyalgia    Gout of multiple sites    Mixed hyperlipidemia    Morbid obesity due to excess calories ([facility] [address]    HTN (hypertension)    Chronic midline low back pain without sciatica    Benign headache    Family hx of colon cancer    Hypoxia    TAURUS (obstructive sleep apnea)    Thyroid mass    Chronic combined systolic and diastolic difficulty, weakness, light-headedness, numbness and headaches. Hematological: Negative for adenopathy. Does not bruise/bleed easily. Psychiatric/Behavioral: Negative for agitation, confusion, decreased concentration, dysphoric mood, hallucinations, self-injury, sleep disturbance and suicidal ideas. The patient is not nervous/anxious and is not hyperactive. /66 (Site: Left Lower Arm, Position: Sitting)   Pulse 85   Resp 24   Ht 5' (1.524 m)   Wt (!) 334 lb 9.6 oz (151.8 kg)   SpO2 92%   BMI 65.35 kg/m²   Physical Exam  Vitals and nursing note reviewed. Constitutional:       General: She is not in acute distress. Appearance: Normal appearance. She is well-developed and normal weight. She is not ill-appearing, toxic-appearing or diaphoretic. HENT:      Head: Normocephalic and atraumatic. Right Ear: Tympanic membrane, ear canal and external ear normal. There is no impacted cerumen. Left Ear: Tympanic membrane, ear canal and external ear normal. There is no impacted cerumen. Nose: Nose normal. No congestion or rhinorrhea. Mouth/Throat:      Mouth: Mucous membranes are moist.      Pharynx: Oropharynx is clear. No oropharyngeal exudate or posterior oropharyngeal erythema. Eyes:      General: No scleral icterus. Right eye: No discharge. Left eye: No discharge. Extraocular Movements: Extraocular movements intact. Conjunctiva/sclera: Conjunctivae normal.      Pupils: Pupils are equal, round, and reactive to light. Neck:      Thyroid: No thyromegaly. Vascular: No carotid bruit or JVD. Trachea: No tracheal deviation. Comments: Palpable thyroid nodules noted. Goiter present  Cardiovascular:      Rate and Rhythm: Normal rate and regular rhythm. Pulses: Normal pulses. Heart sounds: Normal heart sounds. No murmur heard. No friction rub. No gallop. Pulmonary:      Effort: Pulmonary effort is normal. No respiratory distress. Breath sounds: Normal breath sounds. No stridor. No wheezing, rhonchi or rales. Chest:      Chest wall: No tenderness. Abdominal:      General: Abdomen is flat. Bowel sounds are normal. There is no distension. Palpations: Abdomen is soft. There is no mass. Tenderness: There is no abdominal tenderness. There is no right CVA tenderness, left CVA tenderness, guarding or rebound. Hernia: No hernia is present. Musculoskeletal:         General: Tenderness present. No swelling, deformity or signs of injury. Normal range of motion. Cervical back: Normal range of motion and neck supple. No rigidity. No muscular tenderness. Right lower leg: No edema. Left lower leg: No edema. Comments: Gait is antalgic. There is tenderness on palpation of the lower lumbar spine. There is tenderness on palpation of the lower lumbar spine   Lymphadenopathy:      Cervical: No cervical adenopathy. Skin:     General: Skin is warm and dry. Capillary Refill: Capillary refill takes less than 2 seconds. Coloration: Skin is not jaundiced or pale. Findings: No bruising, erythema, lesion or rash. Comments: Has a small skin tag to the right side of her neck. She has a brown irregularly pigmented mole to her right breast.   Neurological:      General: No focal deficit present. Mental Status: She is alert and oriented to person, place, and time. Mental status is at baseline. Cranial Nerves: No cranial nerve deficit. Sensory: No sensory deficit. Motor: No weakness or abnormal muscle tone. Coordination: Coordination normal.      Gait: Gait normal.      Deep Tendon Reflexes: Reflexes are normal and symmetric. Reflexes normal.   Psychiatric:         Mood and Affect: Mood normal.         Behavior: Behavior normal.         Thought Content: Thought content normal.         Judgment: Judgment normal.         1. Need for Tdap vaccination    2. Need for shingles vaccine    3. Encounter for screening mammogram for malignant neoplasm of breast    4. Menopause        ASSESSMENT/PLAN:    30-year-old woman here for follow-up    1. Health maintenance: Routine screening is as per HPI. Labs are currently pending. She was given a prescription for Tdap and zoster vaccine today. 2.  Impaired mobility secondary to back and knee pain: Physical therapy ordered. Patient is interested in a power mobility device. 3.  Overactive bladder: Continue Myrbetriq as prescribed    4. Depression: Continue Cymbalta    5. Arthritis: Continue Celebrex and Cymbalta. She is having worsening right knee pain. X-ray ordered of right knee    6. Hyperuricemia: Labs are currently pending. Continue allopurinol    7. Hyperlipidemia: Continue Tricor as prescribed    8. Hypertension: Blood pressure well controlled on hydralazine, Lasix, carvedilol. She also takes spironolactone. .  She is also on amlodipine. 9.  Congestive heart failure: Appears to be well compensated at this time    10. Pulmonary hypertension: Stable: Followed by Dr. Zaria Agosto    11. Morbid obesity: Stable    12. Insomnia: Continue Elavil as needed. She is also on trazodone    13. Migraines: Continue Fioricet as needed    14. Vitamin D deficiency: Continue cholecalciferol    15. Environmental allergies: Doing well with Flonase    16. COPD: Continue Advair    17. Goiter: Patient did not mention to me that she was going to have surgery at her office visit. I found out about this on June 23, a week after her office visit she needed a note for surgical clearance. She did not get her lab work done prior to this office visit. I also do not have a recent EKG on file. It appears that this surgery is going to be needed as the goiter is affecting her airway. She will be at higher risk for complications of anesthesia secondary to her COPD and morbid obesity.   She will need to be monitored closely in the postoperative period for respiratory compromise. At this point, she appears to be medically demise based on the data that we have. 18.  Sleep apnea: Followed by Dr. Vignesh Parker    19. Upper glycemia: An A1c is currently pending    Gissell Chapin was seen today for medicare awv and knee pain. Diagnoses and all orders for this visit:    Need for Tdap vaccination  -     Tdap (ADACEL) 5-2-15.5 LF-MCG/0.5 injection; Inject 0.5 mLs into the muscle once for 1 dose    Need for shingles vaccine  -     zoster recombinant adjuvanted vaccine [facility]) 50 MCG/0.5ML SUSR injection; Inject 0.5 mLs into the muscle See Admin Instructions 1 dose now and repeat in 2-6 months    Encounter for screening mammogram for malignant neoplasm of breast  -     NINO DIGITAL SCREEN W OR WO CAD BILATERAL; Future    Menopause  -     DEXA BONE DENSITY 2 SITES; Future          No follow-ups on file. Orders Placed This Encounter   Procedures    NINO DIGITAL SCREEN W OR WO CAD BILATERAL     Standing Status:   Future     Standing Expiration Date:   2023     Order Specific Question:   Reason for exam:     Answer:   screening breast cancer     Order Specific Question:   Does this patient have implants? Answer:   No     Order Specific Question:   Does this patient have a personal history of breast cancer? Answer:   No     Order Specific Question:   Where was last mammogram performed? Answer:   [facility]     Order Specific Question:   When was last mammogram performed? Answer:   2021    DEXA BONE DENSITY 2 SITES     Standing Status:   Future     Standing Expiration Date:   2023     Order Specific Question:   Reason for exam:     Answer:   screening       Laurie Dejesus MD     Medicare Annual Wellness Visit - Subsequent    Name: [place] Date: 2022   MRN: [id number] Sex: Female   Age: 77 y.o.  Ethnicity: Non- / Non    : 1955 Race: John Houser / African American      Donnell Babin is here for   Chief Complaint Patient presents with    Medicare AWV    Knee Pain     Patient complains of intermittent (R) knee. She says sometimes it feels like it is going to give out on her. Screenings for behavioral, psychosocial and functional/safety risks, and cognitive dysfunction are all negative except as indicated below. These results, as well as other patient data from the [address] [facility] [address] form, are documented in Flowsheets linked to this Encounter. Allergies   Allergen Reactions    Codeine Hives    Lisinopril Hives       Prior to Visit Medications    Medication Sig Taking?  Authorizing Provider   zoster recombinant adjuvanted vaccine (SHINGRIX) 50 MCG/0.5ML SUSR injection Inject 0.5 mLs into the muscle See Admin Instructions 1 dose now and repeat in 2-6 months Yes Otilio Cisneros MD   MYRBETRIQ 25 MG TB24 TAKE 1 TABLET BY MOUTH DAILY Yes Otilio Cisneros MD   DULoxetine (CYMBALTA) 60 MG extended release capsule TAKE 1 CAPSULE BY MOUTH ONCE DAILY IN THE MORNING Yes Otilio Cisneros MD   celecoxib (CELEBREX) 100 MG capsule TAKE 1 CAPSULE BY MOUTH 2 TIMES DAILY Yes Otilio Cisneros MD   spironolactone (ALDACTONE) 25 MG tablet Take 1 tablet by mouth daily Yes Otilio Cisneros MD   fenofibrate (TRICOR) 145 MG tablet TAKE 1 TABLET BY MOUTH DAILY (FOR TRIGLYCERIDES/CHOLESTEROL) Yes Otilio Cisneros MD   hydrALAZINE (APRESOLINE) 25 MG tablet Take 1 tablet by mouth 4 times daily Yes Otilio Cisneros MD   furosemide (LASIX) 40 MG tablet Take 1 tablet by mouth daily Yes Otilio Cisneros MD   allopurinol (ZYLOPRIM) 300 MG tablet TAKE 1 TABLET BY MOUTH DAILY Yes Otilio Cisneros MD   carvedilol (COREG) 25 MG tablet TAKE 1 TABLET BY MOUTH 2 TIMES DAILY Yes Otilio Cisneros MD   amitriptyline (ELAVIL) 25 MG tablet TAKE ONE TABLET BY MOUTH NIGHTLY FOR NERVE DISEASE Yes Otilio Cisneros MD   butalbital-acetaminophen-caffeine (FIORICET, ESGIC) -40 MG per tablet TAKE ONE TABLET EVERY 4 HOURS AS NEEDED FOR HEADACHES Yes Maurilio Neal MD   Cholecalciferol 50 MCG ( UT) TABS TAKE 1 TABLET BY MOUTH DAILY Yes Historical Provider, MD   albuterol sulfate  (90 Base) MCG/ACT inhaler INHALE 2 PUFFS EVERY 4 (FOUR) HOURS AS NEEDED FOR WHEEZING. Yes Historical Provider, MD   fluticasone (FLONASE) 50 MCG/ACT nasal spray USE 2 SPRAYS IN EACH NOSTRIL ONCE DAILY Yes Historical Provider, MD   fluticasone-salmeterol (ADVAIR) 250-50 MCG/DOSE AEPB INHALE 1 PUFF 2 (TWO) TIMES A DAY.  Yes Historical Provider, MD   OXYGEN Inhale into the lungs 2LPM PRN and QHS LEGACY Yes Historical Provider, MD   meclizine (ANTIVERT) 25 MG tablet Take 1 tablet by mouth 3 times daily as needed for Dizziness Yes Maurilio Neal MD   azelastine (ASTELIN) 0.1 % nasal spray 2 sprays by Nasal route 2 times daily Use in each nostril as directed Yes Maurilio Neal MD   Multiple Vitamins-Minerals (MULTIVITAMIN ADULT PO) Take by mouth Yes Historical Provider, MD   amLODIPine (NORVASC) 10 MG tablet TAKE ONE TABLET BY MOUTH DAILY  Patient not taking: Reported on 2022  Maurilio Neal MD   EPINEPHrine (EPIPEN 2-SHAUN) 0.3 MG/0.3ML SOAJ injection Inject 0.3 mLs into the muscle once for 1 dose Use as directed for allergic reaction  Maurilio Neal MD       Past Medical History:   Diagnosis Date    Abnormal CT of spine     Acute sinusitis     Arthritis     Asthma     Bronchitis     Carpal tunnel syndrome     both    CHF (congestive heart failure) (HCC)     Depression     Fibromyalgia     Furuncle     Gout     Hyperlipidemia     Hypertension     Low vitamin D level     Lumbago     Lumbar radiculopathy     Neuropathy     feet    Obesity     Rheumatoid nodule of knee ([facility] [address]     Sleep apnea     UTI (urinary tract infection)     Vertigo     Vitamin D deficiency        Past Surgical History:   Procedure Laterality Date     SECTION      times two    HYSTERECTOMY (CERVIX STATUS UNKNOWN)         Family History   Problem Relation Age of Onset    Heart Disease Mother     Hypertension Mother     Stroke Father     Hypertension Father     Hypertension Other     Heart Disease Other     Cancer Other        CareTeam (Including outside providers/suppliers regularly involved in providing care):   Patient Care Team:  Walter Mccollum MD as PCP - General (Family Medicine)  Walter Mccollum MD as PCP - [facility] Empaneled Provider  Jeevan Isaacs MD as Consulting Physician (Otolaryngology)    Wt Readings from Last 3 Encounters:   06/16/22 (!) 334 lb 9.6 oz (151.8 kg)   01/04/22 (!) 340 lb 12.8 oz (154.6 kg)   06/10/21 (!) 343 lb 14.4 oz (156 kg)     Vitals:    06/16/22 1402   BP: 118/66   Site: Left Lower Arm   Position: Sitting   Pulse: 85   Resp: 24   SpO2: 92%   Weight: (!) 334 lb 9.6 oz (151.8 kg)   Height: 5' (1.524 m)           The following problems were reviewed today and where indicated follow up appointments were made and/or referrals ordered.     Risk Factor Screenings with Interventions     Fall Risk:  2 or more falls in past year?: no  Fall with injury in past year?: no  Fall Risk Interventions:    · Home safety tips provided    Depression:  PHQ-2 Score: 4  Depression Interventions:  · Relaxation techniques discussed    Anxiety:     Anxiety Interventions:  · Relaxation techniques discussed    Cognitive:  Clock Drawing Test (CDT): Normal  Cognitive Impairment Interventions:  · Patient declines any further evaluation/treatment for cognitive impairment    Substance Abuse:  Social History     Socioeconomic History    Marital status:      Spouse name: Not on file    Number of children: Not on file    Years of education: Not on file    Highest education level: Not on file   Occupational History    Not on file   Tobacco Use    Smoking status: Never Smoker    Smokeless tobacco: Never Used   Vaping Use    Vaping Use: Never used   Substance and Sexual Activity    Alcohol use: No    Drug use: No    Sexual activity: Not on file   Other Topics Concern    Not on file   Social History Narrative    Not on file     Social Determinants of Health     Financial Resource Strain: Medium Risk    Difficulty of Paying Living Expenses: Somewhat hard   Food Insecurity: Food Insecurity Present    Worried About Running Out of Food in the Last Year: Sometimes true    Chani of Food in the Last Year: Never true   Transportation Needs: No Transportation Needs    Lack of Transportation (Medical): No    Lack of Transportation (Non-Medical):  No   Physical Activity: Insufficiently Active    Days of Exercise per Week: 3 days    Minutes of Exercise per Session: 20 min   Stress:     Feeling of Stress : Not on file   Social Connections:     Frequency of Communication with Friends and Family: Not on file    Frequency of Social Gatherings with Friends and Family: Not on file    Attends Christian Services: Not on file    Active Member of Clubs or Organizations: Not on file    Attends Club or Organization Meetings: Not on file    Marital Status: Not on file   Intimate Partner Violence:     Fear of Current or Ex-Partner: Not on file    Emotionally Abused: Not on file    Physically Abused: Not on file    Sexually Abused: Not on file   Housing Stability:     Unable to Pay for Housing in the Last Year: Not on file    Number of [place] in the Last Year: Not on file    Unstable Housing in the Last Year: Not on file        Substance Abuse Interventions:  · no concerns    Health Risk Assessment:     General  In general, how would you say your health is?: Fair  In the past 7 days, have you experienced any of the following: New or Increased Pain, New or Increased Fatigue, Loneliness, Social Isolation, Stress or Anger?: (!) Yes  Select all that apply: (!) Loneliness,Stress,Anger  Do you get the social and emotional support that you need?: Yes  General Health Risk Interventions:  · no concerns    Health Habits/Nutrition  On average, how many days per week do you engage in moderate to strenuous exercise (like a brisk walk)?: 3 days  On average, how many minutes do you engage in exercise at this level?: 20 min  Have you lost any weight without trying in the past 3 months?: No  Have you seen the dentist within the past year?: (!) No  Body mass index is 65.35 kg/m².   Health Habits/Nutrition Interventions:  · no concerns    Hearing/Vision  Do you or your family notice any trouble with your hearing that hasn't been managed with hearing aids?: No  Do you have difficulty driving, watching TV, or doing any of your daily activities because of your eyesight?: No  Have you had an eye exam within the past year?: (!) No  Hearing/Vision Interventions:  · Vision concerns:  patient declines any further evaluation/treatment for this issue    Safety  Do you have working smoke detectors?: Yes  Do you have any tripping hazards - loose or unsecured carpets or rugs?: No  Do you have any tripping hazards - clutter in doorways, halls, or stairs?: No  Do you have either shower bars, grab bars, non-slip mats or non-slip surfaces in your shower or bathtub?: Yes  Do all of your stairways have a railing or banister?: Not Applicable  Do you always fasten your seatbelt when you are in a car?: Yes  Safety Interventions:  · Patient declines any further evaluation/treatment for this issue    ADLs  In the past 7 days, did you need help from others to perform any of the following everyday activities: Eating, dressing, grooming, bathing, toileting, or walking/balance?: No  In the past 7 days, did you need help from others to take care of any of the following: Laundry, housekeeping, banking/finances, shopping, telephone use, food preparation, transportation, or taking medications?: No  ADL Interventions:  · Patient declines any further evaluation/treatment for this issue    Personalized Preventive Plan   Current Health Maintenance Status  Immunization History   Administered Date(s) Administered    COVID-19, Pfizer Purple top, DILUTE for use, 12+ yrs, 30mcg/0.3mL dose 03/09/2021, 03/30/2021, 12/29/2021    Pneumococcal Conjugate 13-valent ([id number]) 11/02/2018    Pneumococcal Polysaccharide ([id number]) 11/30/2020    Tdap (Boostrix, Adacel) 10/11/2011        Health Maintenance   Topic Date Due    Shingles vaccine (1 of 2) Never done    DTaP/Tdap/Td vaccine (2 - Td or Tdap) 10/11/2021    Annual Wellness Visit (AWV)  03/02/2022    Flu vaccine (Season Ended) 10/12/2022 (Originally 9/1/2022)    Breast cancer screen  06/11/2023    Depression Monitoring  06/16/2023    Colorectal Cancer Screen  11/12/2025    Lipids  03/01/2026    DEXA (modify frequency per FRAX score)  Completed    Pneumococcal 65+ years Vaccine  Completed    COVID-19 Vaccine  Completed    Hepatitis C screen  Completed    Hepatitis A vaccine  Aged Out    Hepatitis B vaccine  Aged Out    Hib vaccine  Aged Out    Meningococcal (ACWY) vaccine  Aged Out       Recommendations for [employer] Due: see orders.   Recommended screening schedule for the next 5-10 years is provided to the patient in written form: see Patient Instructions/AVS.

## 2022-06-17 ENCOUNTER — TELEPHONE (OUTPATIENT)
Dept: INTERNAL MEDICINE | Age: 67
End: 2022-06-17

## 2022-06-20 ENCOUNTER — LAB (OUTPATIENT)
Dept: LAB | Facility: HOSPITAL | Age: 67
End: 2022-06-20

## 2022-06-20 DIAGNOSIS — Z20.822 ENCOUNTER FOR PREOPERATIVE SCREENING LABORATORY TESTING FOR COVID-19 VIRUS: ICD-10-CM

## 2022-06-20 DIAGNOSIS — Z01.812 ENCOUNTER FOR PREOPERATIVE SCREENING LABORATORY TESTING FOR COVID-19 VIRUS: ICD-10-CM

## 2022-06-20 LAB — SARS-COV-2 ORF1AB RESP QL NAA+PROBE: NOT DETECTED

## 2022-06-20 PROCEDURE — U0004 COV-19 TEST NON-CDC HGH THRU: HCPCS

## 2022-06-20 PROCEDURE — C9803 HOPD COVID-19 SPEC COLLECT: HCPCS

## 2022-06-21 ENCOUNTER — HOSPITAL ENCOUNTER (OUTPATIENT)
Dept: SLEEP MEDICINE | Facility: HOSPITAL | Age: 67
Discharge: HOME | End: 2022-06-21
Admitting: INTERNAL MEDICINE

## 2022-06-21 VITALS
SYSTOLIC BLOOD PRESSURE: 146 MMHG | HEART RATE: 84 BPM | DIASTOLIC BLOOD PRESSURE: 79 MMHG | HEIGHT: 61 IN | RESPIRATION RATE: 14 BRPM | WEIGHT: 293 LBS | BODY MASS INDEX: 55.32 KG/M2

## 2022-06-21 DIAGNOSIS — G47.33 OSA (OBSTRUCTIVE SLEEP APNEA): ICD-10-CM

## 2022-06-21 PROCEDURE — 95810 POLYSOM 6/> YRS 4/> PARAM: CPT | Performed by: PSYCHIATRY & NEUROLOGY

## 2022-06-21 PROCEDURE — 95810 POLYSOM 6/> YRS 4/> PARAM: CPT

## 2022-06-22 ENCOUNTER — TELEPHONE (OUTPATIENT)
Dept: INTERNAL MEDICINE | Age: 67
End: 2022-06-22

## 2022-06-22 NOTE — TELEPHONE ENCOUNTER
Aida with Dr. Bree Crump office called stated they were looking for a surgical clearance for this patient. She is wanting to know if patient is ok to have a left thyroidectomy on June 13 th and they are wanting to know if you could write a letter stating patient was ok to have surgery.

## 2022-06-23 ENCOUNTER — TELEPHONE (OUTPATIENT)
Dept: OTOLARYNGOLOGY | Facility: CLINIC | Age: 67
End: 2022-06-23

## 2022-06-23 ASSESSMENT — ENCOUNTER SYMPTOMS
PHOTOPHOBIA: 0
BLOOD IN STOOL: 0
EYE REDNESS: 0
EYE ITCHING: 0
CHEST TIGHTNESS: 0
BACK PAIN: 1
CONSTIPATION: 0
TROUBLE SWALLOWING: 0
COUGH: 0
SINUS PRESSURE: 0
VOICE CHANGE: 0
COLOR CHANGE: 0
ABDOMINAL PAIN: 0
EYE PAIN: 0
SHORTNESS OF BREATH: 1
WHEEZING: 0
NAUSEA: 0
EYE DISCHARGE: 0
ABDOMINAL DISTENTION: 0
DIARRHEA: 0
SORE THROAT: 0

## 2022-06-23 NOTE — TELEPHONE ENCOUNTER
The note has been written. However, I wishes have been routed to me sooner. The patient has not had her lab work done. We did not know that she was actually having surgery. I have written a note. We can fax we have got.

## 2022-06-23 NOTE — TELEPHONE ENCOUNTER
I spoke with Aida the nurse yesterday and she informed if the patient was scheduled for surgery. I sent Dr. Elizabeth Fatima a message, and responded, sending completed office note. I also called and left voicemail on Aida from Dr. Manfred Ramos office that patient had no labs and ekg workup because patient did not mention to us she was having surgery and let her know if anything else needed to be done further to call and left us know and left office phone number and my ext.

## 2022-06-23 NOTE — TELEPHONE ENCOUNTER
----- Message from Miri Amaro RN sent at 6/16/2022  4:53 PM CDT -----  Medical clearance with Dr. Barbosa. Sleep study on 6/21

## 2022-06-24 ENCOUNTER — TELEPHONE (OUTPATIENT)
Dept: SLEEP MEDICINE | Facility: HOSPITAL | Age: 67
End: 2022-06-24

## 2022-06-24 NOTE — TELEPHONE ENCOUNTER
Spoke to Ms. Cabrera and went over the results of her PSG performed 06/21/2022.  Questions answered. Dr. Humberto Lance diagnosed Ms. Cabrera with mild sleep apnea (AHI=9.7).  Sleep efficiency was 28.8% and no REM was recorded.  Ms. Cabrera does not want to go through CPAP therapy at this time.  Ms. Cabrera would like to speak to Dr. Milad So about an INSPIRE.  A note was put in to Dr. Peter Crews, about Mr. Cabrera's decision to not undergo a nocturnal in-lab CPAP trial.

## 2022-07-01 ENCOUNTER — HOSPITAL ENCOUNTER (OUTPATIENT)
Dept: WOMENS IMAGING | Age: 67
Discharge: HOME | End: 2022-07-01
Payer: MEDICARE

## 2022-07-01 DIAGNOSIS — Z12.31 ENCOUNTER FOR SCREENING MAMMOGRAM FOR MALIGNANT NEOPLASM OF BREAST: ICD-10-CM

## 2022-07-01 DIAGNOSIS — Z78.0 MENOPAUSE: ICD-10-CM

## 2022-07-01 PROCEDURE — 77063 BREAST TOMOSYNTHESIS BI: CPT

## 2022-07-01 PROCEDURE — 77080 DXA BONE DENSITY AXIAL: CPT

## 2022-07-11 ENCOUNTER — LAB (OUTPATIENT)
Dept: LAB | Facility: HOSPITAL | Age: 67
End: 2022-07-11

## 2022-07-11 ENCOUNTER — HOSPITAL ENCOUNTER (OUTPATIENT)
Dept: GENERAL RADIOLOGY | Facility: HOSPITAL | Age: 67
Discharge: HOME | End: 2022-07-11

## 2022-07-11 ENCOUNTER — TRANSCRIBE ORDERS (OUTPATIENT)
Dept: ADMINISTRATIVE | Facility: HOSPITAL | Age: 67
End: 2022-07-11

## 2022-07-11 ENCOUNTER — PRE-ADMISSION TESTING (OUTPATIENT)
Dept: PREADMISSION TESTING | Facility: HOSPITAL | Age: 67
End: 2022-07-11

## 2022-07-11 VITALS
OXYGEN SATURATION: 93 % | HEART RATE: 85 BPM | BODY MASS INDEX: 53.92 KG/M2 | HEIGHT: 62 IN | RESPIRATION RATE: 22 BRPM | DIASTOLIC BLOOD PRESSURE: 71 MMHG | WEIGHT: 293 LBS | SYSTOLIC BLOOD PRESSURE: 165 MMHG

## 2022-07-11 DIAGNOSIS — E07.89 THYROID MASS OF UNCLEAR ETIOLOGY: ICD-10-CM

## 2022-07-11 DIAGNOSIS — E66.01 MORBID OBESITY DUE TO EXCESS CALORIES: ICD-10-CM

## 2022-07-11 DIAGNOSIS — I50.42 CHRONIC COMBINED SYSTOLIC AND DIASTOLIC CONGESTIVE HEART FAILURE: ICD-10-CM

## 2022-07-11 DIAGNOSIS — I10 ESSENTIAL (PRIMARY) HYPERTENSION: Primary | ICD-10-CM

## 2022-07-11 DIAGNOSIS — I10 ESSENTIAL (PRIMARY) HYPERTENSION: ICD-10-CM

## 2022-07-11 DIAGNOSIS — G47.33 OSA (OBSTRUCTIVE SLEEP APNEA): ICD-10-CM

## 2022-07-11 LAB
ALBUMIN SERPL-MCNC: 4.5 G/DL (ref 3.5–5.2)
ALBUMIN SERPL-MCNC: 4.5 G/DL (ref 3.5–5.2)
ALBUMIN/GLOB SERPL: 1.3 G/DL
ALBUMIN/GLOB SERPL: 1.3 G/DL
ALP SERPL-CCNC: 60 U/L (ref 39–117)
ALP SERPL-CCNC: 61 U/L (ref 39–117)
ALT SERPL W P-5'-P-CCNC: 10 U/L (ref 1–33)
ALT SERPL W P-5'-P-CCNC: 10 U/L (ref 1–33)
ANION GAP SERPL CALCULATED.3IONS-SCNC: 10 MMOL/L (ref 5–15)
ANION GAP SERPL CALCULATED.3IONS-SCNC: 12 MMOL/L (ref 5–15)
AST SERPL-CCNC: 17 U/L (ref 1–32)
AST SERPL-CCNC: 19 U/L (ref 1–32)
BASOPHILS # BLD AUTO: 0.06 10*3/MM3 (ref 0–0.2)
BASOPHILS NFR BLD AUTO: 1 % (ref 0–1.5)
BILIRUB SERPL-MCNC: 0.4 MG/DL (ref 0–1.2)
BILIRUB SERPL-MCNC: 0.4 MG/DL (ref 0–1.2)
BUN SERPL-MCNC: 25 MG/DL (ref 8–23)
BUN SERPL-MCNC: 26 MG/DL (ref 8–23)
BUN/CREAT SERPL: 21.5 (ref 7–25)
BUN/CREAT SERPL: 21.6 (ref 7–25)
CALCIUM SPEC-SCNC: 10.5 MG/DL (ref 8.6–10.5)
CALCIUM SPEC-SCNC: 10.6 MG/DL (ref 8.6–10.5)
CHLORIDE SERPL-SCNC: 104 MMOL/L (ref 98–107)
CHLORIDE SERPL-SCNC: 104 MMOL/L (ref 98–107)
CHOLEST SERPL-MCNC: 182 MG/DL (ref 0–200)
CO2 SERPL-SCNC: 27 MMOL/L (ref 22–29)
CO2 SERPL-SCNC: 29 MMOL/L (ref 22–29)
CREAT SERPL-MCNC: 1.16 MG/DL (ref 0.57–1)
CREAT SERPL-MCNC: 1.21 MG/DL (ref 0.57–1)
DEPRECATED RDW RBC AUTO: 62.5 FL (ref 37–54)
DEPRECATED RDW RBC AUTO: 62.9 FL (ref 37–54)
EGFRCR SERPLBLD CKD-EPI 2021: 49.5 ML/MIN/1.73
EGFRCR SERPLBLD CKD-EPI 2021: 52.1 ML/MIN/1.73
EOSINOPHIL # BLD AUTO: 0.22 10*3/MM3 (ref 0–0.4)
EOSINOPHIL NFR BLD AUTO: 3.6 % (ref 0.3–6.2)
ERYTHROCYTE [DISTWIDTH] IN BLOOD BY AUTOMATED COUNT: 17.9 % (ref 12.3–15.4)
ERYTHROCYTE [DISTWIDTH] IN BLOOD BY AUTOMATED COUNT: 17.9 % (ref 12.3–15.4)
GLOBULIN UR ELPH-MCNC: 3.4 GM/DL
GLOBULIN UR ELPH-MCNC: 3.4 GM/DL
GLUCOSE SERPL-MCNC: 79 MG/DL (ref 65–99)
GLUCOSE SERPL-MCNC: 88 MG/DL (ref 65–99)
HCT VFR BLD AUTO: 34.7 % (ref 34–46.6)
HCT VFR BLD AUTO: 34.9 % (ref 34–46.6)
HDLC SERPL-MCNC: 53 MG/DL (ref 40–60)
HGB BLD-MCNC: 10.4 G/DL (ref 12–15.9)
HGB BLD-MCNC: 10.7 G/DL (ref 12–15.9)
IMM GRANULOCYTES # BLD AUTO: 0.02 10*3/MM3 (ref 0–0.05)
IMM GRANULOCYTES NFR BLD AUTO: 0.3 % (ref 0–0.5)
LDLC SERPL CALC-MCNC: 118 MG/DL (ref 0–100)
LDLC/HDLC SERPL: 2.21 {RATIO}
LYMPHOCYTES # BLD AUTO: 1.62 10*3/MM3 (ref 0.7–3.1)
LYMPHOCYTES NFR BLD AUTO: 26.3 % (ref 19.6–45.3)
MCH RBC QN AUTO: 29 PG (ref 26.6–33)
MCH RBC QN AUTO: 29.5 PG (ref 26.6–33)
MCHC RBC AUTO-ENTMCNC: 30 G/DL (ref 31.5–35.7)
MCHC RBC AUTO-ENTMCNC: 30.7 G/DL (ref 31.5–35.7)
MCV RBC AUTO: 96.1 FL (ref 79–97)
MCV RBC AUTO: 96.7 FL (ref 79–97)
MONOCYTES # BLD AUTO: 0.38 10*3/MM3 (ref 0.1–0.9)
MONOCYTES NFR BLD AUTO: 6.2 % (ref 5–12)
NEUTROPHILS NFR BLD AUTO: 3.85 10*3/MM3 (ref 1.7–7)
NEUTROPHILS NFR BLD AUTO: 62.6 % (ref 42.7–76)
NRBC BLD AUTO-RTO: 0 /100 WBC (ref 0–0.2)
PLATELET # BLD AUTO: 266 10*3/MM3 (ref 140–450)
PLATELET # BLD AUTO: 282 10*3/MM3 (ref 140–450)
PMV BLD AUTO: 9.6 FL (ref 6–12)
PMV BLD AUTO: 9.8 FL (ref 6–12)
POTASSIUM SERPL-SCNC: 4.1 MMOL/L (ref 3.5–5.2)
POTASSIUM SERPL-SCNC: 4.2 MMOL/L (ref 3.5–5.2)
PROT SERPL-MCNC: 7.9 G/DL (ref 6–8.5)
PROT SERPL-MCNC: 7.9 G/DL (ref 6–8.5)
RBC # BLD AUTO: 3.59 10*6/MM3 (ref 3.77–5.28)
RBC # BLD AUTO: 3.63 10*6/MM3 (ref 3.77–5.28)
SARS-COV-2 ORF1AB RESP QL NAA+PROBE: NOT DETECTED
SODIUM SERPL-SCNC: 143 MMOL/L (ref 136–145)
SODIUM SERPL-SCNC: 143 MMOL/L (ref 136–145)
TRIGL SERPL-MCNC: 59 MG/DL (ref 0–150)
TSH SERPL DL<=0.05 MIU/L-ACNC: 1.78 UIU/ML (ref 0.27–4.2)
VLDLC SERPL-MCNC: 11 MG/DL (ref 5–40)
WBC NRBC COR # BLD: 6.08 10*3/MM3 (ref 3.4–10.8)
WBC NRBC COR # BLD: 6.15 10*3/MM3 (ref 3.4–10.8)

## 2022-07-11 PROCEDURE — 82306 VITAMIN D 25 HYDROXY: CPT

## 2022-07-11 PROCEDURE — 80053 COMPREHEN METABOLIC PANEL: CPT

## 2022-07-11 PROCEDURE — U0004 COV-19 TEST NON-CDC HGH THRU: HCPCS

## 2022-07-11 PROCEDURE — 84443 ASSAY THYROID STIM HORMONE: CPT

## 2022-07-11 PROCEDURE — 93005 ELECTROCARDIOGRAM TRACING: CPT

## 2022-07-11 PROCEDURE — 71046 X-RAY EXAM CHEST 2 VIEWS: CPT

## 2022-07-11 PROCEDURE — 85027 COMPLETE CBC AUTOMATED: CPT

## 2022-07-11 PROCEDURE — 93010 ELECTROCARDIOGRAM REPORT: CPT | Performed by: INTERNAL MEDICINE

## 2022-07-11 PROCEDURE — 85025 COMPLETE CBC W/AUTO DIFF WBC: CPT

## 2022-07-11 PROCEDURE — 80061 LIPID PANEL: CPT

## 2022-07-11 PROCEDURE — 36415 COLL VENOUS BLD VENIPUNCTURE: CPT

## 2022-07-11 PROCEDURE — C9803 HOPD COVID-19 SPEC COLLECT: HCPCS

## 2022-07-11 NOTE — DISCHARGE INSTRUCTIONS
Before you come to the hospital        Arrival time: AS DIRECTED BY OFFICE     YOU MAY TAKE THE FOLLOWING MEDICATION(S) THE MORNING OF SURGERY WITH A SIP OF WATER: ***none           ALL OTHER HOME MEDICATION CHECK WITH YOUR PHYSICIAN (especially if you are taking diabetes medicines or blood thinners)    Do not take any Erectile Dysfunction medications (EX: CIALIS, VIAGRA) 24 hours prior to surgery      If you were given and instructed to use a germ- killing soap, use as directed the night before surgery and the morning of surgery before coming to the hospital.             Eating and drinking restrictions prior to scheduled arrival time    2 Hours before arrival time STOP   Drinking Clear liquids (water, apple juice-no pulp)     6 Hours before arrival time STOP   Milk or drinks that contain milk, full liquids    6 Hours before arrival time STOP   Light meals or foods, such as toast or cereal    8 Hours before arrival time STOP   Heavy foods, such as meat, fried foods, or fatty foods    (It is extremely important that you follow these guidelines to prevent delay or cancelation of your procedure)     Clear Liquids  Water and flavored water                                                                      Clear Fruit juices, such as cranberry juice and apple juice.  Black coffee (NO cream of any kind, including powdered).  Plain tea  Clear bouillon or broth.  Flavored gelatin.  Soda.  Gatorade or Powerade.  Full liquid examples  Juices that have pulp.  Frozen ice pops that contain fruit pieces.  Coffee with creamer  Milk.  Yogurt.              MANAGING PAIN AFTER SURGERY    We know you are probably wondering what your pain will be like after surgery.  Following surgery it is unrealistic to expect you will not have pain.   Pain is how our bodies let us know that something is wrong or cautions us to be careful.  That said, our goal is to make your pain tolerable.    Methods we may use to treat your pain include (oral  or IV medications, PCAs, epidurals, nerve blocks, etc.)   While some procedures require IV pain medications for a short time after surgery, transitioning to pain medications by mouth allows for better management of pain.   Your nurse will encourage you to take oral pain medications whenever possible.  IV medications work almost immediately, but only last a short while.  Taking medications by mouth allows for a more constant level of medication in your blood stream for a longer period of time.      Once your pain is out of control it is harder to get back under control.  It is important you are aware when your next dose of pain medication is due.  If you are admitted, your nurse may write the time of your next dose on the white board in your room to help you remember.      We are interested in your pain and encourage you to inform us about aggravating factors during your visit.   Many times a simple repositioning every few hours can make a big difference.    If your physician says it is okay, do not let your pain prevent you from getting out of bed. Be sure to call your nurse for assistance prior to getting up so you do not fall.      Before surgery, please decide your tolerable pain goal.  These faces help describe the pain ratings we use on a 0-10 scale.   Be prepared to tell us your goal and whether or not you take pain or anxiety medications at home.

## 2022-07-12 DIAGNOSIS — I10 PRIMARY HYPERTENSION: ICD-10-CM

## 2022-07-12 LAB — 25(OH)D3 SERPL-MCNC: 39.3 NG/ML (ref 30–100)

## 2022-07-13 ENCOUNTER — ANESTHESIA (OUTPATIENT)
Dept: PERIOP | Facility: HOSPITAL | Age: 67
End: 2022-07-13

## 2022-07-13 ENCOUNTER — HOSPITAL ENCOUNTER (OUTPATIENT)
Facility: HOSPITAL | Age: 67
Discharge: HOME | End: 2022-07-14
Attending: OTOLARYNGOLOGY | Admitting: OTOLARYNGOLOGY

## 2022-07-13 ENCOUNTER — ANESTHESIA EVENT (OUTPATIENT)
Dept: PERIOP | Facility: HOSPITAL | Age: 67
End: 2022-07-13

## 2022-07-13 DIAGNOSIS — I50.42 CHRONIC COMBINED SYSTOLIC AND DIASTOLIC CONGESTIVE HEART FAILURE: ICD-10-CM

## 2022-07-13 DIAGNOSIS — E89.0 S/P PARTIAL THYROIDECTOMY: ICD-10-CM

## 2022-07-13 DIAGNOSIS — E07.89 THYROID MASS OF UNCLEAR ETIOLOGY: ICD-10-CM

## 2022-07-13 DIAGNOSIS — E66.01 MORBID OBESITY DUE TO EXCESS CALORIES: ICD-10-CM

## 2022-07-13 DIAGNOSIS — E07.9 THYROID MASS: Primary | ICD-10-CM

## 2022-07-13 DIAGNOSIS — G47.33 OSA (OBSTRUCTIVE SLEEP APNEA): ICD-10-CM

## 2022-07-13 PROCEDURE — 25010000002 FENTANYL CITRATE (PF) 100 MCG/2ML SOLUTION: Performed by: NURSE ANESTHETIST, CERTIFIED REGISTERED

## 2022-07-13 PROCEDURE — 60220 PARTIAL REMOVAL OF THYROID: CPT | Performed by: OTOLARYNGOLOGY

## 2022-07-13 PROCEDURE — 25010000002 DEXAMETHASONE PER 1 MG: Performed by: NURSE ANESTHETIST, CERTIFIED REGISTERED

## 2022-07-13 PROCEDURE — 88307 TISSUE EXAM BY PATHOLOGIST: CPT | Performed by: OTOLARYNGOLOGY

## 2022-07-13 PROCEDURE — 88311 DECALCIFY TISSUE: CPT | Performed by: OTOLARYNGOLOGY

## 2022-07-13 PROCEDURE — 25010000002 PROPOFOL 10 MG/ML EMULSION: Performed by: NURSE ANESTHETIST, CERTIFIED REGISTERED

## 2022-07-13 PROCEDURE — 0 POTASSIUM CHLORIDE PER 2 MEQ: Performed by: OTOLARYNGOLOGY

## 2022-07-13 PROCEDURE — 94640 AIRWAY INHALATION TREATMENT: CPT

## 2022-07-13 PROCEDURE — 25010000002 ONDANSETRON PER 1 MG: Performed by: NURSE ANESTHETIST, CERTIFIED REGISTERED

## 2022-07-13 DEVICE — SEAL HEMO SURG ARISTA/AH ABS/PWDR 3GM: Type: IMPLANTABLE DEVICE | Site: NECK | Status: FUNCTIONAL

## 2022-07-13 DEVICE — LIGACLIP EXTRA LIGATING CLIP CARTRIDGES: 6 TITANIUM CLIPS/ CARTRIDGE (SMALL)
Type: IMPLANTABLE DEVICE | Site: NECK | Status: FUNCTIONAL
Brand: LIGACLIP

## 2022-07-13 RX ORDER — DROPERIDOL 2.5 MG/ML
0.62 INJECTION, SOLUTION INTRAMUSCULAR; INTRAVENOUS ONCE AS NEEDED
Status: DISCONTINUED | OUTPATIENT
Start: 2022-07-13 | End: 2022-07-13 | Stop reason: HOSPADM

## 2022-07-13 RX ORDER — AMITRIPTYLINE HYDROCHLORIDE 25 MG/1
25 TABLET, FILM COATED ORAL NIGHTLY
Status: DISCONTINUED | OUTPATIENT
Start: 2022-07-13 | End: 2022-07-14 | Stop reason: HOSPADM

## 2022-07-13 RX ORDER — LABETALOL HYDROCHLORIDE 5 MG/ML
5 INJECTION, SOLUTION INTRAVENOUS
Status: DISCONTINUED | OUTPATIENT
Start: 2022-07-13 | End: 2022-07-13 | Stop reason: HOSPADM

## 2022-07-13 RX ORDER — FENTANYL CITRATE 50 UG/ML
INJECTION, SOLUTION INTRAMUSCULAR; INTRAVENOUS AS NEEDED
Status: DISCONTINUED | OUTPATIENT
Start: 2022-07-13 | End: 2022-07-13 | Stop reason: SURG

## 2022-07-13 RX ORDER — PROPOFOL 10 MG/ML
VIAL (ML) INTRAVENOUS AS NEEDED
Status: DISCONTINUED | OUTPATIENT
Start: 2022-07-13 | End: 2022-07-13 | Stop reason: SURG

## 2022-07-13 RX ORDER — HYDROMORPHONE HYDROCHLORIDE 1 MG/ML
0.5 INJECTION, SOLUTION INTRAMUSCULAR; INTRAVENOUS; SUBCUTANEOUS
Status: DISCONTINUED | OUTPATIENT
Start: 2022-07-13 | End: 2022-07-13 | Stop reason: HOSPADM

## 2022-07-13 RX ORDER — SODIUM CHLORIDE, SODIUM LACTATE, POTASSIUM CHLORIDE, CALCIUM CHLORIDE 600; 310; 30; 20 MG/100ML; MG/100ML; MG/100ML; MG/100ML
100 INJECTION, SOLUTION INTRAVENOUS CONTINUOUS
Status: DISCONTINUED | OUTPATIENT
Start: 2022-07-13 | End: 2022-07-13

## 2022-07-13 RX ORDER — ALBUTEROL SULFATE 2.5 MG/3ML
2.5 SOLUTION RESPIRATORY (INHALATION) EVERY 4 HOURS PRN
Status: DISCONTINUED | OUTPATIENT
Start: 2022-07-13 | End: 2022-07-14 | Stop reason: HOSPADM

## 2022-07-13 RX ORDER — CARVEDILOL 25 MG/1
25 TABLET ORAL 2 TIMES DAILY WITH MEALS
Status: DISCONTINUED | OUTPATIENT
Start: 2022-07-13 | End: 2022-07-14 | Stop reason: HOSPADM

## 2022-07-13 RX ORDER — SODIUM CHLORIDE 0.9 % (FLUSH) 0.9 %
3 SYRINGE (ML) INJECTION AS NEEDED
Status: DISCONTINUED | OUTPATIENT
Start: 2022-07-13 | End: 2022-07-13 | Stop reason: HOSPADM

## 2022-07-13 RX ORDER — LIDOCAINE HYDROCHLORIDE 10 MG/ML
0.5 INJECTION, SOLUTION EPIDURAL; INFILTRATION; INTRACAUDAL; PERINEURAL ONCE AS NEEDED
Status: DISCONTINUED | OUTPATIENT
Start: 2022-07-13 | End: 2022-07-13 | Stop reason: HOSPADM

## 2022-07-13 RX ORDER — HYDROCODONE BITARTRATE AND ACETAMINOPHEN 5; 325 MG/1; MG/1
1 TABLET ORAL EVERY 4 HOURS PRN
Status: DISCONTINUED | OUTPATIENT
Start: 2022-07-13 | End: 2022-07-14 | Stop reason: HOSPADM

## 2022-07-13 RX ORDER — DEXAMETHASONE SODIUM PHOSPHATE 4 MG/ML
INJECTION, SOLUTION INTRA-ARTICULAR; INTRALESIONAL; INTRAMUSCULAR; INTRAVENOUS; SOFT TISSUE AS NEEDED
Status: DISCONTINUED | OUTPATIENT
Start: 2022-07-13 | End: 2022-07-13 | Stop reason: SURG

## 2022-07-13 RX ORDER — SODIUM CHLORIDE 0.9 % (FLUSH) 0.9 %
3-10 SYRINGE (ML) INJECTION AS NEEDED
Status: DISCONTINUED | OUTPATIENT
Start: 2022-07-13 | End: 2022-07-13 | Stop reason: HOSPADM

## 2022-07-13 RX ORDER — MAGNESIUM HYDROXIDE 1200 MG/15ML
LIQUID ORAL AS NEEDED
Status: DISCONTINUED | OUTPATIENT
Start: 2022-07-13 | End: 2022-07-13 | Stop reason: HOSPADM

## 2022-07-13 RX ORDER — FENOFIBRATE 145 MG/1
145 TABLET, COATED ORAL DAILY
Status: DISCONTINUED | OUTPATIENT
Start: 2022-07-13 | End: 2022-07-14 | Stop reason: HOSPADM

## 2022-07-13 RX ORDER — FLUMAZENIL 0.1 MG/ML
0.2 INJECTION INTRAVENOUS AS NEEDED
Status: DISCONTINUED | OUTPATIENT
Start: 2022-07-13 | End: 2022-07-13 | Stop reason: HOSPADM

## 2022-07-13 RX ORDER — ONDANSETRON 2 MG/ML
INJECTION INTRAMUSCULAR; INTRAVENOUS AS NEEDED
Status: DISCONTINUED | OUTPATIENT
Start: 2022-07-13 | End: 2022-07-13 | Stop reason: SURG

## 2022-07-13 RX ORDER — MUPIROCIN CALCIUM 20 MG/G
CREAM TOPICAL AS NEEDED
Status: DISCONTINUED | OUTPATIENT
Start: 2022-07-13 | End: 2022-07-13 | Stop reason: HOSPADM

## 2022-07-13 RX ORDER — FUROSEMIDE 40 MG/1
40 TABLET ORAL DAILY
Status: DISCONTINUED | OUTPATIENT
Start: 2022-07-13 | End: 2022-07-14 | Stop reason: HOSPADM

## 2022-07-13 RX ORDER — OXYCODONE AND ACETAMINOPHEN 10; 325 MG/1; MG/1
1 TABLET ORAL ONCE AS NEEDED
Status: DISCONTINUED | OUTPATIENT
Start: 2022-07-13 | End: 2022-07-13 | Stop reason: HOSPADM

## 2022-07-13 RX ORDER — SUCCINYLCHOLINE/SOD CL,ISO/PF 200MG/10ML
SYRINGE (ML) INTRAVENOUS AS NEEDED
Status: DISCONTINUED | OUTPATIENT
Start: 2022-07-13 | End: 2022-07-13 | Stop reason: SURG

## 2022-07-13 RX ORDER — ACETAMINOPHEN 500 MG
1000 TABLET ORAL ONCE
Status: COMPLETED | OUTPATIENT
Start: 2022-07-13 | End: 2022-07-13

## 2022-07-13 RX ORDER — ONDANSETRON 2 MG/ML
4 INJECTION INTRAMUSCULAR; INTRAVENOUS ONCE AS NEEDED
Status: DISCONTINUED | OUTPATIENT
Start: 2022-07-13 | End: 2022-07-14 | Stop reason: HOSPADM

## 2022-07-13 RX ORDER — MORPHINE SULFATE 2 MG/ML
4 INJECTION, SOLUTION INTRAMUSCULAR; INTRAVENOUS
Status: DISCONTINUED | OUTPATIENT
Start: 2022-07-13 | End: 2022-07-14 | Stop reason: HOSPADM

## 2022-07-13 RX ORDER — IBUPROFEN 200 MG
400 TABLET ORAL EVERY 6 HOURS PRN
Status: DISCONTINUED | OUTPATIENT
Start: 2022-07-13 | End: 2022-07-14 | Stop reason: HOSPADM

## 2022-07-13 RX ORDER — LIDOCAINE HYDROCHLORIDE AND EPINEPHRINE 10; 10 MG/ML; UG/ML
INJECTION, SOLUTION INFILTRATION; PERINEURAL AS NEEDED
Status: DISCONTINUED | OUTPATIENT
Start: 2022-07-13 | End: 2022-07-13 | Stop reason: HOSPADM

## 2022-07-13 RX ORDER — ROCURONIUM BROMIDE 10 MG/ML
INJECTION, SOLUTION INTRAVENOUS AS NEEDED
Status: DISCONTINUED | OUTPATIENT
Start: 2022-07-13 | End: 2022-07-13 | Stop reason: SURG

## 2022-07-13 RX ORDER — NALOXONE HCL 0.4 MG/ML
0.4 VIAL (ML) INJECTION
Status: DISCONTINUED | OUTPATIENT
Start: 2022-07-13 | End: 2022-07-14 | Stop reason: HOSPADM

## 2022-07-13 RX ORDER — EPHEDRINE SULFATE 50 MG/ML
INJECTION, SOLUTION INTRAVENOUS AS NEEDED
Status: DISCONTINUED | OUTPATIENT
Start: 2022-07-13 | End: 2022-07-13 | Stop reason: SURG

## 2022-07-13 RX ORDER — SODIUM CHLORIDE, SODIUM LACTATE, POTASSIUM CHLORIDE, CALCIUM CHLORIDE 600; 310; 30; 20 MG/100ML; MG/100ML; MG/100ML; MG/100ML
1000 INJECTION, SOLUTION INTRAVENOUS CONTINUOUS
Status: DISCONTINUED | OUTPATIENT
Start: 2022-07-13 | End: 2022-07-13

## 2022-07-13 RX ORDER — PHENYLEPHRINE HCL IN 0.9% NACL 1 MG/10 ML
SYRINGE (ML) INTRAVENOUS AS NEEDED
Status: DISCONTINUED | OUTPATIENT
Start: 2022-07-13 | End: 2022-07-13 | Stop reason: SURG

## 2022-07-13 RX ORDER — KETAMINE HCL IN NACL, ISO-OSM 100MG/10ML
SYRINGE (ML) INJECTION AS NEEDED
Status: DISCONTINUED | OUTPATIENT
Start: 2022-07-13 | End: 2022-07-13 | Stop reason: SURG

## 2022-07-13 RX ORDER — FENTANYL CITRATE 50 UG/ML
25 INJECTION, SOLUTION INTRAMUSCULAR; INTRAVENOUS
Status: DISCONTINUED | OUTPATIENT
Start: 2022-07-13 | End: 2022-07-13 | Stop reason: HOSPADM

## 2022-07-13 RX ORDER — SODIUM CHLORIDE 0.9 % (FLUSH) 0.9 %
3 SYRINGE (ML) INJECTION EVERY 12 HOURS SCHEDULED
Status: DISCONTINUED | OUTPATIENT
Start: 2022-07-13 | End: 2022-07-13 | Stop reason: HOSPADM

## 2022-07-13 RX ORDER — SPIRONOLACTONE 25 MG/1
25 TABLET ORAL DAILY
Status: DISCONTINUED | OUTPATIENT
Start: 2022-07-13 | End: 2022-07-14 | Stop reason: HOSPADM

## 2022-07-13 RX ORDER — DEXMEDETOMIDINE HYDROCHLORIDE 4 UG/ML
INJECTION, SOLUTION INTRAVENOUS AS NEEDED
Status: DISCONTINUED | OUTPATIENT
Start: 2022-07-13 | End: 2022-07-13 | Stop reason: SURG

## 2022-07-13 RX ORDER — ALLOPURINOL 300 MG/1
300 TABLET ORAL DAILY
Status: DISCONTINUED | OUTPATIENT
Start: 2022-07-13 | End: 2022-07-14 | Stop reason: HOSPADM

## 2022-07-13 RX ORDER — ONDANSETRON 2 MG/ML
4 INJECTION INTRAMUSCULAR; INTRAVENOUS
Status: DISCONTINUED | OUTPATIENT
Start: 2022-07-13 | End: 2022-07-13 | Stop reason: HOSPADM

## 2022-07-13 RX ORDER — NALOXONE HCL 0.4 MG/ML
0.04 VIAL (ML) INJECTION AS NEEDED
Status: DISCONTINUED | OUTPATIENT
Start: 2022-07-13 | End: 2022-07-13 | Stop reason: HOSPADM

## 2022-07-13 RX ORDER — LIDOCAINE HYDROCHLORIDE 20 MG/ML
INJECTION, SOLUTION EPIDURAL; INFILTRATION; INTRACAUDAL; PERINEURAL AS NEEDED
Status: DISCONTINUED | OUTPATIENT
Start: 2022-07-13 | End: 2022-07-13 | Stop reason: SURG

## 2022-07-13 RX ORDER — BUDESONIDE AND FORMOTEROL FUMARATE DIHYDRATE 160; 4.5 UG/1; UG/1
2 AEROSOL RESPIRATORY (INHALATION)
Refills: 11 | Status: DISCONTINUED | OUTPATIENT
Start: 2022-07-13 | End: 2022-07-14 | Stop reason: HOSPADM

## 2022-07-13 RX ORDER — SODIUM CHLORIDE AND POTASSIUM CHLORIDE 150; 450 MG/100ML; MG/100ML
100 INJECTION, SOLUTION INTRAVENOUS CONTINUOUS
Status: DISCONTINUED | OUTPATIENT
Start: 2022-07-13 | End: 2022-07-14 | Stop reason: HOSPADM

## 2022-07-13 RX ADMIN — ACETAMINOPHEN 1000 MG: 500 TABLET, FILM COATED ORAL at 12:37

## 2022-07-13 RX ADMIN — CARVEDILOL 25 MG: 25 TABLET, FILM COATED ORAL at 18:00

## 2022-07-13 RX ADMIN — SPIRONOLACTONE 25 MG: 25 TABLET ORAL at 18:00

## 2022-07-13 RX ADMIN — LIDOCAINE HYDROCHLORIDE 100 MG: 20 INJECTION, SOLUTION EPIDURAL; INFILTRATION; INTRACAUDAL; PERINEURAL at 13:49

## 2022-07-13 RX ADMIN — FUROSEMIDE 40 MG: 40 TABLET ORAL at 18:01

## 2022-07-13 RX ADMIN — AMITRIPTYLINE HYDROCHLORIDE 25 MG: 25 TABLET, FILM COATED ORAL at 20:35

## 2022-07-13 RX ADMIN — POTASSIUM CHLORIDE AND SODIUM CHLORIDE 100 ML/HR: 450; 150 INJECTION, SOLUTION INTRAVENOUS at 18:00

## 2022-07-13 RX ADMIN — ONDANSETRON 4 MG: 2 INJECTION INTRAMUSCULAR; INTRAVENOUS at 15:03

## 2022-07-13 RX ADMIN — PROPOFOL 200 MG: 10 INJECTION, EMULSION INTRAVENOUS at 14:37

## 2022-07-13 RX ADMIN — PROPOFOL 200 MG: 10 INJECTION, EMULSION INTRAVENOUS at 14:14

## 2022-07-13 RX ADMIN — Medication 200 MCG: at 14:39

## 2022-07-13 RX ADMIN — ALLOPURINOL 300 MG: 300 TABLET ORAL at 18:00

## 2022-07-13 RX ADMIN — DEXMEDETOMIDINE HYDROCHLORIDE 20 MCG: 4 INJECTION, SOLUTION INTRAVENOUS at 13:54

## 2022-07-13 RX ADMIN — PROPOFOL 200 MG: 10 INJECTION, EMULSION INTRAVENOUS at 13:49

## 2022-07-13 RX ADMIN — Medication 200 MG: at 13:49

## 2022-07-13 RX ADMIN — DEXMEDETOMIDINE HYDROCHLORIDE 20 MCG: 4 INJECTION, SOLUTION INTRAVENOUS at 14:09

## 2022-07-13 RX ADMIN — EPHEDRINE SULFATE 25 MG: 50 INJECTION INTRAVENOUS at 14:10

## 2022-07-13 RX ADMIN — SODIUM CHLORIDE, POTASSIUM CHLORIDE, SODIUM LACTATE AND CALCIUM CHLORIDE 1000 ML: 600; 310; 30; 20 INJECTION, SOLUTION INTRAVENOUS at 08:48

## 2022-07-13 RX ADMIN — SODIUM CHLORIDE, POTASSIUM CHLORIDE, SODIUM LACTATE AND CALCIUM CHLORIDE: 600; 310; 30; 20 INJECTION, SOLUTION INTRAVENOUS at 15:06

## 2022-07-13 RX ADMIN — GLYCOPYRROLATE 0.2 MG: 0.2 INJECTION INTRAMUSCULAR; INTRAVENOUS at 13:56

## 2022-07-13 RX ADMIN — Medication 50 MG: at 13:58

## 2022-07-13 RX ADMIN — ROCURONIUM BROMIDE 10 MG: 10 SOLUTION INTRAVENOUS at 13:49

## 2022-07-13 RX ADMIN — Medication 200 MCG: at 14:44

## 2022-07-13 RX ADMIN — FENOFIBRATE 145 MG: 145 TABLET ORAL at 18:00

## 2022-07-13 RX ADMIN — FENTANYL CITRATE 100 MCG: 50 INJECTION, SOLUTION INTRAMUSCULAR; INTRAVENOUS at 13:55

## 2022-07-13 RX ADMIN — DEXAMETHASONE SODIUM PHOSPHATE 10 MG: 4 INJECTION, SOLUTION INTRA-ARTICULAR; INTRALESIONAL; INTRAMUSCULAR; INTRAVENOUS; SOFT TISSUE at 14:11

## 2022-07-13 RX ADMIN — BUDESONIDE AND FORMOTEROL FUMARATE DIHYDRATE 2 PUFF: 160; 4.5 AEROSOL RESPIRATORY (INHALATION) at 20:49

## 2022-07-13 RX ADMIN — EPHEDRINE SULFATE 25 MG: 50 INJECTION INTRAVENOUS at 14:01

## 2022-07-13 RX ADMIN — DEXMEDETOMIDINE HYDROCHLORIDE 20 MCG: 4 INJECTION, SOLUTION INTRAVENOUS at 13:38

## 2022-07-13 NOTE — ANESTHESIA PREPROCEDURE EVALUATION
Anesthesia Evaluation     Patient summary reviewed and Nursing notes reviewed   no history of anesthetic complications:  NPO Solid Status: > 8 hours  NPO Liquid Status: > 8 hours           Airway   Mallampati: III  TM distance: >3 FB  Neck ROM: full  Possible difficult intubation  Dental      Pulmonary    (+) asthma,home oxygen, sleep apnea,   Cardiovascular   Exercise tolerance: poor (<4 METS)    (+) hypertension, CAD, CHF Systolic <55%, hyperlipidemia,     ROS comment: Echo 2019    · Left ventricular diastolic dysfunction (grade I) consistent with impaired relaxation.  · Left ventricular systolic function is low normal.  · The left ventricular cavity is mildly dilated.  · Left ventricular wall thickness is consistent with mild concentric hypertrophy.     DIFFICULT STUDY AND CONTRAST WOULD HAVE BEEN HELPFUL  LOW NORMAL LV SYSTOLIC FUNCTION  LV DIASTOLIC DYSFUNCTION GRADE 1 NOTED  NORMAL RV FUNCTION  MILD LVE AND MILD LVH      Coronary calcifications noted on chest CT    Neuro/Psych  (+) numbness, psychiatric history Anxiety and Depression,    (-) seizures, TIA, CVA  GI/Hepatic/Renal/Endo    (+) morbid obesity, GERD,    (-) liver disease, no renal disease, diabetes    Musculoskeletal     Abdominal    Substance History      OB/GYN          Other   arthritis, blood dyscrasia anemia,                     Anesthesia Plan    ASA 3     general     (CT with right tracheal deviation. Will proceed with careful preoxygenation. Have videoscope in room. )  intravenous induction     Anesthetic plan, risks, benefits, and alternatives have been provided, discussed and informed consent has been obtained with: patient.        CODE STATUS:

## 2022-07-13 NOTE — ANESTHESIA POSTPROCEDURE EVALUATION
Patient: Pau Cabrera    Procedure Summary     Date: 07/13/22 Room / Location:  [facility]  /  [facility]    Anesthesia Start: 1344 Anesthesia Stop: 1526    Procedure: Left thyroidectomy with isthmusectomy (Left Neck) Diagnosis:       Thyroid mass of unclear etiology      CHARLIE (obstructive sleep apnea)      Morbid obesity due to excess calories (HCC)      Chronic combined systolic and diastolic congestive heart failure (HCC)      (Thyroid mass of unclear etiology [E07.89])      (CHARLIE (obstructive sleep apnea) [G47.33])      (Morbid obesity due to excess calories (HCC) [E66.01])      (Chronic combined systolic and diastolic congestive heart failure (HCC) [I50.42])    Surgeons: Milad So MD Provider: MERRICK Manley CRNA    Anesthesia Type: general ASA Status: 3          Anesthesia Type: general    Vitals  Vitals Value Taken Time   /68 07/13/22 1618   Temp 98.2 °F (36.8 °C) 07/13/22 1618   Pulse 87 07/13/22 1618   Resp 16 07/13/22 1618   SpO2 91 % 07/13/22 1618           Post Anesthesia Care and Evaluation    Patient location during evaluation: PACU  Patient participation: complete - patient participated  Level of consciousness: awake and alert  Pain score: 0  Pain management: adequate    Airway patency: patent  Anesthetic complications: No anesthetic complications    Cardiovascular status: acceptable and stable  Respiratory status: acceptable and unassisted  Hydration status: acceptable

## 2022-07-13 NOTE — PLAN OF CARE
Goal Outcome Evaluation:           Progress: improving  Outcome Evaluation: SURGERY TODAY. MEPILEX DRESSING INTACT @ NECK INCISION. JASS PATENT - BLOODY DRNG. NOTED. HOB ELEVATED PER ORDER. O2 ON @ 3 L/M PER NASAL CANNULA. PT. DUE TO VOID. NO DISTRESS NOTED.

## 2022-07-13 NOTE — ANESTHESIA PROCEDURE NOTES
Airway  Urgency: elective    Date/Time: 7/13/2022 1:51 PM  Airway not difficult    General Information and Staff    Patient location during procedure: OR  CRNA/CAA: MERRICK Manley CRNA    Indications and Patient Condition  Indications for airway management: airway protection    Preoxygenated: yes  MILS maintained throughout  Mask difficulty assessment: 1 - vent by mask    Final Airway Details  Final airway type: endotracheal airway      Successful airway: ETT and NIM tube  Cuffed: yes   Successful intubation technique: direct laryngoscopy and video laryngoscopy  Facilitating devices/methods: intubating stylet  Endotracheal tube insertion site: oral  Blade: Curtis  Blade size: 3  ETT size (mm): 7.5  Cormack-Lehane Classification: grade I - full view of glottis  Placement verified by: chest auscultation and capnometry   Cuff volume (mL): 6  Measured from: lips  ETT/EBT  to lips (cm): 21  Number of attempts at approach: 1  Assessment: lips, teeth, and gum same as pre-op and atraumatic intubation

## 2022-07-13 NOTE — NURSING NOTE
Assisted to the bathroom via stretcher . Patient noted to desaturate easily with any exertion and she states that she wears oxygen at night .

## 2022-07-13 NOTE — OP NOTE
OPERATIVE NOTE  7/13/2022    NAME: Pau Cabrera    YOB: 1955  MRN: [id number]    PRE-OPERATIVE DIAGNOSIS:    Thyroid mass of unclear etiology [E07.89]  CHARLIE (obstructive sleep apnea) [G47.33]  Morbid obesity due to excess calories (HCC) [E66.01]  Chronic combined systolic and diastolic congestive heart failure (HCC) [I50.42]    POST-OPERATIVE DIAGNOSIS:   Post-Op Diagnosis Codes:     * Thyroid mass of unclear etiology [E07.89]     * CHARLIE (obstructive sleep apnea) [G47.33]     * Morbid obesity due to excess calories (HCC) [E66.01]     * Chronic combined systolic and diastolic congestive heart failure (HCC) [I50.42]    PROCEDURE PERFORMED:   Left thyroid lobectomy and isthmusectomy    SURGEON:   Milad So MD    ASSISTANT(S):   None    ANESTHESIA:   General Anesthesia via Endotracheal Tube    INDICATIONS: The patient is a 66 y.o. female with Thyroid mass of unclear etiology [E07.89]  CHARLIE (obstructive sleep apnea) [G47.33]  Morbid obesity due to excess calories (HCC) [E66.01]  Chronic combined systolic and diastolic congestive heart failure (HCC) [I50.42]    PROCEDURE:  The patient was brought to the operating room, given General Anesthesia via Endotracheal Tube, and prepped and draped in the usual manner.     The recurrent laryngeal nerve was monitored throughout the case utilizing technologist assisted nerve monitoring via Nuvasive.    Approximately 8 mL of 1% Xylocaine with epinephrine was injected subcutaneously and an incision made 2 fingerbreadths above the manubrium for approximately 7 cm utilizing a #15 blade.  The incision was carried down through the superficial layer of deep cervical fascia and the strap musculature identified in the midline. The strap muscles on the left were transected horizontally utilizing the Ethicon Harmonic scalpel. Minimal bleeding was encountered throughout the case which was controlled with a combination of electrocautery, as well as 2-0 and 3-0 silk  ties and the Ethicon Harmonic scalpel.  The inferior aspect of the right thyroid lobe was approached first and dissected free from its surrounding fascial attachments with a Kitner dissector.  The inferior thyroid vasculature was identified and the artery and vein separately clamped cut and ligated with 3-0 silk near the capsule of the gland. The recurrent laryngeal nerve was subsequently identified in Siemens triangle.  Confirmation of identification was obtained was stimulation of the recurrent laryngeal nerve at 0.5 mA utilizing the Xomed probe.      The left inferior parathyroid gland was identified and it's blood supply preserved during the dissection.  The recurrent nerve was then traced superiorly to its entrance into the larynx via the cricoarytenoid joint as the thyroid lobe was mobilized medially.  The middle thyroid vein was identified and ligated near the capsule of the gland utilizing 3-0 silk.    The superior thyroid vascular pedicle was subsequently identified and the superior thyroid artery and vein separately identified, clamped cut and ligated with 3-0 silk near the capsule of the gland.  The left superior parathyroid gland was then identified and it's blood supply preserved as well.   The isthmus of the thyroid gland was transected subsequently utilizing the Ethicon Harmonic scalpel and the left thyroid lobe was extirpated and submitted for permanent pathologic examination.  The nerve stimulated at the conclusion of the case at 0.5 mA.    The wound was irrigated with copious amounts of normal saline solution.  A #15 Declan drain was placed through a separate stab wound inferiorly in the neck.  It was secured to the skin of the anterior neck utilizing a single stitch of 2-0 silk.  Reapproximation of the incision was accomplished with interrupted 4-0 Vicryl to reapproximate the strap musculature, the platysma as well as subcutaneous tissues.  The epidermis was reapproximated utilizing a running  subcuticular stitch of 5-0 Vicryl.  Steri-Strips, Mastisol, Bactroban ointment and a Mepilex were applied to the incision.    The patient tolerated the procedure well without complications and was transported upon extubation to the postanesthesia care unit in stable condition       SPECIMENS:  A: Left thyroid lobe and isthmus    COMPLICATIONS: NONE    ESTIMATED BLOOD LOSS:  < 25 cc    Milad So MD  7/13/2022

## 2022-07-14 VITALS
DIASTOLIC BLOOD PRESSURE: 67 MMHG | RESPIRATION RATE: 18 BRPM | WEIGHT: 293 LBS | SYSTOLIC BLOOD PRESSURE: 129 MMHG | OXYGEN SATURATION: 96 % | HEIGHT: 62 IN | BODY MASS INDEX: 53.92 KG/M2 | HEART RATE: 63 BPM | TEMPERATURE: 98 F

## 2022-07-14 LAB
QT INTERVAL: 378 MS
QTC INTERVAL: 435 MS

## 2022-07-14 PROCEDURE — 0 POTASSIUM CHLORIDE PER 2 MEQ: Performed by: OTOLARYNGOLOGY

## 2022-07-14 PROCEDURE — 94799 UNLISTED PULMONARY SVC/PX: CPT

## 2022-07-14 PROCEDURE — 99024 POSTOP FOLLOW-UP VISIT: CPT | Performed by: NURSE PRACTITIONER

## 2022-07-14 PROCEDURE — 94664 DEMO&/EVAL PT USE INHALER: CPT

## 2022-07-14 RX ADMIN — FUROSEMIDE 40 MG: 40 TABLET ORAL at 08:44

## 2022-07-14 RX ADMIN — SPIRONOLACTONE 25 MG: 25 TABLET ORAL at 08:44

## 2022-07-14 RX ADMIN — CARVEDILOL 25 MG: 25 TABLET, FILM COATED ORAL at 08:43

## 2022-07-14 RX ADMIN — POTASSIUM CHLORIDE AND SODIUM CHLORIDE 100 ML/HR: 450; 150 INJECTION, SOLUTION INTRAVENOUS at 04:01

## 2022-07-14 RX ADMIN — FENOFIBRATE 145 MG: 145 TABLET ORAL at 08:44

## 2022-07-14 RX ADMIN — BUDESONIDE AND FORMOTEROL FUMARATE DIHYDRATE 2 PUFF: 160; 4.5 AEROSOL RESPIRATORY (INHALATION) at 06:03

## 2022-07-14 RX ADMIN — ALLOPURINOL 300 MG: 300 TABLET ORAL at 08:43

## 2022-07-14 NOTE — PLAN OF CARE
Goal Outcome Evaluation:  Plan of Care Reviewed With: patient        Progress: no change  Outcome Evaluation: VSS. Pt up multiple times through out night to bathroom. Occasionally reports mild throat soreness, no request for pain meds. JASS with small amount of bloody drainage. Mepilex noted with small amount of drainage. IVF as ordered. SCD's on while in bed. O2 sats noted to drop to 80's when ambulated to bathroom on room air. O2 sats remain stable while on 2L/NC. Mild SOA noted with exertion.

## 2022-07-14 NOTE — DISCHARGE SUMMARY
[facility]         DISCHARGE SUMMARY    Patient Name: Pau Cabrera  : 1955  MRN: [id number]    Date of Admission: 2022  Date of Discharge:  2022  Primary Care Physician: Carola Barbosa MD    Consults     No orders found for last 30 day(s).          Surgical Procedures Since Admission:  Procedure(s):  Left thyroidectomy with isthmusectomy  Surgeon:  Milad So MD  Status:  1 Day Post-Op  -------------------      Presenting Problem:   Thyroid mass of unclear etiology [E07.89]  CHARLIE (obstructive sleep apnea) [G47.33]  Morbid obesity due to excess calories (HCC) [E66.01]  Chronic combined systolic and diastolic congestive heart failure (HCC) [I50.42]  Thyroid mass [E07.9]    Active and Resolved Hospital Problems:  Active Hospital Problems    Diagnosis POA   • Thyroid mass [E07.9] Yes   • Thyroid mass of unclear etiology [E07.89] Unknown   • CHARLIE (obstructive sleep apnea) - non compliant with cpap [G47.33] Unknown   • Morbid obesity due to excess calories (HCC) [E66.01] Unknown   • Chronic combined systolic and diastolic congestive heart failure (HCC) [I50.42] Unknown      Resolved Hospital Problems   No resolved problems to display.         Hospital Course     Hospital Course:  Pau Cabrera is a 66 y.o. female post op day 1 left thyroid lobectomy and isthmusectomy. She has had a relatively normal postoperative course. She is sitting up in bed this morning. She denies difficulty swallowing and states she has been able to eat/drink normally. She does complain of mild sore throat and hoarseness that have been present since last night.       Day of Discharge     Vital Signs:  Temp:  [97.7 °F (36.5 °C)-99.2 °F (37.3 °C)] 98 °F (36.7 °C)  Heart Rate:  [] 63  Resp:  [16-24] 18  BP: (111-149)/(60-74) 129/67  Flow (L/min):  [2-15] 2  Output by Drain (mL) 22 0701 - 22 1900 22 - 22 0700 22 0701 - 22 0852 Range Total    Closed/Suction Drain 1 Anterior Neck Bulb 15 Fr. 10 40  50     Physical Exam:  Physical Exam  Vitals reviewed.   Constitutional:       Appearance: She is obese.   HENT:      Head: Normocephalic.      Right Ear: Hearing and external ear normal.      Left Ear: Hearing and external ear normal.      Nose: Nose normal.      Mouth/Throat:      Lips: Pink.      Mouth: Mucous membranes are moist.   Neck:     Neurological:      Mental Status: She is alert.            Pertinent  and/or Most Recent Results     LAB RESULTS:      Lab 07/11/22  1419 07/11/22  1346   WBC 6.15 6.08   HEMOGLOBIN 10.7* 10.4*   HEMATOCRIT 34.9 34.7   PLATELETS 266 282   NEUTROS ABS 3.85  --    IMMATURE GRANS (ABS) 0.02  --    LYMPHS ABS 1.62  --    MONOS ABS 0.38  --    EOS ABS 0.22  --    MCV 96.1 96.7         Lab 07/11/22  1419 07/11/22  1346   SODIUM 143 143   POTASSIUM 4.1 4.2   CHLORIDE 104 104   CO2 27.0 29.0   ANION GAP 12.0 10.0   BUN 25* 26*   CREATININE 1.16* 1.21*   EGFR 52.1* 49.5*   GLUCOSE 79 88   CALCIUM 10.6* 10.5   TSH 1.780  --          Lab 07/11/22  1419 07/11/22  1346   TOTAL PROTEIN 7.9 7.9   ALBUMIN 4.50 4.50   GLOBULIN 3.4 3.4   ALT (SGPT) 10 10   AST (SGOT) 19 17   BILIRUBIN 0.4 0.4   ALK PHOS 61 60           Lab 07/11/22  1419   CHOLESTEROL 182   LDL CHOL 118*   HDL CHOL 53   TRIGLYCERIDES 59         Brief Urine Lab Results     None        Microbiology Results (last 10 days)     Procedure Component Value - Date/Time    COVID PRE-OP / PRE-PROCEDURE SCREENING ORDER (NO ISOLATION) - Swab, Nasopharynx [[id number]]  (Normal) Collected: 07/11/22 1233    Lab Status: Final result Specimen: Swab from Nasopharynx Updated: 07/11/22 1922    Narrative:      The following orders were created for panel order COVID PRE-OP / PRE-PROCEDURE SCREENING ORDER (NO ISOLATION) - Swab, Nasopharynx.  Procedure                               Abnormality         Status                     ---------                               -----------         ------                      COVID-19,APTIMA PANTHER,...[[id number]]  Normal              Final result                 Please view results for these tests on the individual orders.    COVID-19,APTIMA PANTHER,PAD IN-HOUSE,NP/OP/NASAL SWAB IN UTM/VTM/SALINE/LIQUID AMIES TRANSPORT MEDIA/NP WASH OR ASPIRATE, 24 HR TAT - Swab, Nasopharynx [[id number]]  (Normal) Collected: 07/11/22 1233    Lab Status: Final result Specimen: Swab from Nasopharynx Updated: 07/11/22 1922     COVID19 Not Detected    Narrative:      Fact sheet for providers: https://www.fda.gov/media/076253/download     Fact sheet for patients: https://www.fda.gov/media/991408/download    Test performed by RT PCR.      Polysomnography 4 or More Parameters    Addendum Date: 6/22/2022    Patient as noted previously has on this study very poor sleep efficiency.  Patient is on 2 L/min oxygen at night at home but oxygen apparently was not used during the study.  Patient had a very difficult time maintaining sleep according to the sleep technologist    Result Date: 6/22/2022  Impression:   Axis A 1: Obstructive sleep apnea G47.33 with hypoxemia as above. Axis A 2: Periodic leg movements G47.61 Axis B 1: If patient wants to attempt retitration CPAP or BiPAP titration with O2 protocol as indicated and in  attended study is preferred with this patient's hypoxemia.  Patient mentioned that she wanted to be a candidate for the inspire however with her elevated BMI she likely would not be a candidate at this time. Axis B 2: Further evaluation and treatment of patient's periodic leg movements needs to be followed symptomatically Axis C: Underlying medical problems and medication effects may be contributory.  With weight of 334 pounds and BMI of 63.1 puts patient in the severely obese category and weight loss program may be helpful as clinically indicated.  Chronic pain may be contributory.  Close follow-up with patient's family physician and pulmonologist indicated with  emphasis on safety. Humberto Lance MD 06/22/22 16:41 CDT Dictated utilizing Dragon voice recognition software     XR Chest 2 View    Result Date: 7/11/2022  Impression: 1. Enlarged left lobe of thyroid gland causing mild displacement of the trachea from the midline to the right.   This report was finalized on 07/11/2022 13:37 by Dr. Ismael Torres MD.    DEXA BONE DENSITY 2 SITES    Result Date: 7/1/2022  Impression: Fracture Risk Assessment: Not increased; WHO classification: Normal bone mineral density of the lumbar spine. Increased; WHO classification: Osteopenia of left femoral neck. In addition, the National Osteoporosis Foundation recommends daily intake of 1200 mg of calcium and 800-1000 IU of Vitamin D3 (cholecalciferol) in addition to weight bearing exercise. If the patient is found to be Vitamin B deficient additional supplements may be required. Electronically Signed by SAVANNA RAJAN MD at 02-Jul-2022 04:20:50 AM           Results for orders placed in visit on 06/14/19    Adult Transthoracic Echo Complete W/ Cont if Necessary Per Protocol    Interpretation Summary  · Left ventricular diastolic dysfunction (grade I) consistent with impaired relaxation.  · Left ventricular systolic function is low normal.  · The left ventricular cavity is mildly dilated.  · Left ventricular wall thickness is consistent with mild concentric hypertrophy.    DIFFICULT STUDY AND CONTRAST WOULD HAVE BEEN HELPFUL  LOW NORMAL LV SYSTOLIC FUNCTION  LV DIASTOLIC DYSFUNCTION GRADE 1 NOTED  NORMAL RV FUNCTION  MILD LVE AND MILD LVH      Labs Pending at Discharge:  Pending Labs     Order Current Status    Tissue Pathology Exam In process          Discharge Details        Discharge Medications      Continue These Medications      Instructions Start Date   albuterol sulfate  (90 Base) MCG/ACT inhaler  Commonly known as: PROVENTIL HFA;VENTOLIN HFA;PROAIR HFA   2 puffs, Inhalation, Every 4 Hours PRN      allopurinol 300 MG  tablet  Commonly known as: ZYLOPRIM   No dose, route, or frequency recorded.      amitriptyline 25 MG tablet  Commonly known as: ELAVIL   25 mg, Oral, Nightly      azelastine 0.1 % nasal spray  Commonly known as: ASTELIN   2 sprays, Nasal, 2 Times Daily, Use in each nostril as directed       butalbital-acetaminophen-caffeine -40 MG per tablet  Commonly known as: FIORICET, ESGIC   1 tablet, Oral, Every 4 Hours PRN      carvedilol 25 MG tablet  Commonly known as: COREG   TAKE 1 TABLET BY MOUTH TWICE A DAY      celecoxib 100 MG capsule  Commonly known as: CeleBREX   100 mg, Oral      DULoxetine 60 MG capsule  Commonly known as: CYMBALTA   60 mg, Oral, Every Morning      EPINEPHrine 0.05 MG/ML syringe  Commonly known as: ADRENALIN   20 mL, Injection      fenofibrate 145 MG tablet  Commonly known as: TRICOR   145 mg, Oral, Daily      fluticasone 50 MCG/ACT nasal spray  Commonly known as: Flonase Allergy Relief   2 sprays, Nasal, Daily      Fluticasone-Salmeterol 250-50 MCG/ACT DISKUS  Commonly known as: Wixela Inhub   1 puff, Inhalation, 2 Times Daily      furosemide 40 MG tablet  Commonly known as: LASIX   40 mg, Oral, Daily      loratadine 10 MG tablet  Commonly known as: CLARITIN   10 mg, Oral, Daily      meclizine 25 MG tablet  Commonly known as: ANTIVERT   25 mg, Oral, 3 Times Daily PRN      multivitamin with minerals tablet tablet   Oral      Myrbetriq 25 MG tablet sustained-release 24 hour 24 hr tablet  Generic drug: Mirabegron ER   25 mg, Oral, Daily      OXYGEN-HELIUM IN   Inhalation, 2L at night      spironolactone 25 MG tablet  Commonly known as: ALDACTONE   25 mg, Oral             Allergies   Allergen Reactions   • Codeine Sulfate Hives   • Lisinopril Swelling and Rash     Facial swelling         Discharge Disposition:  Home or Self Care    Diet:  Hospital:  Diet Order   Procedures   • Diet Regular; Consistent Carbohydrate       Discharge Activity:   Activity as tolerated   No lifting/pulling/pushing as  discussed   Call for worsening hoarseness or difficulty swallowing   Discussed s/s hypocalcemia and reasons that would necessitate call to office or return visit   tsh in 4 weeks         Future Appointments   Date Time Provider Department Center   11/28/2022  2:30 PM Peter Crews MD [facility]       Additional Instructions for the Follow-ups that You Need to Schedule     TSH    Aug 04, 2022 (Approximate)      Release to patient: Immediate               Time spent on Discharge including face to face service:  20 minutes    Beba Robles, APRN

## 2022-07-15 ENCOUNTER — TELEPHONE (OUTPATIENT)
Dept: INTERNAL MEDICINE | Age: 67
End: 2022-07-15

## 2022-07-15 LAB
CYTO UR: NORMAL
LAB AP CASE REPORT: NORMAL
Lab: NORMAL
PATH REPORT.FINAL DX SPEC: NORMAL
PATH REPORT.GROSS SPEC: NORMAL

## 2022-07-15 NOTE — TELEPHONE ENCOUNTER
Alex 45 Transitions Initial Follow Up Call    Outreach made within 2 business days of discharge: Yes    Patient: Mohsen Cunningham   Patient : 1955 MRN: [id number]      Reason for Admission: Thyroid nodule    Discharge Date: 2022       Final Diagnosis   Left upper thyroid nodule, smears (2) and ThinPrep preparation (1): Consistent with a benign follicular nodule ([place] category II). Spoke with: Attempted to make contact with patient/caregiver for an initial transitions of care follow up call post discharge without success. I will reach out again at a later time. Any previously scheduled hospital follow up appointments noted below.         Discharge department/facility: [facility]    Scheduled appointment with PCP within 7-14 days    Follow Up  Future Appointments   Date Time Provider Lidia Albert   2022 10:15 AM MD JD Benjamin NAVA   2022  9:45 AM MD JD Benjamin NAVA   2022  2:30 PM ALEXI Boyd OT   2022  9:30 AM Tawanna Zuluaga MD [facility]       Bárbara Wells, [facility] Alivia Mckinley

## 2022-07-18 ENCOUNTER — OFFICE VISIT (OUTPATIENT)
Dept: INTERNAL MEDICINE | Age: 67
End: 2022-07-18
Payer: MEDICARE

## 2022-07-18 ENCOUNTER — TELEPHONE (OUTPATIENT)
Dept: INTERNAL MEDICINE | Age: 67
End: 2022-07-18

## 2022-07-18 VITALS
DIASTOLIC BLOOD PRESSURE: 82 MMHG | OXYGEN SATURATION: 98 % | HEART RATE: 66 BPM | BODY MASS INDEX: 53.92 KG/M2 | SYSTOLIC BLOOD PRESSURE: 132 MMHG | WEIGHT: 293 LBS | HEIGHT: 62 IN

## 2022-07-18 DIAGNOSIS — J44.9 CHRONIC OBSTRUCTIVE PULMONARY DISEASE, UNSPECIFIED COPD TYPE (HCC): ICD-10-CM

## 2022-07-18 DIAGNOSIS — E04.2 MULTINODULAR GOITER: Primary | ICD-10-CM

## 2022-07-18 DIAGNOSIS — I10 PRIMARY HYPERTENSION: ICD-10-CM

## 2022-07-18 DIAGNOSIS — E89.0 STATUS POST PARTIAL THYROIDECTOMY: ICD-10-CM

## 2022-07-18 PROCEDURE — 99214 OFFICE O/P EST MOD 30 MIN: CPT | Performed by: INTERNAL MEDICINE

## 2022-07-18 RX ORDER — FUROSEMIDE 40 MG/1
40 TABLET ORAL DAILY
Qty: 90 TABLET | Refills: 3 | Status: SHIPPED | OUTPATIENT
Start: 2022-07-18

## 2022-07-18 RX ORDER — EPINEPHRINE 0.3 MG/.3ML
0.3 INJECTION SUBCUTANEOUS ONCE
Qty: 0.3 ML | Refills: 1 | Status: SHIPPED | OUTPATIENT
Start: 2022-07-18 | End: 2022-07-18

## 2022-07-18 NOTE — PROGRESS NOTES
Chief Complaint   Patient presents with    Follow-Up from Hospital       HPI: Leigha Shelton is a 77 y.o. female is here for hospital follow-up. This is a high complexity TCM visit. Ms. Billy Lopez was hospitalized at [facility] between July 13 and July 14, 2022. She recently had a left thyroidectomy with isthmusectomy Dr. Yetta Galeazzi on July 13. An out reach call was made to the patient within 48 hours of her discharge. Her medications were reviewed and reconciled today. About 30 minutes was spent reviewing her chart and seeing patient today. She had presented to the ENT office with complaints of shortness of breath and a history of thyroid nodules. She has a long history of thyroid nodules, which have been present for several years. She started feeling like something was moving in her throat when she was singing. She denies any complaints of dysphagia, hoarseness or globus sensation. Thyroid ultrasound back in April showed left multinodular goiter. It was very difficult to image completely due to his substernal extension. She also had 3 dominant left lobe nodules. It was recommended that she undergo a needle aspiration given the sizes being greater than 2.5 cm. She did have a fine-needle aspiration, which was consistent with a benign follicular nodule. A CT of her neck was done showed an enlarged 10.4 by 5.4 x 5.0 cm multinodular left thyroid lobe extending inferiorly into the substernal region, creating a mass-effect on the trachea. Due to the mass-effect, she electively underwent surgical intervention. She is doing well postoperatively. She is not having any difficulty breathing at this time. She denies any complaints of fevers or chills. She has not had any discharge or drainage from her wound. Her most recent cholesterol level was 182. Her blood pressure remains well controlled. She does have a history of COPD, which is stable.   She remains on oxygen therapy at (Patient not taking: Reported on 7/18/2022) 90 tablet 1    Cholecalciferol 50 MCG (2000 UT) TABS TAKE 1 TABLET BY MOUTH DAILY (Patient not taking: Reported on 7/18/2022)      amLODIPine (NORVASC) 10 MG tablet TAKE ONE TABLET BY MOUTH DAILY (Patient not taking: No sig reported) 90 tablet 3     No current facility-administered medications for this visit. Patient Active Problem List   Diagnosis    Chronic congestive heart failure (HCC)    Depression    Fibromyalgia    Gout of multiple sites    Mixed hyperlipidemia    Morbid obesity due to excess calories (HCC)    HTN (hypertension)    Chronic midline low back pain without sciatica    Benign headache    Family hx of colon cancer    Hypoxia    TAURUS (obstructive sleep apnea)    Thyroid mass    Chronic combined systolic and diastolic congestive heart failure (HCC)    Hypoxemia requiring supplemental oxygen    Mild intermittent asthma without complication    Non-seasonal allergic rhinitis    Pulmonary hypertension (HCC)    SOB (shortness of breath)    OAB (overactive bladder)    Chronic obstructive pulmonary disease (HCC)    Rash    Goiter    Open wound of left lower leg        Review of Systems   Constitutional:  Negative for activity change, appetite change, chills, diaphoresis, fatigue, fever and unexpected weight change. HENT:  Negative for congestion, ear discharge, postnasal drip, sinus pressure, sneezing, sore throat, tinnitus, trouble swallowing and voice change. Loss of taste and smell following COVID-19 infection. .  She recently underwent a partial thyroidectomy. She is feeling somewhat better. She no longer feels like there is anything pushing on her trachea   Eyes:  Negative for photophobia, pain, discharge, redness, itching and visual disturbance. Respiratory:  Positive for shortness of breath. Negative for cough, chest tightness and wheezing.          Some mild shortness of breath following a COVID-19 infection, but has overall improved since last OV   Cardiovascular:  Negative for chest pain, palpitations and leg swelling. Gastrointestinal:  Negative for abdominal distention, abdominal pain, blood in stool, constipation, diarrhea and nausea. Endocrine: Negative for cold intolerance, heat intolerance, polydipsia, polyphagia and polyuria. Genitourinary:  Negative for difficulty urinating, dysuria, flank pain, frequency, hematuria and urgency. Musculoskeletal:  Positive for arthralgias and back pain. Negative for gait problem, joint swelling, myalgias, neck pain and neck stiffness. She does complain of neck pain and hip pain. She has difficulty standing after long periods of time. Right knee pain   Skin:  Negative for color change, pallor, rash and wound. Allergic/Immunologic: Negative for environmental allergies, food allergies and immunocompromised state. Neurological:  Negative for dizziness, tremors, seizures, syncope, facial asymmetry, speech difficulty, weakness, light-headedness, numbness and headaches. Hematological:  Negative for adenopathy. Does not bruise/bleed easily. Psychiatric/Behavioral:  Negative for agitation, confusion, decreased concentration, dysphoric mood, hallucinations, self-injury, sleep disturbance and suicidal ideas. The patient is not nervous/anxious and is not hyperactive. /82   Pulse 66   Ht 5' 1.5\" (1.562 m)   Wt (!) 340 lb (154.2 kg)   SpO2 98%   BMI 63.20 kg/m²   Physical Exam  Vitals and nursing note reviewed. Constitutional:       General: She is not in acute distress. Appearance: Normal appearance. She is well-developed and normal weight. She is not ill-appearing, toxic-appearing or diaphoretic. HENT:      Head: Normocephalic and atraumatic. Right Ear: Tympanic membrane, ear canal and external ear normal. There is no impacted cerumen. Left Ear: Tympanic membrane, ear canal and external ear normal. There is no impacted cerumen.       Nose: Nose normal. No congestion or rhinorrhea. Mouth/Throat:      Mouth: Mucous membranes are moist.      Pharynx: Oropharynx is clear. No oropharyngeal exudate or posterior oropharyngeal erythema. Eyes:      General: No scleral icterus. Right eye: No discharge. Left eye: No discharge. Extraocular Movements: Extraocular movements intact. Conjunctiva/sclera: Conjunctivae normal.      Pupils: Pupils are equal, round, and reactive to light. Neck:      Thyroid: No thyromegaly. Vascular: No carotid bruit or JVD. Trachea: No tracheal deviation. Comments: Incision to neck is clean and healing without any evidence of infection. Cardiovascular:      Rate and Rhythm: Normal rate and regular rhythm. Pulses: Normal pulses. Heart sounds: Normal heart sounds. No murmur heard. No friction rub. No gallop. Pulmonary:      Effort: Pulmonary effort is normal. No respiratory distress. Breath sounds: Normal breath sounds. No stridor. No wheezing, rhonchi or rales. Chest:      Chest wall: No tenderness. Abdominal:      General: Abdomen is flat. Bowel sounds are normal. There is no distension. Palpations: Abdomen is soft. There is no mass. Tenderness: There is no abdominal tenderness. There is no right CVA tenderness, left CVA tenderness, guarding or rebound. Hernia: No hernia is present. Musculoskeletal:         General: Tenderness present. No swelling, deformity or signs of injury. Normal range of motion. Cervical back: Normal range of motion and neck supple. No rigidity. No muscular tenderness. Right lower leg: No edema. Left lower leg: No edema. Comments: Gait is antalgic. There is tenderness on palpation of the lower lumbar spine. There is tenderness on palpation of the lower lumbar spine   Lymphadenopathy:      Cervical: No cervical adenopathy. Skin:     General: Skin is warm and dry.       Capillary Refill: Capillary refill takes less than 2 seconds. Coloration: Skin is not jaundiced or pale. Findings: No bruising, erythema, lesion or rash. Neurological:      General: No focal deficit present. Mental Status: She is alert and oriented to person, place, and time. Mental status is at baseline. Cranial Nerves: No cranial nerve deficit. Sensory: No sensory deficit. Motor: No weakness or abnormal muscle tone. Coordination: Coordination normal.      Gait: Gait normal.      Deep Tendon Reflexes: Reflexes are normal and symmetric. Reflexes normal.   Psychiatric:         Mood and Affect: Mood normal.         Behavior: Behavior normal.         Thought Content: Thought content normal.         Judgment: Judgment normal.       No diagnosis found. ASSESSMENT/PLAN:    63-year-old woman here for hospital follow-up    1. Multinodular goiter: Status post thyroidectomy: Patient is doing well postoperatively. Continue care as per Dr. Ankur Laureano    2. Hypertension: Blood pressure well controlled on current medication regimen    3. COPD: Stable    There are no diagnoses linked to this encounter. No follow-ups on file. No orders of the defined types were placed in this encounter.       Anahi Sheppard MD

## 2022-07-18 NOTE — TELEPHONE ENCOUNTER
Alex 45 Transitions Initial Follow Up Call    Outreach made within 2 business days of discharge: Yes    Patient: Yasmeen Marlow   Patient : 1955 MRN: [id number]    Reason for Admission: Thyroid nodule    Discharge Date:2022       Final Diagnosis   Left upper thyroid nodule, smears (2) and ThinPrep preparation (1): Consistent with a benign follicular nodule ([place] category II). Spoke with: Patient    Discharge department/facility: [facility]    TCM Interactive Patient Contact:  Was patient able to fill all prescriptions: Yes  Was patient instructed to bring all medications to the follow-up visit: Yes  Is patient taking all medications as directed in the discharge summary? Yes  Does patient understand their discharge instructions: Yes  Does patient have questions or concerns that need addressed prior to 7-14 day follow up office visit: no    Spoke to the pt she has an apt today. She said she is doing well the throat pain is gone now and she just wants Dr Elle Mckinney to look it to make sure that it is healing well. She will need some refills on her medications which she will do when she comes in today. She is back to a normal diet and did not think of anything else at this time.     Scheduled appointment with PCP within 7-14 days    Follow Up  Future Appointments   Date Time Provider Lidia Albert   2022 10:15 AM [facility]   2022  9:45 AM [facility]   2022  2:30 PM ALEXI Mercedes OT   2022  9:30 AM Devonte Brand MD [facility]       Kei Padilla, [address] [place] [address] Alivia Mckinley

## 2022-07-21 RX ORDER — ALLOPURINOL 300 MG/1
300 TABLET ORAL DAILY
Qty: 30 TABLET | Refills: 5 | Status: SHIPPED | OUTPATIENT
Start: 2022-07-21

## 2022-07-28 ASSESSMENT — ENCOUNTER SYMPTOMS
TROUBLE SWALLOWING: 0
SHORTNESS OF BREATH: 1
CONSTIPATION: 0
EYE ITCHING: 0
SINUS PRESSURE: 0
EYE DISCHARGE: 0
EYE PAIN: 0
VOICE CHANGE: 0
COUGH: 0
DIARRHEA: 0
ABDOMINAL DISTENTION: 0
BACK PAIN: 1
CHEST TIGHTNESS: 0
SORE THROAT: 0
EYE REDNESS: 0
WHEEZING: 0
NAUSEA: 0
PHOTOPHOBIA: 0
COLOR CHANGE: 0
ABDOMINAL PAIN: 0
BLOOD IN STOOL: 0

## 2022-08-24 NOTE — PROGRESS NOTES
YOB: 1955  Location: [facility]  Location Address: [address] [place] [address], [facility], [address] [place] [address]  Location Phone: [phone]    Chief Complaint   Patient presents with   • Thyroid Problem       History of Present Illness  Pau Cabrera is a 67 y.o. female.  Pau Cabrera is here for follow up of ENT complaints. The patient is s/p left thyroidectomy with isthmusectomy 2022   She has had a relatively normal postoperative course. She denies hoarseness, dysphagia, or globus sensation   She does complain of increased fatigue over the past several weeks   She is not currently on thyroid replacement hormone at this time     Tissue Pathology Exam: [id number]  Order: [id number]   Status: Final result     Visible to patient: Yes (seen)     Next appt: 2022 at 02:45 PM in Otolaryngology (Milad So MD)     Dx: CHARLIE (obstructive sleep apnea); Chroni...    Specimen Information: Thyroid; Tissue         0 Result Notes    Component    Note to Patients    This report may contain a detailed description of human tissue sent by a health care provider to the laboratory for pathologic evaluation. The content of this report is essential for diagnosis and may provide important critical findings. This information may be unfamiliar to patients to review without a medical professional present. It is advised that the patient review this report in the presence of a health care provider who can answer questions and explain the results.   Case Report   Surgical Pathology Report                         Case: [id number]                                   Authorizing Provider:  Milad So MD    Collected:           2022 02:46 PM           Ordering Location:     [facility] OR  Received:            2022 08:19 AM           Pathologist:           Janet Gunter MD                                                     Specimen:    Thyroid, LEFT THYROID LOBE AND  ISTHMUS                                                     Final Diagnosis   Thyroid, Left Lobe and Isthmus, resection:       - Multinodular hyperplasia with calcifications and focal ossification.   Electronically signed by Janet Gunter MD on 7/15/2022 at 1042             Past Medical History:   Diagnosis Date   • Anemia     iron def   • Anxiety    • Asthma    • Back pain    • CHF (congestive heart failure) (HCC)    • Chronic bronchitis (HCC)    • Coronary artery disease    • COVID-19 vaccine series completed     pfizer   • COVID-19 vaccine series completed    • CTS (carpal tunnel syndrome)    • Depression    • Fibromyalgia    • GERD (gastroesophageal reflux disease)    • Gout    • Hyperlipemia    • Hyperlipidemia    • Hypertension    • Obesity    • Obstructive sleep apnea     could not tolerate machine    • Peripheral neuropathy    • Primary central sleep apnea    • Pulmonary arterial hypertension (HCC)    • Rheumatoid arthritis (HCC)    • Sinusitis        Past Surgical History:   Procedure Laterality Date   • BREAST BIOPSY Left    •  SECTION      X 2   • COLONOSCOPY  10/22/2015    Tics repeat exam in 5 years   • COLONOSCOPY  2007    Small hemorrhoids repeat exam in 3 years   • COLONOSCOPY N/A 2020    Procedure: COLONOSCOPY WITH ANESTHESIA;  Surgeon: Denny Francis MD;  Location: [facility] ENDOSCOPY;  Service: Gastroenterology;  Laterality: N/A;  pre: family hx colon ca  post: diverticulosis  Carola Barbosa MD   • HYSTERECTOMY  2000    total   • THYROIDECTOMY Left 2022    Procedure: Left thyroidectomy with isthmusectomy;  Surgeon: Milad So MD;  Location: [facility] OR;  Service: ENT;  Laterality: Left;   • UMBILICAL HERNIA REPAIR      had 1/2 of it repaired with hysterectomy        Outpatient Medications Marked as Taking for the 22 encounter (Office Visit) with Milad So MD   Medication Sig Dispense Refill   • albuterol sulfate  (90 Base)  MCG/ACT inhaler Inhale 2 puffs Every 4 (Four) Hours As Needed for Wheezing. 6.7 g 5   • allopurinol (ZYLOPRIM) 300 MG tablet      • amitriptyline (ELAVIL) 25 MG tablet Take 25 mg by mouth Every Night.     • azelastine (ASTELIN) 0.1 % nasal spray 2 sprays into the nostril(s) as directed by provider 2 (Two) Times a Day. Use in each nostril as directed 60 mL 11   • butalbital-acetaminophen-caffeine (FIORICET, ESGIC) -40 MG per tablet Take 1 tablet by mouth Every 4 (Four) Hours As Needed for Headache.     • carvedilol (COREG) 25 MG tablet TAKE 1 TABLET BY MOUTH TWICE A DAY 60 tablet 3   • celecoxib (CeleBREX) 100 MG capsule Take 100 mg by mouth.     • DULoxetine (CYMBALTA) 60 MG capsule Take 60 mg by mouth Every Morning.     • fenofibrate (TRICOR) 145 MG tablet Take 145 mg by mouth Daily.     • fluticasone (Flonase Allergy Relief) 50 MCG/ACT nasal spray 2 sprays into the nostril(s) as directed by provider Daily. 16 g 11   • Fluticasone-Salmeterol (Wixela Inhub) 250-50 MCG/ACT DISKUS Inhale 1 puff 2 (Two) Times a Day. 1 each 11   • furosemide (LASIX) 40 MG tablet Take 1 tablet by mouth Daily. 30 tablet 0   • loratadine (CLARITIN) 10 MG tablet Take 1 tablet by mouth Daily. 90 tablet 3   • meclizine (ANTIVERT) 25 MG tablet Take 25 mg by mouth 3 (three) times a day as needed for dizziness.     • Multiple Vitamins-Minerals (MULTIVITAMIN ADULT PO) Take  by mouth.     • Myrbetriq 25 MG tablet sustained-release 24 hour 24 hr tablet Take 25 mg by mouth Daily.     • OXYGEN-HELIUM IN Inhale. 2L at night     • spironolactone (ALDACTONE) 25 MG tablet Take 25 mg by mouth.         Codeine sulfate and Lisinopril    Family History   Problem Relation Age of Onset   • Arthritis Mother    • Heart disease Mother    • Hypertension Mother    • Thyroid disease Mother    • Arthritis Father    • Hypertension Father    • Anuerysm Father    • Stroke Father    • Asthma Brother    • Colon polyps Neg Hx    • Colon cancer Neg Hx        Social  History     Socioeconomic History   • Marital status:    Tobacco Use   • Smoking status: Never Smoker   • Smokeless tobacco: Never Used   Vaping Use   • Vaping Use: Never used   Substance and Sexual Activity   • Alcohol use: No   • Drug use: No   • Sexual activity: Not Currently     Partners: Male     Birth control/protection: Surgical       Review of Systems   Constitutional: Positive for fatigue.   HENT: Negative for sore throat, trouble swallowing and voice change.    Respiratory: Positive for shortness of breath.    Cardiovascular: Negative.    Endocrine: Negative.    Genitourinary: Negative.    Musculoskeletal: Positive for gait problem.       Vitals:    08/25/22 1512   BP: 141/70   Pulse: 83   Resp: 16   Temp: 97.7 °F (36.5 °C)       Body mass index is 62.83 kg/m².    Objective     Physical Exam  Vitals reviewed.   Constitutional:       Appearance: She is obese.   HENT:      Head: Normocephalic.      Right Ear: Hearing, tympanic membrane, ear canal and external ear normal.      Left Ear: Hearing, tympanic membrane, ear canal and external ear normal.      Nose: Nose normal.      Mouth/Throat:      Lips: Pink.   Neck:     Neurological:      Mental Status: She is alert.         Assessment & Plan   Diagnoses and all orders for this visit:    1. S/P partial thyroidectomy (Primary)    2. Thyroid mass of unclear etiology    3. CHARLIE (obstructive sleep apnea) - non compliant with cpap    4. Ear itching      * Surgery not found *  No orders of the defined types were placed in this encounter.  discussed massaging incision with scar gel   tsh today   Continue nasal sprays for ear itching     Return in about 3 months (around 11/25/2022) for Recheck.       Patient Instructions   For the best response, use your nasal sprays every day without skipping doses. It may take several weeks before the full effect is acheived.

## 2022-08-25 ENCOUNTER — OFFICE VISIT (OUTPATIENT)
Dept: OTOLARYNGOLOGY | Facility: CLINIC | Age: 67
End: 2022-08-25

## 2022-08-25 ENCOUNTER — LAB (OUTPATIENT)
Dept: LAB | Facility: HOSPITAL | Age: 67
End: 2022-08-25

## 2022-08-25 VITALS
BODY MASS INDEX: 53.92 KG/M2 | DIASTOLIC BLOOD PRESSURE: 70 MMHG | RESPIRATION RATE: 16 BRPM | WEIGHT: 293 LBS | SYSTOLIC BLOOD PRESSURE: 141 MMHG | TEMPERATURE: 97.7 F | HEART RATE: 83 BPM | HEIGHT: 62 IN

## 2022-08-25 DIAGNOSIS — E07.9 THYROID MASS: ICD-10-CM

## 2022-08-25 DIAGNOSIS — E89.0 S/P PARTIAL THYROIDECTOMY: ICD-10-CM

## 2022-08-25 DIAGNOSIS — E07.89 THYROID MASS OF UNCLEAR ETIOLOGY: ICD-10-CM

## 2022-08-25 DIAGNOSIS — E89.0 S/P PARTIAL THYROIDECTOMY: Primary | ICD-10-CM

## 2022-08-25 DIAGNOSIS — L29.9 EAR ITCHING: ICD-10-CM

## 2022-08-25 DIAGNOSIS — G47.33 OSA (OBSTRUCTIVE SLEEP APNEA): ICD-10-CM

## 2022-08-25 LAB — TSH SERPL DL<=0.05 MIU/L-ACNC: 6.39 UIU/ML (ref 0.27–4.2)

## 2022-08-25 PROCEDURE — 36415 COLL VENOUS BLD VENIPUNCTURE: CPT

## 2022-08-25 PROCEDURE — 84443 ASSAY THYROID STIM HORMONE: CPT

## 2022-08-25 PROCEDURE — 99024 POSTOP FOLLOW-UP VISIT: CPT | Performed by: NURSE PRACTITIONER

## 2022-08-26 ENCOUNTER — TELEPHONE (OUTPATIENT)
Dept: OTOLARYNGOLOGY | Facility: CLINIC | Age: 67
End: 2022-08-26

## 2022-08-26 DIAGNOSIS — E89.0 S/P PARTIAL THYROIDECTOMY: Primary | ICD-10-CM

## 2022-08-26 DIAGNOSIS — E89.0 POSTOPERATIVE HYPOTHYROIDISM: ICD-10-CM

## 2022-08-26 RX ORDER — LEVOTHYROXINE SODIUM 25 MCG
25 TABLET ORAL DAILY
Qty: 30 TABLET | Refills: 0 | Status: SHIPPED | OUTPATIENT
Start: 2022-08-26 | End: 2022-09-13

## 2022-08-26 NOTE — TELEPHONE ENCOUNTER
----- Message from DONOVAN Esposito sent at 8/26/2022 10:48 AM CDT -----  Needs to start synthroid 25 mcg and we will recheck in 4 weeks

## 2022-09-13 RX ORDER — LEVOTHYROXINE SODIUM 25 MCG
TABLET ORAL
Qty: 30 TABLET | Refills: 0 | Status: SHIPPED | OUTPATIENT
Start: 2022-09-13 | End: 2022-10-19

## 2022-09-21 ENCOUNTER — TELEPHONE (OUTPATIENT)
Dept: OTOLARYNGOLOGY | Facility: CLINIC | Age: 67
End: 2022-09-21

## 2022-09-21 NOTE — TELEPHONE ENCOUNTER
Patient called wanting refill on  Synthroid 25mg. I told her it was sent to [facility] on 9/13 and to call and check with them. She said she just got it filled on 8/28, told her if they do not have it or there is a problem give us a call back

## 2022-10-19 RX ORDER — LEVOTHYROXINE SODIUM 25 MCG
TABLET ORAL
Qty: 30 TABLET | Refills: 0 | Status: SHIPPED | OUTPATIENT
Start: 2022-10-19 | End: 2022-11-28

## 2022-10-20 RX ORDER — AMITRIPTYLINE HYDROCHLORIDE 25 MG/1
TABLET, FILM COATED ORAL
Qty: 90 TABLET | Refills: 3 | Status: SHIPPED | OUTPATIENT
Start: 2022-10-20

## 2022-10-20 NOTE — TELEPHONE ENCOUNTER
Annette Mireles called requesting a refill of the below medication which has been pended for you:     Requested Prescriptions     Pending Prescriptions Disp Refills    amitriptyline (ELAVIL) 25 MG tablet [Pharmacy Med Name: AMITRIPTYLINE HCL 25 MG TAB 25 Tablet] 90 tablet 3     Sig: TAKE ONE TABLET BY MOUTH NIGHTLY FOR NERVE DISEASE       Last Appointment Date: 7/18/2022  Next Appointment Date: 12/16/2022    Allergies   Allergen Reactions    Codeine Hives    Lisinopril Hives

## 2022-11-22 ENCOUNTER — LAB (OUTPATIENT)
Dept: LAB | Facility: HOSPITAL | Age: 67
End: 2022-11-22

## 2022-11-22 DIAGNOSIS — E89.0 S/P PARTIAL THYROIDECTOMY: ICD-10-CM

## 2022-11-22 DIAGNOSIS — E89.0 POSTOPERATIVE HYPOTHYROIDISM: ICD-10-CM

## 2022-11-22 LAB — TSH SERPL DL<=0.05 MIU/L-ACNC: 8.88 UIU/ML (ref 0.27–4.2)

## 2022-11-22 PROCEDURE — 84443 ASSAY THYROID STIM HORMONE: CPT

## 2022-11-22 PROCEDURE — 36415 COLL VENOUS BLD VENIPUNCTURE: CPT

## 2022-11-28 ENCOUNTER — TELEPHONE (OUTPATIENT)
Dept: OTOLARYNGOLOGY | Facility: CLINIC | Age: 67
End: 2022-11-28

## 2022-11-28 ENCOUNTER — OFFICE VISIT (OUTPATIENT)
Dept: PULMONOLOGY | Facility: CLINIC | Age: 67
End: 2022-11-28

## 2022-11-28 VITALS
HEIGHT: 62 IN | SYSTOLIC BLOOD PRESSURE: 132 MMHG | WEIGHT: 293 LBS | BODY MASS INDEX: 53.92 KG/M2 | OXYGEN SATURATION: 94 % | DIASTOLIC BLOOD PRESSURE: 74 MMHG | HEART RATE: 83 BPM

## 2022-11-28 DIAGNOSIS — I27.20 PULMONARY HYPERTENSION: ICD-10-CM

## 2022-11-28 DIAGNOSIS — R09.02 HYPOXEMIA REQUIRING SUPPLEMENTAL OXYGEN: ICD-10-CM

## 2022-11-28 DIAGNOSIS — E89.0 S/P PARTIAL THYROIDECTOMY: Primary | ICD-10-CM

## 2022-11-28 DIAGNOSIS — Z86.16 HISTORY OF COVID-19: ICD-10-CM

## 2022-11-28 DIAGNOSIS — E66.01 MORBID OBESITY DUE TO EXCESS CALORIES: ICD-10-CM

## 2022-11-28 DIAGNOSIS — G47.33 OSA (OBSTRUCTIVE SLEEP APNEA): Primary | ICD-10-CM

## 2022-11-28 DIAGNOSIS — Z91.199 NON COMPLIANCE WITH MEDICAL TREATMENT: ICD-10-CM

## 2022-11-28 DIAGNOSIS — Z99.81 HYPOXEMIA REQUIRING SUPPLEMENTAL OXYGEN: ICD-10-CM

## 2022-11-28 DIAGNOSIS — J98.4 PULMONARY SCARRING: ICD-10-CM

## 2022-11-28 DIAGNOSIS — R91.1 LUNG NODULE: ICD-10-CM

## 2022-11-28 DIAGNOSIS — R63.5 WEIGHT GAIN: ICD-10-CM

## 2022-11-28 DIAGNOSIS — J45.20 MILD INTERMITTENT ASTHMA WITHOUT COMPLICATION: ICD-10-CM

## 2022-11-28 DIAGNOSIS — J30.89 NON-SEASONAL ALLERGIC RHINITIS, UNSPECIFIED TRIGGER: ICD-10-CM

## 2022-11-28 DIAGNOSIS — Z78.9 NON-SMOKER: ICD-10-CM

## 2022-11-28 PROCEDURE — 99214 OFFICE O/P EST MOD 30 MIN: CPT | Performed by: INTERNAL MEDICINE

## 2022-11-28 RX ORDER — LORATADINE 10 MG/1
10 TABLET ORAL DAILY
Qty: 90 TABLET | Refills: 3 | Status: SHIPPED | OUTPATIENT
Start: 2022-11-28

## 2022-11-28 RX ORDER — FLUTICASONE PROPIONATE AND SALMETEROL 250; 50 UG/1; UG/1
1 POWDER RESPIRATORY (INHALATION) 2 TIMES DAILY
Qty: 1 EACH | Refills: 11 | Status: SHIPPED | OUTPATIENT
Start: 2022-11-28

## 2022-11-28 RX ORDER — ALBUTEROL SULFATE 90 UG/1
2 AEROSOL, METERED RESPIRATORY (INHALATION) EVERY 4 HOURS PRN
Qty: 6.7 G | Refills: 5 | Status: SHIPPED | OUTPATIENT
Start: 2022-11-28

## 2022-11-28 RX ORDER — AZELASTINE 1 MG/ML
2 SPRAY, METERED NASAL 2 TIMES DAILY
Qty: 60 ML | Refills: 11 | Status: SHIPPED | OUTPATIENT
Start: 2022-11-28

## 2022-11-28 RX ORDER — LEVOTHYROXINE SODIUM 50 MCG
50 TABLET ORAL DAILY
Qty: 30 TABLET | Refills: 3 | Status: SHIPPED | OUTPATIENT
Start: 2022-11-28 | End: 2023-03-27

## 2022-11-28 RX ORDER — FLUTICASONE PROPIONATE 50 MCG
2 SPRAY, SUSPENSION (ML) NASAL DAILY
Qty: 16 G | Refills: 11 | Status: SHIPPED | OUTPATIENT
Start: 2022-11-28

## 2022-11-28 NOTE — PROGRESS NOTES
[facility] OUTPATIENT PROGRESS NOTE    Patient: Pau Cabrera  : 1955  Age: 67 y.o.  Date of Service: 2022    REASON FOR CLINIC VISIT:  Chief Complaint   Patient presents with   • CHARLIE (obstructive sleep apnea) - non compliant with cpap       Subjective:    History of Present Illness:  Pau Cabrera is a 67 y.o. female who presents to the office today to be seen for    Diagnosis Plan   1. CHARLIE (obstructive sleep apnea) - non compliant with cpap        2. Mild intermittent asthma without complication        3. Hypoxemia requiring supplemental oxygen        4. Pulmonary hypertension (HCC)        5. Non-smoker        6. History of COVID-19        7. Pulmonary scarring        8. Non-seasonal allergic rhinitis, unspecified trigger        9. Non compliance with medical treatment        10. Morbid obesity due to excess calories (HCC)        11. Weight gain        12. Lung nodule        .  Other problems per record.  Patient is a very pleasant morbidly obese -American female who was seen in the pulmonary clinic for a follow-up visit.  She attended clinic with her brother    She lives independently.  She has asthma and she is a non-smoker.  She also has morbid obesity and cannot further 20 pounds weight since her last visit.  She has obstructive sleep apnea and unable to tolerate the CPAP or BiPAP and is currently using only oxygen 2 L at night.  She feels short of breath on exertion.  She is currently using Wixela and albuterol rescue inhaler as needed.  She also uses fluticasone nasal spray, loratadine and Astelin nasal spray for nasal allergy.  She has no recent hospitalizations and ER visit any urgent care visit.  No other new complaints.    She is vaccinated for COVID but did not take the booster dose.  She is going to take the influenza pneumonia vaccine.  She also takes Lasix for her fluid overload.  She had history of lung nodules in the past but has no recent  CT scan of the chest done    PFT done today:  Not done today      Results for orders placed during the hospital encounter of 06/16/21    Full Pulmonary Function Test With Bronchodilator & ABG    Narrative  [facility] - Pulmonary Function Test    [address]  [place]  [place]  [address]  [phone]    Patient : Pau Cabrera  MRN : [id number]  CSN : [id number]  Pulmonologist : Saman Kwon MD  Date : 6/16/2021    ______________________________________________________________________    Interpretation :  1.  Spirometry is consistent with a severe obstructive ventilatory defect although the decrease in the patient's FEV1 is just into the severe range at 49% of predicted..  2.  There is significant improvement in the patient's spirometry postbronchodilator so that postbronchodilator spirometry is consistent with a moderate obstructive ventilatory defect.  3.  Lung volumes reveal a decrease in vital capacity second to obstruction.  In addition significant hyperinflation is present.  There is also a decrease in inspiratory capacity.  4.  There is a moderate diffusion impairment which when corrected for alveolar volume is normalized.      Saman Kwon MD         Bronchodilator therapy: Wixela, Albuterol rescue inhaler    Smoking Status:   Social History     Tobacco Use   Smoking Status Never   Smokeless Tobacco Never     Pulm Rehab: no  Sleep: yes using 2 LPM O2 at night. Not using CPAP.     Support System: lives alone    Code Status:   There are no questions and answers to display.        Review of Systems:  A complete review of systems is performed and all other systems were reviewed and negative as note above in the HPI.  Review of Systems   Constitutional: Positive for fatigue and unexpected weight gain.   HENT: Positive for congestion, postnasal drip and sinus pressure.    Eyes: Negative.    Respiratory: Positive for cough, chest tightness and shortness of breath.    Cardiovascular:  Positive for leg swelling.   Gastrointestinal: Negative.    Endocrine: Negative.    Genitourinary: Negative.    Musculoskeletal: Positive for arthralgias and back pain.   Allergic/Immunologic: Positive for environmental allergies.   Neurological: Negative.    Hematological: Negative.    Psychiatric/Behavioral: Negative.        CAT/ACT Score:  Not done today    Medications:  Outpatient Encounter Medications as of 11/28/2022   Medication Sig Dispense Refill   • albuterol sulfate  (90 Base) MCG/ACT inhaler Inhale 2 puffs Every 4 (Four) Hours As Needed for Wheezing. 6.7 g 5   • allopurinol (ZYLOPRIM) 300 MG tablet      • amitriptyline (ELAVIL) 25 MG tablet Take 25 mg by mouth Every Night.     • azelastine (ASTELIN) 0.1 % nasal spray 2 sprays into the nostril(s) as directed by provider 2 (Two) Times a Day. Use in each nostril as directed 60 mL 11   • butalbital-acetaminophen-caffeine (FIORICET, ESGIC) -40 MG per tablet Take 1 tablet by mouth Every 4 (Four) Hours As Needed for Headache.     • carvedilol (COREG) 25 MG tablet TAKE 1 TABLET BY MOUTH TWICE A DAY 60 tablet 3   • celecoxib (CeleBREX) 100 MG capsule Take 100 mg by mouth.     • DULoxetine (CYMBALTA) 60 MG capsule Take 60 mg by mouth Every Morning.     • EPINEPHrine (ADRENALIN) 0.05 MG/ML syringe Inject 20 mL as directed.     • fenofibrate (TRICOR) 145 MG tablet Take 145 mg by mouth Daily.     • fluticasone (Flonase Allergy Relief) 50 MCG/ACT nasal spray 2 sprays into the nostril(s) as directed by provider Daily. 16 g 11   • Fluticasone-Salmeterol (Wixela Inhub) 250-50 MCG/ACT DISKUS Inhale 1 puff 2 (Two) Times a Day. 1 each 11   • furosemide (LASIX) 40 MG tablet Take 1 tablet by mouth Daily. 30 tablet 0   • loratadine (CLARITIN) 10 MG tablet Take 1 tablet by mouth Daily. 90 tablet 3   • meclizine (ANTIVERT) 25 MG tablet Take 25 mg by mouth 3 (three) times a day as needed for dizziness.     • Multiple Vitamins-Minerals (MULTIVITAMIN ADULT PO) Take  by  "mouth.     • Myrbetriq 25 MG tablet sustained-release 24 hour 24 hr tablet Take 25 mg by mouth Daily.     • OXYGEN-HELIUM IN Inhale. 2L at night     • spironolactone (ALDACTONE) 25 MG tablet Take 25 mg by mouth.     • Synthroid 50 MCG tablet Take 1 tablet by mouth Daily for 30 days. 30 tablet 3   • [DISCONTINUED] Synthroid 25 MCG tablet TAKE 1 TABLET BY MOUTH DAILY 30 tablet 0     No facility-administered encounter medications on file as of 11/28/2022.       Allergies:  Allergies   Allergen Reactions   • Codeine Sulfate Hives   • Lisinopril Swelling and Rash     Facial swelling       Immunizations:  Immunization History   Administered Date(s) Administered   • COVID-19 (PFIZER) PURPLE CAP 03/09/2021, 03/30/2021, 12/29/2021   • Covid-19 (Pfizer) Gray Cap 08/08/2022   • Pneumococcal Conjugate 13-Valent (PCV13) 11/02/2018   • Pneumococcal Polysaccharide (PPSV23) 11/30/2020   • Tdap 10/11/2011       Objective:    Vitals:  /74   Pulse 83   Ht 156.2 cm (61.5\")   Wt (!) 162 kg (358 lb)   LMP  (LMP Unknown)   SpO2 94%   Breastfeeding No   BMI 66.55 kg/m²     Physical Exam:  General: Patient is a 67 y.o. middle aged pleasant  female. Looks stated age. Appears to be in no acute distress.  Eyes: EOMI. PERRLA. Vision intact. No scleral icterus.  Ear, Nose, Mouth and Throat: Hearing is grossly intact. No Leukoplakia, pharyngitis, stomatitis or thrush. Swollen nasal mucosa with post nasal drop.  Neck: Range of motion of neck normal. No thyromegaly or masses. Mallampati Class 3  Respiratory: Clear to auscultation bilaterally. No use of accessory muscles. Decreased breath sounds.  Cardiovascular: Normal heart sounds. Regularly regular rhythm without murmur.  Gastrointestinal: Non tender, non distended, soft. Bowel sounds positive in all four quadrants. No organomegaly.  Skin: No obvious rashes, lesions, ulcers or large amount of bruising. No edema.   Neurological: No new motor deficits. Cranial nerves " appear intact.  Psychiatric: Patient is alert and oriented to person, place and time.    Chest Imaging:    Assessment:  1. CHARLIE (obstructive sleep apnea) - non compliant with cpap    2. Mild intermittent asthma without complication    3. Hypoxemia requiring supplemental oxygen    4. Pulmonary hypertension (HCC)    5. Non-smoker    6. History of COVID-19    7. Pulmonary scarring    8. Non-seasonal allergic rhinitis, unspecified trigger    9. Non compliance with medical treatment    10. Morbid obesity due to excess calories (HCC)    11. Weight gain    12. Lung nodule        Plan/Recommendations:    1.  I advised the patient for healthy lifestyle and weight loss.  She can further work.  On exercise and activity and diet control as needed.  She is unable to use the CPAP but I advised her to continue the oxygen at night as before.  2.  Patient should continue using Wixela inhaler and albuterol inhaler for the underlying asthma.  Prescription refill was sent to the pharmacy.  3.  Patient should continue using Astelin nasal spray fluticasone nasal spray and loratadine for nasal allergy and prescription refills were done.  4.  Patient is required to take the COVID-vaccine booster dose of influenza pneumonia vaccine and follow-up with her [facility] care provider.  Continue all other medications as before.  Return to pulmonary clinic for a follow-up visit in 6 months time with a CT scan of the chest or earlier if needed.    Follow up:  6 Months    Time Spent:  30 minutes    I appreciate the opportunity of participating in this patient's care. I would like to thank the PCP for the referral.  Please feel free to contact me with any other questions.    Peter Crews MD   Pulmonologist/Intensivist     Electronically signed by: Peter Crews MD, 11/28/2022 15:15 CST

## 2022-11-28 NOTE — TELEPHONE ENCOUNTER
Patient taking the 25 mcg before anything else and in am.  I bumped her up to 50 mcg with a recheck tsh is 1 month.  Patient voiced understanding    ----- Message from DONOVAN Esposito sent at 11/23/2022  8:52 AM CST -----  If she has been taking 25 mcg synthroid daily she needs to increase to 50

## 2022-12-02 ENCOUNTER — OFFICE VISIT (OUTPATIENT)
Dept: OTOLARYNGOLOGY | Facility: CLINIC | Age: 67
End: 2022-12-02

## 2022-12-02 VITALS
BODY MASS INDEX: 55.32 KG/M2 | DIASTOLIC BLOOD PRESSURE: 76 MMHG | TEMPERATURE: 97.8 F | HEIGHT: 61 IN | SYSTOLIC BLOOD PRESSURE: 140 MMHG | HEART RATE: 83 BPM | RESPIRATION RATE: 16 BRPM | WEIGHT: 293 LBS

## 2022-12-02 DIAGNOSIS — E66.01 MORBID OBESITY DUE TO EXCESS CALORIES: ICD-10-CM

## 2022-12-02 DIAGNOSIS — E89.0 POSTOPERATIVE HYPOTHYROIDISM: ICD-10-CM

## 2022-12-02 DIAGNOSIS — E89.0 S/P PARTIAL THYROIDECTOMY: Primary | ICD-10-CM

## 2022-12-02 DIAGNOSIS — E07.9 THYROID MASS: ICD-10-CM

## 2022-12-02 DIAGNOSIS — G47.33 OSA (OBSTRUCTIVE SLEEP APNEA): ICD-10-CM

## 2022-12-02 DIAGNOSIS — E07.89 THYROID MASS OF UNCLEAR ETIOLOGY: ICD-10-CM

## 2022-12-02 PROCEDURE — 99213 OFFICE O/P EST LOW 20 MIN: CPT | Performed by: NURSE PRACTITIONER

## 2022-12-02 NOTE — PROGRESS NOTES
YOB: 1955  Location: [facility]  Location Address: [address] [place] [address], [facility], [address] [place] [address]  Location Phone: [phone]    Chief Complaint   Patient presents with   • Thyroid Problem       History of Present Illness  Pau Cabrera is a 67 y.o. female.  Pau Cabrera is here for follow up of ENT complaints. The patient is s/p left thyroidectomy with isthmusectomy 2022  Pathology revealed multinodular hyperplasia  Patient has had a relatively normal postoperative course.   She denies hoarseness, dysphagia or sore throat. She does continue to complain of ongoing weight gain and fatigue. She  Has recently increased synthroid dose to 50 mcg     Tissue Pathology Exam (2022 14:46)     US Thyroid (2022 09:33)    Past Medical History:   Diagnosis Date   • Anemia     iron def   • Anxiety    • Asthma    • Back pain    • CHF (congestive heart failure) (HCC)    • Chronic bronchitis (HCC)    • Coronary artery disease    • COVID-19 vaccine series completed     pfizer   • COVID-19 vaccine series completed    • CTS (carpal tunnel syndrome)    • Depression    • Fibromyalgia    • GERD (gastroesophageal reflux disease)    • Gout    • Hyperlipemia    • Hyperlipidemia    • Hypertension    • Obesity    • Obstructive sleep apnea     could not tolerate machine    • Peripheral neuropathy    • Primary central sleep apnea    • Pulmonary arterial hypertension (HCC)    • Rheumatoid arthritis (HCC)    • Sinusitis        Past Surgical History:   Procedure Laterality Date   • BREAST BIOPSY Left    •  SECTION      X 2   • COLONOSCOPY  10/22/2015    Tics repeat exam in 5 years   • COLONOSCOPY  2007    Small hemorrhoids repeat exam in 3 years   • COLONOSCOPY N/A 2020    Procedure: COLONOSCOPY WITH ANESTHESIA;  Surgeon: Denny Francis MD;  Location: [facility] ENDOSCOPY;  Service: Gastroenterology;  Laterality: N/A;  pre: family hx colon ca  post: diverticulosis  Polaris,  Carola Rodriguez MD   • HYSTERECTOMY  2000    total   • THYROIDECTOMY Left 7/13/2022    Procedure: Left thyroidectomy with isthmusectomy;  Surgeon: Milad So MD;  Location: [facility];  Service: ENT;  Laterality: Left;   • UMBILICAL HERNIA REPAIR      had 1/2 of it repaired with hysterectomy        Outpatient Medications Marked as Taking for the 12/2/22 encounter (Office Visit) with Beba Robles APRN   Medication Sig Dispense Refill   • albuterol sulfate  (90 Base) MCG/ACT inhaler Inhale 2 puffs Every 4 (Four) Hours As Needed for Wheezing. 6.7 g 5   • allopurinol (ZYLOPRIM) 300 MG tablet      • amitriptyline (ELAVIL) 25 MG tablet Take 25 mg by mouth Every Night.     • azelastine (ASTELIN) 0.1 % nasal spray 2 sprays into the nostril(s) as directed by provider 2 (Two) Times a Day. Use in each nostril as directed 60 mL 11   • butalbital-acetaminophen-caffeine (FIORICET, ESGIC) -40 MG per tablet Take 1 tablet by mouth Every 4 (Four) Hours As Needed for Headache.     • carvedilol (COREG) 25 MG tablet TAKE 1 TABLET BY MOUTH TWICE A DAY 60 tablet 3   • celecoxib (CeleBREX) 100 MG capsule Take 100 mg by mouth.     • DULoxetine (CYMBALTA) 60 MG capsule Take 60 mg by mouth Every Morning.     • EPINEPHrine (ADRENALIN) 0.05 MG/ML syringe Inject 20 mL as directed.     • fenofibrate (TRICOR) 145 MG tablet Take 145 mg by mouth Daily.     • fluticasone (Flonase Allergy Relief) 50 MCG/ACT nasal spray 2 sprays into the nostril(s) as directed by provider Daily. 16 g 11   • Fluticasone-Salmeterol (Wixela Inhub) 250-50 MCG/ACT DISKUS Inhale 1 puff 2 (Two) Times a Day. 1 each 11   • furosemide (LASIX) 40 MG tablet Take 1 tablet by mouth Daily. 30 tablet 0   • loratadine (CLARITIN) 10 MG tablet Take 1 tablet by mouth Daily. 90 tablet 3   • meclizine (ANTIVERT) 25 MG tablet Take 25 mg by mouth 3 (three) times a day as needed for dizziness.     • Multiple Vitamins-Minerals (MULTIVITAMIN ADULT PO) Take  by mouth.      • Myrbetriq 25 MG tablet sustained-release 24 hour 24 hr tablet Take 25 mg by mouth Daily.     • OXYGEN-HELIUM IN Inhale. 2L at night     • spironolactone (ALDACTONE) 25 MG tablet Take 25 mg by mouth.     • Synthroid 50 MCG tablet Take 1 tablet by mouth Daily for 30 days. 30 tablet 3       Codeine sulfate and Lisinopril    Family History   Problem Relation Age of Onset   • Arthritis Mother    • Heart disease Mother    • Hypertension Mother    • Thyroid disease Mother    • Arthritis Father    • Hypertension Father    • Anuerysm Father    • Stroke Father    • Asthma Brother    • Colon polyps Neg Hx    • Colon cancer Neg Hx        Social History     Socioeconomic History   • Marital status:    Tobacco Use   • Smoking status: Never   • Smokeless tobacco: Never   Vaping Use   • Vaping Use: Never used   Substance and Sexual Activity   • Alcohol use: No   • Drug use: No   • Sexual activity: Not Currently     Partners: Male     Birth control/protection: Surgical       Review of Systems   Constitutional: Positive for fatigue and unexpected weight change.   HENT: Negative for sore throat, trouble swallowing and voice change.    Musculoskeletal: Positive for arthralgias, gait problem and myalgias.       Vitals:    12/02/22 0929   BP: 140/76   Pulse: 83   Resp: 16   Temp: 97.8 °F (36.6 °C)       Body mass index is 66.13 kg/m².    Objective     Physical Exam  Vitals reviewed.   Constitutional:       Appearance: She is obese.   HENT:      Head: Normocephalic.      Right Ear: Hearing and external ear normal.      Left Ear: Hearing and external ear normal.      Nose: Nose normal.      Mouth/Throat:      Lips: Pink.      Mouth: Mucous membranes are moist.      Pharynx: Uvula midline.      Comments: Balderas III  Neck:     Musculoskeletal:      Cervical back: Full passive range of motion without pain.   Neurological:      Mental Status: She is alert.         Assessment & Plan   Diagnoses and all orders for this visit:    1.  S/P partial thyroidectomy (Primary)    2. Thyroid mass of unclear etiology  -     US Thyroid    3. Thyroid mass    4. CHARLIE (obstructive sleep apnea) - non compliant with cpap    5. Postoperative hypothyroidism    6. Morbid obesity due to excess calories (HCC)      * Surgery not found *  Orders Placed This Encounter   Procedures   • US Thyroid     Order Specific Question:   Reason for Exam:     Answer:   Thyroid disease     Return in about 3 months (around 3/2/2023).     Repeat tsh in 4 weeks   Call for new/worsening problems   Repeat thyroid ultrasound at f/u visit     There are no Patient Instructions on file for this visit.

## 2022-12-09 DIAGNOSIS — E04.2 MULTINODULAR GOITER: ICD-10-CM

## 2022-12-09 DIAGNOSIS — I10 PRIMARY HYPERTENSION: ICD-10-CM

## 2022-12-09 LAB
ALBUMIN SERPL-MCNC: 4.2 G/DL (ref 3.5–5.2)
ALP BLD-CCNC: 63 U/L (ref 35–104)
ALT SERPL-CCNC: 8 U/L (ref 5–33)
ANION GAP SERPL CALCULATED.3IONS-SCNC: 10 MMOL/L (ref 7–19)
AST SERPL-CCNC: 13 U/L (ref 5–32)
BASOPHILS ABSOLUTE: 0.1 K/UL (ref 0–0.2)
BASOPHILS RELATIVE PERCENT: 1 % (ref 0–1)
BILIRUB SERPL-MCNC: <0.2 MG/DL (ref 0.2–1.2)
BUN BLDV-MCNC: 36 MG/DL (ref 8–23)
CALCIUM SERPL-MCNC: 10.6 MG/DL (ref 8.8–10.2)
CHLORIDE BLD-SCNC: 103 MMOL/L (ref 98–111)
CHOLESTEROL, TOTAL: 166 MG/DL (ref 160–199)
CO2: 28 MMOL/L (ref 22–29)
CREAT SERPL-MCNC: 1.4 MG/DL (ref 0.5–0.9)
EOSINOPHILS ABSOLUTE: 0.3 K/UL (ref 0–0.6)
EOSINOPHILS RELATIVE PERCENT: 4.5 % (ref 0–5)
GFR SERPL CREATININE-BSD FRML MDRD: 41 ML/MIN/{1.73_M2}
GLUCOSE BLD-MCNC: 89 MG/DL (ref 74–109)
HBA1C MFR BLD: 5.5 % (ref 4–6)
HCT VFR BLD CALC: 36.1 % (ref 37–47)
HDLC SERPL-MCNC: 54 MG/DL (ref 65–121)
HEMOGLOBIN: 10.8 G/DL (ref 12–16)
IMMATURE GRANULOCYTES #: 0 K/UL
LDL CHOLESTEROL CALCULATED: 98 MG/DL
LYMPHOCYTES ABSOLUTE: 2 K/UL (ref 1.1–4.5)
LYMPHOCYTES RELATIVE PERCENT: 33.7 % (ref 20–40)
MCH RBC QN AUTO: 29.2 PG (ref 27–31)
MCHC RBC AUTO-ENTMCNC: 29.9 G/DL (ref 33–37)
MCV RBC AUTO: 97.6 FL (ref 81–99)
MONOCYTES ABSOLUTE: 0.4 K/UL (ref 0–0.9)
MONOCYTES RELATIVE PERCENT: 7 % (ref 0–10)
NEUTROPHILS ABSOLUTE: 3.1 K/UL (ref 1.5–7.5)
NEUTROPHILS RELATIVE PERCENT: 53.6 % (ref 50–65)
PDW BLD-RTO: 16.6 % (ref 11.5–14.5)
PLATELET # BLD: 292 K/UL (ref 130–400)
PMV BLD AUTO: 10 FL (ref 9.4–12.3)
POTASSIUM SERPL-SCNC: 4.3 MMOL/L (ref 3.5–5)
RBC # BLD: 3.7 M/UL (ref 4.2–5.4)
SODIUM BLD-SCNC: 141 MMOL/L (ref 136–145)
T4 FREE: 0.99 NG/DL (ref 0.93–1.7)
TOTAL PROTEIN: 7.2 G/DL (ref 6.6–8.7)
TRIGL SERPL-MCNC: 68 MG/DL (ref 0–149)
TSH SERPL DL<=0.05 MIU/L-ACNC: 7.5 UIU/ML (ref 0.27–4.2)
VITAMIN D 25-HYDROXY: 39.1 NG/ML
WBC # BLD: 5.8 K/UL (ref 4.8–10.8)

## 2022-12-16 ENCOUNTER — OFFICE VISIT (OUTPATIENT)
Dept: INTERNAL MEDICINE | Age: 67
End: 2022-12-16
Payer: MEDICARE

## 2022-12-16 VITALS
BODY MASS INDEX: 67.29 KG/M2 | SYSTOLIC BLOOD PRESSURE: 136 MMHG | OXYGEN SATURATION: 94 % | HEART RATE: 89 BPM | WEIGHT: 293 LBS | DIASTOLIC BLOOD PRESSURE: 84 MMHG

## 2022-12-16 DIAGNOSIS — N18.9 CHRONIC KIDNEY DISEASE, UNSPECIFIED CKD STAGE: ICD-10-CM

## 2022-12-16 DIAGNOSIS — R06.02 SHORTNESS OF BREATH: ICD-10-CM

## 2022-12-16 DIAGNOSIS — J44.9 CHRONIC OBSTRUCTIVE PULMONARY DISEASE, UNSPECIFIED COPD TYPE (HCC): ICD-10-CM

## 2022-12-16 DIAGNOSIS — G43.809 OTHER MIGRAINE WITHOUT STATUS MIGRAINOSUS, NOT INTRACTABLE: ICD-10-CM

## 2022-12-16 DIAGNOSIS — I10 PRIMARY HYPERTENSION: ICD-10-CM

## 2022-12-16 DIAGNOSIS — F32.89 OTHER DEPRESSION: ICD-10-CM

## 2022-12-16 DIAGNOSIS — E03.9 HYPOTHYROIDISM, UNSPECIFIED TYPE: Primary | ICD-10-CM

## 2022-12-16 DIAGNOSIS — M19.90 ARTHRITIS: ICD-10-CM

## 2022-12-16 DIAGNOSIS — Z91.09 ENVIRONMENTAL ALLERGIES: ICD-10-CM

## 2022-12-16 DIAGNOSIS — R42 VERTIGO: ICD-10-CM

## 2022-12-16 DIAGNOSIS — N32.81 OAB (OVERACTIVE BLADDER): ICD-10-CM

## 2022-12-16 DIAGNOSIS — G47.00 INSOMNIA, UNSPECIFIED TYPE: ICD-10-CM

## 2022-12-16 DIAGNOSIS — M79.89 SWELLING OF BOTH LOWER EXTREMITIES: ICD-10-CM

## 2022-12-16 DIAGNOSIS — E78.5 HYPERLIPIDEMIA, UNSPECIFIED HYPERLIPIDEMIA TYPE: ICD-10-CM

## 2022-12-16 LAB — PRO-BNP: 588 PG/ML (ref 0–900)

## 2022-12-16 PROCEDURE — 3074F SYST BP LT 130 MM HG: CPT | Performed by: INTERNAL MEDICINE

## 2022-12-16 PROCEDURE — 90677 PCV20 VACCINE IM: CPT | Performed by: INTERNAL MEDICINE

## 2022-12-16 PROCEDURE — 3078F DIAST BP <80 MM HG: CPT | Performed by: INTERNAL MEDICINE

## 2022-12-16 PROCEDURE — 1123F ACP DISCUSS/DSCN MKR DOCD: CPT | Performed by: INTERNAL MEDICINE

## 2022-12-16 PROCEDURE — 1036F TOBACCO NON-USER: CPT | Performed by: INTERNAL MEDICINE

## 2022-12-16 PROCEDURE — G0009 ADMIN PNEUMOCOCCAL VACCINE: HCPCS | Performed by: INTERNAL MEDICINE

## 2022-12-16 PROCEDURE — 1090F PRES/ABSN URINE INCON ASSESS: CPT | Performed by: INTERNAL MEDICINE

## 2022-12-16 PROCEDURE — G8399 PT W/DXA RESULTS DOCUMENT: HCPCS | Performed by: INTERNAL MEDICINE

## 2022-12-16 PROCEDURE — G8417 CALC BMI ABV UP PARAM F/U: HCPCS | Performed by: INTERNAL MEDICINE

## 2022-12-16 PROCEDURE — G8484 FLU IMMUNIZE NO ADMIN: HCPCS | Performed by: INTERNAL MEDICINE

## 2022-12-16 PROCEDURE — G8427 DOCREV CUR MEDS BY ELIG CLIN: HCPCS | Performed by: INTERNAL MEDICINE

## 2022-12-16 PROCEDURE — 3017F COLORECTAL CA SCREEN DOC REV: CPT | Performed by: INTERNAL MEDICINE

## 2022-12-16 PROCEDURE — 99214 OFFICE O/P EST MOD 30 MIN: CPT | Performed by: INTERNAL MEDICINE

## 2022-12-16 PROCEDURE — 3023F SPIROM DOC REV: CPT | Performed by: INTERNAL MEDICINE

## 2022-12-16 RX ORDER — BUMETANIDE 1 MG/1
1 TABLET ORAL DAILY
Qty: 30 TABLET | Refills: 3 | Status: SHIPPED | OUTPATIENT
Start: 2022-12-16

## 2022-12-16 RX ORDER — LEVOTHYROXINE SODIUM 0.05 MG/1
50 TABLET ORAL DAILY
COMMUNITY

## 2022-12-16 RX ORDER — SPIRONOLACTONE 25 MG/1
25 TABLET ORAL DAILY
Qty: 90 TABLET | Refills: 3 | Status: SHIPPED | OUTPATIENT
Start: 2022-12-16

## 2022-12-16 RX ORDER — FENOFIBRATE 145 MG/1
TABLET, COATED ORAL
Qty: 90 TABLET | Refills: 3 | Status: SHIPPED | OUTPATIENT
Start: 2022-12-16

## 2022-12-16 NOTE — PROGRESS NOTES
Chief Complaint   Patient presents with    6 Month Follow-Up       HPI: Ted Cain is a 79 y.o. female is here for follow-up of hypothyroidism, hyperlipidemia, hypertension, COPD, hyperuricemia, chronic kidney disease, depression, arthritis, migraines and vertigo. She gets short of breath with exertion. She does have O2 at home, but she is currently not wearing this today. She states that she has been having lower extremity swelling. She does get short of breath with exertion. She is taking her Lasix as prescribed but no longer thinks is working. She is agreeable to trying Bumex. We will check a BNP today to evaluate for heart failure. She also would like to have a Prevnar 20 vaccine today. Her renal parameters are relatively stable. She does have a history of hypothyroidism. Her TSH did drop from 8.8-7.5. She states that her endocrinologist recently increased her levothyroxine to 50 mg. She is scheduled to see Dr. Rose York on December 27. She does complain of some weight gain at times. Her cholesterol did drop from 2 15-1 61. She does have a smoking history. She is scheduled for low-dose CT scan of her chest in the very near future. She has not had any gout flares. Myrbetriq is helpful for her overactive bladder. Celebrex is helpful for joint pain. Cymbalta is helpful for both joint pain and depression. Her blood pressure is currently well controlled. She denies any complaints of chest pain, chest pressure or heart palpitations. Fioricet is helpful for migraines. Her allergies are stable. She does have a history of vertigo and does take Antivert as needed.   Past Medical History:   Diagnosis Date    Abnormal CT of spine     Acute sinusitis     Arthritis     Asthma     Bronchitis     Carpal tunnel syndrome     both    CHF (congestive heart failure) ([facility])     Depression     Fibromyalgia     Furuncle     Gout     Hyperlipidemia     Hypertension     Low vitamin D level Lumbago     Lumbar radiculopathy     Neuropathy     feet    Obesity     Rheumatoid nodule of knee (HCC)     Sleep apnea     UTI (urinary tract infection)     Vertigo     Vitamin D deficiency       Past Surgical History:   Procedure Laterality Date    BREAST BIOPSY       SECTION      times two    CT NEEDLE BIOPSY THYROID PERCUTANEOUS Left     HYSTERECTOMY (CERVIX STATUS UNKNOWN)      OVARY REMOVAL        Social History     Socioeconomic History    Marital status:    Tobacco Use    Smoking status: Never    Smokeless tobacco: Never   Vaping Use    Vaping Use: Never used   Substance and Sexual Activity    Alcohol use: No    Drug use: No     Social Determinants of Health     Financial Resource Strain: Medium Risk    Difficulty of Paying Living Expenses: Somewhat hard   Food Insecurity: Food Insecurity Present    Worried About Running Out of Food in the Last Year: Sometimes true    Ran Out of Food in the Last Year: Never true   Transportation Needs: No Transportation Needs    Lack of Transportation (Medical): No    Lack of Transportation (Non-Medical):  No   Physical Activity: Insufficiently Active    Days of Exercise per Week: 3 days    Minutes of Exercise per Session: 20 min      Family History   Problem Relation Age of Onset    Heart Disease Mother     Hypertension Mother     Stroke Father     Hypertension Father     Hypertension Other     Heart Disease Other     Cancer Other         Current Outpatient Medications   Medication Sig Dispense Refill    levothyroxine (SYNTHROID) 50 MCG tablet Take 50 mcg by mouth Daily      fenofibrate (TRICOR) 145 MG tablet TAKE 1 TABLET BY MOUTH DAILY (FOR TRIGLYCERIDES/CHOLESTEROL) 90 tablet 3    spironolactone (ALDACTONE) 25 MG tablet Take 1 tablet by mouth daily 90 tablet 3    bumetanide (BUMEX) 1 MG tablet Take 1 tablet by mouth daily 30 tablet 3    amitriptyline (ELAVIL) 25 MG tablet TAKE ONE TABLET BY MOUTH NIGHTLY FOR NERVE DISEASE 90 tablet 3    allopurinol (ZYLOPRIM) 300 MG tablet TAKE 1 TABLET BY MOUTH DAILY 30 tablet 5    EPINEPHrine (EPIPEN 2-SHAUN) 0.3 MG/0.3ML SOAJ injection Inject 0.3 mLs into the muscle once for 1 dose Use as directed for allergic reaction 0.3 mL 1    MYRBETRIQ 25 MG TB24 TAKE 1 TABLET BY MOUTH DAILY 30 tablet 5    DULoxetine (CYMBALTA) 60 MG extended release capsule TAKE 1 CAPSULE BY MOUTH ONCE DAILY IN THE MORNING 90 capsule 3    celecoxib (CELEBREX) 100 MG capsule TAKE 1 CAPSULE BY MOUTH 2 TIMES DAILY 180 capsule 1    carvedilol (COREG) 25 MG tablet TAKE 1 TABLET BY MOUTH 2 TIMES DAILY 180 tablet 3    butalbital-acetaminophen-caffeine (FIORICET, ESGIC) -40 MG per tablet TAKE ONE TABLET EVERY 4 HOURS AS NEEDED FOR HEADACHES 10 tablet 3    albuterol sulfate  (90 Base) MCG/ACT inhaler INHALE 2 PUFFS EVERY 4 (FOUR) HOURS AS NEEDED FOR WHEEZING. fluticasone (FLONASE) 50 MCG/ACT nasal spray USE 2 SPRAYS IN EACH NOSTRIL ONCE DAILY      fluticasone-salmeterol (ADVAIR) 250-50 MCG/DOSE AEPB INHALE 1 PUFF 2 (TWO) TIMES A DAY. OXYGEN Inhale into the lungs 2LPM PRN and QHS LEGACY      meclizine (ANTIVERT) 25 MG tablet Take 1 tablet by mouth 3 times daily as needed for Dizziness 90 tablet 5    azelastine (ASTELIN) 0.1 % nasal spray 2 sprays by Nasal route 2 times daily Use in each nostril as directed 1 Bottle 3    Multiple Vitamins-Minerals (MULTIVITAMIN ADULT PO) Take by mouth       No current facility-administered medications for this visit.         Patient Active Problem List   Diagnosis    Chronic congestive heart failure (HCC)    Depression    Fibromyalgia    Gout of multiple sites    Mixed hyperlipidemia    Morbid obesity due to excess calories (HCC)    HTN (hypertension)    Chronic midline low back pain without sciatica    Benign headache    Family hx of colon cancer    Hypoxia    TAURUS (obstructive sleep apnea)    Thyroid mass    Chronic combined systolic and diastolic congestive heart failure (HCC)    Hypoxemia requiring supplemental oxygen    Mild intermittent asthma without complication    Non-seasonal allergic rhinitis    Pulmonary hypertension (HCC)    SOB (shortness of breath)    OAB (overactive bladder)    Chronic obstructive pulmonary disease (HCC)    Rash    Goiter    Open wound of left lower leg        Review of Systems   Constitutional:  Positive for unexpected weight change. Negative for activity change, appetite change, chills, diaphoresis, fatigue and fever. HENT:  Negative for congestion, ear discharge, postnasal drip, sinus pressure, sneezing, sore throat, tinnitus, trouble swallowing and voice change. Loss of taste and smell following COVID-19 infection. .  She recently underwent a partial thyroidectomy. She is feeling somewhat better. She no longer feels like there is anything pushing on her trachea   Eyes:  Negative for photophobia, pain, discharge, redness, itching and visual disturbance. Respiratory:  Positive for shortness of breath. Negative for cough, chest tightness and wheezing. Some mild shortness of breath following a COVID-19 infection, but has overall improved since last OV   Cardiovascular:  Positive for leg swelling. Negative for chest pain and palpitations. Gastrointestinal:  Negative for abdominal distention, abdominal pain, blood in stool, constipation, diarrhea and nausea. Endocrine: Negative for cold intolerance, heat intolerance, polydipsia, polyphagia and polyuria. Genitourinary:  Negative for difficulty urinating, dysuria, flank pain, frequency, hematuria and urgency. Musculoskeletal:  Positive for arthralgias and back pain. Negative for gait problem, joint swelling, myalgias, neck pain and neck stiffness. She does complain of neck pain and hip pain. She has difficulty standing after long periods of time. Right knee pain   Skin:  Negative for color change, pallor, rash and wound.    Allergic/Immunologic: Negative for environmental allergies, food allergies and immunocompromised state. Neurological:  Negative for dizziness, tremors, seizures, syncope, facial asymmetry, speech difficulty, weakness, light-headedness, numbness and headaches. Hematological:  Negative for adenopathy. Does not bruise/bleed easily. Psychiatric/Behavioral:  Negative for agitation, confusion, decreased concentration, dysphoric mood, hallucinations, self-injury, sleep disturbance and suicidal ideas. The patient is not nervous/anxious and is not hyperactive. /84 (Site: Left Upper Arm, Position: Sitting, Cuff Size: Large Adult)   Pulse 89   Wt (!) 362 lb (164.2 kg)   SpO2 94%   BMI 67.29 kg/m²   Physical Exam  Vitals and nursing note reviewed. Constitutional:       General: She is not in acute distress. Appearance: Normal appearance. She is well-developed and normal weight. She is not ill-appearing, toxic-appearing or diaphoretic. HENT:      Head: Normocephalic and atraumatic. Right Ear: Tympanic membrane, ear canal and external ear normal. There is no impacted cerumen. Left Ear: Tympanic membrane, ear canal and external ear normal. There is no impacted cerumen. Nose: Nose normal. No congestion or rhinorrhea. Mouth/Throat:      Mouth: Mucous membranes are moist.      Pharynx: Oropharynx is clear. No oropharyngeal exudate or posterior oropharyngeal erythema. Eyes:      General: No scleral icterus. Right eye: No discharge. Left eye: No discharge. Extraocular Movements: Extraocular movements intact. Conjunctiva/sclera: Conjunctivae normal.      Pupils: Pupils are equal, round, and reactive to light. Neck:      Thyroid: No thyromegaly. Vascular: No carotid bruit or JVD. Trachea: No tracheal deviation. Comments: Incision to neck is clean and healing without any evidence of infection. Cardiovascular:      Rate and Rhythm: Normal rate and regular rhythm. Pulses: Normal pulses.       Heart sounds: Normal heart sounds. No murmur heard. No friction rub. No gallop. Pulmonary:      Effort: Pulmonary effort is normal. No respiratory distress. Breath sounds: Normal breath sounds. No stridor. No wheezing, rhonchi or rales. Chest:      Chest wall: No tenderness. Abdominal:      General: Abdomen is flat. Bowel sounds are normal. There is no distension. Palpations: Abdomen is soft. There is no mass. Tenderness: There is no abdominal tenderness. There is no right CVA tenderness, left CVA tenderness, guarding or rebound. Hernia: No hernia is present. Musculoskeletal:         General: Tenderness present. No swelling, deformity or signs of injury. Normal range of motion. Cervical back: Normal range of motion and neck supple. No rigidity. No muscular tenderness. Right lower leg: No edema. Left lower leg: No edema. Comments: Gait is antalgic. There is tenderness on palpation of the lower lumbar spine. There is tenderness on palpation of the lower lumbar spine   Lymphadenopathy:      Cervical: No cervical adenopathy. Skin:     General: Skin is warm and dry. Capillary Refill: Capillary refill takes less than 2 seconds. Coloration: Skin is not jaundiced or pale. Findings: No bruising, erythema, lesion or rash. Neurological:      General: No focal deficit present. Mental Status: She is alert and oriented to person, place, and time. Mental status is at baseline. Cranial Nerves: No cranial nerve deficit. Sensory: No sensory deficit. Motor: No weakness or abnormal muscle tone. Coordination: Coordination normal.      Gait: Gait normal.      Deep Tendon Reflexes: Reflexes are normal and symmetric. Reflexes normal.   Psychiatric:         Mood and Affect: Mood normal.         Behavior: Behavior normal.         Thought Content: Thought content normal.         Judgment: Judgment normal.       1. Hypothyroidism, unspecified type    2.  Shortness of breath 3. Hyperlipidemia, unspecified hyperlipidemia type    4. Swelling of both lower extremities    5. Chronic kidney disease, unspecified CKD stage    6. Insomnia, unspecified type    7. OAB (overactive bladder)    8. Other depression    9. Arthritis    10. Primary hypertension    11. Chronic obstructive pulmonary disease, unspecified COPD type ([facility] [address]    12. Other migraine without status migrainosus, not intractable    13. Environmental allergies    14. Vertigo        ASSESSMENT/PLAN:    79-year-old woman here for follow-up    1. Hypothyroidism: Continue levothyroxine at 50 mcg. She was recently changed to 50 mcg on dosing. She is already had an improvement in her TSH    2. Hyperlipidemia: Continue Tricor as prescribed    3. Lower extremity swelling: Discontinue Lasix and try Bumex. She has upcoming appointment with cardiology next week. BNP ordered. Continue spironolactone    4. Chronic kidney disease: Stable    5. Insomnia: Continue Elavil as needed    6. Hyperuricemia: Continue allopurinol as prescribed    7. Overactive bladder: Doing well with Myrbetriq    8. Depression: Doing well with Cymbalta    9. Arthritis: Continue Celebrex as prescribed    10. Hypertension: Blood pressure well controlled on carvedilol    11. COPD: Patient very short of breath with exertion. However, she is not wearing her oxygen today. Continue Advair    12. Migraines: Fioricet as needed    13. Environmental allergies: Continue albuterol and Flonase    14. Vertigo: Meclizine as needed        Deandre Schmidt was seen today for 6 month follow-up. Diagnoses and all orders for this visit:    Hypothyroidism, unspecified type  -     Basic Metabolic Panel; Future  -     TSH; Future    Shortness of breath  -     Brain Natriuretic Peptide;  Future    Hyperlipidemia, unspecified hyperlipidemia type    Swelling of both lower extremities    Chronic kidney disease, unspecified CKD stage    Insomnia, unspecified type    OAB (overactive bladder)    Other depression    Arthritis    Primary hypertension    Chronic obstructive pulmonary disease, unspecified COPD type ([facility] [address]    Other migraine without status migrainosus, not intractable    Environmental allergies    Vertigo    Other orders  -     fenofibrate (TRICOR) 145 MG tablet; TAKE 1 TABLET BY MOUTH DAILY (FOR TRIGLYCERIDES/CHOLESTEROL)  -     spironolactone (ALDACTONE) 25 MG tablet; Take 1 tablet by mouth daily  -     Pneumococcal, PCV20, PREVNAR 20, (age 25 yrs+), IM, PF  -     bumetanide (BUMEX) 1 MG tablet; Take 1 tablet by mouth daily        Return in about 2 weeks (around 12/30/2022), or evaluate swelling.      Orders Placed This Encounter   Procedures    Pneumococcal, PCV20, PREVNAR 21, (age 25 yrs+), IM, PF    Brain Natriuretic Peptide     Standing Status:   Future     Number of Occurrences:   1     Standing Expiration Date:   78/91/2152    Basic Metabolic Panel     Standing Status:   Future     Standing Expiration Date:   12/16/2023    TSH     Standing Status:   Future     Standing Expiration Date:   12/16/2023       Rashida Escalera MD

## 2022-12-17 ASSESSMENT — ENCOUNTER SYMPTOMS
TROUBLE SWALLOWING: 0
WHEEZING: 0
COUGH: 0
COLOR CHANGE: 0
BLOOD IN STOOL: 0
BACK PAIN: 1
SINUS PRESSURE: 0
NAUSEA: 0
DIARRHEA: 0
VOICE CHANGE: 0
ABDOMINAL PAIN: 0
EYE ITCHING: 0
EYE REDNESS: 0
SORE THROAT: 0
CONSTIPATION: 0
PHOTOPHOBIA: 0
ABDOMINAL DISTENTION: 0
CHEST TIGHTNESS: 0
EYE PAIN: 0
EYE DISCHARGE: 0
SHORTNESS OF BREATH: 1

## 2022-12-27 ENCOUNTER — OFFICE VISIT (OUTPATIENT)
Dept: CARDIOLOGY | Facility: CLINIC | Age: 67
End: 2022-12-27

## 2022-12-27 VITALS
WEIGHT: 293 LBS | HEART RATE: 86 BPM | BODY MASS INDEX: 55.32 KG/M2 | SYSTOLIC BLOOD PRESSURE: 130 MMHG | HEIGHT: 61 IN | DIASTOLIC BLOOD PRESSURE: 82 MMHG

## 2022-12-27 DIAGNOSIS — I50.42 CHRONIC COMBINED SYSTOLIC AND DIASTOLIC CONGESTIVE HEART FAILURE: Primary | ICD-10-CM

## 2022-12-27 DIAGNOSIS — E66.01 MORBID OBESITY DUE TO EXCESS CALORIES: ICD-10-CM

## 2022-12-27 DIAGNOSIS — I10 PRIMARY HYPERTENSION: ICD-10-CM

## 2022-12-27 PROCEDURE — 93000 ELECTROCARDIOGRAM COMPLETE: CPT | Performed by: INTERNAL MEDICINE

## 2022-12-27 PROCEDURE — 99214 OFFICE O/P EST MOD 30 MIN: CPT | Performed by: INTERNAL MEDICINE

## 2022-12-27 RX ORDER — BUMETANIDE 1 MG/1
1 TABLET ORAL DAILY
COMMUNITY
Start: 2022-12-16

## 2022-12-27 NOTE — PROGRESS NOTES
"Subjective    Pau Cabrera is a 67 y.o. female. Fu of chf    History of Present Illness     HFcEF:  She is not a candidate for ACE/ARB 2/2 allergic med rxn. Recent lab work is good with normal pBNP and normal Lytes. She has chronic soa and is on Home O2 per NC continuosly. Her wt is stable. She is adherent to meds. She increases her Lasix by 1.5X for feeling bloated or swollen and has had to do that only 2x in the past year. She is adherent to meds and low salt diet. EKG today shows more evidence for LVH than 6 mo ago.    HTN:  Stable meds and home readings are \"120-130's/80's\".     OBESITY:  She has previously been evaluated by the Bariatric clinic and felt to be a poor candidate for surgical wt loss. Her wt is stable. She cannot ambulate x short distances around the house.        The following portions of the patient's history were reviewed and updated as appropriate: allergies, current medications, past family history, past medical history, past social history, past surgical history and problem list.    Patient Active Problem List   Diagnosis   • Chronic combined systolic and diastolic congestive heart failure (HCC)   • HTN (hypertension)   • Degeneration of lumbar intervertebral disc   • Lumbosacral neuritis   • Spondylolisthesis of lumbar region   • Morbid obesity due to excess calories (HCC)   • Family hx of colon cancer   • Hypoxic   • Hypoxemia requiring supplemental oxygen   • Thyroid mass of unclear etiology   • CHARLIE (obstructive sleep apnea) - non compliant with cpap   • Non-seasonal allergic rhinitis   • Pulmonary hypertension (HCC)   • SOB (shortness of breath)   • Mild intermittent asthma without complication   • Non-smoker   • Non compliance with medical treatment   • Pulmonary scarring   • Weight gain   • History of COVID-19   • Thyroid mass   • Lung nodule       Allergies   Allergen Reactions   • Codeine Sulfate Hives   • Lisinopril Swelling and Rash     Facial swelling       Family History "   Problem Relation Age of Onset   • Arthritis Mother    • Heart disease Mother    • Hypertension Mother    • Thyroid disease Mother    • Arthritis Father    • Hypertension Father    • Anuerysm Father    • Stroke Father    • Asthma Brother    • Colon polyps Neg Hx    • Colon cancer Neg Hx        Social History     Socioeconomic History   • Marital status:    Tobacco Use   • Smoking status: Never   • Smokeless tobacco: Never   Vaping Use   • Vaping Use: Never used   Substance and Sexual Activity   • Alcohol use: No   • Drug use: No   • Sexual activity: Not Currently     Partners: Male     Birth control/protection: Surgical         Current Outpatient Medications:   •  albuterol sulfate  (90 Base) MCG/ACT inhaler, Inhale 2 puffs Every 4 (Four) Hours As Needed for Wheezing., Disp: 6.7 g, Rfl: 5  •  allopurinol (ZYLOPRIM) 300 MG tablet, , Disp: , Rfl:   •  amitriptyline (ELAVIL) 25 MG tablet, Take 25 mg by mouth Every Night., Disp: , Rfl:   •  azelastine (ASTELIN) 0.1 % nasal spray, 2 sprays into the nostril(s) as directed by provider 2 (Two) Times a Day. Use in each nostril as directed, Disp: 60 mL, Rfl: 11  •  bumetanide (BUMEX) 1 MG tablet, Take 1 mg by mouth Daily., Disp: , Rfl:   •  butalbital-acetaminophen-caffeine (FIORICET, ESGIC) -40 MG per tablet, Take 1 tablet by mouth Every 4 (Four) Hours As Needed for Headache., Disp: , Rfl:   •  carvedilol (COREG) 25 MG tablet, TAKE 1 TABLET BY MOUTH TWICE A DAY, Disp: 60 tablet, Rfl: 3  •  celecoxib (CeleBREX) 100 MG capsule, Take 100 mg by mouth., Disp: , Rfl:   •  DULoxetine (CYMBALTA) 60 MG capsule, Take 60 mg by mouth Every Morning., Disp: , Rfl:   •  EPINEPHrine (ADRENALIN) 0.05 MG/ML syringe, Inject 20 mL as directed., Disp: , Rfl:   •  fenofibrate (TRICOR) 145 MG tablet, Take 145 mg by mouth Daily., Disp: , Rfl:   •  fluticasone (Flonase Allergy Relief) 50 MCG/ACT nasal spray, 2 sprays into the nostril(s) as directed by provider Daily., Disp: 16  g, Rfl: 11  •  Fluticasone-Salmeterol (Wixela Inhub) 250-50 MCG/ACT DISKUS, Inhale 1 puff 2 (Two) Times a Day., Disp: 1 each, Rfl: 11  •  furosemide (LASIX) 40 MG tablet, Take 1 tablet by mouth Daily., Disp: 30 tablet, Rfl: 0  •  loratadine (CLARITIN) 10 MG tablet, Take 1 tablet by mouth Daily., Disp: 90 tablet, Rfl: 3  •  meclizine (ANTIVERT) 25 MG tablet, Take 25 mg by mouth 3 (three) times a day as needed for dizziness., Disp: , Rfl:   •  Multiple Vitamins-Minerals (MULTIVITAMIN ADULT PO), Take  by mouth., Disp: , Rfl:   •  Myrbetriq 25 MG tablet sustained-release 24 hour 24 hr tablet, Take 25 mg by mouth Daily., Disp: , Rfl:   •  OXYGEN-HELIUM IN, Inhale. 2L at night, Disp: , Rfl:   •  spironolactone (ALDACTONE) 25 MG tablet, Take 25 mg by mouth., Disp: , Rfl:   •  Synthroid 50 MCG tablet, Take 1 tablet by mouth Daily for 30 days., Disp: 30 tablet, Rfl: 3    Past Surgical History:   Procedure Laterality Date   • BREAST BIOPSY Left    •  SECTION      X 2   • COLONOSCOPY  10/22/2015    Tics repeat exam in 5 years   • COLONOSCOPY  2007    Small hemorrhoids repeat exam in 3 years   • COLONOSCOPY N/A 2020    Procedure: COLONOSCOPY WITH ANESTHESIA;  Surgeon: Denny Francis MD;  Location: [facility] ENDOSCOPY;  Service: Gastroenterology;  Laterality: N/A;  pre: family hx colon ca  post: diverticulosis  Carola Barbosa MD   • HYSTERECTOMY      total   • THYROIDECTOMY Left 2022    Procedure: Left thyroidectomy with isthmusectomy;  Surgeon: Milad So MD;  Location: [facility] OR;  Service: ENT;  Laterality: Left;   • UMBILICAL HERNIA REPAIR      had 1/2 of it repaired with hysterectomy        Review of Systems   Constitutional: Negative for activity change, appetite change, fatigue and unexpected weight change.   Respiratory: Positive for shortness of breath. Negative for wheezing.    Cardiovascular: Negative for chest pain, palpitations and leg swelling.   Gastrointestinal:  "Negative for abdominal pain and blood in stool.   Genitourinary: Negative for difficulty urinating and hematuria.   Musculoskeletal: Positive for arthralgias and back pain.       /82   Pulse 86   Ht 154.9 cm (61\")   Wt (!) 156 kg (344 lb)   LMP  (LMP Unknown)   BMI 65.00 kg/m²   Procedures    Objective   Physical Exam  Constitutional:       Appearance: She is obese.   Cardiovascular:      Rate and Rhythm: Normal rate and regular rhythm.      Heart sounds: No murmur heard.    No friction rub. No gallop.   Pulmonary:      Effort: Pulmonary effort is normal.      Breath sounds: Normal breath sounds. No wheezing or rales.   Abdominal:      General: Bowel sounds are normal.      Tenderness: There is no abdominal tenderness.   Musculoskeletal:      Right lower leg: No edema.      Left lower leg: No edema.      Comments: Legs are obese but no obvious edema and no skin induration         Assessment & Plan   Diagnoses and all orders for this visit:    1. Chronic combined systolic and diastolic congestive heart failure (HCC) (Primary)  Comments:  compensated - add Jardiance samples with script to follow  Orders:  -     ECG 12 Lead    2. Primary hypertension  Comments:  controlled    3. Morbid obesity due to excess calories (HCC)  Comments:  with obesity related hypoventilation. stable. discharged from Bariatric clinic      Mdm=mod - adjustment of meds           Return in about 6 months (around 6/27/2023) for Next scheduled follow up.  Orders Placed This Encounter   Procedures   • ECG 12 Lead     Order Specific Question:   Reason for Exam:     Answer:   CHF.HTN     Order Specific Question:   Release to patient     Answer:   Routine Release     "

## 2023-01-04 DIAGNOSIS — M19.90 OSTEOARTHRITIS, UNSPECIFIED OSTEOARTHRITIS TYPE, UNSPECIFIED SITE: ICD-10-CM

## 2023-01-05 RX ORDER — CELECOXIB 100 MG/1
CAPSULE ORAL
Qty: 180 CAPSULE | Refills: 0 | Status: SHIPPED | OUTPATIENT
Start: 2023-01-05

## 2023-01-05 RX ORDER — CARVEDILOL 25 MG/1
25 TABLET ORAL 2 TIMES DAILY
Qty: 180 TABLET | Refills: 0 | Status: SHIPPED | OUTPATIENT
Start: 2023-01-05

## 2023-02-14 DIAGNOSIS — N32.81 OAB (OVERACTIVE BLADDER): ICD-10-CM

## 2023-02-14 RX ORDER — MIRABEGRON 25 MG/1
TABLET, FILM COATED, EXTENDED RELEASE ORAL
Qty: 30 TABLET | Refills: 5 | OUTPATIENT
Start: 2023-02-14

## 2023-02-14 RX ORDER — ALLOPURINOL 300 MG/1
300 TABLET ORAL DAILY
Qty: 30 TABLET | Refills: 5 | OUTPATIENT
Start: 2023-02-14

## 2023-02-22 ENCOUNTER — TELEPHONE (OUTPATIENT)
Dept: OTOLARYNGOLOGY | Facility: CLINIC | Age: 68
End: 2023-02-22
Payer: MEDICARE

## 2023-02-22 NOTE — TELEPHONE ENCOUNTER
Patient called in stating [facility] pharmacy told her they couldnt refill meds and she needed an appointment. I don't have anything sooner and she requests enough to at least get her through to her 3/21 appt.

## 2023-02-24 ENCOUNTER — OFFICE VISIT (OUTPATIENT)
Dept: INTERNAL MEDICINE | Age: 68
End: 2023-02-24
Payer: MEDICARE

## 2023-02-24 VITALS
BODY MASS INDEX: 53.92 KG/M2 | HEART RATE: 87 BPM | WEIGHT: 293 LBS | OXYGEN SATURATION: 91 % | SYSTOLIC BLOOD PRESSURE: 126 MMHG | DIASTOLIC BLOOD PRESSURE: 78 MMHG | HEIGHT: 62 IN

## 2023-02-24 DIAGNOSIS — G43.809 OTHER MIGRAINE WITHOUT STATUS MIGRAINOSUS, NOT INTRACTABLE: ICD-10-CM

## 2023-02-24 DIAGNOSIS — R73.9 HYPERGLYCEMIA: ICD-10-CM

## 2023-02-24 DIAGNOSIS — E03.9 HYPOTHYROIDISM, UNSPECIFIED TYPE: ICD-10-CM

## 2023-02-24 DIAGNOSIS — I27.20 PULMONARY HYPERTENSION (HCC): ICD-10-CM

## 2023-02-24 DIAGNOSIS — R60.0 BILATERAL LOWER EXTREMITY EDEMA: Primary | ICD-10-CM

## 2023-02-24 DIAGNOSIS — E79.0 HYPERURICEMIA: ICD-10-CM

## 2023-02-24 DIAGNOSIS — I50.9 CHRONIC CONGESTIVE HEART FAILURE, UNSPECIFIED HEART FAILURE TYPE (HCC): ICD-10-CM

## 2023-02-24 DIAGNOSIS — I10 PRIMARY HYPERTENSION: ICD-10-CM

## 2023-02-24 DIAGNOSIS — I50.42 CHRONIC COMBINED SYSTOLIC AND DIASTOLIC CONGESTIVE HEART FAILURE (HCC): ICD-10-CM

## 2023-02-24 DIAGNOSIS — E78.5 HYPERLIPIDEMIA, UNSPECIFIED HYPERLIPIDEMIA TYPE: ICD-10-CM

## 2023-02-24 DIAGNOSIS — E11.21 TYPE II DIABETES MELLITUS WITH NEPHROPATHY (HCC): ICD-10-CM

## 2023-02-24 DIAGNOSIS — N32.81 OAB (OVERACTIVE BLADDER): ICD-10-CM

## 2023-02-24 DIAGNOSIS — M25.50 ARTHRALGIA, UNSPECIFIED JOINT: ICD-10-CM

## 2023-02-24 DIAGNOSIS — F32.89 OTHER DEPRESSION: ICD-10-CM

## 2023-02-24 DIAGNOSIS — J44.9 CHRONIC OBSTRUCTIVE PULMONARY DISEASE, UNSPECIFIED COPD TYPE (HCC): ICD-10-CM

## 2023-02-24 DIAGNOSIS — E66.01 MORBID OBESITY DUE TO EXCESS CALORIES (HCC): ICD-10-CM

## 2023-02-24 DIAGNOSIS — Z91.09 ENVIRONMENTAL ALLERGIES: ICD-10-CM

## 2023-02-24 LAB
ALBUMIN SERPL-MCNC: 4.3 G/DL (ref 3.5–5.2)
ALP BLD-CCNC: 57 U/L (ref 35–104)
ALT SERPL-CCNC: 10 U/L (ref 5–33)
ANION GAP SERPL CALCULATED.3IONS-SCNC: 12 MMOL/L (ref 7–19)
AST SERPL-CCNC: 16 U/L (ref 5–32)
BASOPHILS ABSOLUTE: 0.1 K/UL (ref 0–0.2)
BASOPHILS RELATIVE PERCENT: 0.7 % (ref 0–1)
BILIRUB SERPL-MCNC: 0.3 MG/DL (ref 0.2–1.2)
BUN BLDV-MCNC: 22 MG/DL (ref 8–23)
CALCIUM SERPL-MCNC: 10.1 MG/DL (ref 8.8–10.2)
CHLORIDE BLD-SCNC: 101 MMOL/L (ref 98–111)
CHOLESTEROL, TOTAL: 167 MG/DL (ref 160–199)
CO2: 29 MMOL/L (ref 22–29)
CREAT SERPL-MCNC: 1.4 MG/DL (ref 0.5–0.9)
EOSINOPHILS ABSOLUTE: 0.2 K/UL (ref 0–0.6)
EOSINOPHILS RELATIVE PERCENT: 3.1 % (ref 0–5)
GFR SERPL CREATININE-BSD FRML MDRD: 41 ML/MIN/{1.73_M2}
GLUCOSE BLD-MCNC: 80 MG/DL (ref 74–109)
HBA1C MFR BLD: 5.4 % (ref 4–6)
HCT VFR BLD CALC: 36.3 % (ref 37–47)
HDLC SERPL-MCNC: 55 MG/DL (ref 65–121)
HEMOGLOBIN: 10.9 G/DL (ref 12–16)
IMMATURE GRANULOCYTES #: 0 K/UL
LDL CHOLESTEROL CALCULATED: 102 MG/DL
LYMPHOCYTES ABSOLUTE: 1.7 K/UL (ref 1.1–4.5)
LYMPHOCYTES RELATIVE PERCENT: 23.8 % (ref 20–40)
MCH RBC QN AUTO: 28.8 PG (ref 27–31)
MCHC RBC AUTO-ENTMCNC: 30 G/DL (ref 33–37)
MCV RBC AUTO: 96 FL (ref 81–99)
MONOCYTES ABSOLUTE: 0.4 K/UL (ref 0–0.9)
MONOCYTES RELATIVE PERCENT: 5.4 % (ref 0–10)
NEUTROPHILS ABSOLUTE: 4.7 K/UL (ref 1.5–7.5)
NEUTROPHILS RELATIVE PERCENT: 66.7 % (ref 50–65)
PDW BLD-RTO: 16.8 % (ref 11.5–14.5)
PLATELET # BLD: 317 K/UL (ref 130–400)
PMV BLD AUTO: 10.4 FL (ref 9.4–12.3)
POTASSIUM SERPL-SCNC: 4.5 MMOL/L (ref 3.5–5)
RBC # BLD: 3.78 M/UL (ref 4.2–5.4)
SODIUM BLD-SCNC: 142 MMOL/L (ref 136–145)
TOTAL PROTEIN: 7.5 G/DL (ref 6.6–8.7)
TRIGL SERPL-MCNC: 49 MG/DL (ref 0–149)
TSH SERPL DL<=0.05 MIU/L-ACNC: 4.14 UIU/ML (ref 0.27–4.2)
VITAMIN D 25-HYDROXY: 36.2 NG/ML
WBC # BLD: 7.1 K/UL (ref 4.8–10.8)

## 2023-02-24 PROCEDURE — G8399 PT W/DXA RESULTS DOCUMENT: HCPCS | Performed by: INTERNAL MEDICINE

## 2023-02-24 PROCEDURE — 3023F SPIROM DOC REV: CPT | Performed by: INTERNAL MEDICINE

## 2023-02-24 PROCEDURE — G8484 FLU IMMUNIZE NO ADMIN: HCPCS | Performed by: INTERNAL MEDICINE

## 2023-02-24 PROCEDURE — 3017F COLORECTAL CA SCREEN DOC REV: CPT | Performed by: INTERNAL MEDICINE

## 2023-02-24 PROCEDURE — 1036F TOBACCO NON-USER: CPT | Performed by: INTERNAL MEDICINE

## 2023-02-24 PROCEDURE — 99214 OFFICE O/P EST MOD 30 MIN: CPT | Performed by: INTERNAL MEDICINE

## 2023-02-24 PROCEDURE — G8417 CALC BMI ABV UP PARAM F/U: HCPCS | Performed by: INTERNAL MEDICINE

## 2023-02-24 PROCEDURE — 3044F HG A1C LEVEL LT 7.0%: CPT | Performed by: INTERNAL MEDICINE

## 2023-02-24 PROCEDURE — 3078F DIAST BP <80 MM HG: CPT | Performed by: INTERNAL MEDICINE

## 2023-02-24 PROCEDURE — 2022F DILAT RTA XM EVC RTNOPTHY: CPT | Performed by: INTERNAL MEDICINE

## 2023-02-24 PROCEDURE — 3074F SYST BP LT 130 MM HG: CPT | Performed by: INTERNAL MEDICINE

## 2023-02-24 PROCEDURE — 1123F ACP DISCUSS/DSCN MKR DOCD: CPT | Performed by: INTERNAL MEDICINE

## 2023-02-24 PROCEDURE — G8427 DOCREV CUR MEDS BY ELIG CLIN: HCPCS | Performed by: INTERNAL MEDICINE

## 2023-02-24 PROCEDURE — 1090F PRES/ABSN URINE INCON ASSESS: CPT | Performed by: INTERNAL MEDICINE

## 2023-02-24 RX ORDER — BUMETANIDE 1 MG/1
1 TABLET ORAL 2 TIMES DAILY
Qty: 30 TABLET | Refills: 3 | Status: SHIPPED | OUTPATIENT
Start: 2023-02-24

## 2023-02-24 RX ORDER — ALLOPURINOL 300 MG/1
300 TABLET ORAL DAILY
Qty: 30 TABLET | Refills: 5 | Status: SHIPPED | OUTPATIENT
Start: 2023-02-24

## 2023-02-24 SDOH — ECONOMIC STABILITY: FOOD INSECURITY: WITHIN THE PAST 12 MONTHS, YOU WORRIED THAT YOUR FOOD WOULD RUN OUT BEFORE YOU GOT MONEY TO BUY MORE.: NEVER TRUE

## 2023-02-24 SDOH — ECONOMIC STABILITY: HOUSING INSECURITY
IN THE LAST 12 MONTHS, WAS THERE A TIME WHEN YOU DID NOT HAVE A STEADY PLACE TO SLEEP OR SLEPT IN A SHELTER (INCLUDING NOW)?: NO

## 2023-02-24 SDOH — ECONOMIC STABILITY: FOOD INSECURITY: WITHIN THE PAST 12 MONTHS, THE FOOD YOU BOUGHT JUST DIDN'T LAST AND YOU DIDN'T HAVE MONEY TO GET MORE.: NEVER TRUE

## 2023-02-24 SDOH — ECONOMIC STABILITY: INCOME INSECURITY: HOW HARD IS IT FOR YOU TO PAY FOR THE VERY BASICS LIKE FOOD, HOUSING, MEDICAL CARE, AND HEATING?: NOT HARD AT ALL

## 2023-02-24 ASSESSMENT — PATIENT HEALTH QUESTIONNAIRE - PHQ9
1. LITTLE INTEREST OR PLEASURE IN DOING THINGS: 0
6. FEELING BAD ABOUT YOURSELF - OR THAT YOU ARE A FAILURE OR HAVE LET YOURSELF OR YOUR FAMILY DOWN: 0
8. MOVING OR SPEAKING SO SLOWLY THAT OTHER PEOPLE COULD HAVE NOTICED. OR THE OPPOSITE, BEING SO FIGETY OR RESTLESS THAT YOU HAVE BEEN MOVING AROUND A LOT MORE THAN USUAL: 0
SUM OF ALL RESPONSES TO PHQ9 QUESTIONS 1 & 2: 0
3. TROUBLE FALLING OR STAYING ASLEEP: 0
SUM OF ALL RESPONSES TO PHQ QUESTIONS 1-9: 0
SUM OF ALL RESPONSES TO PHQ QUESTIONS 1-9: 0
4. FEELING TIRED OR HAVING LITTLE ENERGY: 0
SUM OF ALL RESPONSES TO PHQ QUESTIONS 1-9: 0
SUM OF ALL RESPONSES TO PHQ QUESTIONS 1-9: 0
5. POOR APPETITE OR OVEREATING: 0
7. TROUBLE CONCENTRATING ON THINGS, SUCH AS READING THE NEWSPAPER OR WATCHING TELEVISION: 0
9. THOUGHTS THAT YOU WOULD BE BETTER OFF DEAD, OR OF HURTING YOURSELF: 0
2. FEELING DOWN, DEPRESSED OR HOPELESS: 0
10. IF YOU CHECKED OFF ANY PROBLEMS, HOW DIFFICULT HAVE THESE PROBLEMS MADE IT FOR YOU TO DO YOUR WORK, TAKE CARE OF THINGS AT HOME, OR GET ALONG WITH OTHER PEOPLE: 0

## 2023-02-24 NOTE — PROGRESS NOTES
Per Dr. Kinza Russell this [address] [place] [address] gave the pt 5 sample boxes of Myrbetriq 25 mg all with lot number [id number] and Exp date 01/2024.

## 2023-02-24 NOTE — PROGRESS NOTES
Chief Complaint   Patient presents with    2 Week Follow-Up    Swelling       HPI: Killian Prather is a 79 y.o. female is here for follow-up of lower extremity swelling, overactive bladder, hyperuricemia, joint pain, hypertension, hypothyroidism, pulmonary hypertension, COPD, depression, migraines, and vertigo. She is doing well today. She was recently seen in our office for lower extremity edema. The lower extremity edema has improved significantly. We will check a TSH and BMP today. However, she is still having some difficulty with lower extremity swelling. She wants to know if we can increase her Bumex to twice daily. I think we can do that. We will just have to monitor her kidney function closely. Denies any complaints of chest pain, chest pressure or shortness of breath above her baseline COPD. Myrbetriq is helpful for her overactive bladder. She has not had any gout flares. She does have a history of chronic arthritis. Her joint pain is stable her current dose of Celebrex. Her blood pressure remains well controlled. Her TSH is due for recheck. We will check a TSH today. She does have a history of pulmonary hypertension, which is stable. Her COPD is also stable she has not had any recent migraine headaches. Her depression is well controlled on her current medications. She does have a history of vertigo, which is well controlled on her current medication regimen    She does have a history of hyperlipidemia. She is tolerating her fenofibrate as prescribed. She does wear her oxygen at nighttime. At this time, she does not feel that she is having increasing work of breathing.     Past Medical History:   Diagnosis Date    Abnormal CT of spine     Acute sinusitis     Arthritis     Asthma     Bronchitis     Carpal tunnel syndrome     both    CHF (congestive heart failure) (HCC)     Depression     Fibromyalgia     Furuncle     Gout     Hyperlipidemia     Hypertension     Low vitamin D level     Lumbago     Lumbar radiculopathy     Neuropathy     feet    Obesity     Rheumatoid nodule of knee (HCC)     Sleep apnea     UTI (urinary tract infection)     Vertigo     Vitamin D deficiency       Past Surgical History:   Procedure Laterality Date    BREAST BIOPSY       SECTION      times two    CT NEEDLE BIOPSY THYROID PERCUTANEOUS Left     HYSTERECTOMY (CERVIX STATUS UNKNOWN)      OVARY REMOVAL        Social History     Socioeconomic History    Marital status:    Tobacco Use    Smoking status: Never    Smokeless tobacco: Never   Vaping Use    Vaping Use: Never used   Substance and Sexual Activity    Alcohol use: No    Drug use: No     Social Determinants of Health     Financial Resource Strain: Low Risk     Difficulty of Paying Living Expenses: Not hard at all   Food Insecurity: No Food Insecurity    Worried About Running Out of Food in the Last Year: Never true    Ran Out of Food in the Last Year: Never true   Transportation Needs: No Transportation Needs    Lack of Transportation (Medical): No    Lack of Transportation (Non-Medical):  No   Physical Activity: Insufficiently Active    Days of Exercise per Week: 3 days    Minutes of Exercise per Session: 20 min   Housing Stability: Unknown    Unstable Housing in the Last Year: No      Family History   Problem Relation Age of Onset    Heart Disease Mother     Hypertension Mother     Stroke Father     Hypertension Father     Hypertension Other     Heart Disease Other     Cancer Other         Current Outpatient Medications   Medication Sig Dispense Refill    allopurinol (ZYLOPRIM) 300 MG tablet Take 1 tablet by mouth daily 30 tablet 5    mirabegron (MYRBETRIQ) 25 MG TB24 TAKE 1 TABLET BY MOUTH DAILY 30 tablet 5    bumetanide (BUMEX) 1 MG tablet Take 1 tablet by mouth 2 times daily Take 2nd dose around 2 PM 30 tablet 3    celecoxib (CELEBREX) 100 MG capsule TAKE ONE CAPSULE BY MOUTH TWO TIMES DAILY 180 capsule 0    carvedilol (COREG) 25 MG tablet TAKE 1 TABLET BY MOUTH 2 TIMES DAILY 180 tablet 0    levothyroxine (SYNTHROID) 50 MCG tablet Take 50 mcg by mouth Daily      fenofibrate (TRICOR) 145 MG tablet TAKE 1 TABLET BY MOUTH DAILY (FOR TRIGLYCERIDES/CHOLESTEROL) 90 tablet 3    spironolactone (ALDACTONE) 25 MG tablet Take 1 tablet by mouth daily 90 tablet 3    amitriptyline (ELAVIL) 25 MG tablet TAKE ONE TABLET BY MOUTH NIGHTLY FOR NERVE DISEASE 90 tablet 3    EPINEPHrine (EPIPEN 2-SHAUN) 0.3 MG/0.3ML SOAJ injection Inject 0.3 mLs into the muscle once for 1 dose Use as directed for allergic reaction 0.3 mL 1    DULoxetine (CYMBALTA) 60 MG extended release capsule TAKE 1 CAPSULE BY MOUTH ONCE DAILY IN THE MORNING 90 capsule 3    butalbital-acetaminophen-caffeine (FIORICET, ESGIC) -40 MG per tablet TAKE ONE TABLET EVERY 4 HOURS AS NEEDED FOR HEADACHES 10 tablet 3    albuterol sulfate  (90 Base) MCG/ACT inhaler INHALE 2 PUFFS EVERY 4 (FOUR) HOURS AS NEEDED FOR WHEEZING. fluticasone (FLONASE) 50 MCG/ACT nasal spray USE 2 SPRAYS IN EACH NOSTRIL ONCE DAILY      fluticasone-salmeterol (ADVAIR) 250-50 MCG/DOSE AEPB INHALE 1 PUFF 2 (TWO) TIMES A DAY. OXYGEN Inhale into the lungs 2LPM PRN and QHS LEGACY      meclizine (ANTIVERT) 25 MG tablet Take 1 tablet by mouth 3 times daily as needed for Dizziness 90 tablet 5    azelastine (ASTELIN) 0.1 % nasal spray 2 sprays by Nasal route 2 times daily Use in each nostril as directed 1 Bottle 3    Multiple Vitamins-Minerals (MULTIVITAMIN ADULT PO) Take by mouth       No current facility-administered medications for this visit.         Patient Active Problem List   Diagnosis    Chronic congestive heart failure (HCC)    Depression    Fibromyalgia    Gout of multiple sites    Mixed hyperlipidemia    Morbid obesity due to excess calories (HCC)    HTN (hypertension)    Chronic midline low back pain without sciatica    Benign headache    Family hx of colon cancer    Hypoxia    TAURUS (obstructive sleep apnea) Thyroid mass    Chronic combined systolic and diastolic congestive heart failure (HCC)    Hypoxemia requiring supplemental oxygen    Mild intermittent asthma without complication    Non-seasonal allergic rhinitis    Pulmonary hypertension (HCC)    SOB (shortness of breath)    OAB (overactive bladder)    Chronic obstructive pulmonary disease (HCC)    Rash    Goiter    Open wound of left lower leg        Review of Systems   Constitutional:  Positive for unexpected weight change. Negative for activity change, appetite change, chills, diaphoresis, fatigue and fever. HENT:  Negative for congestion, ear discharge, postnasal drip, sinus pressure, sneezing, sore throat, tinnitus, trouble swallowing and voice change. Loss of taste and smell following COVID-19 infection. .  She recently underwent a partial thyroidectomy. She is feeling somewhat better. She no longer feels like there is anything pushing on her trachea   Eyes:  Negative for photophobia, pain, discharge, redness, itching and visual disturbance. Respiratory:  Positive for shortness of breath. Negative for cough, chest tightness and wheezing. Some mild shortness of breath following a COVID-19 infection, but has overall improved since last OV   Cardiovascular:  Positive for leg swelling. Negative for chest pain and palpitations. Gastrointestinal:  Negative for abdominal distention, abdominal pain, blood in stool, constipation, diarrhea and nausea. Endocrine: Negative for cold intolerance, heat intolerance, polydipsia, polyphagia and polyuria. Genitourinary:  Negative for difficulty urinating, dysuria, flank pain, frequency, hematuria and urgency. Musculoskeletal:  Positive for arthralgias and back pain. Negative for gait problem, joint swelling, myalgias, neck pain and neck stiffness. She does complain of neck pain and hip pain. She has difficulty standing after long periods of time.  Right knee pain   Skin:  Negative for color change, pallor, rash and wound. Allergic/Immunologic: Negative for environmental allergies, food allergies and immunocompromised state. Neurological:  Negative for dizziness, tremors, seizures, syncope, facial asymmetry, speech difficulty, weakness, light-headedness, numbness and headaches. Hematological:  Negative for adenopathy. Does not bruise/bleed easily. Psychiatric/Behavioral:  Negative for agitation, confusion, decreased concentration, dysphoric mood, hallucinations, self-injury, sleep disturbance and suicidal ideas. The patient is not nervous/anxious and is not hyperactive. /78   Pulse 87   Ht 5' 1.5\" (1.562 m)   Wt (!) 354 lb 3.2 oz (160.7 kg)   SpO2 91%   BMI 65.84 kg/m²   Physical Exam  Vitals and nursing note reviewed. Constitutional:       General: She is not in acute distress. Appearance: Normal appearance. She is well-developed and normal weight. She is not ill-appearing, toxic-appearing or diaphoretic. HENT:      Head: Normocephalic and atraumatic. Right Ear: Tympanic membrane, ear canal and external ear normal. There is no impacted cerumen. Left Ear: Tympanic membrane, ear canal and external ear normal. There is no impacted cerumen. Nose: Nose normal. No congestion or rhinorrhea. Mouth/Throat:      Mouth: Mucous membranes are moist.      Pharynx: Oropharynx is clear. No oropharyngeal exudate or posterior oropharyngeal erythema. Eyes:      General: No scleral icterus. Right eye: No discharge. Left eye: No discharge. Extraocular Movements: Extraocular movements intact. Conjunctiva/sclera: Conjunctivae normal.      Pupils: Pupils are equal, round, and reactive to light. Neck:      Thyroid: No thyromegaly. Vascular: No carotid bruit or JVD. Trachea: No tracheal deviation. Comments: Incision to neck is clean and healing without any evidence of infection.   Cardiovascular:      Rate and Rhythm: Normal rate and regular rhythm. Pulses: Normal pulses. Heart sounds: Normal heart sounds. No murmur heard. No friction rub. No gallop. Pulmonary:      Effort: Pulmonary effort is normal. No respiratory distress. Breath sounds: Normal breath sounds. No stridor. No wheezing, rhonchi or rales. Chest:      Chest wall: No tenderness. Abdominal:      General: Abdomen is flat. Bowel sounds are normal. There is no distension. Palpations: Abdomen is soft. There is no mass. Tenderness: There is no abdominal tenderness. There is no right CVA tenderness, left CVA tenderness, guarding or rebound. Hernia: No hernia is present. Musculoskeletal:         General: Tenderness present. No swelling, deformity or signs of injury. Normal range of motion. Cervical back: Normal range of motion and neck supple. No rigidity. No muscular tenderness. Right lower leg: No edema. Left lower leg: No edema. Comments: Gait is antalgic. There is tenderness on palpation of the lower lumbar spine. There is tenderness on palpation of the lower lumbar spine   Lymphadenopathy:      Cervical: No cervical adenopathy. Skin:     General: Skin is warm and dry. Capillary Refill: Capillary refill takes less than 2 seconds. Coloration: Skin is not jaundiced or pale. Findings: No bruising, erythema, lesion or rash. Neurological:      General: No focal deficit present. Mental Status: She is alert and oriented to person, place, and time. Mental status is at baseline. Cranial Nerves: No cranial nerve deficit. Sensory: No sensory deficit. Motor: No weakness or abnormal muscle tone. Coordination: Coordination normal.      Gait: Gait normal.      Deep Tendon Reflexes: Reflexes are normal and symmetric. Reflexes normal.   Psychiatric:         Mood and Affect: Mood normal.         Behavior: Behavior normal.         Thought Content:  Thought content normal.         Judgment: Judgment normal.       1. Bilateral lower extremity edema    2. OAB (overactive bladder)    3. Type II diabetes mellitus with nephropathy ([address]    4. Chronic obstructive pulmonary disease, unspecified COPD type ([address]    5. Pulmonary hypertension ([address]    6. Chronic combined systolic and diastolic congestive heart failure ([address]    7. Morbid obesity due to excess calories ([address]    8. Chronic congestive heart failure, unspecified heart failure type ([address]    9. Hyperuricemia    10. Arthralgia, unspecified joint    11. Primary hypertension    12. Hypothyroidism, unspecified type    13. Hyperlipidemia, unspecified hyperlipidemia type    14. Other depression    15. Other migraine without status migrainosus, not intractable    16. Environmental allergies        ASSESSMENT/PLAN:    70-year-old woman here for follow-up    Lower extremity edema: Seems to be improving with Bumex. Increase Bumex to twice daily dosing. We will check a TSH, CMP today. We will also check a BNP    2. Overactive bladder: Continue Myrbetriq    3. Hyperuricemia: No gout flares on Zyloprim    4. Joint pain: Continue Celebrex as prescribed    5. Hypertension: Blood pressure well controlled on carvedilol    6. Hypothyroidism: Check a TSH with next lab draw. Continue levothyroxine at current dose    7. Hyperlipidemia: Continue fenofibrate as prescribed    8. Congestive heart failure: We will decrease her Bumex to twice daily dosing. Continue Aldactone, beta-blockade. He is to be relatively stable at this time. 9.  Depression: Doing well with Elavil and Cymbalta    10. Migraines: Continue Fioricet as needed    11. Environmental allergies: Doing well with Flonase    12. Migraines: Continue Fioricet as needed    13. COPD/pulmonary hypertension: Stable    14. Type 2 diabetes with nephropathy: We will check an A1c with next lab draw    Yolande Bones was seen today for 2 week follow-up and swelling.     Diagnoses and all orders for this visit:    Bilateral lower extremity edema    OAB (overactive bladder)  -     mirabegron (MYRBETRIQ) 25 MG TB24; TAKE 1 TABLET BY MOUTH DAILY    Type II diabetes mellitus with nephropathy (HCC)  -     Comprehensive Metabolic Panel; Future  -     CBC with Auto Differential; Future  -     Hemoglobin A1C; Future  -     Lipid Panel; Future  -     TSH; Future  -     Microalbumin / Creatinine Urine Ratio; Future  -     Vitamin D 25 Hydroxy; Future    Chronic obstructive pulmonary disease, unspecified COPD type ([facility]    Pulmonary hypertension (HCC)    Chronic combined systolic and diastolic congestive heart failure (HCC)    Morbid obesity due to excess calories (HCC)    Chronic congestive heart failure, unspecified heart failure type (HCC)    Hyperuricemia    Arthralgia, unspecified joint    Primary hypertension    Hypothyroidism, unspecified type    Hyperlipidemia, unspecified hyperlipidemia type    Other depression    Other migraine without status migrainosus, not intractable    Environmental allergies    Other orders  -     allopurinol (ZYLOPRIM) 300 MG tablet; Take 1 tablet by mouth daily  -     bumetanide (BUMEX) 1 MG tablet; Take 1 tablet by mouth 2 times daily Take 2nd dose around 2 PM        Return in about 4 months (around 6/24/2023), or medicare wellness.      Orders Placed This Encounter   Procedures    Comprehensive Metabolic Panel     Fasting 12 hours     Standing Status:   Future     Standing Expiration Date:   2/24/2024    CBC with Auto Differential     Fast 12 hours     Standing Status:   Future     Standing Expiration Date:   2/24/2024    Hemoglobin A1C     Fast 12 hours     Standing Status:   Future     Standing Expiration Date:   2/24/2024    Lipid Panel     Standing Status:   Future     Standing Expiration Date:   2/24/2024     Order Specific Question:   Is Patient Fasting?/# of Hours     Answer:   12    TSH     Fast 12 hours     Standing Status:   Future     Standing Expiration Date:   2/24/2024    Microalbumin / Creatinine Urine Ratio     Standing Status:   Future     Standing Expiration Date:   2/24/2024    Vitamin D 25 Hydroxy     Standing Status:   Future     Standing Expiration Date:   2/24/2024       Rey Santos MD

## 2023-03-04 ASSESSMENT — ENCOUNTER SYMPTOMS
CHEST TIGHTNESS: 0
WHEEZING: 0
SHORTNESS OF BREATH: 1
SINUS PRESSURE: 0
EYE PAIN: 0
VOICE CHANGE: 0
NAUSEA: 0
COUGH: 0
SORE THROAT: 0
ABDOMINAL PAIN: 0
BACK PAIN: 1
CONSTIPATION: 0
PHOTOPHOBIA: 0
EYE DISCHARGE: 0
EYE ITCHING: 0
COLOR CHANGE: 0
DIARRHEA: 0
TROUBLE SWALLOWING: 0
BLOOD IN STOOL: 0
ABDOMINAL DISTENTION: 0
EYE REDNESS: 0

## 2023-03-20 NOTE — PROGRESS NOTES
YOB: 1955  Location: [facility]  Location Address: [address] [place] [address], [facility], [address] [place] [address]  Location Phone: [phone]    Chief Complaint   Patient presents with   • Thyroid Problem       History of Present Illness  Pau Cabrera is a 67 y.o. female.  Pau Cabrera is here for follow up of ENT complaints. The patient is s/p left thyroidectomy with isthmusectomy 2022. Pathology revealed multinodular hyperplasia   She also has a history of right sided thyroid nodules     She has had a relatively normal postoperatively course.   She reports mild improvement in fatigue. She denies hoarseness, globus sensation or difficulty swallowing.   She is currently taking synthroid 50 mcg  TSH (2023 17:37 EST)    US Thyroid (2023 09:52)  Tissue Pathology Exam (2022 14:46)       Past Medical History:   Diagnosis Date   • Anemia     iron def   • Anxiety    • Asthma    • Back pain    • CHF (congestive heart failure) (HCC)    • Chronic bronchitis (HCC)    • Coronary artery disease    • COVID-19 vaccine series completed     pfizer   • COVID-19 vaccine series completed    • CTS (carpal tunnel syndrome)    • Depression    • Fibromyalgia    • GERD (gastroesophageal reflux disease)    • Gout    • Hyperlipemia    • Hyperlipidemia    • Hypertension    • Obesity    • Obstructive sleep apnea     could not tolerate machine    • Peripheral neuropathy    • Primary central sleep apnea    • Pulmonary arterial hypertension (HCC)    • Rheumatoid arthritis (HCC)    • Sinusitis        Past Surgical History:   Procedure Laterality Date   • BREAST BIOPSY Left    •  SECTION      X 2   • COLONOSCOPY  10/22/2015    Tics repeat exam in 5 years   • COLONOSCOPY  2007    Small hemorrhoids repeat exam in 3 years   • COLONOSCOPY N/A 2020    Procedure: COLONOSCOPY WITH ANESTHESIA;  Surgeon: Denny Francis MD;  Location: [facility] ENDOSCOPY;  Service: Gastroenterology;   Laterality: N/A;  pre: family hx colon ca  post: diverticulosis  Carola Barbosa MD   • HYSTERECTOMY  2000    total   • THYROIDECTOMY Left 7/13/2022    Procedure: Left thyroidectomy with isthmusectomy;  Surgeon: Milad So MD;  Location: [facility] OR;  Service: ENT;  Laterality: Left;   • UMBILICAL HERNIA REPAIR      had 1/2 of it repaired with hysterectomy        Outpatient Medications Marked as Taking for the 3/21/23 encounter (Office Visit) with Milad So MD   Medication Sig Dispense Refill   • albuterol sulfate  (90 Base) MCG/ACT inhaler Inhale 2 puffs Every 4 (Four) Hours As Needed for Wheezing. 6.7 g 5   • allopurinol (ZYLOPRIM) 300 MG tablet      • amitriptyline (ELAVIL) 25 MG tablet Take 1 tablet by mouth Every Night.     • azelastine (ASTELIN) 0.1 % nasal spray 2 sprays into the nostril(s) as directed by provider 2 (Two) Times a Day. Use in each nostril as directed 60 mL 11   • bumetanide (BUMEX) 1 MG tablet Take 1 tablet by mouth Daily.     • butalbital-acetaminophen-caffeine (FIORICET, ESGIC) -40 MG per tablet Take 1 tablet by mouth Every 4 (Four) Hours As Needed for Headache.     • carvedilol (COREG) 25 MG tablet TAKE 1 TABLET BY MOUTH TWICE A DAY 60 tablet 3   • celecoxib (CeleBREX) 100 MG capsule Take 1 capsule by mouth.     • DULoxetine (CYMBALTA) 60 MG capsule Take 1 capsule by mouth Every Morning.     • fenofibrate (TRICOR) 145 MG tablet Take 1 tablet by mouth Daily.     • fluticasone (Flonase Allergy Relief) 50 MCG/ACT nasal spray 2 sprays into the nostril(s) as directed by provider Daily. 16 g 11   • Fluticasone-Salmeterol (Wixela Inhub) 250-50 MCG/ACT DISKUS Inhale 1 puff 2 (Two) Times a Day. 1 each 11   • furosemide (LASIX) 40 MG tablet Take 1 tablet by mouth Daily. 30 tablet 0   • loratadine (CLARITIN) 10 MG tablet Take 1 tablet by mouth Daily. 90 tablet 3   • meclizine (ANTIVERT) 25 MG tablet Take 1 tablet by mouth 3 (Three) Times a Day As Needed for  Dizziness.     • Multiple Vitamins-Minerals (MULTIVITAMIN ADULT PO) Take  by mouth.     • Myrbetriq 25 MG tablet sustained-release 24 hour 24 hr tablet Take 1 tablet by mouth Daily.     • OXYGEN-HELIUM IN Inhale. 2L at night     • spironolactone (ALDACTONE) 25 MG tablet Take 1 tablet by mouth.     • Synthroid 50 MCG tablet Take 1 tablet by mouth Daily for 30 days. 30 tablet 3       Codeine sulfate and Lisinopril    Family History   Problem Relation Age of Onset   • Arthritis Mother    • Heart disease Mother    • Hypertension Mother    • Thyroid disease Mother    • Arthritis Father    • Hypertension Father    • Anuerysm Father    • Stroke Father    • Asthma Brother    • Colon polyps Neg Hx    • Colon cancer Neg Hx        Social History     Socioeconomic History   • Marital status:    Tobacco Use   • Smoking status: Never   • Smokeless tobacco: Never   Vaping Use   • Vaping Use: Never used   Substance and Sexual Activity   • Alcohol use: No   • Drug use: No   • Sexual activity: Not Currently     Partners: Male     Birth control/protection: Surgical       Review of Systems   Constitutional: Positive for fatigue.   HENT: Negative for sore throat, trouble swallowing and voice change.    Endocrine:        Admits dry skin          Vitals:    03/21/23 1011   BP: 151/88   Pulse: 91   Resp: 18   Temp: 98.4 °F (36.9 °C)       Body mass index is 65 kg/m².    Objective     Physical Exam  Vitals reviewed.   Constitutional:       Appearance: Normal appearance. She is obese.   HENT:      Head: Normocephalic.      Right Ear: Tympanic membrane, ear canal and external ear normal.      Left Ear: Tympanic membrane, ear canal and external ear normal.      Nose: Nose normal.      Mouth/Throat:      Lips: Pink.      Mouth: Mucous membranes are moist.      Pharynx: Uvula midline.      Comments: Balderas I    Neck:     Musculoskeletal:      Cervical back: Full passive range of motion without pain.   Neurological:      Mental Status:  She is alert.       Dr. So has examined and assessed the patient and agrees with current treatment plan      Assessment & Plan   Diagnoses and all orders for this visit:    1. S/P partial thyroidectomy (Primary)  -     TSH; Future  -     T4; Future  -     T3; Future  -     US Thyroid; Future    2. Thyroid mass of unclear etiology  -     TSH; Future  -     T4; Future  -     T3; Future  -     US Thyroid; Future    3. CHARLIE (obstructive sleep apnea) - non compliant with cpap    4. Morbid obesity due to excess calories (HCC)    5. Thyroid nodule  -     US Thyroid; Future      * Surgery not found *  Orders Placed This Encounter   Procedures   • US Thyroid     Standing Status:   Future     Standing Expiration Date:   3/21/2024     Order Specific Question:   Reason for Exam:     Answer:   Thyroid disease   • TSH     Standing Status:   Future     Standing Expiration Date:   3/21/2024   • T4     Standing Status:   Future     Standing Expiration Date:   3/21/2024   • T3     Standing Status:   Future     Standing Expiration Date:   3/21/2024     Order Specific Question:   Release to patient     Answer:   Routine Release     Return in about 6 months (around 9/21/2023).     Repeat tsh prior to f/u   Ultrasound at f/u visit   Call for new/worsening problems (sore throat, dysphagia, globus sensation)     Patient Instructions   The patient has a thyroid nodule, which is relatively small, and studies do not suggest a malignancy. I have recommended observation with follow-up with me for repeat ultrasound. I explained the pathology of thyroid nodules including the risks of cancer. The options of surgery were discussed, but the patient wants to pursue an observational course for now, which is reasonable. The patient wishes to continue to defer surgery at this time.

## 2023-03-21 ENCOUNTER — OFFICE VISIT (OUTPATIENT)
Dept: OTOLARYNGOLOGY | Facility: CLINIC | Age: 68
End: 2023-03-21
Payer: MEDICARE

## 2023-03-21 VITALS
WEIGHT: 293 LBS | DIASTOLIC BLOOD PRESSURE: 88 MMHG | SYSTOLIC BLOOD PRESSURE: 151 MMHG | HEIGHT: 61 IN | RESPIRATION RATE: 18 BRPM | HEART RATE: 91 BPM | TEMPERATURE: 98.4 F | BODY MASS INDEX: 55.32 KG/M2

## 2023-03-21 DIAGNOSIS — E66.01 MORBID OBESITY DUE TO EXCESS CALORIES: ICD-10-CM

## 2023-03-21 DIAGNOSIS — E89.0 S/P PARTIAL THYROIDECTOMY: Primary | ICD-10-CM

## 2023-03-21 DIAGNOSIS — G47.33 OSA (OBSTRUCTIVE SLEEP APNEA): ICD-10-CM

## 2023-03-21 DIAGNOSIS — E04.1 THYROID NODULE: ICD-10-CM

## 2023-03-21 DIAGNOSIS — E07.89 THYROID MASS OF UNCLEAR ETIOLOGY: ICD-10-CM

## 2023-03-21 PROCEDURE — 99213 OFFICE O/P EST LOW 20 MIN: CPT | Performed by: NURSE PRACTITIONER

## 2023-03-21 PROCEDURE — 1160F RVW MEDS BY RX/DR IN RCRD: CPT | Performed by: NURSE PRACTITIONER

## 2023-03-21 PROCEDURE — 3077F SYST BP >= 140 MM HG: CPT | Performed by: NURSE PRACTITIONER

## 2023-03-21 PROCEDURE — 1159F MED LIST DOCD IN RCRD: CPT | Performed by: NURSE PRACTITIONER

## 2023-03-21 PROCEDURE — 3079F DIAST BP 80-89 MM HG: CPT | Performed by: NURSE PRACTITIONER

## 2023-03-27 RX ORDER — LEVOTHYROXINE SODIUM 50 MCG
50 TABLET ORAL DAILY
Qty: 30 TABLET | Refills: 3 | Status: SHIPPED | OUTPATIENT
Start: 2023-03-27 | End: 2023-04-26

## 2023-04-18 DIAGNOSIS — M19.90 OSTEOARTHRITIS, UNSPECIFIED OSTEOARTHRITIS TYPE, UNSPECIFIED SITE: ICD-10-CM

## 2023-04-18 RX ORDER — CELECOXIB 100 MG/1
CAPSULE ORAL
Qty: 180 CAPSULE | Refills: 0 | Status: SHIPPED | OUTPATIENT
Start: 2023-04-18

## 2023-04-18 RX ORDER — CARVEDILOL 25 MG/1
25 TABLET ORAL 2 TIMES DAILY
Qty: 180 TABLET | Refills: 0 | Status: SHIPPED | OUTPATIENT
Start: 2023-04-18

## 2023-04-18 NOTE — TELEPHONE ENCOUNTER
Jeanine Yost called requesting a refill of the below medication which has been pended for you:     Requested Prescriptions     Pending Prescriptions Disp Refills    carvedilol (COREG) 25 MG tablet [Pharmacy Med Name: CARVEDILOL 25 MG TABS 25 Tablet] 180 tablet 0     Sig: TAKE 1 TABLET BY MOUTH 2 TIMES DAILY    celecoxib (CELEBREX) 100 MG capsule [Pharmacy Med Name: CELECOXIB 100 MG CAPS 100 Capsule] 180 capsule 0     Sig: TAKE ONE CAPSULE BY MOUTH TWO TIMES DAILY       Last Appointment Date: 2/24/2023  Next Appointment Date: 6/19/2023    Allergies   Allergen Reactions    Codeine Hives    Lisinopril Hives

## 2023-06-05 ENCOUNTER — OFFICE VISIT (OUTPATIENT)
Dept: PULMONOLOGY | Facility: CLINIC | Age: 68
End: 2023-06-05
Payer: MEDICARE

## 2023-06-05 ENCOUNTER — HOSPITAL ENCOUNTER (OUTPATIENT)
Dept: CT IMAGING | Facility: HOSPITAL | Age: 68
Discharge: HOME | End: 2023-06-05
Admitting: INTERNAL MEDICINE
Payer: MEDICARE

## 2023-06-05 VITALS
SYSTOLIC BLOOD PRESSURE: 146 MMHG | OXYGEN SATURATION: 96 % | HEART RATE: 86 BPM | DIASTOLIC BLOOD PRESSURE: 82 MMHG | HEIGHT: 61 IN | WEIGHT: 293 LBS | BODY MASS INDEX: 55.32 KG/M2

## 2023-06-05 DIAGNOSIS — J98.4 PULMONARY SCARRING: ICD-10-CM

## 2023-06-05 DIAGNOSIS — R91.1 LUNG NODULE: ICD-10-CM

## 2023-06-05 DIAGNOSIS — G47.33 OSA (OBSTRUCTIVE SLEEP APNEA): ICD-10-CM

## 2023-06-05 DIAGNOSIS — R06.02 SOB (SHORTNESS OF BREATH): ICD-10-CM

## 2023-06-05 DIAGNOSIS — Z78.9 NON-SMOKER: ICD-10-CM

## 2023-06-05 DIAGNOSIS — Z86.16 HISTORY OF COVID-19: ICD-10-CM

## 2023-06-05 DIAGNOSIS — Z99.81 HYPOXEMIA REQUIRING SUPPLEMENTAL OXYGEN: ICD-10-CM

## 2023-06-05 DIAGNOSIS — R09.02 HYPOXEMIA REQUIRING SUPPLEMENTAL OXYGEN: ICD-10-CM

## 2023-06-05 DIAGNOSIS — J45.20 MILD INTERMITTENT ASTHMA WITHOUT COMPLICATION: Primary | ICD-10-CM

## 2023-06-05 DIAGNOSIS — I27.20 PULMONARY HYPERTENSION: ICD-10-CM

## 2023-06-05 DIAGNOSIS — J44.9 CHRONIC OBSTRUCTIVE PULMONARY DISEASE, UNSPECIFIED COPD TYPE: ICD-10-CM

## 2023-06-05 PROBLEM — M79.7 FIBROMYALGIA: Status: ACTIVE | Noted: 2017-07-08

## 2023-06-05 PROBLEM — N32.81 OAB (OVERACTIVE BLADDER): Status: ACTIVE | Noted: 2021-10-27

## 2023-06-05 PROBLEM — E04.9 GOITER: Status: ACTIVE | Noted: 2022-05-18

## 2023-06-05 PROBLEM — G89.29 CHRONIC MIDLINE LOW BACK PAIN WITHOUT SCIATICA: Status: ACTIVE | Noted: 2017-07-08

## 2023-06-05 PROBLEM — I50.9 CHRONIC CONGESTIVE HEART FAILURE: Status: ACTIVE | Noted: 2017-07-08

## 2023-06-05 PROBLEM — E66.01 MORBID OBESITY DUE TO EXCESS CALORIES: Status: ACTIVE | Noted: 2017-07-08

## 2023-06-05 PROBLEM — E78.2 MIXED HYPERLIPIDEMIA: Status: ACTIVE | Noted: 2017-07-08

## 2023-06-05 PROBLEM — M54.50 CHRONIC MIDLINE LOW BACK PAIN WITHOUT SCIATICA: Status: ACTIVE | Noted: 2017-07-08

## 2023-06-05 PROBLEM — R51.9 BENIGN HEADACHE: Status: ACTIVE | Noted: 2018-11-28

## 2023-06-05 PROBLEM — R21 RASH: Status: ACTIVE | Noted: 2022-05-18

## 2023-06-05 PROBLEM — S81.802A OPEN WOUND OF LEFT LOWER LEG: Status: ACTIVE | Noted: 2022-05-18

## 2023-06-05 PROBLEM — M10.9 GOUT OF MULTIPLE SITES: Status: ACTIVE | Noted: 2017-07-08

## 2023-06-05 PROBLEM — F32.A DEPRESSION: Status: ACTIVE | Noted: 2017-07-08

## 2023-06-05 PROCEDURE — 3077F SYST BP >= 140 MM HG: CPT | Performed by: INTERNAL MEDICINE

## 2023-06-05 PROCEDURE — 99214 OFFICE O/P EST MOD 30 MIN: CPT | Performed by: INTERNAL MEDICINE

## 2023-06-05 PROCEDURE — 71250 CT THORAX DX C-: CPT

## 2023-06-05 PROCEDURE — 3079F DIAST BP 80-89 MM HG: CPT | Performed by: INTERNAL MEDICINE

## 2023-06-05 RX ORDER — METHYLPREDNISOLONE 4 MG/1
TABLET ORAL
Qty: 1 EACH | Refills: 0 | Status: SHIPPED | OUTPATIENT
Start: 2023-06-05

## 2023-06-05 RX ORDER — FLUTICASONE PROPIONATE AND SALMETEROL 250; 50 UG/1; UG/1
1 POWDER RESPIRATORY (INHALATION) 2 TIMES DAILY
Qty: 1 EACH | Refills: 11 | Status: SHIPPED | OUTPATIENT
Start: 2023-06-05

## 2023-06-05 RX ORDER — ALBUTEROL SULFATE 90 UG/1
2 AEROSOL, METERED RESPIRATORY (INHALATION) EVERY 4 HOURS PRN
Qty: 6.7 G | Refills: 5 | Status: SHIPPED | OUTPATIENT
Start: 2023-06-05

## 2023-06-05 RX ORDER — LORATADINE 10 MG/1
10 TABLET ORAL DAILY
Qty: 90 TABLET | Refills: 3 | Status: SHIPPED | OUTPATIENT
Start: 2023-06-05

## 2023-06-05 RX ORDER — FLUTICASONE PROPIONATE 50 MCG
2 SPRAY, SUSPENSION (ML) NASAL DAILY
Qty: 16 G | Refills: 11 | Status: SHIPPED | OUTPATIENT
Start: 2023-06-05

## 2023-06-05 RX ORDER — AZELASTINE 1 MG/ML
2 SPRAY, METERED NASAL 2 TIMES DAILY
Qty: 60 ML | Refills: 11 | Status: SHIPPED | OUTPATIENT
Start: 2023-06-05

## 2023-06-05 NOTE — PROGRESS NOTES
[facility] OUTPATIENT PROGRESS NOTE    Patient: Pau Cabrera  : 1955  Age: 67 y.o.  Date of Service: 2023    REASON FOR CLINIC VISIT:  Chief Complaint   Patient presents with    CHARLIE (obstructive sleep apnea) - non compliant with cpa       Subjective:    History of Present Illness:  Pau Cabrera is a 67 y.o. female who presents to the office today to be seen for    Diagnosis Plan   1. Mild intermittent asthma without complication        2. Pulmonary hypertension        3. Pulmonary scarring        4. SOB (shortness of breath)        5. Chronic obstructive pulmonary disease, unspecified COPD type        6. CHARLIE (obstructive sleep apnea) - non compliant with cpap        7. Hypoxemia requiring supplemental oxygen        8. Non-smoker        9. History of COVID-19        10. Lung nodule        .  Other problems per record.    History:    Patient is a morbidly obese very pleasant middle aged -American female who was seen in the pulmonary clinic for a follow-up visit.  She was accompanied by her sister for the clinic visit today.    Patient is a non-smoker but has chronic obstructive pulmonary disease/asthma and her last pulmonary function test showed severe chronic obstructive pulmonary disease and significant improvement with the bronchodilator challenge suggesting underlying asthma.  Patient has hyperinflation also noted but the last pulmonary function test done 2 years ago.  Patient reported she is using Wixela and albuterol rescue inhaler and also uses fluticasone nasal spray Astelin nasal spray and loratadine for nasal allergy.  She still has occasional shortness of breath and chest tightness and wheezing.  She has obstructive sleep apnea but is unable to tolerate the CPAP and currently using 2 to 3 L oxygen all the time.    She is vaccinated for COVID did not have any COVID infection she received vaccination for pneumonia and influenza.  She lives at home  independently and her sister who works in the [employer] as a  comes and helps her from time to time.  She had no recent hospitalizations and ER visit any urgent care visit or any other acute complaints.  And is requesting refill of her medications.  He had a CT scan of the chest done today before the clinic visit the results of which is reviewed patient is stable lung nodule and some chronic changes which are unremarkable.    PFT done today:  Not done today      Results for orders placed during the hospital encounter of 06/16/21    Full Pulmonary Function Test With Bronchodilator & ABG    Narrative  [facility] - Pulmonary Function Test    [address]  [place]  [place]  [address]  [phone]    Patient : Pua Cabrera  MRN : [id number]  CSN : [id number]  Pulmonologist : Saman Kwon MD  Date : 6/16/2021    ______________________________________________________________________    Interpretation :  1.  Spirometry is consistent with a severe obstructive ventilatory defect although the decrease in the patient's FEV1 is just into the severe range at 49% of predicted..  2.  There is significant improvement in the patient's spirometry postbronchodilator so that postbronchodilator spirometry is consistent with a moderate obstructive ventilatory defect.  3.  Lung volumes reveal a decrease in vital capacity second to obstruction.  In addition significant hyperinflation is present.  There is also a decrease in inspiratory capacity.  4.  There is a moderate diffusion impairment which when corrected for alveolar volume is normalized.      Saman Kwon MD         Bronchodilator therapy: Wixela inhaler and Albuterol rescue inhaler.    Smoking Status:   Social History     Tobacco Use   Smoking Status Never   Smokeless Tobacco Never     Pulm Rehab: no  Sleep: yes on 2 LPM O2 at night and all the time     Support System: lives with their family    Code Status:   There are no questions and  answers to display.        Review of Systems:  A complete review of systems is performed and all other systems were reviewed and negative as note above in the HPI.  Review of Systems   Constitutional:  Positive for fatigue.   HENT:  Positive for congestion, postnasal drip and sinus pressure.    Eyes: Negative.    Respiratory:  Positive for cough, chest tightness and shortness of breath.    Cardiovascular: Negative.    Gastrointestinal: Negative.    Endocrine: Negative.    Genitourinary: Negative.    Musculoskeletal:  Positive for arthralgias and back pain.   Skin: Negative.    Allergic/Immunologic: Positive for environmental allergies.   Neurological: Negative.    Hematological: Negative.    Psychiatric/Behavioral: Negative.       CAT/ACT Score:  Not done today    Medications:  Outpatient Encounter Medications as of 6/5/2023   Medication Sig Dispense Refill    albuterol sulfate  (90 Base) MCG/ACT inhaler Inhale 2 puffs Every 4 (Four) Hours As Needed for Wheezing. 6.7 g 5    allopurinol (ZYLOPRIM) 300 MG tablet       amitriptyline (ELAVIL) 25 MG tablet Take 1 tablet by mouth Every Night.      azelastine (ASTELIN) 0.1 % nasal spray 2 sprays into the nostril(s) as directed by provider 2 (Two) Times a Day. Use in each nostril as directed 60 mL 11    bumetanide (BUMEX) 1 MG tablet Take 1 tablet by mouth Daily.      butalbital-acetaminophen-caffeine (FIORICET, ESGIC) -40 MG per tablet Take 1 tablet by mouth Every 4 (Four) Hours As Needed for Headache.      carvedilol (COREG) 25 MG tablet TAKE 1 TABLET BY MOUTH TWICE A DAY 60 tablet 3    celecoxib (CeleBREX) 100 MG capsule Take 1 capsule by mouth.      DULoxetine (CYMBALTA) 60 MG capsule Take 1 capsule by mouth Every Morning.      EPINEPHrine (ADRENALIN) 0.05 MG/ML syringe Inject 20 mL as directed.      fenofibrate (TRICOR) 145 MG tablet Take 1 tablet by mouth Daily.      fluticasone (Flonase Allergy Relief) 50 MCG/ACT nasal spray 2 sprays into the nostril(s)  as directed by provider Daily. 16 g 11    Fluticasone-Salmeterol (Wixela Inhub) 250-50 MCG/ACT DISKUS Inhale 1 puff 2 (Two) Times a Day. 1 each 11    furosemide (LASIX) 40 MG tablet Take 1 tablet by mouth Daily. 30 tablet 0    loratadine (CLARITIN) 10 MG tablet Take 1 tablet by mouth Daily. 90 tablet 3    meclizine (ANTIVERT) 25 MG tablet Take 1 tablet by mouth 3 (Three) Times a Day As Needed for Dizziness.      Multiple Vitamins-Minerals (MULTIVITAMIN ADULT PO) Take  by mouth.      Myrbetriq 25 MG tablet sustained-release 24 hour 24 hr tablet Take 1 tablet by mouth Daily.      OXYGEN-HELIUM IN Inhale. 2L at night      spironolactone (ALDACTONE) 25 MG tablet Take 1 tablet by mouth.      Synthroid 50 MCG tablet TAKE 1 TABLET BY MOUTH DAILY FOR 30 DAYS. 30 tablet 3    [DISCONTINUED] albuterol sulfate  (90 Base) MCG/ACT inhaler Inhale 2 puffs Every 4 (Four) Hours As Needed for Wheezing. 6.7 g 5    [DISCONTINUED] azelastine (ASTELIN) 0.1 % nasal spray 2 sprays into the nostril(s) as directed by provider 2 (Two) Times a Day. Use in each nostril as directed 60 mL 11    [DISCONTINUED] fluticasone (Flonase Allergy Relief) 50 MCG/ACT nasal spray 2 sprays into the nostril(s) as directed by provider Daily. 16 g 11    [DISCONTINUED] Fluticasone-Salmeterol (Wixela Inhub) 250-50 MCG/ACT DISKUS Inhale 1 puff 2 (Two) Times a Day. 1 each 11    [DISCONTINUED] loratadine (CLARITIN) 10 MG tablet Take 1 tablet by mouth Daily. 90 tablet 3     No facility-administered encounter medications on file as of 6/5/2023.       Allergies:  Allergies   Allergen Reactions    Codeine Sulfate Hives    Lisinopril Swelling and Rash     Facial swelling       Immunizations:  Immunization History   Administered Date(s) Administered    COVID-19 (PFIZER) Purple Cap Monovalent 03/09/2021, 03/30/2021, 12/29/2021    Covid-19 (Pfizer) Gray Cap Monovalent 08/08/2022    Pneumococcal Conjugate 13-Valent (PCV13) 11/02/2018    Pneumococcal Conjugate 20-Valent  "(PCV20) 12/16/2022    Pneumococcal Polysaccharide (PPSV23) 11/30/2020    Tdap 10/11/2011       Objective:    Vitals:  /82   Pulse 86   Ht 154.9 cm (61\")   Wt (!) 156 kg (344 lb)   LMP  (LMP Unknown)   SpO2 96%   BMI 65.00 kg/m²     Physical Exam:  General: Patient is a 67 y.o. morbidly obese middle aged  female. Looks stated age. Appears to be in no acute distress.  Eyes: EOMI. PERRLA. Vision intact. No scleral icterus.  Ear, Nose, Mouth and Throat: Hearing is grossly intact. No Leukoplakia, pharyngitis, stomatitis or thrush. Swollen nasal mucosa with post nasal drop.  Neck: Range of motion of neck normal. No thyromegaly or masses. Mallampati Class 3  Respiratory: Clear to auscultation bilaterally. No use of accessory muscles. Decreased breath sounds.  Cardiovascular: Normal heart sounds. Regularly regular rhythm without murmur.  Gastrointestinal: Non tender, non distended, soft. Bowel sounds positive in all four quadrants. No organomegaly.  Skin: No obvious rashes, lesions, ulcers or large amount of bruising. Bilateral ankle edema.   Neurological: No new motor deficits. Cranial nerves appear intact.  Psychiatric: Patient is alert and oriented to person, place and time.    Chest Imaging:    Study Result    Narrative & Impression   CT CHEST WO CONTRAST DIAGNOSTIC- 6/5/2023 8:11 AM CDT     HISTORY: Lung nodule, 6-8mm; R91.1-Solitary pulmonary nodule 6 mm left  upper lobe pulmonary nodule     COMPARISON: 05/12/2022, 06/04/2021, 01/14/2011     DOSE LENGTH PRODUCT: 709 mGy cm. Automated exposure control was also  utilized to decrease patient radiation dose.     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 Right thyroid lobe is  unremarkable in appearance.     No pathologic intrathoracic or axillary lymphadenopathy. Mild vascular  calcification with moderate LAD coronary calcification. Mild  cardiomegaly. No " pericardial or pleural effusion.     Images of the upper abdomen demonstrate no suspicious adrenal nodules.  5.4 cm superior left renal cyst.     5 mm subpleural left upper lobe pulmonary nodule on image 32 is stable  compared back to the 01/14/2011 exam consistent with a benign process  requiring no routine follow-up. There is stable bibasilar linear right  upper lobe scarring. No new or suspicious pulmonary nodularity. No lobar  consolidation. No pneumothorax. No endobronchial lesion.     Degenerative changes of the shoulders as well as thoracic spine. No  aggressive regional bony lesions.     IMPRESSION:  1. Stable 5 mm subpleural left upper lobe pulmonary nodule unchanged  from 2011 supporting a benign process. No routine follow-up required.  Right upper and bilateral lower lobe scarring. No consolidation or  suspicious nodularity.  2. Mild cardiomegaly. Vascular calcification. Moderate within the LAD  coronary artery.  3. Left thyroidectomy changes.      This report was finalized on 06/05/2023 08:45 by Dr. Rosa Johnson MD.     Assessment:  1. Mild intermittent asthma without complication    2. Pulmonary hypertension    3. Pulmonary scarring    4. SOB (shortness of breath)    5. Chronic obstructive pulmonary disease, unspecified COPD type    6. CHARLIE (obstructive sleep apnea) - non compliant with cpap    7. Hypoxemia requiring supplemental oxygen    8. Non-smoker    9. History of COVID-19    10. Lung nodule        Plan/Recommendations:    1.  Patient has super morbid obesity with a BMI of 65 and she is unable to tolerate CPAP although she has obstructive sleep apnea and obesity ventilation syndrome and she also has pulmonary hypertension most likely due to sleep related problem.  She is currently using oxygen 2 L at night and all the time and she should continue oxygen for now.  I explained her recent CT scan results to the patient.  2.  She has severe chronic obstructive pulmonary disease and asthma noted in  the last pulmonary function test although she was a non-smoker she has severe lung disease noted in the last PFT and no PFT has been done for last 2 years and I ordered a PFT before the next clinic visit in 6 months time.  She will continue using Wixela and albuterol rescue inhaler as before.  Prescription refill was sent to the pharmacy.  3.  She has allergic rhinitis and will continue Astelin nasal spray, fluticasone nasal spray and loratadine.  All prescription refills were sent to the pharmacy.  She is vaccinated for COVID and will need to continue taking booster dose of COVID-vaccine soon and will influenza and pneumonia vaccines and shingles vaccine.  4.  Weight loss and healthy lifestyle is recommended.  Patient has cor pulmonale due to excessive body weight.  She will continue follow-up with the primary care provider and return to pulmonary clinic for a follow-up visit in 6 months time with a PFT prior to the clinic visit.  No further CT scan is needed at this time.    Follow up:  6 Months    Time Spent:  30 minutes    I appreciate the opportunity of participating in this patient's care. I would like to thank the PCP for the referral.  Please feel free to contact me with any other questions.    Peter Crews MD   Pulmonologist/Intensivist     Electronically signed by: Peter Crews MD, 6/5/2023 16:15 CDT

## 2023-07-05 RX ORDER — DULOXETIN HYDROCHLORIDE 60 MG/1
CAPSULE, DELAYED RELEASE ORAL
Qty: 90 CAPSULE | Refills: 3 | Status: SHIPPED | OUTPATIENT
Start: 2023-07-05

## 2023-07-05 NOTE — TELEPHONE ENCOUNTER
Yordanphoenix Vasuphoenix called to request a refill on her medication.       Last office visit : 2/24/2023   Next office visit : Visit date not found     Requested Prescriptions     Pending Prescriptions Disp Refills    DULoxetine (CYMBALTA) 60 MG extended release capsule [Pharmacy Med Name: DULOXETINE HCL 60 MG CAP 60 Capsule] 90 capsule 3     Sig: TAKE ONE CAPSULE BY MOUTH ONCE DAILY IN THE MORNING            Beryl Chapin LPN

## 2023-07-24 DIAGNOSIS — M19.90 OSTEOARTHRITIS, UNSPECIFIED OSTEOARTHRITIS TYPE, UNSPECIFIED SITE: ICD-10-CM

## 2023-07-24 RX ORDER — CARVEDILOL 25 MG/1
25 TABLET ORAL 2 TIMES DAILY
Qty: 180 TABLET | Refills: 0 | Status: SHIPPED | OUTPATIENT
Start: 2023-07-24

## 2023-07-24 RX ORDER — CELECOXIB 100 MG/1
CAPSULE ORAL
Qty: 180 CAPSULE | Refills: 0 | Status: SHIPPED | OUTPATIENT
Start: 2023-07-24

## 2023-07-24 NOTE — TELEPHONE ENCOUNTER
Antonio Medina called requesting a refill of the below medication which has been pended for you:     Requested Prescriptions     Pending Prescriptions Disp Refills    carvedilol (COREG) 25 MG tablet [Pharmacy Med Name: CARVEDILOL 25 MG TABS 25 Tablet] 180 tablet 0     Sig: TAKE 1 TABLET BY MOUTH 2 TIMES DAILY    celecoxib (CELEBREX) 100 MG capsule [Pharmacy Med Name: CELECOXIB 100 MG  Capsule] 180 capsule 0     Sig: TAKE ONE CAPSULE BY MOUTH TWO TIMES DAILY       Last Appointment Date: 2/24/2023  Next Appointment Date: Visit date not found    Allergies   Allergen Reactions    Codeine Hives    Lisinopril Hives

## 2023-07-27 RX ORDER — SOLIFENACIN SUCCINATE 10 MG/1
10 TABLET, FILM COATED ORAL DAILY
Qty: 30 TABLET | Refills: 3 | Status: SHIPPED | OUTPATIENT
Start: 2023-07-27 | End: 2024-07-26

## 2023-07-27 NOTE — PROGRESS NOTES
Received request from Richi Eddy to change Myrbetriq to a cheaper alternative. Patient agreed to change. Per Dr. Larissa Graff 10mg daily sent to [address] [place] [address].

## 2023-08-15 DIAGNOSIS — E11.21 TYPE II DIABETES MELLITUS WITH NEPHROPATHY (HCC): ICD-10-CM

## 2023-08-15 LAB
25(OH)D3 SERPL-MCNC: 33.9 NG/ML
ALBUMIN SERPL-MCNC: 4.5 G/DL (ref 3.5–5.2)
ALP SERPL-CCNC: 57 U/L (ref 35–104)
ALT SERPL-CCNC: 12 U/L (ref 5–33)
ANION GAP SERPL CALCULATED.3IONS-SCNC: 13 MMOL/L (ref 7–19)
AST SERPL-CCNC: 17 U/L (ref 5–32)
BASOPHILS # BLD: 0.1 K/UL (ref 0–0.2)
BASOPHILS NFR BLD: 1 % (ref 0–1)
BILIRUB SERPL-MCNC: 0.3 MG/DL (ref 0.2–1.2)
BUN SERPL-MCNC: 16 MG/DL (ref 8–23)
CALCIUM SERPL-MCNC: 10.2 MG/DL (ref 8.8–10.2)
CHLORIDE SERPL-SCNC: 102 MMOL/L (ref 98–111)
CHOLEST SERPL-MCNC: 161 MG/DL (ref 160–199)
CO2 SERPL-SCNC: 28 MMOL/L (ref 22–29)
CREAT SERPL-MCNC: 1.3 MG/DL (ref 0.5–0.9)
CREAT UR-MCNC: 165.7 MG/DL (ref 28–217)
EOSINOPHIL # BLD: 0.3 K/UL (ref 0–0.6)
EOSINOPHIL NFR BLD: 5.2 % (ref 0–5)
ERYTHROCYTE [DISTWIDTH] IN BLOOD BY AUTOMATED COUNT: 17.6 % (ref 11.5–14.5)
GLUCOSE SERPL-MCNC: 92 MG/DL (ref 74–109)
HBA1C MFR BLD: 5.4 % (ref 4–6)
HCT VFR BLD AUTO: 36 % (ref 37–47)
HDLC SERPL-MCNC: 51 MG/DL (ref 65–121)
HGB BLD-MCNC: 10.8 G/DL (ref 12–16)
IMM GRANULOCYTES # BLD: 0 K/UL
LDLC SERPL CALC-MCNC: 101 MG/DL
LYMPHOCYTES # BLD: 1.6 K/UL (ref 1.1–4.5)
LYMPHOCYTES NFR BLD: 27.5 % (ref 20–40)
MCH RBC QN AUTO: 29.2 PG (ref 27–31)
MCHC RBC AUTO-ENTMCNC: 30 G/DL (ref 33–37)
MCV RBC AUTO: 97.3 FL (ref 81–99)
MICROALBUMIN UR-MCNC: 13 MG/DL (ref 0–19)
MICROALBUMIN/CREAT UR-RTO: 78.5 MG/G
MONOCYTES # BLD: 0.3 K/UL (ref 0–0.9)
MONOCYTES NFR BLD: 5.5 % (ref 0–10)
NEUTROPHILS # BLD: 3.6 K/UL (ref 1.5–7.5)
NEUTS SEG NFR BLD: 60.5 % (ref 50–65)
PLATELET # BLD AUTO: 285 K/UL (ref 130–400)
PMV BLD AUTO: 9.8 FL (ref 9.4–12.3)
POTASSIUM SERPL-SCNC: 4.4 MMOL/L (ref 3.5–5)
PROT SERPL-MCNC: 7.4 G/DL (ref 6.6–8.7)
RBC # BLD AUTO: 3.7 M/UL (ref 4.2–5.4)
SODIUM SERPL-SCNC: 143 MMOL/L (ref 136–145)
TRIGL SERPL-MCNC: 47 MG/DL (ref 0–149)
TSH SERPL DL<=0.005 MIU/L-ACNC: 8.76 UIU/ML (ref 0.27–4.2)
WBC # BLD AUTO: 6 K/UL (ref 4.8–10.8)

## 2023-08-17 ENCOUNTER — TELEPHONE (OUTPATIENT)
Dept: INTERNAL MEDICINE | Age: 68
End: 2023-08-17

## 2023-08-17 DIAGNOSIS — E05.90 HYPERTHYROIDISM: Primary | ICD-10-CM

## 2023-08-17 RX ORDER — LEVOTHYROXINE SODIUM 0.07 MG/1
75 TABLET ORAL DAILY
Qty: 30 TABLET | Refills: 0 | Status: SHIPPED | OUTPATIENT
Start: 2023-08-17

## 2023-08-17 NOTE — TELEPHONE ENCOUNTER
Requested Prescriptions     Pending Prescriptions Disp Refills    levothyroxine (SYNTHROID) 75 MCG tablet 30 tablet 0     Sig: Take 1 tablet by mouth daily

## 2023-08-17 NOTE — TELEPHONE ENCOUNTER
Canceled her appointment for today and does not have a follow-up. I did get her lab work back. Vitamin D is normal.  Urine microalbumin is okay TSH is elevated. Cholesterol 161 A1c is 5.4. CBC is stable CMP is stable. Increase  Synthroid to 75 mg p.o. daily. Repeat a TSH in about a month.

## 2023-09-28 DIAGNOSIS — E05.90 HYPERTHYROIDISM: ICD-10-CM

## 2023-09-28 RX ORDER — LEVOTHYROXINE SODIUM 0.07 MG/1
75 TABLET ORAL DAILY
Qty: 30 TABLET | Refills: 0 | Status: SHIPPED | OUTPATIENT
Start: 2023-09-28

## 2023-09-28 NOTE — TELEPHONE ENCOUNTER
Amberly Zarate called requesting a refill of the below medication which has been pended for you:     Requested Prescriptions     Pending Prescriptions Disp Refills    levothyroxine (SYNTHROID) 75 MCG tablet [Pharmacy Med Name: LEVOTHYROXINE SODIUM 75 MCG 75 Tablet] 30 tablet 0     Sig: TAKE 1 TABLET BY MOUTH DAILY       Last Appointment Date: 2/24/2023  Next Appointment Date: 10/3/2023    Allergies   Allergen Reactions    Codeine Hives    Lisinopril Hives

## 2023-10-16 RX ORDER — LEVOTHYROXINE SODIUM 50 MCG
50 TABLET ORAL DAILY
Qty: 30 TABLET | Refills: 3 | Status: SHIPPED | OUTPATIENT
Start: 2023-10-16

## 2023-11-04 DIAGNOSIS — F32.89 OTHER DEPRESSION: ICD-10-CM

## 2023-11-04 DIAGNOSIS — M19.90 OSTEOARTHRITIS, UNSPECIFIED OSTEOARTHRITIS TYPE, UNSPECIFIED SITE: ICD-10-CM

## 2023-11-04 DIAGNOSIS — I10 PRIMARY HYPERTENSION: Primary | ICD-10-CM

## 2023-11-06 RX ORDER — CARVEDILOL 25 MG/1
25 TABLET ORAL 2 TIMES DAILY
Qty: 180 TABLET | Refills: 0 | Status: SHIPPED | OUTPATIENT
Start: 2023-11-06

## 2023-11-06 RX ORDER — AMITRIPTYLINE HYDROCHLORIDE 25 MG/1
TABLET, FILM COATED ORAL
Qty: 90 TABLET | Refills: 0 | Status: SHIPPED | OUTPATIENT
Start: 2023-11-06

## 2023-11-06 RX ORDER — CELECOXIB 100 MG/1
CAPSULE ORAL
Qty: 180 CAPSULE | Refills: 0 | Status: SHIPPED | OUTPATIENT
Start: 2023-11-06

## 2023-11-20 DIAGNOSIS — E05.90 HYPERTHYROIDISM: ICD-10-CM

## 2023-11-20 RX ORDER — LEVOTHYROXINE SODIUM 0.07 MG/1
75 TABLET ORAL DAILY
Qty: 30 TABLET | Refills: 0 | Status: SHIPPED | OUTPATIENT
Start: 2023-11-20

## 2023-12-14 DIAGNOSIS — N32.81 OAB (OVERACTIVE BLADDER): Primary | ICD-10-CM

## 2023-12-14 RX ORDER — SOLIFENACIN SUCCINATE 10 MG/1
10 TABLET, FILM COATED ORAL DAILY
Qty: 30 TABLET | Refills: 0 | Status: SHIPPED | OUTPATIENT
Start: 2023-12-14

## 2023-12-28 ENCOUNTER — APPOINTMENT (OUTPATIENT)
Dept: ULTRASOUND IMAGING | Facility: HOSPITAL | Age: 68
End: 2023-12-28
Payer: MEDICARE

## 2023-12-28 ENCOUNTER — APPOINTMENT (OUTPATIENT)
Dept: GENERAL RADIOLOGY | Facility: HOSPITAL | Age: 68
End: 2023-12-28
Payer: MEDICARE

## 2023-12-28 ENCOUNTER — HOSPITAL ENCOUNTER (EMERGENCY)
Facility: HOSPITAL | Age: 68
Discharge: HOME | End: 2023-12-28
Payer: MEDICARE

## 2023-12-28 VITALS
BODY MASS INDEX: 57.52 KG/M2 | OXYGEN SATURATION: 98 % | DIASTOLIC BLOOD PRESSURE: 65 MMHG | HEIGHT: 60 IN | SYSTOLIC BLOOD PRESSURE: 117 MMHG | TEMPERATURE: 99.1 F | RESPIRATION RATE: 16 BRPM | HEART RATE: 87 BPM | WEIGHT: 293 LBS

## 2023-12-28 DIAGNOSIS — B37.2 CANDIDAL SKIN INFECTION: ICD-10-CM

## 2023-12-28 DIAGNOSIS — L03.115 CELLULITIS OF RIGHT LOWER EXTREMITY: ICD-10-CM

## 2023-12-28 DIAGNOSIS — S81.801A WOUND OF RIGHT LOWER EXTREMITY, INITIAL ENCOUNTER: Primary | ICD-10-CM

## 2023-12-28 LAB
ALBUMIN SERPL-MCNC: 4.3 G/DL (ref 3.5–5.2)
ALBUMIN/GLOB SERPL: 1.3 G/DL
ALP SERPL-CCNC: 46 U/L (ref 39–117)
ALT SERPL W P-5'-P-CCNC: 13 U/L (ref 1–33)
ANION GAP SERPL CALCULATED.3IONS-SCNC: 13 MMOL/L (ref 5–15)
AST SERPL-CCNC: 17 U/L (ref 1–32)
BASOPHILS # BLD AUTO: 0.05 10*3/MM3 (ref 0–0.2)
BASOPHILS NFR BLD AUTO: 0.7 % (ref 0–1.5)
BILIRUB SERPL-MCNC: 0.5 MG/DL (ref 0–1.2)
BUN SERPL-MCNC: 20 MG/DL (ref 8–23)
BUN/CREAT SERPL: 18 (ref 7–25)
CALCIUM SPEC-SCNC: 10.3 MG/DL (ref 8.6–10.5)
CHLORIDE SERPL-SCNC: 101 MMOL/L (ref 98–107)
CO2 SERPL-SCNC: 26 MMOL/L (ref 22–29)
CREAT SERPL-MCNC: 1.11 MG/DL (ref 0.57–1)
CRP SERPL-MCNC: 2.69 MG/DL (ref 0–0.5)
D-LACTATE SERPL-SCNC: 1.3 MMOL/L (ref 0.5–2)
DEPRECATED RDW RBC AUTO: 56.6 FL (ref 37–54)
EGFRCR SERPLBLD CKD-EPI 2021: 54.3 ML/MIN/1.73
EOSINOPHIL # BLD AUTO: 0.26 10*3/MM3 (ref 0–0.4)
EOSINOPHIL NFR BLD AUTO: 3.7 % (ref 0.3–6.2)
ERYTHROCYTE [DISTWIDTH] IN BLOOD BY AUTOMATED COUNT: 16.9 % (ref 12.3–15.4)
ERYTHROCYTE [SEDIMENTATION RATE] IN BLOOD: 62 MM/HR (ref 0–30)
GLOBULIN UR ELPH-MCNC: 3.4 GM/DL
GLUCOSE SERPL-MCNC: 79 MG/DL (ref 65–99)
HCT VFR BLD AUTO: 34.8 % (ref 34–46.6)
HGB BLD-MCNC: 10.4 G/DL (ref 12–15.9)
IMM GRANULOCYTES # BLD AUTO: 0.02 10*3/MM3 (ref 0–0.05)
IMM GRANULOCYTES NFR BLD AUTO: 0.3 % (ref 0–0.5)
LYMPHOCYTES # BLD AUTO: 1.39 10*3/MM3 (ref 0.7–3.1)
LYMPHOCYTES NFR BLD AUTO: 19.9 % (ref 19.6–45.3)
MCH RBC QN AUTO: 27.7 PG (ref 26.6–33)
MCHC RBC AUTO-ENTMCNC: 29.9 G/DL (ref 31.5–35.7)
MCV RBC AUTO: 92.6 FL (ref 79–97)
MONOCYTES # BLD AUTO: 0.39 10*3/MM3 (ref 0.1–0.9)
MONOCYTES NFR BLD AUTO: 5.6 % (ref 5–12)
NEUTROPHILS NFR BLD AUTO: 4.88 10*3/MM3 (ref 1.7–7)
NEUTROPHILS NFR BLD AUTO: 69.8 % (ref 42.7–76)
NRBC BLD AUTO-RTO: 0 /100 WBC (ref 0–0.2)
PLATELET # BLD AUTO: 315 10*3/MM3 (ref 140–450)
PMV BLD AUTO: 9.4 FL (ref 6–12)
POTASSIUM SERPL-SCNC: 4.2 MMOL/L (ref 3.5–5.2)
PROCALCITONIN SERPL-MCNC: 0.04 NG/ML (ref 0–0.25)
PROT SERPL-MCNC: 7.7 G/DL (ref 6–8.5)
RBC # BLD AUTO: 3.76 10*6/MM3 (ref 3.77–5.28)
SODIUM SERPL-SCNC: 140 MMOL/L (ref 136–145)
WBC NRBC COR # BLD AUTO: 6.99 10*3/MM3 (ref 3.4–10.8)

## 2023-12-28 PROCEDURE — 84145 PROCALCITONIN (PCT): CPT | Performed by: PHYSICIAN ASSISTANT

## 2023-12-28 PROCEDURE — 80053 COMPREHEN METABOLIC PANEL: CPT | Performed by: PHYSICIAN ASSISTANT

## 2023-12-28 PROCEDURE — 36415 COLL VENOUS BLD VENIPUNCTURE: CPT

## 2023-12-28 PROCEDURE — 87205 SMEAR GRAM STAIN: CPT | Performed by: PHYSICIAN ASSISTANT

## 2023-12-28 PROCEDURE — 85025 COMPLETE CBC W/AUTO DIFF WBC: CPT | Performed by: PHYSICIAN ASSISTANT

## 2023-12-28 PROCEDURE — 87186 SC STD MICRODIL/AGAR DIL: CPT | Performed by: PHYSICIAN ASSISTANT

## 2023-12-28 PROCEDURE — 73590 X-RAY EXAM OF LOWER LEG: CPT

## 2023-12-28 PROCEDURE — 87070 CULTURE OTHR SPECIMN AEROBIC: CPT | Performed by: PHYSICIAN ASSISTANT

## 2023-12-28 PROCEDURE — 99284 EMERGENCY DEPT VISIT MOD MDM: CPT

## 2023-12-28 PROCEDURE — 83605 ASSAY OF LACTIC ACID: CPT | Performed by: PHYSICIAN ASSISTANT

## 2023-12-28 PROCEDURE — 86140 C-REACTIVE PROTEIN: CPT | Performed by: PHYSICIAN ASSISTANT

## 2023-12-28 PROCEDURE — 93970 EXTREMITY STUDY: CPT

## 2023-12-28 PROCEDURE — 85652 RBC SED RATE AUTOMATED: CPT | Performed by: PHYSICIAN ASSISTANT

## 2023-12-28 PROCEDURE — 87040 BLOOD CULTURE FOR BACTERIA: CPT | Performed by: PHYSICIAN ASSISTANT

## 2023-12-28 PROCEDURE — 87077 CULTURE AEROBIC IDENTIFY: CPT | Performed by: PHYSICIAN ASSISTANT

## 2023-12-28 RX ORDER — SULFAMETHOXAZOLE AND TRIMETHOPRIM 800; 160 MG/1; MG/1
1 TABLET ORAL 2 TIMES DAILY
Qty: 20 TABLET | Refills: 0 | Status: SHIPPED | OUTPATIENT
Start: 2023-12-28 | End: 2024-01-07

## 2023-12-28 RX ORDER — CEPHALEXIN 500 MG/1
500 CAPSULE ORAL 2 TIMES DAILY
Qty: 14 CAPSULE | Refills: 0 | Status: SHIPPED | OUTPATIENT
Start: 2023-12-28 | End: 2024-01-04

## 2023-12-28 RX ORDER — NYSTATIN 100000 [USP'U]/G
POWDER TOPICAL 3 TIMES DAILY
Qty: 60 G | Refills: 0 | Status: SHIPPED | OUTPATIENT
Start: 2023-12-28

## 2023-12-28 RX ORDER — SODIUM CHLORIDE 0.9 % (FLUSH) 0.9 %
10 SYRINGE (ML) INJECTION AS NEEDED
Status: DISCONTINUED | OUTPATIENT
Start: 2023-12-28 | End: 2023-12-28 | Stop reason: HOSPADM

## 2023-12-28 RX ORDER — NYSTATIN 100000 U/G
1 CREAM TOPICAL ONCE
Status: COMPLETED | OUTPATIENT
Start: 2023-12-28 | End: 2023-12-28

## 2023-12-28 RX ADMIN — NYSTATIN 1 APPLICATION: 100000 CREAM TOPICAL at 14:19

## 2023-12-28 NOTE — ED PROVIDER NOTES
"Subjective   History of Present Illness    Patient is a 68-year-old female presenting to ED with bilateral lower extremity swelling.  PMH significant for CHF, fibromyalgia, hypertension, CHARLIE without use of sleeping device, rheumatoid arthritis, peripheral neuropathy.  Patient states a couple months ago she noticed a small wound on her right posterior lower leg while in the shower for which she has not taking care of, not followed up with wound care, and not been evaluated.  Patient states that the area has become larger and she has developed swelling to both of her legs, greater on the right side.  Patient states that her inguinal regions have started developing small \"enlarged lymph nodes\" for which her right leg as well is not healing as quickly.  Patient states that the legs become so swollen that she has tried taking her diuretics with no relief.  Patient denies any problems proximal to her inguinal region including abdominal pain, nausea, vomiting, swelling of her other extremities, shortness of breath.  Patient states that a family member encouraged her to seek evaluation due to malodor today for which she went to urgent care and was advised to come to the ER for further evaluation.  Patient states that she has not been cleaning the area, not applying any dressings, and has not taken any medications PTA.  Patient did state the area began draining fluid over the past couple days which is abnormal    Records reviewed show patient was seen just prior to arrival at urgent care for swelling of lower extremity, venous stasis ulcer of right lower leg.  Patient was advised to go to the ER at that time for further evaluation.    Patient was otherwise last seen in the outpatient setting at the cardiology office on 6/27/2023 for CHF, hypertension, CHARLIE, COPD.    Patient was last seen in the ED on 6/16/2021 for chronic respiratory failure, oxygen dependence, thyroid mass.    Review of Systems   Constitutional: Negative.  " Negative for chills, diaphoresis and fever.   HENT: Negative.     Respiratory: Negative.  Negative for shortness of breath.    Cardiovascular:  Positive for leg swelling (bilateral LE, worse on right side).   Gastrointestinal:  Negative for nausea and vomiting.   Genitourinary: Negative.    Musculoskeletal:  Negative for gait problem and joint swelling.   Skin:  Positive for wound (right posterior lower leg).   Neurological:  Negative for weakness and numbness.   Psychiatric/Behavioral: Negative.     All other systems reviewed and are negative.      Past Medical History:   Diagnosis Date    Anemia     iron def    Anxiety     Asthma     Back pain     CHF (congestive heart failure)     Chronic bronchitis     Coronary artery disease     COVID-19 vaccine series completed     pfizer    COVID-19 vaccine series completed     CTS (carpal tunnel syndrome)     Depression     Fibromyalgia     GERD (gastroesophageal reflux disease)     Gout     Hyperlipemia     Hyperlipidemia     Hypertension     Obesity     Obstructive sleep apnea     could not tolerate machine     Peripheral neuropathy     Primary central sleep apnea     Pulmonary arterial hypertension     Rheumatoid arthritis     Sinusitis        Allergies   Allergen Reactions    Codeine Sulfate Hives    Lisinopril Swelling and Rash     Facial swelling       Past Surgical History:   Procedure Laterality Date    BREAST BIOPSY Left      SECTION      X 2    COLONOSCOPY  10/22/2015    Tics repeat exam in 5 years    COLONOSCOPY  2007    Small hemorrhoids repeat exam in 3 years    COLONOSCOPY N/A 2020    Procedure: COLONOSCOPY WITH ANESTHESIA;  Surgeon: Denny Francis MD;  Location: [facility] ENDOSCOPY;  Service: Gastroenterology;  Laterality: N/A;  pre: family hx colon ca  post: diverticulosis  Carola Barbosa MD    HYSTERECTOMY      total    THYROIDECTOMY Left 2022    Procedure: Left thyroidectomy with isthmusectomy;  Surgeon: Milad So  MD Tomas;  Location:  [facility] OR;  Service: ENT;  Laterality: Left;    UMBILICAL HERNIA REPAIR      had 1/2 of it repaired with hysterectomy        Family History   Problem Relation Age of Onset    Arthritis Mother     Heart disease Mother     Hypertension Mother     Thyroid disease Mother     Arthritis Father     Hypertension Father     Anuerysm Father     Stroke Father     Asthma Brother     Colon polyps Neg Hx     Colon cancer Neg Hx        Social History     Socioeconomic History    Marital status:    Tobacco Use    Smoking status: Never    Smokeless tobacco: Never   Vaping Use    Vaping Use: Never used   Substance and Sexual Activity    Alcohol use: No    Drug use: No    Sexual activity: Not Currently     Partners: Male     Birth control/protection: Surgical           Objective   Physical Exam  Vitals and nursing note reviewed.   Constitutional:       General: She is not in acute distress.     Appearance: Normal appearance. She is well-developed and well-groomed. She is morbidly obese. She is not ill-appearing, toxic-appearing or diaphoretic.   HENT:      Head: Normocephalic.      Mouth/Throat:      Mouth: Mucous membranes are moist.      Pharynx: Oropharynx is clear.   Eyes:      Conjunctiva/sclera: Conjunctivae normal.      Pupils: Pupils are equal, round, and reactive to light.   Cardiovascular:      Rate and Rhythm: Normal rate.   Pulmonary:      Effort: Pulmonary effort is normal.      Breath sounds: Normal breath sounds.   Abdominal:      Palpations: Abdomen is soft.   Musculoskeletal:         General: Swelling and tenderness present.      Cervical back: Neck supple.      Right lower le+ Edema present.      Left lower leg: 3+ Edema present.   Feet:      Right foot:      Toenail Condition: Right toenails are abnormally thick, long and ingrown. Fungal disease present.     Left foot:      Toenail Condition: Left toenails are abnormally thick, long and ingrown. Fungal disease present.  Skin:      Findings: Wound present. No erythema.      Comments: Approximately 7 cm in length by 2 cm in diameter wound noted to the posterior right lower leg on the distal aspect.  No involvement of the Achilles tendon region.  Diffuse swelling to the right calf and right lower leg with tenderness overlying the wound.  Malodorous straw-colored discharge from the area.  Ability to visualize wound bed.  No surrounding or streaking erythema on the lower extremity.  Remainder of right lower extremity including the top as well as plantar aspect of foot are normal to inspection.  Left lower extremity with swelling to the calf however no wounds to the leg, foot, or plantar aspect of the foot.  No tenderness to palpitation of the left calf.   Neurological:      Mental Status: She is alert and oriented to person, place, and time.   Psychiatric:         Mood and Affect: Mood normal.         Speech: Speech normal.         Behavior: Behavior is cooperative.         Procedures           ED Course                                             Medical Decision Making  Problems Addressed:  Cellulitis of right lower extremity: complicated acute illness or injury  Wound of right lower extremity, initial encounter: complicated acute illness or injury    Amount and/or Complexity of Data Reviewed  External Data Reviewed: labs, radiology and notes.  Labs: ordered. Decision-making details documented in ED Course.  Radiology: ordered. Decision-making details documented in ED Course.  ECG/medicine tests: ordered. Decision-making details documented in ED Course.    Risk  Prescription drug management.        Patient is a 68-year-old female presenting to ED with bilateral lower extremity swelling.  PMH significant for CHF, fibromyalgia, hypertension, CHARLIE without use of sleeping device, rheumatoid arthritis, peripheral neuropathy.  Upon initial evaluation patient resting comfortably in bed hypertensive with otherwise stable vital signs including afebrile  status.  Malodorous wound noted to the right posterior calf with diffuse tenderness.  Lab workup revealed low concern for systemic infectious or bacterial process with normal blood cell count as well as normal lactic acid and procalcitonin.  Sed rate 62 CRP however 2.69.  CBC otherwise showed baseline H&H for patient at 10.4/34.8 with no further acute findings.  CMP with creatinine 1.11, GFR 54.3  and BUN of 20, proved from patient's baseline.  No electrolyte disturbances and no hepatic dysfunction.  Wound cultures were obtained from patient's right leg.  Venous Doppler studies of the bilateral lower extremity showed: No evidence of DVT to the level of the knees with soft tissue edema below the level of the knees.  X-ray imaging of the right hip/fib showed: Diffuse soft tissue edema with arthritic changes of the knee and ankle with no acute osseous pathology identified.  Examination revealed evidence as well of candidal rash to the inguinal region for which nystatin was applied.  Discussed with patient need for continued nystatin ointment as well as trying to minimize skin contacting in these regions.  Discussed with patient need for establishing care with wound care as well as follow-up with a primary care provider or wound care within the next 48 hours for close reevaluation.  Discussed need to keep wound open to the air is much as possible to help promote healing.  Throughout evaluation patient is able to ambulate at her baseline without difficulty and had improvement in her hypertensive status with continued no tachycardia and afebrile status.  Discussed with patient need for antibiotic compliance, close outpatient follow-up, strict return precautions, and need for immediate return to ED should she develop any new or worsening symptoms.    Differential diagnosis: DVT, SVT, cellulitis, wound, osteomyelitis, sepsis/systemic infection, electrolyte disturbance    Final diagnoses:   Wound of right lower extremity,  initial encounter   Cellulitis of right lower extremity   Candidal skin infection       ED Disposition  ED Disposition       ED Disposition   Discharge    Condition   Stable    Comment   --               Carola Barbosa MD  [address] [place] [address] [address]  [place] [id number]  [phone]    Schedule an appointment as soon as possible for a visit in 2 days      [facility]  [address]  [place] [id number] [phone]  Schedule an appointment as soon as possible for a visit in 2 days      [facility]  [address] [id number] [phone]    As needed         Medication List        New Prescriptions      cephalexin 500 MG capsule  Commonly known as: KEFLEX  Take 1 capsule by mouth 2 (Two) Times a Day for 7 days.     nystatin 441640 UNIT/GM powder  Commonly known as: MYCOSTATIN  Apply  topically to the appropriate area as directed 3 (Three) Times a Day.     sulfamethoxazole-trimethoprim 800-160 MG per tablet  Commonly known as: BACTRIM DS,SEPTRA DS  Take 1 tablet by mouth 2 (Two) Times a Day for 10 days.               Where to Get Your Medications        These medications were sent to [facility] - [place], [place] - [address] - [phone]  - [phone]   [address], [facility] [address]      Phone: [phone]   cephalexin 500 MG capsule  nystatin 141926 UNIT/GM powder  sulfamethoxazole-trimethoprim 800-160 MG per tablet            Nick Lao PA-C  12/28/23 [id number]

## 2023-12-28 NOTE — DISCHARGE INSTRUCTIONS
Please complete both of your antibiotics in their entirety even if you begin to feel better.  Please leave your wound open to the air is much as possible to help promote healing.  Please make sure that you are cleaning it daily with warm water and Dial soap.  Please rest and elevate your legs is much as possible.  Please continue to apply nystatin to your rash on your upper legs as well as keep them open to the air is much as possible.  You will need to follow-up with your primary care provider or wound care within the next 48 hours for close reevaluation however should you develop any new or worsening symptoms please return to the ER for further evaluation

## 2023-12-31 LAB
BACTERIA SPEC AEROBE CULT: ABNORMAL
BACTERIA SPEC AEROBE CULT: ABNORMAL
GRAM STN SPEC: ABNORMAL
GRAM STN SPEC: ABNORMAL

## 2024-01-01 ENCOUNTER — APPOINTMENT (OUTPATIENT)
Dept: GENERAL RADIOLOGY | Facility: HOSPITAL | Age: 69
End: 2024-01-01
Payer: MEDICARE

## 2024-01-01 ENCOUNTER — APPOINTMENT (OUTPATIENT)
Dept: CT IMAGING | Facility: HOSPITAL | Age: 69
End: 2024-01-01
Payer: MEDICARE

## 2024-01-01 ENCOUNTER — APPOINTMENT (OUTPATIENT)
Dept: CARDIOLOGY | Facility: HOSPITAL | Age: 69
End: 2024-01-01
Payer: MEDICARE

## 2024-01-01 ENCOUNTER — HOSPITAL ENCOUNTER (INPATIENT)
Facility: HOSPITAL | Age: 69
LOS: 5 days | End: 2024-11-05
Attending: FAMILY MEDICINE | Admitting: INTERNAL MEDICINE
Payer: MEDICARE

## 2024-01-01 VITALS
BODY MASS INDEX: 55.32 KG/M2 | DIASTOLIC BLOOD PRESSURE: 64 MMHG | HEIGHT: 61 IN | OXYGEN SATURATION: 51 % | RESPIRATION RATE: 14 BRPM | HEART RATE: 72 BPM | WEIGHT: 293 LBS | SYSTOLIC BLOOD PRESSURE: 107 MMHG | TEMPERATURE: 97.5 F

## 2024-01-01 DIAGNOSIS — N39.0 ACUTE UTI (URINARY TRACT INFECTION): ICD-10-CM

## 2024-01-01 DIAGNOSIS — E66.01 MORBID OBESITY: ICD-10-CM

## 2024-01-01 DIAGNOSIS — L89.223 PRESSURE INJURY OF LEFT THIGH, STAGE 3: ICD-10-CM

## 2024-01-01 DIAGNOSIS — J96.02 ACUTE RESPIRATORY FAILURE WITH HYPOXIA AND HYPERCAPNIA: Primary | ICD-10-CM

## 2024-01-01 DIAGNOSIS — L89.213 PRESSURE INJURY OF RIGHT THIGH, STAGE 3: ICD-10-CM

## 2024-01-01 DIAGNOSIS — J96.01 ACUTE RESPIRATORY FAILURE WITH HYPOXIA AND HYPERCAPNIA: Primary | ICD-10-CM

## 2024-01-01 DIAGNOSIS — L89.310 PRESSURE INJURY OF RIGHT BUTTOCK, UNSTAGEABLE: ICD-10-CM

## 2024-01-01 DIAGNOSIS — L89.320 PRESSURE INJURY OF LEFT BUTTOCK, UNSTAGEABLE: ICD-10-CM

## 2024-01-01 DIAGNOSIS — L97.412 NON-PRESSURE CHRONIC ULCER OF RIGHT HEEL AND MIDFOOT WITH FAT LAYER EXPOSED: ICD-10-CM

## 2024-01-01 LAB
ALBUMIN SERPL-MCNC: 2.7 G/DL (ref 3.5–5.2)
ALBUMIN SERPL-MCNC: 3 G/DL (ref 3.5–5.2)
ALBUMIN SERPL-MCNC: 3.1 G/DL (ref 3.5–5.2)
ALBUMIN SERPL-MCNC: 3.3 G/DL (ref 3.5–5.2)
ALBUMIN SERPL-MCNC: 3.4 G/DL (ref 3.5–5.2)
ALBUMIN/GLOB SERPL: 0.8 G/DL
ALBUMIN/GLOB SERPL: 0.9 G/DL
ALBUMIN/GLOB SERPL: 1 G/DL
ALP SERPL-CCNC: 44 U/L (ref 39–117)
ALP SERPL-CCNC: 46 U/L (ref 39–117)
ALP SERPL-CCNC: 46 U/L (ref 39–117)
ALP SERPL-CCNC: 50 U/L (ref 39–117)
ALP SERPL-CCNC: 50 U/L (ref 39–117)
ALT SERPL W P-5'-P-CCNC: 22 U/L (ref 1–33)
ALT SERPL W P-5'-P-CCNC: 26 U/L (ref 1–33)
ALT SERPL W P-5'-P-CCNC: 26 U/L (ref 1–33)
ALT SERPL W P-5'-P-CCNC: 29 U/L (ref 1–33)
ALT SERPL W P-5'-P-CCNC: 40 U/L (ref 1–33)
AMMONIA BLD-SCNC: 21 UMOL/L (ref 11–51)
AMPHET+METHAMPHET UR QL: NEGATIVE
AMPHETAMINES UR QL: NEGATIVE
ANION GAP SERPL CALCULATED.3IONS-SCNC: 11 MMOL/L (ref 5–15)
ANION GAP SERPL CALCULATED.3IONS-SCNC: 12 MMOL/L (ref 5–15)
ANION GAP SERPL CALCULATED.3IONS-SCNC: 12 MMOL/L (ref 5–15)
ANION GAP SERPL CALCULATED.3IONS-SCNC: 14 MMOL/L (ref 5–15)
ANION GAP SERPL CALCULATED.3IONS-SCNC: 5 MMOL/L (ref 5–15)
ANION GAP SERPL CALCULATED.3IONS-SCNC: 8 MMOL/L (ref 5–15)
APTT PPP: 27 SECONDS (ref 24.5–36)
ARTERIAL PATENCY WRIST A: POSITIVE
AST SERPL-CCNC: 19 U/L (ref 1–32)
AST SERPL-CCNC: 27 U/L (ref 1–32)
AST SERPL-CCNC: 31 U/L (ref 1–32)
AST SERPL-CCNC: 32 U/L (ref 1–32)
AST SERPL-CCNC: 44 U/L (ref 1–32)
ATMOSPHERIC PRESS: 748 MMHG
ATMOSPHERIC PRESS: 750 MMHG
ATMOSPHERIC PRESS: 751 MMHG
ATMOSPHERIC PRESS: 753 MMHG
ATMOSPHERIC PRESS: 754 MMHG
ATMOSPHERIC PRESS: 755 MMHG
ATMOSPHERIC PRESS: 756 MMHG
ATMOSPHERIC PRESS: 757 MMHG
ATMOSPHERIC PRESS: 759 MMHG
ATMOSPHERIC PRESS: 759 MMHG
B PARAPERT DNA SPEC QL NAA+PROBE: NOT DETECTED
B PERT DNA SPEC QL NAA+PROBE: NOT DETECTED
BACTERIA BLD CULT: NORMAL
BACTERIA SPEC AEROBE CULT: ABNORMAL
BACTERIA SPEC AEROBE CULT: NORMAL
BACTERIA UR QL AUTO: ABNORMAL /HPF
BARBITURATES UR QL SCN: NEGATIVE
BASE EXCESS BLDA CALC-SCNC: 10.1 MMOL/L (ref 0–2)
BASE EXCESS BLDA CALC-SCNC: 10.1 MMOL/L (ref 0–2)
BASE EXCESS BLDA CALC-SCNC: 12.3 MMOL/L (ref 0–2)
BASE EXCESS BLDA CALC-SCNC: 13.3 MMOL/L (ref 0–2)
BASE EXCESS BLDA CALC-SCNC: 14.9 MMOL/L (ref 0–2)
BASE EXCESS BLDA CALC-SCNC: 5.8 MMOL/L (ref 0–2)
BASE EXCESS BLDA CALC-SCNC: 7.1 MMOL/L (ref 0–2)
BASE EXCESS BLDA CALC-SCNC: 7.5 MMOL/L (ref 0–2)
BASE EXCESS BLDA CALC-SCNC: 8.2 MMOL/L (ref 0–2)
BASE EXCESS BLDA CALC-SCNC: 9.1 MMOL/L (ref 0–2)
BASE EXCESS BLDA CALC-SCNC: 9.3 MMOL/L (ref 0–2)
BASE EXCESS BLDA CALC-SCNC: 9.4 MMOL/L (ref 0–2)
BASOPHILS # BLD AUTO: 0.01 10*3/MM3 (ref 0–0.2)
BASOPHILS # BLD AUTO: 0.02 10*3/MM3 (ref 0–0.2)
BASOPHILS # BLD AUTO: 0.02 10*3/MM3 (ref 0–0.2)
BASOPHILS # BLD AUTO: 0.03 10*3/MM3 (ref 0–0.2)
BASOPHILS # BLD AUTO: 0.03 10*3/MM3 (ref 0–0.2)
BASOPHILS # BLD AUTO: 0.05 10*3/MM3 (ref 0–0.2)
BASOPHILS NFR BLD AUTO: 0.1 % (ref 0–1.5)
BASOPHILS NFR BLD AUTO: 0.2 % (ref 0–1.5)
BASOPHILS NFR BLD AUTO: 0.2 % (ref 0–1.5)
BASOPHILS NFR BLD AUTO: 0.3 % (ref 0–1.5)
BASOPHILS NFR BLD AUTO: 0.3 % (ref 0–1.5)
BASOPHILS NFR BLD AUTO: 0.5 % (ref 0–1.5)
BDY SITE: ABNORMAL
BENZODIAZ UR QL SCN: NEGATIVE
BH CV ECHO MEAS - AO MAX PG: 17.9 MMHG
BH CV ECHO MEAS - AO MEAN PG: 8.2 MMHG
BH CV ECHO MEAS - AO ROOT DIAM: 3 CM
BH CV ECHO MEAS - AO V2 MAX: 211.6 CM/SEC
BH CV ECHO MEAS - AO V2 VTI: 39.4 CM
BH CV ECHO MEAS - AVA(I,D): 2.46 CM2
BH CV ECHO MEAS - EDV(CUBED): 139.3 ML
BH CV ECHO MEAS - EDV(MOD-SP2): 193.7 ML
BH CV ECHO MEAS - EDV(MOD-SP4): 209.7 ML
BH CV ECHO MEAS - EF(MOD-SP2): 50.6 %
BH CV ECHO MEAS - EF(MOD-SP4): 40.5 %
BH CV ECHO MEAS - ESV(CUBED): 57.9 ML
BH CV ECHO MEAS - ESV(MOD-SP2): 95.6 ML
BH CV ECHO MEAS - ESV(MOD-SP4): 124.8 ML
BH CV ECHO MEAS - FS: 25.4 %
BH CV ECHO MEAS - IVS/LVPW: 0.97 CM
BH CV ECHO MEAS - IVSD: 1.31 CM
BH CV ECHO MEAS - LA DIMENSION: 3.8 CM
BH CV ECHO MEAS - LV DIASTOLIC VOL/BSA (35-75): 85.8 CM2
BH CV ECHO MEAS - LV MASS(C)D: 287.2 GRAMS
BH CV ECHO MEAS - LV MAX PG: 6.1 MMHG
BH CV ECHO MEAS - LV MEAN PG: 2.8 MMHG
BH CV ECHO MEAS - LV SYSTOLIC VOL/BSA (12-30): 51.1 CM2
BH CV ECHO MEAS - LV V1 MAX: 123.3 CM/SEC
BH CV ECHO MEAS - LV V1 VTI: 23 CM
BH CV ECHO MEAS - LVIDD: 5.2 CM
BH CV ECHO MEAS - LVIDS: 3.9 CM
BH CV ECHO MEAS - LVOT AREA: 4.2 CM2
BH CV ECHO MEAS - LVOT DIAM: 2.31 CM
BH CV ECHO MEAS - LVPWD: 1.36 CM
BH CV ECHO MEAS - MV A MAX VEL: 73.8 CM/SEC
BH CV ECHO MEAS - MV DEC SLOPE: 314.9 CM/SEC2
BH CV ECHO MEAS - MV DEC TIME: 0.19 SEC
BH CV ECHO MEAS - MV E MAX VEL: 75.8 CM/SEC
BH CV ECHO MEAS - MV E/A: 1.03
BH CV ECHO MEAS - MV MAX PG: 2.6 MMHG
BH CV ECHO MEAS - MV MEAN PG: 0.88 MMHG
BH CV ECHO MEAS - MV V2 VTI: 29.6 CM
BH CV ECHO MEAS - MVA(VTI): 3.3 CM2
BH CV ECHO MEAS - SV(LVOT): 96.7 ML
BH CV ECHO MEAS - SV(MOD-SP2): 98.1 ML
BH CV ECHO MEAS - SV(MOD-SP4): 84.9 ML
BH CV ECHO MEAS - SVI(LVOT): 39.6 ML/M2
BH CV ECHO MEAS - SVI(MOD-BP): 37.9 ML/M2
BH CV ECHO MEAS - SVI(MOD-SP2): 40.1 ML/M2
BH CV ECHO MEAS - SVI(MOD-SP4): 34.8 ML/M2
BH CV XLRA - TDI S': 17 CM/SEC
BILIRUB SERPL-MCNC: 0.2 MG/DL (ref 0–1.2)
BILIRUB SERPL-MCNC: 0.3 MG/DL (ref 0–1.2)
BILIRUB SERPL-MCNC: 0.4 MG/DL (ref 0–1.2)
BILIRUB UR QL STRIP: NEGATIVE
BODY TEMPERATURE: 37
BOTTLE TYPE: NORMAL
BUN SERPL-MCNC: 23 MG/DL (ref 8–23)
BUN SERPL-MCNC: 24 MG/DL (ref 8–23)
BUN SERPL-MCNC: 24 MG/DL (ref 8–23)
BUN SERPL-MCNC: 25 MG/DL (ref 8–23)
BUN SERPL-MCNC: 32 MG/DL (ref 8–23)
BUN SERPL-MCNC: 38 MG/DL (ref 8–23)
BUN/CREAT SERPL: 25.3 (ref 7–25)
BUN/CREAT SERPL: 26.6 (ref 7–25)
BUN/CREAT SERPL: 27.6 (ref 7–25)
BUN/CREAT SERPL: 29.9 (ref 7–25)
BUN/CREAT SERPL: 34 (ref 7–25)
BUN/CREAT SERPL: 43.7 (ref 7–25)
BUPRENORPHINE SERPL-MCNC: NEGATIVE NG/ML
C PNEUM DNA NPH QL NAA+NON-PROBE: NOT DETECTED
CA-I BLD-MCNC: 4.93 MG/DL (ref 4.6–5.4)
CA-I BLD-MCNC: 5 MG/DL (ref 4.6–5.4)
CA-I BLD-MCNC: 5.08 MG/DL (ref 4.6–5.4)
CALCIUM SPEC-SCNC: 10.1 MG/DL (ref 8.6–10.5)
CALCIUM SPEC-SCNC: 10.3 MG/DL (ref 8.6–10.5)
CALCIUM SPEC-SCNC: 9.4 MG/DL (ref 8.6–10.5)
CALCIUM SPEC-SCNC: 9.5 MG/DL (ref 8.6–10.5)
CALCIUM SPEC-SCNC: 9.6 MG/DL (ref 8.6–10.5)
CALCIUM SPEC-SCNC: 9.6 MG/DL (ref 8.6–10.5)
CANNABINOIDS SERPL QL: NEGATIVE
CHLORIDE SERPL-SCNC: 100 MMOL/L (ref 98–107)
CHLORIDE SERPL-SCNC: 97 MMOL/L (ref 98–107)
CHLORIDE SERPL-SCNC: 97 MMOL/L (ref 98–107)
CHLORIDE SERPL-SCNC: 98 MMOL/L (ref 98–107)
CHLORIDE SERPL-SCNC: 98 MMOL/L (ref 98–107)
CHLORIDE SERPL-SCNC: 99 MMOL/L (ref 98–107)
CLARITY UR: CLEAR
CO2 SERPL-SCNC: 28 MMOL/L (ref 22–29)
CO2 SERPL-SCNC: 30 MMOL/L (ref 22–29)
CO2 SERPL-SCNC: 32 MMOL/L (ref 22–29)
CO2 SERPL-SCNC: 35 MMOL/L (ref 22–29)
CO2 SERPL-SCNC: 35 MMOL/L (ref 22–29)
CO2 SERPL-SCNC: 38 MMOL/L (ref 22–29)
COCAINE UR QL: NEGATIVE
COHGB MFR BLD: 1.4 % (ref 0–5)
COLOR UR: YELLOW
CREAT SERPL-MCNC: 0.77 MG/DL (ref 0.57–1)
CREAT SERPL-MCNC: 0.87 MG/DL (ref 0.57–1)
CREAT SERPL-MCNC: 0.87 MG/DL (ref 0.57–1)
CREAT SERPL-MCNC: 0.94 MG/DL (ref 0.57–1)
CREAT SERPL-MCNC: 0.94 MG/DL (ref 0.57–1)
CREAT SERPL-MCNC: 0.95 MG/DL (ref 0.57–1)
D-LACTATE SERPL-SCNC: 0.9 MMOL/L (ref 0.5–2)
D-LACTATE SERPL-SCNC: 1.3 MMOL/L (ref 0.5–2)
DEPRECATED RDW RBC AUTO: 55.2 FL (ref 37–54)
DEPRECATED RDW RBC AUTO: 55.9 FL (ref 37–54)
DEPRECATED RDW RBC AUTO: 57 FL (ref 37–54)
DEPRECATED RDW RBC AUTO: 57.4 FL (ref 37–54)
DEPRECATED RDW RBC AUTO: 58.6 FL (ref 37–54)
DEPRECATED RDW RBC AUTO: 60.4 FL (ref 37–54)
EGFRCR SERPLBLD CKD-EPI 2021: 65 ML/MIN/1.73
EGFRCR SERPLBLD CKD-EPI 2021: 65.8 ML/MIN/1.73
EGFRCR SERPLBLD CKD-EPI 2021: 65.8 ML/MIN/1.73
EGFRCR SERPLBLD CKD-EPI 2021: 72.2 ML/MIN/1.73
EGFRCR SERPLBLD CKD-EPI 2021: 72.2 ML/MIN/1.73
EGFRCR SERPLBLD CKD-EPI 2021: 83.6 ML/MIN/1.73
EOSINOPHIL # BLD AUTO: 0 10*3/MM3 (ref 0–0.4)
EOSINOPHIL # BLD AUTO: 0.01 10*3/MM3 (ref 0–0.4)
EOSINOPHIL # BLD AUTO: 0.06 10*3/MM3 (ref 0–0.4)
EOSINOPHIL # BLD AUTO: 0.1 10*3/MM3 (ref 0–0.4)
EOSINOPHIL # BLD AUTO: 0.16 10*3/MM3 (ref 0–0.4)
EOSINOPHIL # BLD AUTO: 0.46 10*3/MM3 (ref 0–0.4)
EOSINOPHIL NFR BLD AUTO: 0 % (ref 0.3–6.2)
EOSINOPHIL NFR BLD AUTO: 0.1 % (ref 0.3–6.2)
EOSINOPHIL NFR BLD AUTO: 0.6 % (ref 0.3–6.2)
EOSINOPHIL NFR BLD AUTO: 1.1 % (ref 0.3–6.2)
EOSINOPHIL NFR BLD AUTO: 1.5 % (ref 0.3–6.2)
EOSINOPHIL NFR BLD AUTO: 4.4 % (ref 0.3–6.2)
EPAP: 10
EPAP: 22
EPAP: 6
EPAP: 6
ERYTHROCYTE [DISTWIDTH] IN BLOOD BY AUTOMATED COUNT: 16.3 % (ref 12.3–15.4)
ERYTHROCYTE [DISTWIDTH] IN BLOOD BY AUTOMATED COUNT: 16.4 % (ref 12.3–15.4)
ERYTHROCYTE [DISTWIDTH] IN BLOOD BY AUTOMATED COUNT: 16.5 % (ref 12.3–15.4)
ERYTHROCYTE [DISTWIDTH] IN BLOOD BY AUTOMATED COUNT: 16.7 % (ref 12.3–15.4)
ERYTHROCYTE [DISTWIDTH] IN BLOOD BY AUTOMATED COUNT: 16.8 % (ref 12.3–15.4)
ERYTHROCYTE [DISTWIDTH] IN BLOOD BY AUTOMATED COUNT: 17.1 % (ref 12.3–15.4)
FENTANYL UR-MCNC: NEGATIVE NG/ML
FLUAV SUBTYP SPEC NAA+PROBE: NOT DETECTED
FLUBV RNA ISLT QL NAA+PROBE: NOT DETECTED
GAS FLOW AIRWAY: 15 LPM
GEN 5 2HR TROPONIN T REFLEX: 41 NG/L
GLOBULIN UR ELPH-MCNC: 3 GM/DL
GLOBULIN UR ELPH-MCNC: 3.2 GM/DL
GLOBULIN UR ELPH-MCNC: 3.3 GM/DL
GLOBULIN UR ELPH-MCNC: 3.4 GM/DL
GLOBULIN UR ELPH-MCNC: 3.8 GM/DL
GLUCOSE BLDC GLUCOMTR-MCNC: 101 MG/DL (ref 70–130)
GLUCOSE BLDC GLUCOMTR-MCNC: 104 MG/DL (ref 70–130)
GLUCOSE BLDC GLUCOMTR-MCNC: 111 MG/DL (ref 70–130)
GLUCOSE BLDC GLUCOMTR-MCNC: 111 MG/DL (ref 70–130)
GLUCOSE BLDC GLUCOMTR-MCNC: 112 MG/DL (ref 70–130)
GLUCOSE BLDC GLUCOMTR-MCNC: 116 MG/DL (ref 70–130)
GLUCOSE BLDC GLUCOMTR-MCNC: 116 MG/DL (ref 70–130)
GLUCOSE BLDC GLUCOMTR-MCNC: 142 MG/DL (ref 70–130)
GLUCOSE BLDC GLUCOMTR-MCNC: 52 MG/DL (ref 70–130)
GLUCOSE BLDC GLUCOMTR-MCNC: 58 MG/DL (ref 70–130)
GLUCOSE BLDC GLUCOMTR-MCNC: 60 MG/DL (ref 70–130)
GLUCOSE BLDC GLUCOMTR-MCNC: 65 MG/DL (ref 70–130)
GLUCOSE BLDC GLUCOMTR-MCNC: 71 MG/DL (ref 70–130)
GLUCOSE BLDC GLUCOMTR-MCNC: 75 MG/DL (ref 70–130)
GLUCOSE BLDC GLUCOMTR-MCNC: 75 MG/DL (ref 70–130)
GLUCOSE BLDC GLUCOMTR-MCNC: 76 MG/DL (ref 70–130)
GLUCOSE BLDC GLUCOMTR-MCNC: 78 MG/DL (ref 70–130)
GLUCOSE BLDC GLUCOMTR-MCNC: 81 MG/DL (ref 70–130)
GLUCOSE BLDC GLUCOMTR-MCNC: 85 MG/DL (ref 70–130)
GLUCOSE BLDC GLUCOMTR-MCNC: 87 MG/DL (ref 70–130)
GLUCOSE BLDC GLUCOMTR-MCNC: 90 MG/DL (ref 70–130)
GLUCOSE BLDC GLUCOMTR-MCNC: 93 MG/DL (ref 70–130)
GLUCOSE SERPL-MCNC: 103 MG/DL (ref 65–99)
GLUCOSE SERPL-MCNC: 117 MG/DL (ref 65–99)
GLUCOSE SERPL-MCNC: 143 MG/DL (ref 65–99)
GLUCOSE SERPL-MCNC: 65 MG/DL (ref 65–99)
GLUCOSE SERPL-MCNC: 79 MG/DL (ref 65–99)
GLUCOSE SERPL-MCNC: 95 MG/DL (ref 65–99)
GLUCOSE UR STRIP-MCNC: NEGATIVE MG/DL
HADV DNA SPEC NAA+PROBE: NOT DETECTED
HCO3 BLDA-SCNC: 33.4 MMOL/L (ref 20–26)
HCO3 BLDA-SCNC: 34.5 MMOL/L (ref 20–26)
HCO3 BLDA-SCNC: 34.8 MMOL/L (ref 20–26)
HCO3 BLDA-SCNC: 36.4 MMOL/L (ref 20–26)
HCO3 BLDA-SCNC: 37.8 MMOL/L (ref 20–26)
HCO3 BLDA-SCNC: 37.9 MMOL/L (ref 20–26)
HCO3 BLDA-SCNC: 38.1 MMOL/L (ref 20–26)
HCO3 BLDA-SCNC: 38.2 MMOL/L (ref 20–26)
HCO3 BLDA-SCNC: 38.2 MMOL/L (ref 20–26)
HCO3 BLDA-SCNC: 38.5 MMOL/L (ref 20–26)
HCO3 BLDA-SCNC: 38.7 MMOL/L (ref 20–26)
HCO3 BLDA-SCNC: 38.7 MMOL/L (ref 20–26)
HCOV 229E RNA SPEC QL NAA+PROBE: NOT DETECTED
HCOV HKU1 RNA SPEC QL NAA+PROBE: NOT DETECTED
HCOV NL63 RNA SPEC QL NAA+PROBE: NOT DETECTED
HCOV OC43 RNA SPEC QL NAA+PROBE: NOT DETECTED
HCT VFR BLD AUTO: 30.2 % (ref 34–46.6)
HCT VFR BLD AUTO: 30.3 % (ref 34–46.6)
HCT VFR BLD AUTO: 32 % (ref 34–46.6)
HCT VFR BLD AUTO: 33.6 % (ref 34–46.6)
HCT VFR BLD AUTO: 34.3 % (ref 34–46.6)
HCT VFR BLD AUTO: 38.2 % (ref 34–46.6)
HCT VFR BLD CALC: 31.7 % (ref 38–51)
HGB BLD-MCNC: 11 G/DL (ref 12–15.9)
HGB BLD-MCNC: 9.1 G/DL (ref 12–15.9)
HGB BLD-MCNC: 9.1 G/DL (ref 12–15.9)
HGB BLD-MCNC: 9.4 G/DL (ref 12–15.9)
HGB BLD-MCNC: 9.6 G/DL (ref 12–15.9)
HGB BLD-MCNC: 9.8 G/DL (ref 12–15.9)
HGB BLDA-MCNC: 10.3 G/DL (ref 12–16)
HGB UR QL STRIP.AUTO: NEGATIVE
HMPV RNA NPH QL NAA+NON-PROBE: NOT DETECTED
HPIV1 RNA ISLT QL NAA+PROBE: NOT DETECTED
HPIV2 RNA SPEC QL NAA+PROBE: NOT DETECTED
HPIV3 RNA NPH QL NAA+PROBE: NOT DETECTED
HPIV4 P GENE NPH QL NAA+PROBE: NOT DETECTED
HYALINE CASTS UR QL AUTO: ABNORMAL /LPF
IMM GRANULOCYTES # BLD AUTO: 0.04 10*3/MM3 (ref 0–0.05)
IMM GRANULOCYTES # BLD AUTO: 0.05 10*3/MM3 (ref 0–0.05)
IMM GRANULOCYTES # BLD AUTO: 0.09 10*3/MM3 (ref 0–0.05)
IMM GRANULOCYTES # BLD AUTO: 0.15 10*3/MM3 (ref 0–0.05)
IMM GRANULOCYTES # BLD AUTO: 0.18 10*3/MM3 (ref 0–0.05)
IMM GRANULOCYTES # BLD AUTO: 0.23 10*3/MM3 (ref 0–0.05)
IMM GRANULOCYTES NFR BLD AUTO: 0.4 % (ref 0–0.5)
IMM GRANULOCYTES NFR BLD AUTO: 0.5 % (ref 0–0.5)
IMM GRANULOCYTES NFR BLD AUTO: 0.6 % (ref 0–0.5)
IMM GRANULOCYTES NFR BLD AUTO: 1.4 % (ref 0–0.5)
IMM GRANULOCYTES NFR BLD AUTO: 1.8 % (ref 0–0.5)
IMM GRANULOCYTES NFR BLD AUTO: 2.2 % (ref 0–0.5)
INHALED O2 CONCENTRATION: 40 %
INHALED O2 CONCENTRATION: 50 %
INHALED O2 CONCENTRATION: 60 %
INHALED O2 CONCENTRATION: 90 %
INR PPP: 1.12 (ref 0.91–1.09)
INR PPP: 1.13 (ref 0.91–1.09)
INR PPP: 1.18 (ref 0.91–1.09)
INR PPP: 1.22 (ref 0.91–1.09)
IPAP: 16
IPAP: 20
IPAP: 20
IPAP: 6
KETONES UR QL STRIP: ABNORMAL
LEFT ATRIUM VOLUME INDEX: 40.9 ML/M2
LEFT ATRIUM VOLUME: 53 ML
LEUKOCYTE ESTERASE UR QL STRIP.AUTO: ABNORMAL
LYMPHOCYTES # BLD AUTO: 0.62 10*3/MM3 (ref 0.7–3.1)
LYMPHOCYTES # BLD AUTO: 0.83 10*3/MM3 (ref 0.7–3.1)
LYMPHOCYTES # BLD AUTO: 0.99 10*3/MM3 (ref 0.7–3.1)
LYMPHOCYTES # BLD AUTO: 1.18 10*3/MM3 (ref 0.7–3.1)
LYMPHOCYTES # BLD AUTO: 1.39 10*3/MM3 (ref 0.7–3.1)
LYMPHOCYTES # BLD AUTO: 1.75 10*3/MM3 (ref 0.7–3.1)
LYMPHOCYTES NFR BLD AUTO: 10.7 % (ref 19.6–45.3)
LYMPHOCYTES NFR BLD AUTO: 13.4 % (ref 19.6–45.3)
LYMPHOCYTES NFR BLD AUTO: 16.9 % (ref 19.6–45.3)
LYMPHOCYTES NFR BLD AUTO: 5.7 % (ref 19.6–45.3)
LYMPHOCYTES NFR BLD AUTO: 8.2 % (ref 19.6–45.3)
LYMPHOCYTES NFR BLD AUTO: 8.3 % (ref 19.6–45.3)
Lab: ABNORMAL
Lab: NORMAL
M PNEUMO IGG SER IA-ACNC: NOT DETECTED
MAGNESIUM SERPL-MCNC: 1.7 MG/DL (ref 1.6–2.4)
MAGNESIUM SERPL-MCNC: 1.7 MG/DL (ref 1.6–2.4)
MAGNESIUM SERPL-MCNC: 1.8 MG/DL (ref 1.6–2.4)
MAGNESIUM SERPL-MCNC: 1.8 MG/DL (ref 1.6–2.4)
MAGNESIUM SERPL-MCNC: 1.9 MG/DL (ref 1.6–2.4)
MAGNESIUM SERPL-MCNC: 1.9 MG/DL (ref 1.6–2.4)
MAGNESIUM SERPL-MCNC: 2.1 MG/DL (ref 1.6–2.4)
MCH RBC QN AUTO: 27.4 PG (ref 26.6–33)
MCH RBC QN AUTO: 27.7 PG (ref 26.6–33)
MCH RBC QN AUTO: 27.8 PG (ref 26.6–33)
MCH RBC QN AUTO: 27.9 PG (ref 26.6–33)
MCH RBC QN AUTO: 27.9 PG (ref 26.6–33)
MCH RBC QN AUTO: 28.1 PG (ref 26.6–33)
MCHC RBC AUTO-ENTMCNC: 28 G/DL (ref 31.5–35.7)
MCHC RBC AUTO-ENTMCNC: 28.6 G/DL (ref 31.5–35.7)
MCHC RBC AUTO-ENTMCNC: 28.8 G/DL (ref 31.5–35.7)
MCHC RBC AUTO-ENTMCNC: 30 G/DL (ref 31.5–35.7)
MCHC RBC AUTO-ENTMCNC: 30 G/DL (ref 31.5–35.7)
MCHC RBC AUTO-ENTMCNC: 30.1 G/DL (ref 31.5–35.7)
MCV RBC AUTO: 91.8 FL (ref 79–97)
MCV RBC AUTO: 93 FL (ref 79–97)
MCV RBC AUTO: 93.5 FL (ref 79–97)
MCV RBC AUTO: 97 FL (ref 79–97)
MCV RBC AUTO: 97.4 FL (ref 79–97)
MCV RBC AUTO: 98 FL (ref 79–97)
METHADONE UR QL SCN: NEGATIVE
METHGB BLD QL: 0.6 % (ref 0–3)
MODALITY: ABNORMAL
MONOCYTES # BLD AUTO: 0.1 10*3/MM3 (ref 0.1–0.9)
MONOCYTES # BLD AUTO: 0.42 10*3/MM3 (ref 0.1–0.9)
MONOCYTES # BLD AUTO: 0.52 10*3/MM3 (ref 0.1–0.9)
MONOCYTES # BLD AUTO: 0.65 10*3/MM3 (ref 0.1–0.9)
MONOCYTES # BLD AUTO: 0.83 10*3/MM3 (ref 0.1–0.9)
MONOCYTES # BLD AUTO: 0.97 10*3/MM3 (ref 0.1–0.9)
MONOCYTES NFR BLD AUTO: 0.9 % (ref 5–12)
MONOCYTES NFR BLD AUTO: 4.2 % (ref 5–12)
MONOCYTES NFR BLD AUTO: 5.6 % (ref 5–12)
MONOCYTES NFR BLD AUTO: 6.3 % (ref 5–12)
MONOCYTES NFR BLD AUTO: 6.8 % (ref 5–12)
MONOCYTES NFR BLD AUTO: 8 % (ref 5–12)
MRSA DNA SPEC QL NAA+PROBE: NORMAL
NEUTROPHILS NFR BLD AUTO: 10.13 10*3/MM3 (ref 1.7–7)
NEUTROPHILS NFR BLD AUTO: 12.09 10*3/MM3 (ref 1.7–7)
NEUTROPHILS NFR BLD AUTO: 68 % (ref 42.7–76)
NEUTROPHILS NFR BLD AUTO: 7.05 10*3/MM3 (ref 1.7–7)
NEUTROPHILS NFR BLD AUTO: 7.54 10*3/MM3 (ref 1.7–7)
NEUTROPHILS NFR BLD AUTO: 7.98 10*3/MM3 (ref 1.7–7)
NEUTROPHILS NFR BLD AUTO: 77.2 % (ref 42.7–76)
NEUTROPHILS NFR BLD AUTO: 8.5 10*3/MM3 (ref 1.7–7)
NEUTROPHILS NFR BLD AUTO: 81.8 % (ref 42.7–76)
NEUTROPHILS NFR BLD AUTO: 84.2 % (ref 42.7–76)
NEUTROPHILS NFR BLD AUTO: 84.8 % (ref 42.7–76)
NEUTROPHILS NFR BLD AUTO: 92.8 % (ref 42.7–76)
NITRITE UR QL STRIP: POSITIVE
NOTIFIED BY: ABNORMAL
NOTIFIED WHO: ABNORMAL
NRBC BLD AUTO-RTO: 0 /100 WBC (ref 0–0.2)
NRBC BLD AUTO-RTO: 0.2 /100 WBC (ref 0–0.2)
NT-PROBNP SERPL-MCNC: 3688 PG/ML (ref 0–900)
OPIATES UR QL: NEGATIVE
OXYCODONE UR QL SCN: NEGATIVE
OXYHGB MFR BLDV: 92.8 % (ref 94–99)
PCO2 BLDA: 41 MM HG (ref 35–45)
PCO2 BLDA: 53.1 MM HG (ref 35–45)
PCO2 BLDA: 55.9 MM HG (ref 35–45)
PCO2 BLDA: 57.6 MM HG (ref 35–45)
PCO2 BLDA: 58.5 MM HG (ref 35–45)
PCO2 BLDA: 58.6 MM HG (ref 35–45)
PCO2 BLDA: 62.1 MM HG (ref 35–45)
PCO2 BLDA: 76.1 MM HG (ref 35–45)
PCO2 BLDA: 77.5 MM HG (ref 35–45)
PCO2 BLDA: 80.2 MM HG (ref 35–45)
PCO2 BLDA: 80.8 MM HG (ref 35–45)
PCO2 BLDA: >102 MM HG (ref 35–45)
PCO2 TEMP ADJ BLD: 41 MM HG (ref 35–45)
PCO2 TEMP ADJ BLD: 53.1 MM HG (ref 35–45)
PCO2 TEMP ADJ BLD: 55.9 MM HG (ref 35–45)
PCO2 TEMP ADJ BLD: 57.6 MM HG (ref 35–45)
PCO2 TEMP ADJ BLD: 58.5 MM HG (ref 35–45)
PCO2 TEMP ADJ BLD: 58.6 MM HG (ref 35–45)
PCO2 TEMP ADJ BLD: 62.1 MM HG (ref 35–45)
PCO2 TEMP ADJ BLD: 76.1 MM HG (ref 35–45)
PCO2 TEMP ADJ BLD: 77.5 MM HG (ref 35–45)
PCO2 TEMP ADJ BLD: 80.2 MM HG (ref 35–45)
PCO2 TEMP ADJ BLD: 80.8 MM HG (ref 35–45)
PCO2 TEMP ADJ BLD: >102 MM HG (ref 35–45)
PCP UR QL SCN: NEGATIVE
PEEP RESPIRATORY: 5 CM[H2O]
PEEP RESPIRATORY: 8 CM[H2O]
PH BLDA: 7.13 PH UNITS (ref 7.35–7.45)
PH BLDA: 7.28 PH UNITS (ref 7.35–7.45)
PH BLDA: 7.29 PH UNITS (ref 7.35–7.45)
PH BLDA: 7.3 PH UNITS (ref 7.35–7.45)
PH BLDA: 7.31 PH UNITS (ref 7.35–7.45)
PH BLDA: 7.35 PH UNITS (ref 7.35–7.45)
PH BLDA: 7.38 PH UNITS (ref 7.35–7.45)
PH BLDA: 7.38 PH UNITS (ref 7.35–7.45)
PH BLDA: 7.41 PH UNITS (ref 7.35–7.45)
PH BLDA: 7.43 PH UNITS (ref 7.35–7.45)
PH BLDA: 7.47 PH UNITS (ref 7.35–7.45)
PH BLDA: 7.58 PH UNITS (ref 7.35–7.45)
PH UR STRIP.AUTO: 5.5 [PH] (ref 5–8)
PH, TEMP CORRECTED: 7.13 PH UNITS (ref 7.35–7.45)
PH, TEMP CORRECTED: 7.28 PH UNITS (ref 7.35–7.45)
PH, TEMP CORRECTED: 7.29 PH UNITS (ref 7.35–7.45)
PH, TEMP CORRECTED: 7.3 PH UNITS (ref 7.35–7.45)
PH, TEMP CORRECTED: 7.31 PH UNITS (ref 7.35–7.45)
PH, TEMP CORRECTED: 7.35 PH UNITS (ref 7.35–7.45)
PH, TEMP CORRECTED: 7.38 PH UNITS (ref 7.35–7.45)
PH, TEMP CORRECTED: 7.38 PH UNITS (ref 7.35–7.45)
PH, TEMP CORRECTED: 7.41 PH UNITS (ref 7.35–7.45)
PH, TEMP CORRECTED: 7.43 PH UNITS (ref 7.35–7.45)
PH, TEMP CORRECTED: 7.47 PH UNITS (ref 7.35–7.45)
PH, TEMP CORRECTED: 7.58 PH UNITS (ref 7.35–7.45)
PHOSPHATE SERPL-MCNC: 1.6 MG/DL (ref 2.5–4.5)
PHOSPHATE SERPL-MCNC: 3 MG/DL (ref 2.5–4.5)
PHOSPHATE SERPL-MCNC: 3.1 MG/DL (ref 2.5–4.5)
PHOSPHATE SERPL-MCNC: 3.8 MG/DL (ref 2.5–4.5)
PHOSPHATE SERPL-MCNC: 4.4 MG/DL (ref 2.5–4.5)
PHOSPHATE SERPL-MCNC: 5 MG/DL (ref 2.5–4.5)
PLATELET # BLD AUTO: 268 10*3/MM3 (ref 140–450)
PLATELET # BLD AUTO: 303 10*3/MM3 (ref 140–450)
PLATELET # BLD AUTO: 319 10*3/MM3 (ref 140–450)
PLATELET # BLD AUTO: 324 10*3/MM3 (ref 140–450)
PLATELET # BLD AUTO: 326 10*3/MM3 (ref 140–450)
PLATELET # BLD AUTO: 332 10*3/MM3 (ref 140–450)
PMV BLD AUTO: 10 FL (ref 6–12)
PMV BLD AUTO: 10 FL (ref 6–12)
PMV BLD AUTO: 10.4 FL (ref 6–12)
PMV BLD AUTO: 10.7 FL (ref 6–12)
PMV BLD AUTO: 10.9 FL (ref 6–12)
PMV BLD AUTO: 10.9 FL (ref 6–12)
PO2 BLD: 129 MM[HG] (ref 0–500)
PO2 BLD: 134 MM[HG] (ref 0–500)
PO2 BLD: 136 MM[HG] (ref 0–500)
PO2 BLD: 140 MM[HG] (ref 0–500)
PO2 BLD: 146 MM[HG] (ref 0–500)
PO2 BLD: 171 MM[HG] (ref 0–500)
PO2 BLD: 193 MM[HG] (ref 0–500)
PO2 BLD: 195 MM[HG] (ref 0–500)
PO2 BLD: 213 MM[HG] (ref 0–500)
PO2 BLD: 243 MM[HG] (ref 0–500)
PO2 BLD: 91 MM[HG] (ref 0–500)
PO2 BLDA: 54.4 MM HG (ref 83–108)
PO2 BLDA: 64.4 MM HG (ref 83–108)
PO2 BLDA: 66.9 MM HG (ref 83–108)
PO2 BLDA: 69.9 MM HG (ref 83–108)
PO2 BLDA: 78.1 MM HG (ref 83–108)
PO2 BLDA: 81.5 MM HG (ref 83–108)
PO2 BLDA: 82 MM HG (ref 83–108)
PO2 BLDA: 85 MM HG (ref 83–108)
PO2 BLDA: 85.7 MM HG (ref 83–108)
PO2 BLDA: 87.8 MM HG (ref 83–108)
PO2 BLDA: 96.5 MM HG (ref 83–108)
PO2 BLDA: 97.2 MM HG (ref 83–108)
PO2 TEMP ADJ BLD: 54.4 MM HG (ref 83–108)
PO2 TEMP ADJ BLD: 64.4 MM HG (ref 83–108)
PO2 TEMP ADJ BLD: 66.9 MM HG (ref 83–108)
PO2 TEMP ADJ BLD: 69.9 MM HG (ref 83–108)
PO2 TEMP ADJ BLD: 78.1 MM HG (ref 83–108)
PO2 TEMP ADJ BLD: 81.5 MM HG (ref 83–108)
PO2 TEMP ADJ BLD: 82 MM HG (ref 83–108)
PO2 TEMP ADJ BLD: 85 MM HG (ref 83–108)
PO2 TEMP ADJ BLD: 85.7 MM HG (ref 83–108)
PO2 TEMP ADJ BLD: 87.8 MM HG (ref 83–108)
PO2 TEMP ADJ BLD: 96.5 MM HG (ref 83–108)
PO2 TEMP ADJ BLD: 97.2 MM HG (ref 83–108)
POTASSIUM BLDA-SCNC: 4.1 MMOL/L (ref 3.5–5.2)
POTASSIUM SERPL-SCNC: 3.6 MMOL/L (ref 3.5–5.2)
POTASSIUM SERPL-SCNC: 3.8 MMOL/L (ref 3.5–5.2)
POTASSIUM SERPL-SCNC: 3.9 MMOL/L (ref 3.5–5.2)
POTASSIUM SERPL-SCNC: 4.1 MMOL/L (ref 3.5–5.2)
POTASSIUM SERPL-SCNC: 4.1 MMOL/L (ref 3.5–5.2)
POTASSIUM SERPL-SCNC: 4.3 MMOL/L (ref 3.5–5.2)
POTASSIUM SERPL-SCNC: 4.5 MMOL/L (ref 3.5–5.2)
PROCALCITONIN SERPL-MCNC: 0.08 NG/ML (ref 0–0.25)
PROT SERPL-MCNC: 5.7 G/DL (ref 6–8.5)
PROT SERPL-MCNC: 6.3 G/DL (ref 6–8.5)
PROT SERPL-MCNC: 6.7 G/DL (ref 6–8.5)
PROT SERPL-MCNC: 6.7 G/DL (ref 6–8.5)
PROT SERPL-MCNC: 6.8 G/DL (ref 6–8.5)
PROT UR QL STRIP: NEGATIVE
PROTHROMBIN TIME: 14.9 SECONDS (ref 11.8–14.8)
PROTHROMBIN TIME: 14.9 SECONDS (ref 11.8–14.8)
PROTHROMBIN TIME: 15.5 SECONDS (ref 11.8–14.8)
PROTHROMBIN TIME: 15.9 SECONDS (ref 11.8–14.8)
QT INTERVAL: 400 MS
QT INTERVAL: 512 MS
QT INTERVAL: 530 MS
QTC INTERVAL: 422 MS
QTC INTERVAL: 579 MS
QTC INTERVAL: 583 MS
RBC # BLD AUTO: 3.24 10*6/MM3 (ref 3.77–5.28)
RBC # BLD AUTO: 3.29 10*6/MM3 (ref 3.77–5.28)
RBC # BLD AUTO: 3.43 10*6/MM3 (ref 3.77–5.28)
RBC # BLD AUTO: 3.44 10*6/MM3 (ref 3.77–5.28)
RBC # BLD AUTO: 3.52 10*6/MM3 (ref 3.77–5.28)
RBC # BLD AUTO: 3.94 10*6/MM3 (ref 3.77–5.28)
RBC # UR STRIP: ABNORMAL /HPF
REF LAB TEST METHOD: ABNORMAL
RHINOVIRUS RNA SPEC NAA+PROBE: NOT DETECTED
RSV RNA NPH QL NAA+NON-PROBE: NOT DETECTED
SAO2 % BLDCOA: 90.5 % (ref 94–99)
SAO2 % BLDCOA: 91.3 % (ref 94–99)
SAO2 % BLDCOA: 91.4 % (ref 94–99)
SAO2 % BLDCOA: 91.9 % (ref 94–99)
SAO2 % BLDCOA: 94.6 % (ref 94–99)
SAO2 % BLDCOA: 94.7 % (ref 94–99)
SAO2 % BLDCOA: 95.5 % (ref 94–99)
SAO2 % BLDCOA: 96.1 % (ref 94–99)
SAO2 % BLDCOA: 96.2 % (ref 94–99)
SAO2 % BLDCOA: 96.8 % (ref 94–99)
SAO2 % BLDCOA: 97.6 % (ref 94–99)
SAO2 % BLDCOA: 98.1 % (ref 94–99)
SARS-COV-2 RNA NPH QL NAA+NON-PROBE: NOT DETECTED
SET MECH RESP RATE: 14
SET MECH RESP RATE: 20
SODIUM BLDA-SCNC: 146 MMOL/L (ref 136–145)
SODIUM SERPL-SCNC: 139 MMOL/L (ref 136–145)
SODIUM SERPL-SCNC: 140 MMOL/L (ref 136–145)
SODIUM SERPL-SCNC: 141 MMOL/L (ref 136–145)
SODIUM SERPL-SCNC: 141 MMOL/L (ref 136–145)
SODIUM SERPL-SCNC: 143 MMOL/L (ref 136–145)
SODIUM SERPL-SCNC: 145 MMOL/L (ref 136–145)
SP GR UR STRIP: 1.02 (ref 1–1.03)
SQUAMOUS #/AREA URNS HPF: ABNORMAL /HPF
TRICYCLICS UR QL SCN: POSITIVE
TROPONIN T DELTA: 2 NG/L
TROPONIN T SERPL HS-MCNC: 39 NG/L
TSH SERPL DL<=0.05 MIU/L-ACNC: 1.69 UIU/ML (ref 0.27–4.2)
UROBILINOGEN UR QL STRIP: ABNORMAL
VENTILATOR MODE: ABNORMAL
VENTILATOR MODE: AC
VT ON VENT VENT: 500 ML
WBC # UR STRIP: ABNORMAL /HPF
WBC NRBC COR # BLD AUTO: 10.02 10*3/MM3 (ref 3.4–10.8)
WBC NRBC COR # BLD AUTO: 10.35 10*3/MM3 (ref 3.4–10.8)
WBC NRBC COR # BLD AUTO: 10.37 10*3/MM3 (ref 3.4–10.8)
WBC NRBC COR # BLD AUTO: 10.91 10*3/MM3 (ref 3.4–10.8)
WBC NRBC COR # BLD AUTO: 14.35 10*3/MM3 (ref 3.4–10.8)
WBC NRBC COR # BLD AUTO: 9.23 10*3/MM3 (ref 3.4–10.8)

## 2024-01-01 PROCEDURE — 82948 REAGENT STRIP/BLOOD GLUCOSE: CPT

## 2024-01-01 PROCEDURE — 82803 BLOOD GASES ANY COMBINATION: CPT

## 2024-01-01 PROCEDURE — 81001 URINALYSIS AUTO W/SCOPE: CPT | Performed by: FAMILY MEDICINE

## 2024-01-01 PROCEDURE — 94003 VENT MGMT INPAT SUBQ DAY: CPT

## 2024-01-01 PROCEDURE — 36600 WITHDRAWAL OF ARTERIAL BLOOD: CPT

## 2024-01-01 PROCEDURE — 71045 X-RAY EXAM CHEST 1 VIEW: CPT

## 2024-01-01 PROCEDURE — 85730 THROMBOPLASTIN TIME PARTIAL: CPT | Performed by: FAMILY MEDICINE

## 2024-01-01 PROCEDURE — 25010000002 ENOXAPARIN PER 10 MG: Performed by: INTERNAL MEDICINE

## 2024-01-01 PROCEDURE — 80053 COMPREHEN METABOLIC PANEL: CPT | Performed by: INTERNAL MEDICINE

## 2024-01-01 PROCEDURE — 83735 ASSAY OF MAGNESIUM: CPT | Performed by: INTERNAL MEDICINE

## 2024-01-01 PROCEDURE — 70450 CT HEAD/BRAIN W/O DYE: CPT

## 2024-01-01 PROCEDURE — 94799 UNLISTED PULMONARY SVC/PX: CPT

## 2024-01-01 PROCEDURE — 25010000002 CEFEPIME PER 500 MG: Performed by: NURSE PRACTITIONER

## 2024-01-01 PROCEDURE — 87088 URINE BACTERIA CULTURE: CPT | Performed by: FAMILY MEDICINE

## 2024-01-01 PROCEDURE — 94002 VENT MGMT INPAT INIT DAY: CPT

## 2024-01-01 PROCEDURE — 25010000002 CEFTRIAXONE PER 250 MG: Performed by: EMERGENCY MEDICINE

## 2024-01-01 PROCEDURE — 87641 MR-STAPH DNA AMP PROBE: CPT | Performed by: NURSE PRACTITIONER

## 2024-01-01 PROCEDURE — 25810000003 SODIUM CHLORIDE 0.9 % SOLUTION: Performed by: NURSE PRACTITIONER

## 2024-01-01 PROCEDURE — 83050 HGB METHEMOGLOBIN QUAN: CPT

## 2024-01-01 PROCEDURE — 25010000002 CEFTRIAXONE PER 250 MG: Performed by: PHYSICIAN ASSISTANT

## 2024-01-01 PROCEDURE — 84443 ASSAY THYROID STIM HORMONE: CPT | Performed by: NURSE PRACTITIONER

## 2024-01-01 PROCEDURE — 63710000001 PREDNISONE PER 1 MG: Performed by: INTERNAL MEDICINE

## 2024-01-01 PROCEDURE — 83735 ASSAY OF MAGNESIUM: CPT | Performed by: NURSE PRACTITIONER

## 2024-01-01 PROCEDURE — 25010000002 CEFTRIAXONE PER 250 MG: Performed by: NURSE PRACTITIONER

## 2024-01-01 PROCEDURE — 85610 PROTHROMBIN TIME: CPT | Performed by: FAMILY MEDICINE

## 2024-01-01 PROCEDURE — 83880 ASSAY OF NATRIURETIC PEPTIDE: CPT | Performed by: NURSE PRACTITIONER

## 2024-01-01 PROCEDURE — 83735 ASSAY OF MAGNESIUM: CPT | Performed by: FAMILY MEDICINE

## 2024-01-01 PROCEDURE — 36415 COLL VENOUS BLD VENIPUNCTURE: CPT | Performed by: NURSE PRACTITIONER

## 2024-01-01 PROCEDURE — 85610 PROTHROMBIN TIME: CPT | Performed by: NURSE PRACTITIONER

## 2024-01-01 PROCEDURE — 84100 ASSAY OF PHOSPHORUS: CPT | Performed by: NURSE PRACTITIONER

## 2024-01-01 PROCEDURE — 87077 CULTURE AEROBIC IDENTIFY: CPT | Performed by: FAMILY MEDICINE

## 2024-01-01 PROCEDURE — 25010000002 FENTANYL CITRATE (PF) 50 MCG/ML SOLUTION: Performed by: INTERNAL MEDICINE

## 2024-01-01 PROCEDURE — 25010000002 PROPOFOL 1000 MG/100ML EMULSION

## 2024-01-01 PROCEDURE — 84100 ASSAY OF PHOSPHORUS: CPT | Performed by: INTERNAL MEDICINE

## 2024-01-01 PROCEDURE — 25010000002 FUROSEMIDE PER 20 MG: Performed by: NURSE PRACTITIONER

## 2024-01-01 PROCEDURE — 25010000002 FUROSEMIDE PER 20 MG: Performed by: INTERNAL MEDICINE

## 2024-01-01 PROCEDURE — 94761 N-INVAS EAR/PLS OXIMETRY MLT: CPT

## 2024-01-01 PROCEDURE — 25010000002 FENTANYL CITRATE (PF) 2500 MCG/50ML SOLUTION: Performed by: NURSE PRACTITIONER

## 2024-01-01 PROCEDURE — 85025 COMPLETE CBC W/AUTO DIFF WBC: CPT | Performed by: INTERNAL MEDICINE

## 2024-01-01 PROCEDURE — 87086 URINE CULTURE/COLONY COUNT: CPT | Performed by: FAMILY MEDICINE

## 2024-01-01 PROCEDURE — 82805 BLOOD GASES W/O2 SATURATION: CPT

## 2024-01-01 PROCEDURE — 93010 ELECTROCARDIOGRAM REPORT: CPT | Performed by: EMERGENCY MEDICINE

## 2024-01-01 PROCEDURE — 25010000002 PROPOFOL 10 MG/ML EMULSION: Performed by: NURSE PRACTITIONER

## 2024-01-01 PROCEDURE — 25010000002 MIDAZOLAM PER 1MG: Performed by: NURSE PRACTITIONER

## 2024-01-01 PROCEDURE — 93005 ELECTROCARDIOGRAM TRACING: CPT | Performed by: NURSE PRACTITIONER

## 2024-01-01 PROCEDURE — 25010000002 METHYLPREDNISOLONE PER 125 MG: Performed by: EMERGENCY MEDICINE

## 2024-01-01 PROCEDURE — 51702 INSERT TEMP BLADDER CATH: CPT

## 2024-01-01 PROCEDURE — 85025 COMPLETE CBC W/AUTO DIFF WBC: CPT | Performed by: NURSE PRACTITIONER

## 2024-01-01 PROCEDURE — 87186 SC STD MICRODIL/AGAR DIL: CPT | Performed by: FAMILY MEDICINE

## 2024-01-01 PROCEDURE — 84132 ASSAY OF SERUM POTASSIUM: CPT | Performed by: NURSE PRACTITIONER

## 2024-01-01 PROCEDURE — 84484 ASSAY OF TROPONIN QUANT: CPT | Performed by: FAMILY MEDICINE

## 2024-01-01 PROCEDURE — 93306 TTE W/DOPPLER COMPLETE: CPT

## 2024-01-01 PROCEDURE — 94664 DEMO&/EVAL PT USE INHALER: CPT

## 2024-01-01 PROCEDURE — 99285 EMERGENCY DEPT VISIT HI MDM: CPT

## 2024-01-01 PROCEDURE — 87040 BLOOD CULTURE FOR BACTERIA: CPT | Performed by: FAMILY MEDICINE

## 2024-01-01 PROCEDURE — 74018 RADEX ABDOMEN 1 VIEW: CPT

## 2024-01-01 PROCEDURE — 94660 CPAP INITIATION&MGMT: CPT

## 2024-01-01 PROCEDURE — 0202U NFCT DS 22 TRGT SARS-COV-2: CPT | Performed by: NURSE PRACTITIONER

## 2024-01-01 PROCEDURE — 71275 CT ANGIOGRAPHY CHEST: CPT

## 2024-01-01 PROCEDURE — 93306 TTE W/DOPPLER COMPLETE: CPT | Performed by: HOSPITALIST

## 2024-01-01 PROCEDURE — 82375 ASSAY CARBOXYHB QUANT: CPT

## 2024-01-01 PROCEDURE — 25010000002 METHYLPREDNISOLONE PER 40 MG: Performed by: NURSE PRACTITIONER

## 2024-01-01 PROCEDURE — 82140 ASSAY OF AMMONIA: CPT | Performed by: NURSE PRACTITIONER

## 2024-01-01 PROCEDURE — 80307 DRUG TEST PRSMV CHEM ANLYZR: CPT | Performed by: NURSE PRACTITIONER

## 2024-01-01 PROCEDURE — 25510000001 PERFLUTREN 6.52 MG/ML SUSPENSION 2 ML VIAL: Performed by: INTERNAL MEDICINE

## 2024-01-01 PROCEDURE — 31500 INSERT EMERGENCY AIRWAY: CPT | Performed by: NURSE PRACTITIONER

## 2024-01-01 PROCEDURE — 80048 BASIC METABOLIC PNL TOTAL CA: CPT | Performed by: NURSE PRACTITIONER

## 2024-01-01 PROCEDURE — 5A09357 ASSISTANCE WITH RESPIRATORY VENTILATION, LESS THAN 24 CONSECUTIVE HOURS, CONTINUOUS POSITIVE AIRWAY PRESSURE: ICD-10-PCS | Performed by: EMERGENCY MEDICINE

## 2024-01-01 PROCEDURE — 25010000002 EPINEPHRINE 1 MG/10ML SOLUTION PREFILLED SYRINGE: Performed by: NURSE PRACTITIONER

## 2024-01-01 PROCEDURE — 80053 COMPREHEN METABOLIC PANEL: CPT | Performed by: NURSE PRACTITIONER

## 2024-01-01 PROCEDURE — 25010000002 VANCOMYCIN 1000 MG/200ML SOLUTION: Performed by: NURSE PRACTITIONER

## 2024-01-01 PROCEDURE — 82330 ASSAY OF CALCIUM: CPT

## 2024-01-01 PROCEDURE — 85025 COMPLETE CBC W/AUTO DIFF WBC: CPT | Performed by: FAMILY MEDICINE

## 2024-01-01 PROCEDURE — 92950 HEART/LUNG RESUSCITATION CPR: CPT

## 2024-01-01 PROCEDURE — 25510000001 IOPAMIDOL PER 1 ML: Performed by: INTERNAL MEDICINE

## 2024-01-01 PROCEDURE — 94640 AIRWAY INHALATION TREATMENT: CPT

## 2024-01-01 PROCEDURE — 83605 ASSAY OF LACTIC ACID: CPT | Performed by: NURSE PRACTITIONER

## 2024-01-01 PROCEDURE — 83605 ASSAY OF LACTIC ACID: CPT | Performed by: FAMILY MEDICINE

## 2024-01-01 PROCEDURE — 87154 CUL TYP ID BLD PTHGN 6+ TRGT: CPT | Performed by: FAMILY MEDICINE

## 2024-01-01 PROCEDURE — 25010000002 FENTANYL CITRATE (PF) 50 MCG/ML SOLUTION: Performed by: NURSE PRACTITIONER

## 2024-01-01 PROCEDURE — 5A1955Z RESPIRATORY VENTILATION, GREATER THAN 96 CONSECUTIVE HOURS: ICD-10-PCS | Performed by: NURSE PRACTITIONER

## 2024-01-01 PROCEDURE — 84145 PROCALCITONIN (PCT): CPT | Performed by: NURSE PRACTITIONER

## 2024-01-01 PROCEDURE — 80053 COMPREHEN METABOLIC PANEL: CPT | Performed by: FAMILY MEDICINE

## 2024-01-01 PROCEDURE — 99222 1ST HOSP IP/OBS MODERATE 55: CPT | Performed by: NURSE PRACTITIONER

## 2024-01-01 PROCEDURE — 0BH17EZ INSERTION OF ENDOTRACHEAL AIRWAY INTO TRACHEA, VIA NATURAL OR ARTIFICIAL OPENING: ICD-10-PCS | Performed by: NURSE PRACTITIONER

## 2024-01-01 PROCEDURE — 74177 CT ABD & PELVIS W/CONTRAST: CPT

## 2024-01-01 RX ORDER — PREDNISONE 20 MG/1
20 TABLET ORAL DAILY
Status: DISCONTINUED | OUTPATIENT
Start: 2024-01-01 | End: 2024-01-01 | Stop reason: HOSPADM

## 2024-01-01 RX ORDER — METHYLPREDNISOLONE SODIUM SUCCINATE 40 MG/ML
40 INJECTION, POWDER, LYOPHILIZED, FOR SOLUTION INTRAMUSCULAR; INTRAVENOUS EVERY 8 HOURS
Status: DISCONTINUED | OUTPATIENT
Start: 2024-01-01 | End: 2024-01-01

## 2024-01-01 RX ORDER — ROCURONIUM BROMIDE 10 MG/ML
50 INJECTION, SOLUTION INTRAVENOUS ONCE
Status: COMPLETED | OUTPATIENT
Start: 2024-01-01 | End: 2024-01-01

## 2024-01-01 RX ORDER — FUROSEMIDE 10 MG/ML
40 INJECTION INTRAMUSCULAR; INTRAVENOUS ONCE
Status: COMPLETED | OUTPATIENT
Start: 2024-01-01 | End: 2024-01-01

## 2024-01-01 RX ORDER — SODIUM CHLORIDE 9 MG/ML
40 INJECTION, SOLUTION INTRAVENOUS AS NEEDED
Status: DISCONTINUED | OUTPATIENT
Start: 2024-01-01 | End: 2024-01-01 | Stop reason: HOSPADM

## 2024-01-01 RX ORDER — DEXTROSE MONOHYDRATE 25 G/50ML
INJECTION, SOLUTION INTRAVENOUS
Status: COMPLETED
Start: 2024-01-01 | End: 2024-01-01

## 2024-01-01 RX ORDER — ETOMIDATE 2 MG/ML
INJECTION INTRAVENOUS
Status: COMPLETED | OUTPATIENT
Start: 2024-01-01 | End: 2024-01-01

## 2024-01-01 RX ORDER — BUDESONIDE 0.5 MG/2ML
0.5 INHALANT ORAL
Status: DISCONTINUED | OUTPATIENT
Start: 2024-01-01 | End: 2024-01-01 | Stop reason: HOSPADM

## 2024-01-01 RX ORDER — LEVOTHYROXINE SODIUM 75 UG/1
75 TABLET ORAL
Status: DISCONTINUED | OUTPATIENT
Start: 2024-01-01 | End: 2024-01-01 | Stop reason: HOSPADM

## 2024-01-01 RX ORDER — IPRATROPIUM BROMIDE AND ALBUTEROL SULFATE 2.5; .5 MG/3ML; MG/3ML
3 SOLUTION RESPIRATORY (INHALATION) ONCE
Status: COMPLETED | OUTPATIENT
Start: 2024-01-01 | End: 2024-01-01

## 2024-01-01 RX ORDER — MIDAZOLAM HYDROCHLORIDE 2 MG/2ML
2 INJECTION, SOLUTION INTRAMUSCULAR; INTRAVENOUS ONCE
Status: COMPLETED | OUTPATIENT
Start: 2024-01-01 | End: 2024-01-01

## 2024-01-01 RX ORDER — BISACODYL 10 MG
10 SUPPOSITORY, RECTAL RECTAL DAILY PRN
Status: DISCONTINUED | OUTPATIENT
Start: 2024-01-01 | End: 2024-01-01 | Stop reason: HOSPADM

## 2024-01-01 RX ORDER — FENTANYL CITRATE 50 UG/ML
50 INJECTION, SOLUTION INTRAMUSCULAR; INTRAVENOUS
Status: DISCONTINUED | OUTPATIENT
Start: 2024-01-01 | End: 2024-01-01

## 2024-01-01 RX ORDER — SODIUM CHLORIDE 0.9 % (FLUSH) 0.9 %
10 SYRINGE (ML) INJECTION EVERY 12 HOURS SCHEDULED
Status: DISCONTINUED | OUTPATIENT
Start: 2024-01-01 | End: 2024-01-01 | Stop reason: HOSPADM

## 2024-01-01 RX ORDER — POTASSIUM CHLORIDE 1.5 G/1.58G
40 POWDER, FOR SOLUTION ORAL EVERY 4 HOURS
Status: COMPLETED | OUTPATIENT
Start: 2024-01-01 | End: 2024-01-01

## 2024-01-01 RX ORDER — PANTOPRAZOLE SODIUM 40 MG/1
40 TABLET, DELAYED RELEASE ORAL
Status: DISCONTINUED | OUTPATIENT
Start: 2024-01-01 | End: 2024-01-01

## 2024-01-01 RX ORDER — NITROGLYCERIN 0.4 MG/1
0.4 TABLET SUBLINGUAL
Status: DISCONTINUED | OUTPATIENT
Start: 2024-01-01 | End: 2024-01-01 | Stop reason: HOSPADM

## 2024-01-01 RX ORDER — DEXTROSE MONOHYDRATE 25 G/50ML
50 INJECTION, SOLUTION INTRAVENOUS ONCE
Status: COMPLETED | OUTPATIENT
Start: 2024-01-01 | End: 2024-01-01

## 2024-01-01 RX ORDER — IOPAMIDOL 755 MG/ML
200 INJECTION, SOLUTION INTRAVASCULAR
Status: COMPLETED | OUTPATIENT
Start: 2024-01-01 | End: 2024-01-01

## 2024-01-01 RX ORDER — ACETAMINOPHEN 325 MG/1
650 TABLET ORAL EVERY 4 HOURS PRN
Status: DISCONTINUED | OUTPATIENT
Start: 2024-01-01 | End: 2024-01-01 | Stop reason: HOSPADM

## 2024-01-01 RX ORDER — FUROSEMIDE 10 MG/ML
40 INJECTION INTRAMUSCULAR; INTRAVENOUS EVERY 4 HOURS
Status: COMPLETED | OUTPATIENT
Start: 2024-01-01 | End: 2024-01-01

## 2024-01-01 RX ORDER — PREDNISONE 10 MG/1
10 TABLET ORAL DAILY
Status: DISCONTINUED | OUTPATIENT
Start: 2024-11-08 | End: 2024-01-01 | Stop reason: HOSPADM

## 2024-01-01 RX ORDER — CARVEDILOL 25 MG/1
25 TABLET ORAL 2 TIMES DAILY WITH MEALS
Status: DISCONTINUED | OUTPATIENT
Start: 2024-01-01 | End: 2024-01-01

## 2024-01-01 RX ORDER — CHLORHEXIDINE GLUCONATE 500 MG/1
1 CLOTH TOPICAL ONCE
Status: COMPLETED | OUTPATIENT
Start: 2024-01-01 | End: 2024-01-01

## 2024-01-01 RX ORDER — DEXTROSE MONOHYDRATE 25 G/50ML
25 INJECTION, SOLUTION INTRAVENOUS
Status: DISCONTINUED | OUTPATIENT
Start: 2024-01-01 | End: 2024-01-01 | Stop reason: HOSPADM

## 2024-01-01 RX ORDER — ACETAZOLAMIDE 250 MG/1
500 TABLET ORAL 2 TIMES DAILY
Status: DISCONTINUED | OUTPATIENT
Start: 2024-01-01 | End: 2024-01-01 | Stop reason: HOSPADM

## 2024-01-01 RX ORDER — NYSTATIN 100000 [USP'U]/G
POWDER TOPICAL EVERY 12 HOURS SCHEDULED
Status: DISCONTINUED | OUTPATIENT
Start: 2024-01-01 | End: 2024-01-01 | Stop reason: HOSPADM

## 2024-01-01 RX ORDER — MIDAZOLAM HYDROCHLORIDE 2 MG/2ML
2 INJECTION, SOLUTION INTRAMUSCULAR; INTRAVENOUS EVERY 6 HOURS PRN
Status: DISCONTINUED | OUTPATIENT
Start: 2024-01-01 | End: 2024-01-01

## 2024-01-01 RX ORDER — BISACODYL 5 MG/1
5 TABLET, DELAYED RELEASE ORAL DAILY PRN
Status: DISCONTINUED | OUTPATIENT
Start: 2024-01-01 | End: 2024-01-01 | Stop reason: HOSPADM

## 2024-01-01 RX ORDER — ACETAMINOPHEN 650 MG/1
650 SUPPOSITORY RECTAL EVERY 4 HOURS PRN
Status: DISCONTINUED | OUTPATIENT
Start: 2024-01-01 | End: 2024-01-01 | Stop reason: HOSPADM

## 2024-01-01 RX ORDER — ONDANSETRON 2 MG/ML
4 INJECTION INTRAMUSCULAR; INTRAVENOUS EVERY 6 HOURS PRN
Status: DISCONTINUED | OUTPATIENT
Start: 2024-01-01 | End: 2024-01-01 | Stop reason: HOSPADM

## 2024-01-01 RX ORDER — DOXYCYCLINE 100 MG/1
100 TABLET ORAL EVERY 12 HOURS SCHEDULED
Status: DISCONTINUED | OUTPATIENT
Start: 2024-01-01 | End: 2024-01-01

## 2024-01-01 RX ORDER — ETOMIDATE 2 MG/ML
20 INJECTION INTRAVENOUS ONCE
Status: COMPLETED | OUTPATIENT
Start: 2024-01-01 | End: 2024-01-01

## 2024-01-01 RX ORDER — PREDNISONE 20 MG/1
40 TABLET ORAL DAILY
Status: COMPLETED | OUTPATIENT
Start: 2024-01-01 | End: 2024-01-01

## 2024-01-01 RX ORDER — NYSTATIN 100000 [USP'U]/G
POWDER TOPICAL EVERY 12 HOURS SCHEDULED
Status: DISCONTINUED | OUTPATIENT
Start: 2024-01-01 | End: 2024-01-01 | Stop reason: SDUPTHER

## 2024-01-01 RX ORDER — SODIUM CHLORIDE 0.9 % (FLUSH) 0.9 %
10 SYRINGE (ML) INJECTION AS NEEDED
Status: DISCONTINUED | OUTPATIENT
Start: 2024-01-01 | End: 2024-01-01 | Stop reason: HOSPADM

## 2024-01-01 RX ORDER — POLYETHYLENE GLYCOL 3350 17 G/17G
17 POWDER, FOR SOLUTION ORAL DAILY PRN
Status: DISCONTINUED | OUTPATIENT
Start: 2024-01-01 | End: 2024-01-01 | Stop reason: HOSPADM

## 2024-01-01 RX ORDER — AZITHROMYCIN 250 MG/1
500 TABLET, FILM COATED ORAL
Status: COMPLETED | OUTPATIENT
Start: 2024-01-01 | End: 2024-01-01

## 2024-01-01 RX ORDER — VANCOMYCIN 1 G/200ML
1000 INJECTION, SOLUTION INTRAVENOUS EVERY 12 HOURS
Status: DISCONTINUED | OUTPATIENT
Start: 2024-01-01 | End: 2024-01-01

## 2024-01-01 RX ORDER — ROCURONIUM BROMIDE 10 MG/ML
INJECTION, SOLUTION INTRAVENOUS
Status: COMPLETED | OUTPATIENT
Start: 2024-01-01 | End: 2024-01-01

## 2024-01-01 RX ORDER — PANTOPRAZOLE SODIUM 40 MG/10ML
40 INJECTION, POWDER, LYOPHILIZED, FOR SOLUTION INTRAVENOUS
Status: DISCONTINUED | OUTPATIENT
Start: 2024-01-01 | End: 2024-01-01 | Stop reason: HOSPADM

## 2024-01-01 RX ORDER — ALBUTEROL SULFATE 0.83 MG/ML
2.5 SOLUTION RESPIRATORY (INHALATION) ONCE
Status: COMPLETED | OUTPATIENT
Start: 2024-01-01 | End: 2024-01-01

## 2024-01-01 RX ORDER — ENOXAPARIN SODIUM 100 MG/ML
60 INJECTION SUBCUTANEOUS EVERY 12 HOURS
Status: DISCONTINUED | OUTPATIENT
Start: 2024-01-01 | End: 2024-01-01 | Stop reason: HOSPADM

## 2024-01-01 RX ORDER — PROPOFOL 10 MG/ML
INJECTION, EMULSION INTRAVENOUS
Status: COMPLETED
Start: 2024-01-01 | End: 2024-01-01

## 2024-01-01 RX ORDER — CHLORHEXIDINE GLUCONATE 500 MG/1
1 CLOTH TOPICAL EVERY 24 HOURS
Status: DISCONTINUED | OUTPATIENT
Start: 2024-01-01 | End: 2024-01-01 | Stop reason: HOSPADM

## 2024-01-01 RX ORDER — FENTANYL CITRATE 50 UG/ML
25 INJECTION, SOLUTION INTRAMUSCULAR; INTRAVENOUS EVERY 4 HOURS PRN
Status: DISCONTINUED | OUTPATIENT
Start: 2024-01-01 | End: 2024-01-01 | Stop reason: HOSPADM

## 2024-01-01 RX ORDER — METHYLPREDNISOLONE SODIUM SUCCINATE 125 MG/2ML
125 INJECTION, POWDER, LYOPHILIZED, FOR SOLUTION INTRAMUSCULAR; INTRAVENOUS ONCE
Status: COMPLETED | OUTPATIENT
Start: 2024-01-01 | End: 2024-01-01

## 2024-01-01 RX ORDER — AMOXICILLIN 250 MG
2 CAPSULE ORAL 2 TIMES DAILY
Status: DISCONTINUED | OUTPATIENT
Start: 2024-01-01 | End: 2024-01-01 | Stop reason: HOSPADM

## 2024-01-01 RX ORDER — CARVEDILOL 25 MG/1
25 TABLET ORAL 2 TIMES DAILY WITH MEALS
Status: DISCONTINUED | OUTPATIENT
Start: 2024-01-01 | End: 2024-01-01 | Stop reason: HOSPADM

## 2024-01-01 RX ORDER — NICOTINE POLACRILEX 4 MG
15 LOZENGE BUCCAL
Status: DISCONTINUED | OUTPATIENT
Start: 2024-01-01 | End: 2024-01-01 | Stop reason: HOSPADM

## 2024-01-01 RX ORDER — IPRATROPIUM BROMIDE AND ALBUTEROL SULFATE 2.5; .5 MG/3ML; MG/3ML
3 SOLUTION RESPIRATORY (INHALATION)
Status: DISCONTINUED | OUTPATIENT
Start: 2024-01-01 | End: 2024-01-01 | Stop reason: HOSPADM

## 2024-01-01 RX ORDER — DEXTROSE MONOHYDRATE 100 MG/ML
50 INJECTION, SOLUTION INTRAVENOUS CONTINUOUS
Status: DISPENSED | OUTPATIENT
Start: 2024-01-01 | End: 2024-01-01

## 2024-01-01 RX ORDER — MIDAZOLAM HYDROCHLORIDE 2 MG/2ML
2 INJECTION, SOLUTION INTRAMUSCULAR; INTRAVENOUS EVERY 4 HOURS PRN
Status: DISCONTINUED | OUTPATIENT
Start: 2024-01-01 | End: 2024-01-01

## 2024-01-01 RX ORDER — DEXMEDETOMIDINE HYDROCHLORIDE 4 UG/ML
.2-1.5 INJECTION, SOLUTION INTRAVENOUS
Status: DISCONTINUED | OUTPATIENT
Start: 2024-01-01 | End: 2024-01-01 | Stop reason: HOSPADM

## 2024-01-01 RX ORDER — DULOXETIN HYDROCHLORIDE 30 MG/1
60 CAPSULE, DELAYED RELEASE ORAL DAILY
Status: DISCONTINUED | OUTPATIENT
Start: 2024-01-01 | End: 2024-01-01 | Stop reason: HOSPADM

## 2024-01-01 RX ORDER — AMINO AC/PROTEIN HYDR/WHEY PRO 10G-100/30
45 LIQUID (ML) ORAL 2 TIMES DAILY
Status: DISCONTINUED | OUTPATIENT
Start: 2024-01-01 | End: 2024-01-01

## 2024-01-01 RX ADMIN — CARVEDILOL 25 MG: 25 TABLET, FILM COATED ORAL at 08:03

## 2024-01-01 RX ADMIN — FENTANYL CITRATE 50 MCG: 50 INJECTION, SOLUTION INTRAMUSCULAR; INTRAVENOUS at 22:01

## 2024-01-01 RX ADMIN — Medication 45 ML: at 20:45

## 2024-01-01 RX ADMIN — METHYLPREDNISOLONE SODIUM SUCCINATE 125 MG: 125 INJECTION, POWDER, FOR SOLUTION INTRAMUSCULAR; INTRAVENOUS at 19:14

## 2024-01-01 RX ADMIN — IPRATROPIUM BROMIDE AND ALBUTEROL SULFATE 3 ML: .5; 3 SOLUTION RESPIRATORY (INHALATION) at 10:39

## 2024-01-01 RX ADMIN — COLLAGENASE SANTYL 1 APPLICATION: 250 OINTMENT TOPICAL at 08:15

## 2024-01-01 RX ADMIN — CARVEDILOL 25 MG: 25 TABLET, FILM COATED ORAL at 08:14

## 2024-01-01 RX ADMIN — PROPOFOL 50 MCG/KG/MIN: 10 INJECTION, EMULSION INTRAVENOUS at 20:46

## 2024-01-01 RX ADMIN — IPRATROPIUM BROMIDE AND ALBUTEROL SULFATE 3 ML: .5; 3 SOLUTION RESPIRATORY (INHALATION) at 20:10

## 2024-01-01 RX ADMIN — DOCUSATE SODIUM 50 MG AND SENNOSIDES 8.6 MG 2 TABLET: 8.6; 5 TABLET, FILM COATED ORAL at 08:03

## 2024-01-01 RX ADMIN — Medication 10 ML: at 08:03

## 2024-01-01 RX ADMIN — DEXMEDETOMIDINE HYDROCHLORIDE 0.4 MCG/KG/HR: 4 INJECTION, SOLUTION INTRAVENOUS at 18:30

## 2024-01-01 RX ADMIN — IOPAMIDOL 200 ML: 755 INJECTION, SOLUTION INTRAVENOUS at 04:24

## 2024-01-01 RX ADMIN — IPRATROPIUM BROMIDE AND ALBUTEROL SULFATE 3 ML: .5; 3 SOLUTION RESPIRATORY (INHALATION) at 06:38

## 2024-01-01 RX ADMIN — Medication 1 APPLICATION: at 20:13

## 2024-01-01 RX ADMIN — DEXMEDETOMIDINE HYDROCHLORIDE 0.2 MCG/KG/HR: 4 INJECTION, SOLUTION INTRAVENOUS at 01:08

## 2024-01-01 RX ADMIN — BUDESONIDE 0.5 MG: 0.5 INHALANT RESPIRATORY (INHALATION) at 06:42

## 2024-01-01 RX ADMIN — Medication 1 APPLICATION: at 20:32

## 2024-01-01 RX ADMIN — Medication 45 ML: at 20:38

## 2024-01-01 RX ADMIN — CARVEDILOL 25 MG: 25 TABLET, FILM COATED ORAL at 18:15

## 2024-01-01 RX ADMIN — METHYLPREDNISOLONE SODIUM SUCCINATE 40 MG: 40 INJECTION, POWDER, FOR SOLUTION INTRAMUSCULAR; INTRAVENOUS at 10:18

## 2024-01-01 RX ADMIN — PROPOFOL 50 MCG/KG/MIN: 10 INJECTION, EMULSION INTRAVENOUS at 22:46

## 2024-01-01 RX ADMIN — ENOXAPARIN SODIUM 60 MG: 100 INJECTION SUBCUTANEOUS at 08:03

## 2024-01-01 RX ADMIN — NYSTATIN 1 APPLICATION: 100000 POWDER TOPICAL at 21:32

## 2024-01-01 RX ADMIN — Medication 45 ML: at 10:32

## 2024-01-01 RX ADMIN — Medication 10 ML: at 20:38

## 2024-01-01 RX ADMIN — CHLORHEXIDINE GLUCONATE 1 APPLICATION: 500 CLOTH TOPICAL at 04:35

## 2024-01-01 RX ADMIN — LEVOTHYROXINE SODIUM 75 MCG: 0.07 TABLET ORAL at 05:19

## 2024-01-01 RX ADMIN — PROPOFOL 50 MCG/KG/MIN: 10 INJECTION, EMULSION INTRAVENOUS at 16:07

## 2024-01-01 RX ADMIN — LEVOTHYROXINE SODIUM 75 MCG: 0.07 TABLET ORAL at 05:42

## 2024-01-01 RX ADMIN — AZITHROMYCIN 500 MG: 250 TABLET, FILM COATED ORAL at 08:41

## 2024-01-01 RX ADMIN — CARVEDILOL 25 MG: 25 TABLET, FILM COATED ORAL at 17:00

## 2024-01-01 RX ADMIN — PROPOFOL 35 MCG/KG/MIN: 10 INJECTION, EMULSION INTRAVENOUS at 17:15

## 2024-01-01 RX ADMIN — DOCUSATE SODIUM 50 MG AND SENNOSIDES 8.6 MG 2 TABLET: 8.6; 5 TABLET, FILM COATED ORAL at 09:21

## 2024-01-01 RX ADMIN — IPRATROPIUM BROMIDE AND ALBUTEROL SULFATE 3 ML: .5; 3 SOLUTION RESPIRATORY (INHALATION) at 07:02

## 2024-01-01 RX ADMIN — PROPOFOL 50 MCG/KG/MIN: 10 INJECTION, EMULSION INTRAVENOUS at 16:51

## 2024-01-01 RX ADMIN — PROPOFOL 50 MCG/KG/MIN: 10 INJECTION, EMULSION INTRAVENOUS at 17:59

## 2024-01-01 RX ADMIN — ENOXAPARIN SODIUM 60 MG: 100 INJECTION SUBCUTANEOUS at 21:17

## 2024-01-01 RX ADMIN — DOXYCYCLINE 100 MG: 100 TABLET ORAL at 09:21

## 2024-01-01 RX ADMIN — CARVEDILOL 25 MG: 25 TABLET, FILM COATED ORAL at 07:48

## 2024-01-01 RX ADMIN — PROPOFOL 50 MCG/KG/MIN: 10 INJECTION, EMULSION INTRAVENOUS at 02:23

## 2024-01-01 RX ADMIN — Medication 45 ML: at 21:31

## 2024-01-01 RX ADMIN — EPINEPHRINE 1 MG: 0.1 INJECTION INTRAVENOUS at 18:43

## 2024-01-01 RX ADMIN — ENOXAPARIN SODIUM 60 MG: 100 INJECTION SUBCUTANEOUS at 09:21

## 2024-01-01 RX ADMIN — PROPOFOL 50 MCG/KG/MIN: 10 INJECTION, EMULSION INTRAVENOUS at 13:53

## 2024-01-01 RX ADMIN — DOCUSATE SODIUM 50 MG AND SENNOSIDES 8.6 MG 2 TABLET: 8.6; 5 TABLET, FILM COATED ORAL at 21:16

## 2024-01-01 RX ADMIN — METHYLPREDNISOLONE SODIUM SUCCINATE 40 MG: 40 INJECTION, POWDER, FOR SOLUTION INTRAMUSCULAR; INTRAVENOUS at 02:22

## 2024-01-01 RX ADMIN — NYSTATIN: 100000 POWDER TOPICAL at 08:42

## 2024-01-01 RX ADMIN — Medication 1 APPLICATION: at 08:14

## 2024-01-01 RX ADMIN — DOCUSATE SODIUM 50 MG AND SENNOSIDES 8.6 MG 2 TABLET: 8.6; 5 TABLET, FILM COATED ORAL at 08:14

## 2024-01-01 RX ADMIN — ROCURONIUM BROMIDE 50 MG: 10 INJECTION, SOLUTION INTRAVENOUS at 03:17

## 2024-01-01 RX ADMIN — DOCUSATE SODIUM 50 MG AND SENNOSIDES 8.6 MG 2 TABLET: 8.6; 5 TABLET, FILM COATED ORAL at 20:33

## 2024-01-01 RX ADMIN — NYSTATIN: 100000 POWDER TOPICAL at 20:37

## 2024-01-01 RX ADMIN — PROPOFOL 35 MCG/KG/MIN: 10 INJECTION, EMULSION INTRAVENOUS at 00:49

## 2024-01-01 RX ADMIN — BUDESONIDE 0.5 MG: 0.5 INHALANT RESPIRATORY (INHALATION) at 06:06

## 2024-01-01 RX ADMIN — PROPOFOL 50 MCG/KG/MIN: 10 INJECTION, EMULSION INTRAVENOUS at 18:34

## 2024-01-01 RX ADMIN — BUDESONIDE 0.5 MG: 0.5 INHALANT RESPIRATORY (INHALATION) at 18:07

## 2024-01-01 RX ADMIN — Medication 45 ML: at 20:25

## 2024-01-01 RX ADMIN — FENTANYL CITRATE 125 MCG/HR: 50 INJECTION, SOLUTION INTRAMUSCULAR; INTRAVENOUS at 23:55

## 2024-01-01 RX ADMIN — CHLORHEXIDINE GLUCONATE 1 APPLICATION: 500 CLOTH TOPICAL at 04:00

## 2024-01-01 RX ADMIN — SODIUM CHLORIDE 1000 MG: 900 INJECTION INTRAVENOUS at 11:42

## 2024-01-01 RX ADMIN — COLLAGENASE SANTYL 1 APPLICATION: 250 OINTMENT TOPICAL at 12:21

## 2024-01-01 RX ADMIN — Medication 10 ML: at 08:15

## 2024-01-01 RX ADMIN — CARVEDILOL 25 MG: 25 TABLET, FILM COATED ORAL at 18:17

## 2024-01-01 RX ADMIN — AZITHROMYCIN 500 MG: 250 TABLET, FILM COATED ORAL at 17:20

## 2024-01-01 RX ADMIN — PROPOFOL 50 MCG/KG/MIN: 10 INJECTION, EMULSION INTRAVENOUS at 04:22

## 2024-01-01 RX ADMIN — ROCURONIUM BROMIDE 40 MG: 10 INJECTION, SOLUTION INTRAVENOUS at 18:29

## 2024-01-01 RX ADMIN — LEVOTHYROXINE SODIUM 75 MCG: 0.07 TABLET ORAL at 09:20

## 2024-01-01 RX ADMIN — PROPOFOL 35 MCG/KG/MIN: 10 INJECTION, EMULSION INTRAVENOUS at 03:54

## 2024-01-01 RX ADMIN — NYSTATIN: 100000 POWDER TOPICAL at 20:26

## 2024-01-01 RX ADMIN — FUROSEMIDE 40 MG: 10 INJECTION, SOLUTION INTRAVENOUS at 15:28

## 2024-01-01 RX ADMIN — PROPOFOL 50 MCG/KG/MIN: 10 INJECTION, EMULSION INTRAVENOUS at 23:54

## 2024-01-01 RX ADMIN — Medication 1 APPLICATION: at 20:41

## 2024-01-01 RX ADMIN — PROPOFOL 35 MCG/KG/MIN: 10 INJECTION, EMULSION INTRAVENOUS at 11:51

## 2024-01-01 RX ADMIN — DOXYCYCLINE 100 MG: 100 TABLET ORAL at 08:03

## 2024-01-01 RX ADMIN — Medication 45 ML: at 08:14

## 2024-01-01 RX ADMIN — PROPOFOL 50 MCG/KG/MIN: 10 INJECTION, EMULSION INTRAVENOUS at 20:08

## 2024-01-01 RX ADMIN — MIDAZOLAM HYDROCHLORIDE 2 MG: 1 INJECTION, SOLUTION INTRAMUSCULAR; INTRAVENOUS at 02:22

## 2024-01-01 RX ADMIN — SODIUM CHLORIDE 1000 MG: 900 INJECTION INTRAVENOUS at 10:32

## 2024-01-01 RX ADMIN — COLLAGENASE SANTYL 1 APPLICATION: 250 OINTMENT TOPICAL at 08:42

## 2024-01-01 RX ADMIN — SODIUM CHLORIDE 1000 MG: 900 INJECTION INTRAVENOUS at 10:53

## 2024-01-01 RX ADMIN — ACETAZOLAMIDE 500 MG: 250 TABLET ORAL at 20:32

## 2024-01-01 RX ADMIN — ALBUTEROL SULFATE 2.5 MG: 2.5 SOLUTION RESPIRATORY (INHALATION) at 18:17

## 2024-01-01 RX ADMIN — IPRATROPIUM BROMIDE AND ALBUTEROL SULFATE 3 ML: .5; 3 SOLUTION RESPIRATORY (INHALATION) at 18:07

## 2024-01-01 RX ADMIN — PANTOPRAZOLE SODIUM 40 MG: 40 INJECTION, POWDER, FOR SOLUTION INTRAVENOUS at 05:19

## 2024-01-01 RX ADMIN — RACEPINEPHRINE HYDROCHLORIDE 0.5 ML: 11.25 SOLUTION RESPIRATORY (INHALATION) at 15:31

## 2024-01-01 RX ADMIN — PROPOFOL 50 MCG/KG/MIN: 10 INJECTION, EMULSION INTRAVENOUS at 21:56

## 2024-01-01 RX ADMIN — ENOXAPARIN SODIUM 60 MG: 100 INJECTION SUBCUTANEOUS at 20:13

## 2024-01-01 RX ADMIN — VANCOMYCIN 1000 MG: 1 INJECTION, SOLUTION INTRAVENOUS at 09:25

## 2024-01-01 RX ADMIN — PROPOFOL 50 MCG/KG/MIN: 10 INJECTION, EMULSION INTRAVENOUS at 10:18

## 2024-01-01 RX ADMIN — Medication 1 APPLICATION: at 08:03

## 2024-01-01 RX ADMIN — Medication 45 ML: at 15:32

## 2024-01-01 RX ADMIN — FENTANYL CITRATE 25 MCG: 50 INJECTION, SOLUTION INTRAMUSCULAR; INTRAVENOUS at 03:42

## 2024-01-01 RX ADMIN — FUROSEMIDE 40 MG: 10 INJECTION, SOLUTION INTRAVENOUS at 10:53

## 2024-01-01 RX ADMIN — Medication 45 ML: at 08:04

## 2024-01-01 RX ADMIN — FUROSEMIDE 40 MG: 10 INJECTION, SOLUTION INTRAVENOUS at 08:14

## 2024-01-01 RX ADMIN — IPRATROPIUM BROMIDE AND ALBUTEROL SULFATE 3 ML: .5; 3 SOLUTION RESPIRATORY (INHALATION) at 14:32

## 2024-01-01 RX ADMIN — CHLORHEXIDINE GLUCONATE 1 APPLICATION: 500 CLOTH TOPICAL at 23:07

## 2024-01-01 RX ADMIN — PROPOFOL 35 MCG/KG/MIN: 10 INJECTION, EMULSION INTRAVENOUS at 23:04

## 2024-01-01 RX ADMIN — IPRATROPIUM BROMIDE AND ALBUTEROL SULFATE 3 ML: .5; 3 SOLUTION RESPIRATORY (INHALATION) at 15:01

## 2024-01-01 RX ADMIN — FUROSEMIDE 40 MG: 10 INJECTION, SOLUTION INTRAVENOUS at 23:11

## 2024-01-01 RX ADMIN — IPRATROPIUM BROMIDE AND ALBUTEROL SULFATE 3 ML: .5; 3 SOLUTION RESPIRATORY (INHALATION) at 10:27

## 2024-01-01 RX ADMIN — ACETAZOLAMIDE 500 MG: 250 TABLET ORAL at 20:13

## 2024-01-01 RX ADMIN — DEXMEDETOMIDINE HYDROCHLORIDE 0.2 MCG/KG/HR: 4 INJECTION, SOLUTION INTRAVENOUS at 11:39

## 2024-01-01 RX ADMIN — PROPOFOL 50 MCG/KG/MIN: 10 INJECTION, EMULSION INTRAVENOUS at 05:12

## 2024-01-01 RX ADMIN — IPRATROPIUM BROMIDE AND ALBUTEROL SULFATE 3 ML: .5; 3 SOLUTION RESPIRATORY (INHALATION) at 06:45

## 2024-01-01 RX ADMIN — Medication 1 APPLICATION: at 08:42

## 2024-01-01 RX ADMIN — POTASSIUM & SODIUM PHOSPHATES POWDER PACK 280-160-250 MG 2 PACKET: 280-160-250 PACK at 05:24

## 2024-01-01 RX ADMIN — SODIUM CHLORIDE 1000 MG: 900 INJECTION INTRAVENOUS at 20:57

## 2024-01-01 RX ADMIN — DOXYCYCLINE 100 MG: 100 TABLET ORAL at 21:16

## 2024-01-01 RX ADMIN — ETOMIDATE 30 MG: 2 INJECTION, SOLUTION INTRAVENOUS at 18:29

## 2024-01-01 RX ADMIN — PROPOFOL 50 MCG/KG/MIN: 10 INJECTION, EMULSION INTRAVENOUS at 08:17

## 2024-01-01 RX ADMIN — PANTOPRAZOLE SODIUM 40 MG: 40 INJECTION, POWDER, FOR SOLUTION INTRAVENOUS at 05:12

## 2024-01-01 RX ADMIN — FENTANYL CITRATE 25 MCG: 50 INJECTION, SOLUTION INTRAMUSCULAR; INTRAVENOUS at 01:38

## 2024-01-01 RX ADMIN — PROPOFOL 50 MCG/KG/MIN: 10 INJECTION, EMULSION INTRAVENOUS at 02:57

## 2024-01-01 RX ADMIN — COLLAGENASE SANTYL 1 APPLICATION: 250 OINTMENT TOPICAL at 08:04

## 2024-01-01 RX ADMIN — MIDAZOLAM HYDROCHLORIDE 2 MG: 1 INJECTION, SOLUTION INTRAMUSCULAR; INTRAVENOUS at 06:34

## 2024-01-01 RX ADMIN — PREDNISONE 20 MG: 20 TABLET ORAL at 08:14

## 2024-01-01 RX ADMIN — MIDAZOLAM HYDROCHLORIDE 2 MG: 1 INJECTION, SOLUTION INTRAMUSCULAR; INTRAVENOUS at 03:11

## 2024-01-01 RX ADMIN — ENOXAPARIN SODIUM 60 MG: 100 INJECTION SUBCUTANEOUS at 08:41

## 2024-01-01 RX ADMIN — PROPOFOL 35 MCG/KG/MIN: 10 INJECTION, EMULSION INTRAVENOUS at 14:15

## 2024-01-01 RX ADMIN — FENTANYL CITRATE 50 MCG: 50 INJECTION, SOLUTION INTRAMUSCULAR; INTRAVENOUS at 11:55

## 2024-01-01 RX ADMIN — DOCUSATE SODIUM 50 MG AND SENNOSIDES 8.6 MG 2 TABLET: 8.6; 5 TABLET, FILM COATED ORAL at 08:02

## 2024-01-01 RX ADMIN — DEXMEDETOMIDINE HYDROCHLORIDE 0.4 MCG/KG/HR: 4 INJECTION, SOLUTION INTRAVENOUS at 00:36

## 2024-01-01 RX ADMIN — IPRATROPIUM BROMIDE AND ALBUTEROL SULFATE 3 ML: .5; 3 SOLUTION RESPIRATORY (INHALATION) at 06:00

## 2024-01-01 RX ADMIN — DEXTROSE MONOHYDRATE 50 ML: 25 INJECTION, SOLUTION INTRAVENOUS at 06:50

## 2024-01-01 RX ADMIN — ACETAMINOPHEN 650 MG: 325 TABLET ORAL at 21:09

## 2024-01-01 RX ADMIN — ENOXAPARIN SODIUM 60 MG: 100 INJECTION SUBCUTANEOUS at 23:10

## 2024-01-01 RX ADMIN — VANCOMYCIN 1000 MG: 1 INJECTION, SOLUTION INTRAVENOUS at 23:24

## 2024-01-01 RX ADMIN — DEXTROSE MONOHYDRATE 50 ML: 25 INJECTION, SOLUTION INTRAVENOUS at 22:16

## 2024-01-01 RX ADMIN — PROPOFOL 50 MCG/KG/MIN: 10 INJECTION, EMULSION INTRAVENOUS at 12:21

## 2024-01-01 RX ADMIN — PROPOFOL 40 MCG/KG/MIN: 10 INJECTION, EMULSION INTRAVENOUS at 20:12

## 2024-01-01 RX ADMIN — PROPOFOL 50 MCG/KG/MIN: 10 INJECTION, EMULSION INTRAVENOUS at 13:50

## 2024-01-01 RX ADMIN — Medication 10 ML: at 08:42

## 2024-01-01 RX ADMIN — CHLORHEXIDINE GLUCONATE 1 APPLICATION: 500 CLOTH TOPICAL at 03:54

## 2024-01-01 RX ADMIN — EPINEPHRINE 1 MG: 0.1 INJECTION INTRAVENOUS at 18:46

## 2024-01-01 RX ADMIN — ACETAZOLAMIDE 500 MG: 250 TABLET ORAL at 08:41

## 2024-01-01 RX ADMIN — FENTANYL CITRATE 25 MCG: 50 INJECTION, SOLUTION INTRAMUSCULAR; INTRAVENOUS at 23:38

## 2024-01-01 RX ADMIN — IPRATROPIUM BROMIDE AND ALBUTEROL SULFATE 3 ML: .5; 3 SOLUTION RESPIRATORY (INHALATION) at 00:11

## 2024-01-01 RX ADMIN — CHLORHEXIDINE GLUCONATE 1 APPLICATION: 500 CLOTH TOPICAL at 05:34

## 2024-01-01 RX ADMIN — MIDAZOLAM HYDROCHLORIDE 2 MG: 1 INJECTION, SOLUTION INTRAMUSCULAR; INTRAVENOUS at 20:33

## 2024-01-01 RX ADMIN — LEVOTHYROXINE SODIUM 75 MCG: 0.07 TABLET ORAL at 05:12

## 2024-01-01 RX ADMIN — ACETAMINOPHEN 650 MG: 325 TABLET ORAL at 21:16

## 2024-01-01 RX ADMIN — PREDNISONE 40 MG: 20 TABLET ORAL at 17:19

## 2024-01-01 RX ADMIN — DEXMEDETOMIDINE HYDROCHLORIDE 0.2 MCG/KG/HR: 4 INJECTION, SOLUTION INTRAVENOUS at 05:54

## 2024-01-01 RX ADMIN — ETOMIDATE INJECTION 20 MG: 2 SOLUTION INTRAVENOUS at 03:17

## 2024-01-01 RX ADMIN — PREDNISONE 40 MG: 20 TABLET ORAL at 08:03

## 2024-01-01 RX ADMIN — IPRATROPIUM BROMIDE AND ALBUTEROL SULFATE 3 ML: .5; 3 SOLUTION RESPIRATORY (INHALATION) at 07:07

## 2024-01-01 RX ADMIN — IPRATROPIUM BROMIDE AND ALBUTEROL SULFATE 3 ML: .5; 3 SOLUTION RESPIRATORY (INHALATION) at 14:07

## 2024-01-01 RX ADMIN — BUDESONIDE 0.5 MG: 0.5 INHALANT RESPIRATORY (INHALATION) at 20:10

## 2024-01-01 RX ADMIN — CARVEDILOL 25 MG: 25 TABLET, FILM COATED ORAL at 18:22

## 2024-01-01 RX ADMIN — DOCUSATE SODIUM 50 MG AND SENNOSIDES 8.6 MG 2 TABLET: 8.6; 5 TABLET, FILM COATED ORAL at 08:41

## 2024-01-01 RX ADMIN — COLLAGENASE SANTYL 1 APPLICATION: 250 OINTMENT TOPICAL at 08:02

## 2024-01-01 RX ADMIN — PROPOFOL 45 MCG/KG/MIN: 10 INJECTION, EMULSION INTRAVENOUS at 07:56

## 2024-01-01 RX ADMIN — SODIUM CHLORIDE 1000 MG: 900 INJECTION INTRAVENOUS at 10:18

## 2024-01-01 RX ADMIN — Medication 1 APPLICATION: at 21:17

## 2024-01-01 RX ADMIN — PROPOFOL 50 MCG/KG/MIN: 10 INJECTION, EMULSION INTRAVENOUS at 07:41

## 2024-01-01 RX ADMIN — EPINEPHRINE 1 MG: 0.1 INJECTION INTRAVENOUS at 18:23

## 2024-01-01 RX ADMIN — EPINEPHRINE 1 MG: 0.1 INJECTION INTRAVENOUS at 18:40

## 2024-01-01 RX ADMIN — Medication 1 APPLICATION: at 09:21

## 2024-01-01 RX ADMIN — Medication 10 ML: at 20:26

## 2024-01-01 RX ADMIN — PANTOPRAZOLE SODIUM 40 MG: 40 INJECTION, POWDER, FOR SOLUTION INTRAVENOUS at 05:42

## 2024-01-01 RX ADMIN — PROPOFOL 50 MCG/KG/MIN: 10 INJECTION, EMULSION INTRAVENOUS at 02:11

## 2024-01-01 RX ADMIN — PROPOFOL 5 MCG/KG/MIN: 10 INJECTION, EMULSION INTRAVENOUS at 00:28

## 2024-01-01 RX ADMIN — FUROSEMIDE 40 MG: 10 INJECTION, SOLUTION INTRAVENOUS at 21:19

## 2024-01-01 RX ADMIN — Medication 10 ML: at 09:21

## 2024-01-01 RX ADMIN — IPRATROPIUM BROMIDE AND ALBUTEROL SULFATE 3 ML: .5; 3 SOLUTION RESPIRATORY (INHALATION) at 15:05

## 2024-01-01 RX ADMIN — EPINEPHRINE 1 MG: 0.1 INJECTION INTRAVENOUS at 18:37

## 2024-01-01 RX ADMIN — CEFEPIME 2000 MG: 2 INJECTION, POWDER, FOR SOLUTION INTRAVENOUS at 22:39

## 2024-01-01 RX ADMIN — IPRATROPIUM BROMIDE AND ALBUTEROL SULFATE 3 ML: .5; 3 SOLUTION RESPIRATORY (INHALATION) at 20:05

## 2024-01-01 RX ADMIN — NYSTATIN: 100000 POWDER TOPICAL at 08:04

## 2024-01-01 RX ADMIN — FENTANYL CITRATE 25 MCG: 50 INJECTION, SOLUTION INTRAMUSCULAR; INTRAVENOUS at 18:17

## 2024-01-01 RX ADMIN — POLYETHYLENE GLYCOL 3350 17 G: 17 POWDER, FOR SOLUTION ORAL at 08:40

## 2024-01-01 RX ADMIN — PROPOFOL 50 MCG/KG/MIN: 10 INJECTION, EMULSION INTRAVENOUS at 05:53

## 2024-01-01 RX ADMIN — DOCUSATE SODIUM 50 MG AND SENNOSIDES 8.6 MG 2 TABLET: 8.6; 5 TABLET, FILM COATED ORAL at 20:40

## 2024-01-01 RX ADMIN — ENOXAPARIN SODIUM 60 MG: 100 INJECTION SUBCUTANEOUS at 20:33

## 2024-01-01 RX ADMIN — FENTANYL CITRATE 50 MCG: 50 INJECTION, SOLUTION INTRAMUSCULAR; INTRAVENOUS at 12:27

## 2024-01-01 RX ADMIN — NYSTATIN: 100000 POWDER TOPICAL at 11:55

## 2024-01-01 RX ADMIN — ACETAZOLAMIDE 500 MG: 250 TABLET ORAL at 08:02

## 2024-01-01 RX ADMIN — SODIUM CHLORIDE 1000 MG: 900 INJECTION INTRAVENOUS at 20:16

## 2024-01-01 RX ADMIN — PROPOFOL 50 MCG/KG/MIN: 10 INJECTION, EMULSION INTRAVENOUS at 06:28

## 2024-01-01 RX ADMIN — CHLORHEXIDINE GLUCONATE 1 APPLICATION: 500 CLOTH TOPICAL at 05:25

## 2024-01-01 RX ADMIN — NYSTATIN: 100000 POWDER TOPICAL at 08:02

## 2024-01-01 RX ADMIN — DULOXETINE HYDROCHLORIDE 60 MG: 30 CAPSULE, DELAYED RELEASE ORAL at 08:03

## 2024-01-01 RX ADMIN — ACETAZOLAMIDE 500 MG: 250 TABLET ORAL at 10:18

## 2024-01-01 RX ADMIN — PROPOFOL 50 MCG/KG/MIN: 10 INJECTION, EMULSION INTRAVENOUS at 00:10

## 2024-01-01 RX ADMIN — RACEPINEPHRINE HYDROCHLORIDE 0.5 ML: 11.25 SOLUTION RESPIRATORY (INHALATION) at 17:57

## 2024-01-01 RX ADMIN — Medication 10 ML: at 21:17

## 2024-01-01 RX ADMIN — PROPOFOL 5 MCG/KG/MIN: 10 INJECTION, EMULSION INTRAVENOUS at 04:17

## 2024-01-01 RX ADMIN — DOCUSATE SODIUM 50 MG AND SENNOSIDES 8.6 MG 2 TABLET: 8.6; 5 TABLET, FILM COATED ORAL at 20:13

## 2024-01-01 RX ADMIN — MIDAZOLAM HYDROCHLORIDE 2 MG: 1 INJECTION, SOLUTION INTRAMUSCULAR; INTRAVENOUS at 21:34

## 2024-01-01 RX ADMIN — FENTANYL CITRATE 50 MCG/HR: 50 INJECTION, SOLUTION INTRAMUSCULAR; INTRAVENOUS at 22:53

## 2024-01-01 RX ADMIN — IPRATROPIUM BROMIDE AND ALBUTEROL SULFATE 3 ML: .5; 3 SOLUTION RESPIRATORY (INHALATION) at 19:47

## 2024-01-01 RX ADMIN — NYSTATIN: 100000 POWDER TOPICAL at 20:46

## 2024-01-01 RX ADMIN — POTASSIUM CHLORIDE 40 MEQ: 1.5 POWDER, FOR SOLUTION ORAL at 04:18

## 2024-01-01 RX ADMIN — DEXTROSE MONOHYDRATE 50 ML/HR: 100 INJECTION, SOLUTION INTRAVENOUS at 02:28

## 2024-01-01 RX ADMIN — IPRATROPIUM BROMIDE AND ALBUTEROL SULFATE 3 ML: .5; 3 SOLUTION RESPIRATORY (INHALATION) at 14:18

## 2024-01-01 RX ADMIN — Medication 10 ML: at 20:42

## 2024-01-01 RX ADMIN — IPRATROPIUM BROMIDE AND ALBUTEROL SULFATE 3 ML: .5; 3 SOLUTION RESPIRATORY (INHALATION) at 18:17

## 2024-01-01 RX ADMIN — LEVOTHYROXINE SODIUM 75 MCG: 0.07 TABLET ORAL at 06:12

## 2024-01-01 RX ADMIN — FENTANYL CITRATE 50 MCG: 50 INJECTION, SOLUTION INTRAMUSCULAR; INTRAVENOUS at 19:16

## 2024-01-01 RX ADMIN — Medication 1 APPLICATION: at 23:09

## 2024-01-01 RX ADMIN — PROPOFOL 30 MCG/KG/MIN: 10 INJECTION, EMULSION INTRAVENOUS at 07:51

## 2024-01-01 RX ADMIN — CEFEPIME 2000 MG: 2 INJECTION, POWDER, FOR SOLUTION INTRAVENOUS at 06:19

## 2024-01-01 RX ADMIN — PROPOFOL 40 MCG/KG/MIN: 10 INJECTION, EMULSION INTRAVENOUS at 09:38

## 2024-01-01 RX ADMIN — PROPOFOL 50 MCG/KG/MIN: 10 INJECTION, EMULSION INTRAVENOUS at 03:40

## 2024-01-01 RX ADMIN — PERFLUTREN 10 ML: 6.52 INJECTION, SUSPENSION INTRAVENOUS at 09:56

## 2024-01-01 RX ADMIN — BUDESONIDE 0.5 MG: 0.5 INHALANT RESPIRATORY (INHALATION) at 07:02

## 2024-01-01 RX ADMIN — PREDNISONE 40 MG: 20 TABLET ORAL at 08:42

## 2024-01-01 RX ADMIN — FENTANYL CITRATE 50 MCG: 50 INJECTION, SOLUTION INTRAMUSCULAR; INTRAVENOUS at 16:46

## 2024-01-01 RX ADMIN — ACETAZOLAMIDE 500 MG: 250 TABLET ORAL at 08:14

## 2024-01-01 RX ADMIN — PROPOFOL 35 MCG/KG/MIN: 10 INJECTION, EMULSION INTRAVENOUS at 06:32

## 2024-01-01 RX ADMIN — NYSTATIN: 100000 POWDER TOPICAL at 08:16

## 2024-01-01 RX ADMIN — ENOXAPARIN SODIUM 60 MG: 100 INJECTION SUBCUTANEOUS at 08:15

## 2024-01-01 RX ADMIN — PROPOFOL INJECTABLE EMULSION 5 MCG/KG/MIN: 10 INJECTION, EMULSION INTRAVENOUS at 00:28

## 2024-01-01 RX ADMIN — PROPOFOL 50 MCG/KG/MIN: 10 INJECTION, EMULSION INTRAVENOUS at 23:30

## 2024-01-01 RX ADMIN — ACETAZOLAMIDE 500 MG: 250 TABLET ORAL at 20:40

## 2024-01-01 RX ADMIN — POTASSIUM CHLORIDE 40 MEQ: 1.5 POWDER, FOR SOLUTION ORAL at 08:41

## 2024-01-01 RX ADMIN — FENTANYL CITRATE 50 MCG: 50 INJECTION, SOLUTION INTRAMUSCULAR; INTRAVENOUS at 01:01

## 2024-01-01 RX ADMIN — Medication 10 ML: at 23:07

## 2024-01-01 RX ADMIN — FENTANYL CITRATE 50 MCG: 50 INJECTION, SOLUTION INTRAMUSCULAR; INTRAVENOUS at 03:37

## 2024-01-01 RX ADMIN — MIDAZOLAM HYDROCHLORIDE 2 MG: 1 INJECTION, SOLUTION INTRAMUSCULAR; INTRAVENOUS at 20:24

## 2024-01-01 RX ADMIN — MIDAZOLAM HYDROCHLORIDE 2 MG: 1 INJECTION, SOLUTION INTRAMUSCULAR; INTRAVENOUS at 23:01

## 2024-01-01 RX ADMIN — IPRATROPIUM BROMIDE AND ALBUTEROL SULFATE 3 ML: .5; 3 SOLUTION RESPIRATORY (INHALATION) at 11:20

## 2024-01-01 RX ADMIN — AZITHROMYCIN 500 MG: 250 TABLET, FILM COATED ORAL at 08:02

## 2024-01-01 RX ADMIN — PROPOFOL 50 MCG/KG/MIN: 10 INJECTION, EMULSION INTRAVENOUS at 01:00

## 2024-01-01 RX ADMIN — DULOXETINE HYDROCHLORIDE 60 MG: 30 CAPSULE, DELAYED RELEASE ORAL at 08:14

## 2024-01-01 RX ADMIN — PROPOFOL 50 MCG/KG/MIN: 10 INJECTION, EMULSION INTRAVENOUS at 12:20

## 2024-01-01 RX ADMIN — Medication 45 ML: at 09:25

## 2024-01-01 RX ADMIN — FUROSEMIDE 40 MG: 10 INJECTION, SOLUTION INTRAVENOUS at 18:22

## 2024-01-01 RX ADMIN — BUDESONIDE 0.5 MG: 0.5 INHALANT RESPIRATORY (INHALATION) at 19:47

## 2024-01-01 RX ADMIN — ENOXAPARIN SODIUM 60 MG: 100 INJECTION SUBCUTANEOUS at 20:40

## 2024-01-01 RX ADMIN — PROPOFOL 50 MCG/KG/MIN: 10 INJECTION, EMULSION INTRAVENOUS at 10:21

## 2024-01-01 RX ADMIN — POLYETHYLENE GLYCOL 3350 17 G: 17 POWDER, FOR SOLUTION ORAL at 07:41

## 2024-01-02 LAB
BACTERIA SPEC AEROBE CULT: NORMAL
BACTERIA SPEC AEROBE CULT: NORMAL

## 2024-01-06 DIAGNOSIS — E05.90 HYPERTHYROIDISM: ICD-10-CM

## 2024-01-08 RX ORDER — CHOLECALCIFEROL (VITAMIN D3) 125 MCG
CAPSULE ORAL
Qty: 90 TABLET | Refills: 3 | OUTPATIENT
Start: 2024-01-08

## 2024-01-08 RX ORDER — LEVOTHYROXINE SODIUM 0.07 MG/1
75 TABLET ORAL DAILY
Qty: 30 TABLET | Refills: 0 | OUTPATIENT
Start: 2024-01-08

## 2024-01-08 RX ORDER — ALLOPURINOL 300 MG/1
300 TABLET ORAL DAILY
Qty: 30 TABLET | Refills: 5 | OUTPATIENT
Start: 2024-01-08

## 2024-01-30 ENCOUNTER — OFFICE VISIT (OUTPATIENT)
Dept: INTERNAL MEDICINE | Age: 69
End: 2024-01-30
Payer: MEDICARE

## 2024-01-30 VITALS
OXYGEN SATURATION: 95 % | BODY MASS INDEX: 57.52 KG/M2 | WEIGHT: 293 LBS | SYSTOLIC BLOOD PRESSURE: 138 MMHG | HEART RATE: 87 BPM | HEIGHT: 60 IN | DIASTOLIC BLOOD PRESSURE: 88 MMHG

## 2024-01-30 DIAGNOSIS — I50.9 CHRONIC CONGESTIVE HEART FAILURE, UNSPECIFIED HEART FAILURE TYPE (HCC): ICD-10-CM

## 2024-01-30 DIAGNOSIS — E78.5 HYPERLIPIDEMIA, UNSPECIFIED HYPERLIPIDEMIA TYPE: ICD-10-CM

## 2024-01-30 DIAGNOSIS — M79.2 NEUROPATHIC PAIN: ICD-10-CM

## 2024-01-30 DIAGNOSIS — J44.9 CHRONIC OBSTRUCTIVE PULMONARY DISEASE, UNSPECIFIED COPD TYPE (HCC): ICD-10-CM

## 2024-01-30 DIAGNOSIS — E55.9 VITAMIN D DEFICIENCY: ICD-10-CM

## 2024-01-30 DIAGNOSIS — R73.9 HYPERGLYCEMIA: ICD-10-CM

## 2024-01-30 DIAGNOSIS — I10 PRIMARY HYPERTENSION: ICD-10-CM

## 2024-01-30 DIAGNOSIS — E79.0 HYPERURICEMIA: ICD-10-CM

## 2024-01-30 DIAGNOSIS — R42 VERTIGO: ICD-10-CM

## 2024-01-30 DIAGNOSIS — F32.89 OTHER DEPRESSION: ICD-10-CM

## 2024-01-30 DIAGNOSIS — E66.01 MORBID OBESITY DUE TO EXCESS CALORIES (HCC): ICD-10-CM

## 2024-01-30 DIAGNOSIS — N32.81 OAB (OVERACTIVE BLADDER): ICD-10-CM

## 2024-01-30 DIAGNOSIS — E66.01 MORBID OBESITY (HCC): ICD-10-CM

## 2024-01-30 DIAGNOSIS — R60.0 LOWER EXTREMITY EDEMA: ICD-10-CM

## 2024-01-30 DIAGNOSIS — I50.42 CHRONIC COMBINED SYSTOLIC AND DIASTOLIC CONGESTIVE HEART FAILURE (HCC): ICD-10-CM

## 2024-01-30 DIAGNOSIS — R60.0 LOWER EXTREMITY EDEMA: Primary | ICD-10-CM

## 2024-01-30 DIAGNOSIS — Z91.09 ENVIRONMENTAL ALLERGIES: ICD-10-CM

## 2024-01-30 DIAGNOSIS — M19.90 OSTEOARTHRITIS, UNSPECIFIED OSTEOARTHRITIS TYPE, UNSPECIFIED SITE: ICD-10-CM

## 2024-01-30 DIAGNOSIS — G43.809 OTHER MIGRAINE WITHOUT STATUS MIGRAINOSUS, NOT INTRACTABLE: ICD-10-CM

## 2024-01-30 DIAGNOSIS — I27.20 PULMONARY HYPERTENSION (HCC): ICD-10-CM

## 2024-01-30 DIAGNOSIS — M25.50 ARTHRALGIA, UNSPECIFIED JOINT: ICD-10-CM

## 2024-01-30 DIAGNOSIS — L97.229 ULCER OF LEFT CALF, UNSPECIFIED ULCER STAGE (HCC): ICD-10-CM

## 2024-01-30 DIAGNOSIS — E03.9 HYPOTHYROIDISM, UNSPECIFIED TYPE: ICD-10-CM

## 2024-01-30 DIAGNOSIS — J45.20 MILD INTERMITTENT REACTIVE AIRWAY DISEASE WITHOUT COMPLICATION: ICD-10-CM

## 2024-01-30 LAB
25(OH)D3 SERPL-MCNC: 29.7 NG/ML
ALBUMIN SERPL-MCNC: 4.4 G/DL (ref 3.5–5.2)
ALP SERPL-CCNC: 53 U/L (ref 35–104)
ALT SERPL-CCNC: 15 U/L (ref 5–33)
ANION GAP SERPL CALCULATED.3IONS-SCNC: 14 MMOL/L (ref 7–19)
AST SERPL-CCNC: 17 U/L (ref 5–32)
BASOPHILS # BLD: 0.1 K/UL (ref 0–0.2)
BASOPHILS NFR BLD: 0.7 % (ref 0–1)
BILIRUB SERPL-MCNC: 0.5 MG/DL (ref 0.2–1.2)
BNP BLD-MCNC: 872 PG/ML (ref 0–124)
BUN SERPL-MCNC: 18 MG/DL (ref 8–23)
CALCIUM SERPL-MCNC: 10.4 MG/DL (ref 8.8–10.2)
CHLORIDE SERPL-SCNC: 105 MMOL/L (ref 98–111)
CHOLEST SERPL-MCNC: 160 MG/DL (ref 160–199)
CO2 SERPL-SCNC: 27 MMOL/L (ref 22–29)
CREAT SERPL-MCNC: 1.1 MG/DL (ref 0.5–0.9)
CREAT UR-MCNC: 31.3 MG/DL (ref 28–217)
EOSINOPHIL # BLD: 0.3 K/UL (ref 0–0.6)
EOSINOPHIL NFR BLD: 4.4 % (ref 0–5)
ERYTHROCYTE [DISTWIDTH] IN BLOOD BY AUTOMATED COUNT: 17.6 % (ref 11.5–14.5)
GLUCOSE SERPL-MCNC: 77 MG/DL (ref 74–109)
HBA1C MFR BLD: 5.4 % (ref 4–6)
HCT VFR BLD AUTO: 35.9 % (ref 37–47)
HDLC SERPL-MCNC: 51 MG/DL (ref 65–121)
HGB BLD-MCNC: 10.7 G/DL (ref 12–16)
IMM GRANULOCYTES # BLD: 0 K/UL
LDLC SERPL CALC-MCNC: 96 MG/DL
LYMPHOCYTES # BLD: 1.4 K/UL (ref 1.1–4.5)
LYMPHOCYTES NFR BLD: 21 % (ref 20–40)
MCH RBC QN AUTO: 28.2 PG (ref 27–31)
MCHC RBC AUTO-ENTMCNC: 29.8 G/DL (ref 33–37)
MCV RBC AUTO: 94.7 FL (ref 81–99)
MICROALBUMIN UR-MCNC: <1.2 MG/DL (ref 0–19)
MICROALBUMIN/CREAT UR-RTO: NORMAL MG/G
MONOCYTES # BLD: 0.6 K/UL (ref 0–0.9)
MONOCYTES NFR BLD: 8.6 % (ref 0–10)
NEUTROPHILS # BLD: 4.4 K/UL (ref 1.5–7.5)
NEUTS SEG NFR BLD: 64.9 % (ref 50–65)
PLATELET # BLD AUTO: 278 K/UL (ref 130–400)
PMV BLD AUTO: 10.4 FL (ref 9.4–12.3)
POTASSIUM SERPL-SCNC: 4.5 MMOL/L (ref 3.5–5)
PROT SERPL-MCNC: 7.4 G/DL (ref 6.6–8.7)
RBC # BLD AUTO: 3.79 M/UL (ref 4.2–5.4)
SODIUM SERPL-SCNC: 146 MMOL/L (ref 136–145)
TRIGL SERPL-MCNC: 63 MG/DL (ref 0–149)
TSH SERPL DL<=0.005 MIU/L-ACNC: 4.57 UIU/ML (ref 0.27–4.2)
WBC # BLD AUTO: 6.9 K/UL (ref 4.8–10.8)

## 2024-01-30 PROCEDURE — G8417 CALC BMI ABV UP PARAM F/U: HCPCS | Performed by: INTERNAL MEDICINE

## 2024-01-30 PROCEDURE — G8399 PT W/DXA RESULTS DOCUMENT: HCPCS | Performed by: INTERNAL MEDICINE

## 2024-01-30 PROCEDURE — 3023F SPIROM DOC REV: CPT | Performed by: INTERNAL MEDICINE

## 2024-01-30 PROCEDURE — 1123F ACP DISCUSS/DSCN MKR DOCD: CPT | Performed by: INTERNAL MEDICINE

## 2024-01-30 PROCEDURE — 1036F TOBACCO NON-USER: CPT | Performed by: INTERNAL MEDICINE

## 2024-01-30 PROCEDURE — 3079F DIAST BP 80-89 MM HG: CPT | Performed by: INTERNAL MEDICINE

## 2024-01-30 PROCEDURE — G8427 DOCREV CUR MEDS BY ELIG CLIN: HCPCS | Performed by: INTERNAL MEDICINE

## 2024-01-30 PROCEDURE — 3075F SYST BP GE 130 - 139MM HG: CPT | Performed by: INTERNAL MEDICINE

## 2024-01-30 PROCEDURE — 99214 OFFICE O/P EST MOD 30 MIN: CPT | Performed by: INTERNAL MEDICINE

## 2024-01-30 PROCEDURE — 3017F COLORECTAL CA SCREEN DOC REV: CPT | Performed by: INTERNAL MEDICINE

## 2024-01-30 PROCEDURE — 1090F PRES/ABSN URINE INCON ASSESS: CPT | Performed by: INTERNAL MEDICINE

## 2024-01-30 PROCEDURE — G8484 FLU IMMUNIZE NO ADMIN: HCPCS | Performed by: INTERNAL MEDICINE

## 2024-01-30 RX ORDER — ALBUTEROL SULFATE 90 UG/1
AEROSOL, METERED RESPIRATORY (INHALATION)
Qty: 18 G | Refills: 0 | Status: SHIPPED | OUTPATIENT
Start: 2024-01-30

## 2024-01-30 RX ORDER — AMITRIPTYLINE HYDROCHLORIDE 25 MG/1
TABLET, FILM COATED ORAL
Qty: 90 TABLET | Refills: 0 | Status: SHIPPED | OUTPATIENT
Start: 2024-01-30

## 2024-01-30 RX ORDER — FLUTICASONE PROPIONATE 50 MCG
1 SPRAY, SUSPENSION (ML) NASAL DAILY
Qty: 16 G | Refills: 2 | Status: SHIPPED | OUTPATIENT
Start: 2024-01-30

## 2024-01-30 RX ORDER — LEVOTHYROXINE SODIUM 0.07 MG/1
75 TABLET ORAL DAILY
Qty: 30 TABLET | Refills: 0 | Status: SHIPPED | OUTPATIENT
Start: 2024-01-30

## 2024-01-30 RX ORDER — CELECOXIB 100 MG/1
CAPSULE ORAL
Qty: 180 CAPSULE | Refills: 0 | Status: SHIPPED | OUTPATIENT
Start: 2024-01-30

## 2024-01-30 RX ORDER — BUMETANIDE 1 MG/1
1 TABLET ORAL 2 TIMES DAILY
Qty: 30 TABLET | Refills: 3 | Status: SHIPPED | OUTPATIENT
Start: 2024-01-30

## 2024-01-30 RX ORDER — SPIRONOLACTONE 25 MG/1
25 TABLET ORAL DAILY
Qty: 90 TABLET | Refills: 3 | Status: SHIPPED | OUTPATIENT
Start: 2024-01-30

## 2024-01-30 RX ORDER — SOLIFENACIN SUCCINATE 10 MG/1
10 TABLET, FILM COATED ORAL DAILY
Qty: 30 TABLET | Refills: 0 | Status: SHIPPED | OUTPATIENT
Start: 2024-01-30

## 2024-01-30 RX ORDER — CARVEDILOL 25 MG/1
25 TABLET ORAL 2 TIMES DAILY
Qty: 180 TABLET | Refills: 0 | Status: SHIPPED | OUTPATIENT
Start: 2024-01-30

## 2024-01-30 RX ORDER — DULOXETIN HYDROCHLORIDE 60 MG/1
CAPSULE, DELAYED RELEASE ORAL
Qty: 90 CAPSULE | Refills: 3 | Status: SHIPPED | OUTPATIENT
Start: 2024-01-30

## 2024-01-30 RX ORDER — FLUTICASONE PROPIONATE AND SALMETEROL 250; 50 UG/1; UG/1
1 POWDER RESPIRATORY (INHALATION) EVERY 12 HOURS
Qty: 60 EACH | Refills: 3 | Status: SHIPPED | OUTPATIENT
Start: 2024-01-30

## 2024-01-30 RX ORDER — CEPHALEXIN 500 MG/1
500 CAPSULE ORAL 3 TIMES DAILY
Qty: 30 CAPSULE | Refills: 0 | Status: SHIPPED | OUTPATIENT
Start: 2024-01-30 | End: 2024-02-09

## 2024-01-30 RX ORDER — EPINEPHRINE 0.3 MG/.3ML
0.3 INJECTION SUBCUTANEOUS ONCE
Qty: 0.3 ML | Refills: 1 | Status: SHIPPED | OUTPATIENT
Start: 2024-01-30 | End: 2024-01-30

## 2024-01-30 RX ORDER — VIT E ACET/GLY/DIMETH/WATER
LOTION (ML) TOPICAL
Qty: 473 ML | Refills: 1 | Status: SHIPPED | OUTPATIENT
Start: 2024-01-30

## 2024-01-30 RX ORDER — FENOFIBRATE 145 MG/1
TABLET, COATED ORAL
Qty: 90 TABLET | Refills: 3 | Status: SHIPPED | OUTPATIENT
Start: 2024-01-30

## 2024-01-30 RX ORDER — ALLOPURINOL 300 MG/1
300 TABLET ORAL DAILY
Qty: 30 TABLET | Refills: 5 | Status: SHIPPED | OUTPATIENT
Start: 2024-01-30

## 2024-01-30 RX ORDER — VIT E ACET/GLY/DIMETH/WATER
LOTION (ML) TOPICAL
Qty: 12 ML | Refills: 1 | Status: SHIPPED | OUTPATIENT
Start: 2024-01-30

## 2024-01-30 ASSESSMENT — PATIENT HEALTH QUESTIONNAIRE - PHQ9
4. FEELING TIRED OR HAVING LITTLE ENERGY: 0
2. FEELING DOWN, DEPRESSED OR HOPELESS: 0
6. FEELING BAD ABOUT YOURSELF - OR THAT YOU ARE A FAILURE OR HAVE LET YOURSELF OR YOUR FAMILY DOWN: 0
3. TROUBLE FALLING OR STAYING ASLEEP: 0
10. IF YOU CHECKED OFF ANY PROBLEMS, HOW DIFFICULT HAVE THESE PROBLEMS MADE IT FOR YOU TO DO YOUR WORK, TAKE CARE OF THINGS AT HOME, OR GET ALONG WITH OTHER PEOPLE: 0
1. LITTLE INTEREST OR PLEASURE IN DOING THINGS: 0
5. POOR APPETITE OR OVEREATING: 0
SUM OF ALL RESPONSES TO PHQ QUESTIONS 1-9: 0
SUM OF ALL RESPONSES TO PHQ QUESTIONS 1-9: 0
7. TROUBLE CONCENTRATING ON THINGS, SUCH AS READING THE NEWSPAPER OR WATCHING TELEVISION: 0
SUM OF ALL RESPONSES TO PHQ9 QUESTIONS 1 & 2: 0
SUM OF ALL RESPONSES TO PHQ QUESTIONS 1-9: 0
9. THOUGHTS THAT YOU WOULD BE BETTER OFF DEAD, OR OF HURTING YOURSELF: 0
8. MOVING OR SPEAKING SO SLOWLY THAT OTHER PEOPLE COULD HAVE NOTICED. OR THE OPPOSITE, BEING SO FIGETY OR RESTLESS THAT YOU HAVE BEEN MOVING AROUND A LOT MORE THAN USUAL: 0
SUM OF ALL RESPONSES TO PHQ QUESTIONS 1-9: 0

## 2024-01-30 NOTE — PROGRESS NOTES
Chief Complaint   Patient presents with    Other       HPI: Tessa Dias is a 68 y.o. female is here for evaluation of lower extremity swelling.  We have not seen her in over a year.  She states that over the last 3 months, the swelling has gotten worse.  She also has a lesion to the back of her leg.  She is does not want to go to wound care.  The lesion appears to be ulcerated.  However, it does not appear to be infected at this time.  The skin on her legs is also very dry.    She does have a history of congestive heart failure.  Upon further exam, she does not have pitting edema.  Her legs are very big however, she does not appear to be retaining fluid at this time.  Will check some labs today to include a CBC CMP, TSH, BNP, lipid panel TSH A1c and vitamin D.    She does have a history of of reactive airways disease, which is stable on Advair.  Her allergies are stable on Flonase.  Her thyroid function is stable.  She does have a history of hyperuricemia.  She is on allopurinol.  She denies any complaints of gout flares.  Elavil is helpful for both depression and neuropathic pain.  She also takes Cymbalta for depression.    She does have a history of migraines.  She does take Fioricet as needed.  Her blood pressure is well-controlled.  Celebrex is helpful for joint pain.    Her allergies are stable.  A TSH is currently pending.  She is taking thyroid medication.  She does have a history of vertigo and she is on Antivert as needed.  She also has a history of overactive bladder and does take Vesicare.    Past Medical History:   Diagnosis Date    Abnormal CT of spine     Acute sinusitis     Arthritis     Asthma     Bronchitis     Carpal tunnel syndrome     both    CHF (congestive heart failure) (HCC)     Depression     Fibromyalgia     Furuncle     Gout     Hyperlipidemia     Hypertension     Low vitamin D level     Lumbago     Lumbar radiculopathy     Neuropathy     feet    Obesity     Rheumatoid nodule of

## 2024-02-06 ENCOUNTER — TELEPHONE (OUTPATIENT)
Dept: INTERNAL MEDICINE | Age: 69
End: 2024-02-06

## 2024-02-06 SDOH — ECONOMIC STABILITY: FOOD INSECURITY: WITHIN THE PAST 12 MONTHS, YOU WORRIED THAT YOUR FOOD WOULD RUN OUT BEFORE YOU GOT MONEY TO BUY MORE.: NEVER TRUE

## 2024-02-06 SDOH — ECONOMIC STABILITY: FOOD INSECURITY: WITHIN THE PAST 12 MONTHS, THE FOOD YOU BOUGHT JUST DIDN'T LAST AND YOU DIDN'T HAVE MONEY TO GET MORE.: NEVER TRUE

## 2024-02-06 SDOH — ECONOMIC STABILITY: INCOME INSECURITY: HOW HARD IS IT FOR YOU TO PAY FOR THE VERY BASICS LIKE FOOD, HOUSING, MEDICAL CARE, AND HEATING?: NOT HARD AT ALL

## 2024-02-06 ASSESSMENT — ENCOUNTER SYMPTOMS
CONSTIPATION: 0
VOICE CHANGE: 0
WHEEZING: 0
EYE ITCHING: 0
BACK PAIN: 1
NAUSEA: 0
EYE PAIN: 0
DIARRHEA: 0
EYE DISCHARGE: 0
SORE THROAT: 0
PHOTOPHOBIA: 0
TROUBLE SWALLOWING: 0
BLOOD IN STOOL: 0
SINUS PRESSURE: 0
SHORTNESS OF BREATH: 0
ABDOMINAL PAIN: 0
CHEST TIGHTNESS: 0
COLOR CHANGE: 0
EYE REDNESS: 0
ABDOMINAL DISTENTION: 0
COUGH: 0

## 2024-02-06 NOTE — TELEPHONE ENCOUNTER
Patient should have made a follow-up appointment when she left the office last week.  She does not have a follow-up.  Please get her on the books.

## 2024-02-07 DIAGNOSIS — I50.42 CHRONIC COMBINED SYSTOLIC (CONGESTIVE) AND DIASTOLIC (CONGESTIVE) HEART FAILURE (HCC): ICD-10-CM

## 2024-02-07 DIAGNOSIS — E55.9 VITAMIN D DEFICIENCY: Primary | ICD-10-CM

## 2024-02-07 DIAGNOSIS — R79.89 ELEVATED BRAIN NATRIURETIC PEPTIDE (BNP) LEVEL: ICD-10-CM

## 2024-02-07 RX ORDER — ERGOCALCIFEROL 1.25 MG/1
50000 CAPSULE ORAL WEEKLY
Qty: 12 CAPSULE | Refills: 1 | Status: SHIPPED | OUTPATIENT
Start: 2024-02-07

## 2024-02-12 ENCOUNTER — HOSPITAL ENCOUNTER (OUTPATIENT)
Dept: NON INVASIVE DIAGNOSTICS | Age: 69
Discharge: HOME | End: 2024-02-12
Payer: MEDICARE

## 2024-02-12 DIAGNOSIS — I50.42 CHRONIC COMBINED SYSTOLIC (CONGESTIVE) AND DIASTOLIC (CONGESTIVE) HEART FAILURE (HCC): ICD-10-CM

## 2024-02-12 DIAGNOSIS — R79.89 ELEVATED BRAIN NATRIURETIC PEPTIDE (BNP) LEVEL: ICD-10-CM

## 2024-02-12 PROCEDURE — 93306 TTE W/DOPPLER COMPLETE: CPT

## 2024-02-13 ENCOUNTER — TELEPHONE (OUTPATIENT)
Dept: INTERNAL MEDICINE | Age: 69
End: 2024-02-13

## 2024-02-13 NOTE — TELEPHONE ENCOUNTER
----- Message from Misa Johnsonborn sent at 2/13/2024  9:10 AM CST -----  Subject: Referral Request    Reason for referral request? Podiatry  Provider patient wants to be referred to(if known):     Provider Phone Number(if known):[phone]    Additional Information for Provider?   ---------------------------------------------------------------------------  --------------  CALL BACK INFO    [phone]; OK to leave message on voicemail  ---------------------------------------------------------------------------  --------------

## 2024-02-13 NOTE — TELEPHONE ENCOUNTER
----- Message from Misa Fang sent at 2/13/2024  9:12 AM CST -----  Subject: Refill Request    QUESTIONS  Name of Medication? vitamin D (ERGOCALCIFEROL) 1.25 MG (07173 UT) CAPS   capsule  Patient-reported dosage and instructions? 1.25 mg, once weekly  How many days do you have left? 0  Preferred Pharmacy? [facility]  Pharmacy phone number (if available)? [phone]  ---------------------------------------------------------------------------  --------------  CALL BACK INFO  What is the best way for the office to contact you? OK to leave message on   voicemail  Preferred Call Back Phone Number? [phone]  ---------------------------------------------------------------------------  --------------  SCRIPT ANSWERS  Relationship to Patient? Self

## 2024-02-14 DIAGNOSIS — I50.9 CHRONIC CONGESTIVE HEART FAILURE, UNSPECIFIED HEART FAILURE TYPE (HCC): Primary | ICD-10-CM

## 2024-02-19 ENCOUNTER — TELEPHONE (OUTPATIENT)
Dept: PODIATRY | Facility: CLINIC | Age: 69
End: 2024-02-19
Payer: MEDICARE

## 2024-02-19 ENCOUNTER — OFFICE VISIT (OUTPATIENT)
Dept: INTERNAL MEDICINE | Age: 69
End: 2024-02-19

## 2024-02-19 VITALS
BODY MASS INDEX: 55.32 KG/M2 | WEIGHT: 293 LBS | HEIGHT: 61 IN | OXYGEN SATURATION: 92 % | SYSTOLIC BLOOD PRESSURE: 133 MMHG | HEART RATE: 95 BPM | DIASTOLIC BLOOD PRESSURE: 79 MMHG

## 2024-02-19 DIAGNOSIS — I50.9 CONGESTIVE HEART FAILURE, UNSPECIFIED HF CHRONICITY, UNSPECIFIED HEART FAILURE TYPE (HCC): ICD-10-CM

## 2024-02-19 DIAGNOSIS — I27.20 PULMONARY HYPERTENSION (HCC): ICD-10-CM

## 2024-02-19 DIAGNOSIS — I50.42 CHRONIC COMBINED SYSTOLIC AND DIASTOLIC CONGESTIVE HEART FAILURE (HCC): ICD-10-CM

## 2024-02-19 DIAGNOSIS — E66.01 MORBID OBESITY (HCC): ICD-10-CM

## 2024-02-19 DIAGNOSIS — E78.5 HYPERLIPIDEMIA, UNSPECIFIED HYPERLIPIDEMIA TYPE: ICD-10-CM

## 2024-02-19 DIAGNOSIS — L85.3 DRY SKIN: ICD-10-CM

## 2024-02-19 DIAGNOSIS — M25.50 ARTHRALGIA, UNSPECIFIED JOINT: ICD-10-CM

## 2024-02-19 DIAGNOSIS — L60.9 NAIL PROBLEM: Primary | ICD-10-CM

## 2024-02-19 DIAGNOSIS — M79.671 RIGHT FOOT PAIN: ICD-10-CM

## 2024-02-19 DIAGNOSIS — F32.89 OTHER DEPRESSION: ICD-10-CM

## 2024-02-19 DIAGNOSIS — E55.9 VITAMIN D DEFICIENCY: ICD-10-CM

## 2024-02-19 DIAGNOSIS — Z12.31 ENCOUNTER FOR SCREENING MAMMOGRAM FOR MALIGNANT NEOPLASM OF BREAST: ICD-10-CM

## 2024-02-19 DIAGNOSIS — J45.909 ASTHMA DUE TO ENVIRONMENTAL ALLERGIES: ICD-10-CM

## 2024-02-19 DIAGNOSIS — L97.919 ULCER OF RIGHT LOWER EXTREMITY, UNSPECIFIED ULCER STAGE (HCC): ICD-10-CM

## 2024-02-19 DIAGNOSIS — E79.0 HYPERURICEMIA: ICD-10-CM

## 2024-02-19 DIAGNOSIS — J44.9 CHRONIC OBSTRUCTIVE PULMONARY DISEASE, UNSPECIFIED COPD TYPE (HCC): ICD-10-CM

## 2024-02-19 DIAGNOSIS — Z78.0 MENOPAUSE: ICD-10-CM

## 2024-02-19 DIAGNOSIS — G47.00 INSOMNIA, UNSPECIFIED TYPE: ICD-10-CM

## 2024-02-19 DIAGNOSIS — Z00.00 ROUTINE ADULT HEALTH MAINTENANCE: Primary | ICD-10-CM

## 2024-02-19 DIAGNOSIS — I73.9 CLAUDICATION (HCC): ICD-10-CM

## 2024-02-19 DIAGNOSIS — E03.9 HYPOTHYROIDISM, UNSPECIFIED TYPE: ICD-10-CM

## 2024-02-19 DIAGNOSIS — G43.809 OTHER MIGRAINE WITHOUT STATUS MIGRAINOSUS, NOT INTRACTABLE: ICD-10-CM

## 2024-02-19 DIAGNOSIS — L60.9 NAIL PROBLEM: ICD-10-CM

## 2024-02-19 DIAGNOSIS — I50.9 CHRONIC CONGESTIVE HEART FAILURE, UNSPECIFIED HEART FAILURE TYPE (HCC): ICD-10-CM

## 2024-02-19 DIAGNOSIS — R42 VERTIGO: ICD-10-CM

## 2024-02-19 DIAGNOSIS — N32.81 OAB (OVERACTIVE BLADDER): ICD-10-CM

## 2024-02-19 DIAGNOSIS — R60.0 BILATERAL LOWER EXTREMITY EDEMA: ICD-10-CM

## 2024-02-19 DIAGNOSIS — E66.01 MORBID OBESITY DUE TO EXCESS CALORIES (HCC): ICD-10-CM

## 2024-02-19 DIAGNOSIS — I10 PRIMARY HYPERTENSION: ICD-10-CM

## 2024-02-19 LAB — BNP BLD-MCNC: 864 PG/ML (ref 0–124)

## 2024-02-19 RX ORDER — VIT E ACET/GLY/DIMETH/WATER
LOTION (ML) TOPICAL
Qty: 237 ML | Refills: 1 | Status: SHIPPED | OUTPATIENT
Start: 2024-02-19

## 2024-02-19 ASSESSMENT — PATIENT HEALTH QUESTIONNAIRE - PHQ9
2. FEELING DOWN, DEPRESSED OR HOPELESS: 0
8. MOVING OR SPEAKING SO SLOWLY THAT OTHER PEOPLE COULD HAVE NOTICED. OR THE OPPOSITE, BEING SO FIGETY OR RESTLESS THAT YOU HAVE BEEN MOVING AROUND A LOT MORE THAN USUAL: 0
SUM OF ALL RESPONSES TO PHQ9 QUESTIONS 1 & 2: 0
4. FEELING TIRED OR HAVING LITTLE ENERGY: 0
SUM OF ALL RESPONSES TO PHQ QUESTIONS 1-9: 0
6. FEELING BAD ABOUT YOURSELF - OR THAT YOU ARE A FAILURE OR HAVE LET YOURSELF OR YOUR FAMILY DOWN: 0
SUM OF ALL RESPONSES TO PHQ QUESTIONS 1-9: 0
7. TROUBLE CONCENTRATING ON THINGS, SUCH AS READING THE NEWSPAPER OR WATCHING TELEVISION: 0
3. TROUBLE FALLING OR STAYING ASLEEP: 0
10. IF YOU CHECKED OFF ANY PROBLEMS, HOW DIFFICULT HAVE THESE PROBLEMS MADE IT FOR YOU TO DO YOUR WORK, TAKE CARE OF THINGS AT HOME, OR GET ALONG WITH OTHER PEOPLE: 0
SUM OF ALL RESPONSES TO PHQ QUESTIONS 1-9: 0
9. THOUGHTS THAT YOU WOULD BE BETTER OFF DEAD, OR OF HURTING YOURSELF: 0
SUM OF ALL RESPONSES TO PHQ QUESTIONS 1-9: 0
5. POOR APPETITE OR OVEREATING: 0
1. LITTLE INTEREST OR PLEASURE IN DOING THINGS: 0

## 2024-02-19 ASSESSMENT — LIFESTYLE VARIABLES
HOW MANY STANDARD DRINKS CONTAINING ALCOHOL DO YOU HAVE ON A TYPICAL DAY: PATIENT DOES NOT DRINK
HOW OFTEN DO YOU HAVE A DRINK CONTAINING ALCOHOL: NEVER

## 2024-02-19 NOTE — PROGRESS NOTES
Chief Complaint   Patient presents with    Follow-up     Right leg leaks in the back, sore on right foot       HPI: Tessa Dias is a 68 y.o. female is here for annual Medicare wellness exam.  Her functional status is poor.  She is mostly wheelchair-bound.  She has not had any history of falls.  She has no concerns in regards to safety, hearing, or cognition.  Compliance has been a longstanding issue.      She is on oxygen therapy for history of pulmonary hypertension and COPD.  She does see Dr. Mathur for this.  She was seeing Dr. Harrison for cardiac issues.  She needs a referral to a new cardiologist.  We can make that referral for her.  She also would like to see a podiatrist.  It is very difficult for her to trim her nails.  She also complains of pain to the right foot.  She states that there is a sore on her right foot.  However, I did not see a sore today.  She has a lot of very dry flaky skin.  She does complain of some claudication-like symptoms.  We will go ahead and order an REAL test today.  The skin on her legs is very dry and very flaky.  She states that various creams have not helped.  We can try some Cetaphil cream.    She does use her inhalers as prescribed.  She does have some chronic shortness of breath, which has been stable.  She has not had any gout flares on allopurinol.  Elavil is helpful for neuropathic pain.  She does have a history of migraine headaches.  She does take Fioricet as needed.  However, we have not filled this in quite some time.  Her blood pressure is well-controlled.  She has not had any complaints of chest pain, chest pressure or heart palpitations.    Celebrex is helpful for joint pain.  Her depression is stable on Cymbalta.  She is tolerating her fenofibrate without difficulty.    Thyroid function is stable on her current dose of Synthroid.  She does have a history of vertigo.  She takes Antivert as needed.  Vesicare is helpful for overactive bladder.  She does

## 2024-02-20 NOTE — TELEPHONE ENCOUNTER
Patient called back she is scheduled for 02/26/24 at 11:30 am. Patient confirmed appt date and time.

## 2024-02-21 NOTE — PROGRESS NOTES
[facility] - PODIATRY    Today's Date: 02/26/2024     Patient Name: Pau Cabrera  MRN: [id number]  CSN: [id number]  PCP: Carola Barbosa MD  Referring Provider: No ref. provider found    SUBJECTIVE     Chief Complaint   Patient presents with    [facility]     Carola Barbosa, 02/19/2024 Pain damaris feet, non diabetic- needs ABN- pt states has cellulitis and gout, something in bottom of right foot that hurts and feels like its moving around. Nail issues, long and discolored. Dry skinn is huge issue- pt pain 7/10 at worst     HPI: Pau Cabrera, a 68 y.o.female, comes to clinic as a(n) new patient complaining of foot pain, complaining of toenail/callus issues, and complaining of lower extremity wounds. Patient has h/o Anxiety, Asthma, Back Pain, CHF, CAD, Depression, Fibromyalgia, GERD, Gout, Hyperlipidemia, HTN, Obesity, CHARLIE, Peripheral Neuropathy, Pulmonary Arterial HTN, Rheumatoid Arthritis . Patient is non-diabetic. Today patient presents with complaints of extremely dry skin on her BLEs. She also complaints of bilateral foot pain, especially on the bottom of her right foot; she states it feels like there is something in there moving around that is causing a lot of pain. She denies any known trauma or injury her feet. She also reports that her toenails are elongated, thickened, dystrophic, and discolored. She reports she has extreme difficulty caring for these herself d/t body habitus, mobility issues, and back issues. She also reports she has severe edema in her BLEs that has been present for years now. She reports there is a spot on the back of her right leg that has been weeping a lot lately and that there may be an open wound. She has a few other areas on her body that may also have a wound but she is unsure because she is unable to visualize these areas. She reports she has tried compression stockings and elevating her lower extremities for over 2-3 months  with no improvement in bilateral LE edema. She also has been unable to get compression stockings on and off d/t mobility issues.  Admits pain at 7/10 level. Denies previous treatment. Denies any constitutional symptoms. No other pedal complaints at this time.    Past Medical History:   Diagnosis Date    Anemia     iron def    Anxiety     Asthma     Back pain     CHF (congestive heart failure)     Chronic bronchitis     Coronary artery disease     COVID-19 vaccine series completed     pfizer    COVID-19 vaccine series completed     CTS (carpal tunnel syndrome)     Depression     Fibromyalgia     GERD (gastroesophageal reflux disease)     Gout     Hyperlipemia     Hyperlipidemia     Hypertension     Obesity     Obstructive sleep apnea     could not tolerate machine     Peripheral neuropathy     Primary central sleep apnea     Pulmonary arterial hypertension     Rheumatoid arthritis     Sinusitis      Past Surgical History:   Procedure Laterality Date    BREAST BIOPSY Left      SECTION      X 2    COLONOSCOPY  10/22/2015    Tics repeat exam in 5 years    COLONOSCOPY  2007    Small hemorrhoids repeat exam in 3 years    COLONOSCOPY N/A 2020    Procedure: COLONOSCOPY WITH ANESTHESIA;  Surgeon: Denny Francis MD;  Location: [facility] ENDOSCOPY;  Service: Gastroenterology;  Laterality: N/A;  pre: family hx colon ca  post: diverticulosis  Carola Barbosa MD    HYSTERECTOMY      total    THYROIDECTOMY Left 2022    Procedure: Left thyroidectomy with isthmusectomy;  Surgeon: Milad So MD;  Location: [facility] OR;  Service: ENT;  Laterality: Left;    UMBILICAL HERNIA REPAIR      had 1/2 of it repaired with hysterectomy      Family History   Problem Relation Age of Onset    Arthritis Mother     Heart disease Mother     Hypertension Mother     Thyroid disease Mother     Arthritis Father     Hypertension Father     Anuerysm Father     Stroke Father     Asthma Brother     Colon polyps Neg  Hx     Colon cancer Neg Hx      Social History     Socioeconomic History    Marital status:    Tobacco Use    Smoking status: Never    Smokeless tobacco: Never   Vaping Use    Vaping Use: Never used   Substance and Sexual Activity    Alcohol use: No    Drug use: No    Sexual activity: Not Currently     Partners: Male     Birth control/protection: Surgical     Allergies   Allergen Reactions    Codeine Sulfate Hives    Lisinopril Swelling and Rash     Facial swelling     Current Outpatient Medications   Medication Sig Dispense Refill    albuterol sulfate  (90 Base) MCG/ACT inhaler Inhale 2 puffs Every 4 (Four) Hours As Needed for Wheezing. 6.7 g 5    allopurinol (ZYLOPRIM) 300 MG tablet       amitriptyline (ELAVIL) 25 MG tablet Take 1 tablet by mouth Every Night.      azelastine (ASTELIN) 0.1 % nasal spray 2 sprays into the nostril(s) as directed by provider 2 (Two) Times a Day. Use in each nostril as directed 60 mL 11    bumetanide (BUMEX) 1 MG tablet Take 1 tablet by mouth Daily.      butalbital-acetaminophen-caffeine (FIORICET, ESGIC) -40 MG per tablet Take 1 tablet by mouth Every 4 (Four) Hours As Needed for Headache.      carvedilol (COREG) 25 MG tablet TAKE 1 TABLET BY MOUTH TWICE A DAY 60 tablet 3    celecoxib (CeleBREX) 100 MG capsule Take 1 capsule by mouth.      DULoxetine (CYMBALTA) 60 MG capsule Take 1 capsule by mouth Every Morning.      EPINEPHrine (ADRENALIN) 0.05 MG/ML syringe Inject 20 mL as directed.      fenofibrate (TRICOR) 145 MG tablet Take 1 tablet by mouth Daily.      fluticasone (Flonase Allergy Relief) 50 MCG/ACT nasal spray 2 sprays into the nostril(s) as directed by provider Daily. 16 g 11    Fluticasone-Salmeterol (Wixela Inhub) 250-50 MCG/ACT DISKUS Inhale 1 puff 2 (Two) Times a Day. 1 each 11    loratadine (CLARITIN) 10 MG tablet Take 1 tablet by mouth Daily. 90 tablet 3    meclizine (ANTIVERT) 25 MG tablet Take 1 tablet by mouth 3 (Three) Times a Day As Needed for  Dizziness.      Multiple Vitamins-Minerals (MULTIVITAMIN ADULT PO) Take  by mouth.      OXYGEN-HELIUM IN Inhale. 2L at night      spironolactone (ALDACTONE) 25 MG tablet Take 1 tablet by mouth.      Myrbetriq 25 MG tablet sustained-release 24 hour 24 hr tablet Take 1 tablet by mouth Daily. (Patient not taking: Reported on 2/26/2024)      nystatin (MYCOSTATIN) 627041 UNIT/GM powder Apply  topically to the appropriate area as directed 3 (Three) Times a Day. 60 g 0     No current facility-administered medications for this visit.     Review of Systems   Constitutional:  Negative for activity change and fever.   HENT:  Negative for congestion.    Respiratory:  Positive for wheezing. Negative for chest tightness and shortness of breath.         Wearing portable O2 today   Cardiovascular:  Positive for leg swelling. Negative for chest pain.   Gastrointestinal:  Negative for abdominal pain, constipation, diarrhea, nausea and vomiting.   Musculoskeletal:  Positive for arthralgias, back pain, gait problem and myalgias.        Fibromyalgia; wheelchair for ambulation; bilateral foot pain   Skin:  Positive for color change and wound.        Open wound on back of RLE. Weeping from bilateral LEs.   Neurological:  Negative for dizziness, weakness and numbness.   Psychiatric/Behavioral:  Negative for agitation, behavioral problems and confusion.        OBJECTIVE     Vitals:    02/26/24 1109   BP: 136/74   Pulse: 78   SpO2: 97%       PHYSICAL EXAM  GEN:   Accompanied by friend.     Foot/Ankle Exam    GENERAL  Appearance:  appears stated age  Orientation:  AAOx3  Affect:  appropriate  Gait:  (Unsteady)  Assistance:  wheelchair    VASCULAR     Right Foot Vascularity   Dorsalis pedis:  2+  Posterior tibial:  2+  Skin temperature:  warm  Edema grading:  3+ and pitting  CFT:  3  Pedal hair growth:  Absent  Varicosities:  none     Left Foot Vascularity   Dorsalis pedis:  2+  Posterior tibial:  2+  Skin temperature:  warm  Edema grading:   3+, non-pitting and pitting  CFT:  3  Pedal hair growth:  Absent  Varicosities:  none     NEUROLOGIC     Right Foot Neurologic   Light touch sensation: diminished  Vibratory sensation: diminished  Hot/Cold sensation: diminished     Left Foot Neurologic   Light touch sensation: diminished  Vibratory sensation: diminished  Hot/Cold sensation:  diminished    MUSCULOSKELETAL     Right Foot Musculoskeletal   Ecchymosis:  none  Tenderness:  callous right foot and toenail problem    Arch:  Normal     Left Foot Musculoskeletal   Ecchymosis:  none  Tenderness:  toenail problem  Arch:  Normal    MUSCLE STRENGTH     Right Foot Muscle Strength   Normal strength    Foot dorsiflexion:  5  Foot plantar flexion:  5  Foot inversion:  5  Foot eversion:  5     Left Foot Muscle Strength   Normal strength    Foot dorsiflexion:  5  Foot plantar flexion:  5  Foot inversion:  5  Foot eversion:  5    RANGE OF MOTION     Right Foot Range of Motion   Foot and ankle ROM within normal limits       Left Foot Range of Motion   Foot and ankle ROM within normal limits      DERMATOLOGIC      Right Foot Dermatologic   Skin  Positive for corn, dryness and skin changes.   Nails  1.  Positive for elongated, onychomycosis, abnormal thickness, subungual debris, dystrophic nail and pincer.  2.  Positive for elongated, abnormal thickness, subungual debris, dystrophic nail and pincer nail.  3.  Positive for elongated, abnormal thickness, subungual debris, dystrophic nail and pincer.  4.  Positive for elongated, abnormal thickness, subungual debris, dystrophic nail and pincer.  5.  Positive for elongated, onychomycosis, subungual debris, dystrophic nail and pincer.     Left Foot Dermatologic   Skin  Positive for dryness and skin changes.   Nails  1.  Positive for elongated, onychomycosis, abnormal thickness, subungual debris, dystrophic nail and pincer.  2.  Positive for elongated, abnormal thickness, subungual debris, dystrophic nail and pincer.  3.   Positive for elongated, abnormal thickness, subungual debris, dystrophic nail and pincer.  4.  Positive for elongated, abnormally thick, subungual debris, dystrophic nail and pincer.  5.  Positive for elongated, abnormally thick, subungual debris, dystrophic nail and pincer.     Right foot additional comments: Extremely dry, flaky skin noted.     Left foot additional comments: Extremely dry, flaky skin noted.    Image:       RADIOLOGY/NUCLEAR:  No results found.    LABORATORY/CULTURE RESULTS:      PATHOLOGY RESULTS:       ASSESSMENT/PLAN     Diagnoses and all orders for this visit:    1. Dystrophic nail (Primary)    2. Bilateral foot pain  -     XR Foot 3+ View Bilateral; Future    3. Open wound of right lower leg, initial encounter  -     Ambulatory Referral to Wound Clinic    4. BMI 60.0-69.9, adult    5. Gait abnormality    6. Thickened nail    7. Dry skin      Comprehensive lower extremity examination and evaluation was performed.  Discussed findings and treatment plan including risks, benefits, and treatment options with patient in detail. Patient agreed with treatment plan.  After verbal consent obtained, nail(s) x10 debrided of length and thickness with nail nipper without incidence  After verbal consent obtained, calluses x1 pared utilizing dermal curette and/or scalpel without incidence  Patient may maintain nails and calluses at home utilizing emery board or pumice stone between visits as needed   Recommened OTC Urea 40% cream for extremely dry skin to be applied 2x/daily. Also recommended to lather feet up in Vaseline and to apply bag over feet for a couple hours then remove and wash Vaseline off and exfoliate skin with either a wash cloth or pumice stone.  Encouraged to utilize compression stockings and to keep feet elevated while at rest. Referral to [facility] for Lymphedema Pumps placed.   Referral to outpatient [facility] for open area of weeping to back of RLE. Encouraged to keep this area dry and  covered until appointment with [facility].   Xrays ordered for bilateral feet for bilateral foot pain.   Follow-up with Podiatry in 3 months to keep nails and calluses maintained.     An After Visit Summary was printed and given to the patient at discharge, including (if requested) any available informative/educational handouts regarding diagnosis, treatment, or medications. All questions were answered to patient/family satisfaction. Should symptoms fail to improve or worsen they agree to call or return to clinic or to go to the Emergency Department. Discussed the importance of following up with any needed screening tests/labs/specialist appointments and any requested follow-up recommended by me today. Importance of maintaining follow-up discussed and patient accepts that missed appointments can delay diagnosis and potentially lead to worsening of conditions.  Return in about 3 months (around 5/26/2024) for Follow-up with APRN, Follow-up in [facility] or sooner if acute issues arise.      This document has been electronically signed by DONOVAN Feldman on February 26, 2024 12:33 CST

## 2024-02-23 ENCOUNTER — TELEPHONE (OUTPATIENT)
Dept: PODIATRY | Facility: CLINIC | Age: 69
End: 2024-02-23
Payer: MEDICARE

## 2024-02-26 ENCOUNTER — HOSPITAL ENCOUNTER (OUTPATIENT)
Dept: GENERAL RADIOLOGY | Facility: HOSPITAL | Age: 69
Discharge: HOME | End: 2024-02-26
Payer: MEDICARE

## 2024-02-26 ENCOUNTER — OFFICE VISIT (OUTPATIENT)
Dept: WOUND CARE | Facility: HOSPITAL | Age: 69
End: 2024-02-26
Payer: MEDICARE

## 2024-02-26 ENCOUNTER — OFFICE VISIT (OUTPATIENT)
Dept: PODIATRY | Facility: CLINIC | Age: 69
End: 2024-02-26
Payer: MEDICARE

## 2024-02-26 VITALS
WEIGHT: 293 LBS | BODY MASS INDEX: 57.52 KG/M2 | HEIGHT: 60 IN | DIASTOLIC BLOOD PRESSURE: 74 MMHG | SYSTOLIC BLOOD PRESSURE: 136 MMHG | HEART RATE: 78 BPM | OXYGEN SATURATION: 97 %

## 2024-02-26 DIAGNOSIS — M79.671 BILATERAL FOOT PAIN: ICD-10-CM

## 2024-02-26 DIAGNOSIS — M79.672 BILATERAL FOOT PAIN: ICD-10-CM

## 2024-02-26 DIAGNOSIS — L60.3 DYSTROPHIC NAIL: Primary | ICD-10-CM

## 2024-02-26 DIAGNOSIS — R26.9 GAIT ABNORMALITY: ICD-10-CM

## 2024-02-26 DIAGNOSIS — S81.801A OPEN WOUND OF RIGHT LOWER LEG, INITIAL ENCOUNTER: ICD-10-CM

## 2024-02-26 DIAGNOSIS — L60.2 THICKENED NAIL: ICD-10-CM

## 2024-02-26 DIAGNOSIS — L85.3 DRY SKIN: ICD-10-CM

## 2024-02-26 PROCEDURE — 1160F RVW MEDS BY RX/DR IN RCRD: CPT

## 2024-02-26 PROCEDURE — 73630 X-RAY EXAM OF FOOT: CPT

## 2024-02-26 PROCEDURE — 1159F MED LIST DOCD IN RCRD: CPT

## 2024-02-26 PROCEDURE — 3075F SYST BP GE 130 - 139MM HG: CPT

## 2024-02-26 PROCEDURE — 11721 DEBRIDE NAIL 6 OR MORE: CPT

## 2024-02-26 PROCEDURE — 11055 PARING/CUTG B9 HYPRKER LES 1: CPT

## 2024-02-26 PROCEDURE — 99214 OFFICE O/P EST MOD 30 MIN: CPT

## 2024-02-26 PROCEDURE — 3078F DIAST BP <80 MM HG: CPT

## 2024-02-27 ENCOUNTER — TELEPHONE (OUTPATIENT)
Dept: PODIATRY | Facility: CLINIC | Age: 69
End: 2024-02-27
Payer: MEDICARE

## 2024-02-27 ENCOUNTER — PATIENT ROUNDING (BHMG ONLY) (OUTPATIENT)
Dept: PODIATRY | Facility: CLINIC | Age: 69
End: 2024-02-27
Payer: MEDICARE

## 2024-02-27 NOTE — TELEPHONE ENCOUNTER
Called pt regarding xray results and let her know   xrays did not have any significant findings. Mainly soft tissue swelling that I believe is associated with lymphedema and some degenerative changes. The pain she was having on the bottom of her right foot I believe is associated with that area of thickened callus. Please stress on the importance of keeping her feet moisturized - 2x/daily- if she was unable to get the Urea 40%, I recommend CeraVe, Aquaphor, or Kerasal Intensive Foot Repair.     Pt stated understanding

## 2024-02-27 NOTE — PROGRESS NOTES
February 27, 2024    Hello, may I speak with Pautavia Cedeno Deborah?    My name is unruly      I am  with [facility]  [address], [address]  [place] [address] [phone].    Before we get started may I verify your date of birth? 1955    I am calling to officially welcome you to our practice and ask about your recent visit. Is this a good time to talk? yes    Tell me about your visit with us. What things went well?  everyone was nice and answered all questions       We're always looking for ways to make our patients' experiences even better. Do you have recommendations on ways we may improve?  no    Overall were you satisfied with your first visit to our practice? yes       I appreciate you taking the time to speak with me today. Is there anything else I can do for you? no      Thank you, and have a great day.

## 2024-03-04 ENCOUNTER — OFFICE VISIT (OUTPATIENT)
Dept: WOUND CARE | Facility: HOSPITAL | Age: 69
End: 2024-03-04
Payer: MEDICARE

## 2024-03-04 DIAGNOSIS — N32.81 OAB (OVERACTIVE BLADDER): ICD-10-CM

## 2024-03-04 RX ORDER — SOLIFENACIN SUCCINATE 10 MG/1
10 TABLET, FILM COATED ORAL DAILY
Qty: 30 TABLET | Refills: 1 | Status: SHIPPED | OUTPATIENT
Start: 2024-03-04 | End: 2024-03-07

## 2024-03-05 ENCOUNTER — TELEPHONE (OUTPATIENT)
Dept: INTERNAL MEDICINE | Age: 69
End: 2024-03-05

## 2024-03-05 ENCOUNTER — OFFICE VISIT (OUTPATIENT)
Dept: PULMONOLOGY | Facility: CLINIC | Age: 69
End: 2024-03-05
Payer: MEDICARE

## 2024-03-05 VITALS
SYSTOLIC BLOOD PRESSURE: 162 MMHG | OXYGEN SATURATION: 92 % | WEIGHT: 293 LBS | HEART RATE: 103 BPM | HEIGHT: 61 IN | BODY MASS INDEX: 55.32 KG/M2 | DIASTOLIC BLOOD PRESSURE: 108 MMHG

## 2024-03-05 DIAGNOSIS — E66.01 MORBID OBESITY DUE TO EXCESS CALORIES: ICD-10-CM

## 2024-03-05 DIAGNOSIS — Z86.16 HISTORY OF COVID-19: ICD-10-CM

## 2024-03-05 DIAGNOSIS — Z91.199 NON COMPLIANCE WITH MEDICAL TREATMENT: ICD-10-CM

## 2024-03-05 DIAGNOSIS — J98.4 PULMONARY SCARRING: ICD-10-CM

## 2024-03-05 DIAGNOSIS — J45.20 MILD INTERMITTENT ASTHMA WITHOUT COMPLICATION: ICD-10-CM

## 2024-03-05 DIAGNOSIS — Z99.81 HYPOXEMIA REQUIRING SUPPLEMENTAL OXYGEN: ICD-10-CM

## 2024-03-05 DIAGNOSIS — G47.33 OSA (OBSTRUCTIVE SLEEP APNEA): ICD-10-CM

## 2024-03-05 DIAGNOSIS — R06.02 SOB (SHORTNESS OF BREATH): ICD-10-CM

## 2024-03-05 DIAGNOSIS — R09.02 HYPOXEMIA REQUIRING SUPPLEMENTAL OXYGEN: ICD-10-CM

## 2024-03-05 DIAGNOSIS — I27.20 PULMONARY HYPERTENSION: ICD-10-CM

## 2024-03-05 DIAGNOSIS — Z78.9 NON-SMOKER: ICD-10-CM

## 2024-03-05 DIAGNOSIS — R91.1 LUNG NODULE: ICD-10-CM

## 2024-03-05 DIAGNOSIS — J44.9 CHRONIC OBSTRUCTIVE PULMONARY DISEASE, UNSPECIFIED COPD TYPE: Primary | ICD-10-CM

## 2024-03-05 RX ORDER — ERGOCALCIFEROL 1.25 MG/1
1 CAPSULE ORAL WEEKLY
COMMUNITY
Start: 2024-02-07

## 2024-03-05 RX ORDER — SOLIFENACIN SUCCINATE 10 MG/1
1 TABLET, FILM COATED ORAL DAILY
COMMUNITY
Start: 2024-03-04

## 2024-03-05 RX ORDER — LEVOTHYROXINE SODIUM 0.07 MG/1
1 TABLET ORAL DAILY
COMMUNITY
Start: 2024-01-30

## 2024-03-05 NOTE — TELEPHONE ENCOUNTER
----- Message from Tosha Clemons sent at 3/5/2024  9:09 AM CST -----  Subject: Message to Provider    QUESTIONS  Information for Provider? Pt would like to speak with the nurse about   boots that were going to be delivered to her. Pt stated wound care was   getting boots also so she would like confirmation. Pt would like the call   to go to her mother Iqra Thank you.  ---------------------------------------------------------------------------  --------------  CALL BACK INFO  [phone]; OK to leave message on voicemail  ---------------------------------------------------------------------------  --------------  SCRIPT ANSWERS  Relationship to Patient? Self

## 2024-03-05 NOTE — PROGRESS NOTES
[facility] OUTPATIENT PROGRESS NOTE    Patient: Pau Cabrera  : 1955  Age: 68 y.o.  Date of Service: 2024    REASON FOR CLINIC VISIT:  Chief Complaint   Patient presents with    Asthma       Subjective:    History of Present Illness:  Pau Cabrera is a 68 y.o. female who presents to the office today to be seen for    Diagnosis Plan   1. Chronic obstructive pulmonary disease, unspecified COPD type  XR Chest 1 View      2. Hypoxemia requiring supplemental oxygen        3. Non-smoker        4. CHARLIE (obstructive sleep apnea) - non compliant with cpap        5. History of COVID-19        6. SOB (shortness of breath)        7. Pulmonary scarring        8. Pulmonary hypertension        9. Mild intermittent asthma without complication        10. Lung nodule        11. Non compliance with medical treatment        12. Morbid obesity due to excess calories  Ambulatory Referral to Bariatric Surgery      .  Other problems per record.    History:    Patient is a very pleasant middle-aged obese -American female who was seen in the pulmonary clinic for a follow-up visit.    She has morbid obesity and obstructive sleep apnea and is unable to tolerate CPAP and is currently using 2 L oxygen at night and all the time.  She is due to have severe obstructive airway dysfunction with FEV1 49% predicted and a PFT done today.  She also has restrictive lung disease noted from decreased total lung capacity.  The patient has BMI of 68.  She is a lifelong non-smoker.  She has asthma and COPD overlap noted in the PFT.  She also has heart failure and currently taking Bumex.  She is on Wixela and albuterol rescue inhaler which is changed to Trelegy Ellipta.  She is vaccinated with all vaccinations.    Patient lives independently but has poor mobility due to the excessive body weight.  She has allergy problems and uses fluticasone nasal spray loratadine and Astelin nasal spray.  She notes and  hospitalizations and ER visit and urgent visit with a new complaints.  She had last chest x-ray done about a year ago which showed a stable lung nodule but no further imaging study was done recently    PFT done today:  Not done today    PFT Values          3/5/2024    11:30   Pre Drug PFT Results   FVC 54   FEV1 49   FEF 25-75% 33   FEV1/FVC 71   Other Tests PFT Results   TLC 75      DLCO 72   D/VAsb 104     Results for orders placed in visit on 24    Pulmonary Function Test    Narrative  Pulmonary Function Test    Performed by: Gabriela Bates, RRT  Authorized by: Peter Crews MD  Pre Drug % Predicted  FVC: 54%  FEV1: 49%  FEF 25-75%: 33%  FEV1/FVC: 71%  T%  RV: 108%  DLCO: 72%  D/VAsb: 104%    Interpretation  Overall comments:  The above test results are acceptable and reproducible by ATS criteria  From analysis of the above test results the patient showed evidence of severe obstructive airway dysfunction which is confirmed by decrease in FEV1 and FEF 25 to 75%.  She also has some restrictive dysfunction noted which is moderate due to decrease in TLC and residual volume is near normal limits the diffusion capacity corrected for alveolar volume is within normal limits    In comparison to the prior test done in  the test results are very similar without any significant change. Clinical correlation is indicated    Peter Crews MD  Pulmonologist/Intensivist  3/5/2024 16:20 CST      Results for orders placed during the hospital encounter of 21    Full Pulmonary Function Test With Bronchodilator & ABG    Narrative  [facility] - Pulmonary Function Test    [address] [place] [address]  [id number]  [phone]    Patient : Pau Cabrera  MRN : [id number]  CSN : [id number]  Pulmonologist : Saman Kwon MD  Date : 2021    ______________________________________________________________________    Interpretation :  1.  Spirometry is consistent with a severe  obstructive ventilatory defect although the decrease in the patient's FEV1 is just into the severe range at 49% of predicted..  2.  There is significant improvement in the patient's spirometry postbronchodilator so that postbronchodilator spirometry is consistent with a moderate obstructive ventilatory defect.  3.  Lung volumes reveal a decrease in vital capacity second to obstruction.  In addition significant hyperinflation is present.  There is also a decrease in inspiratory capacity.  4.  There is a moderate diffusion impairment which when corrected for alveolar volume is normalized.      Saman Kwon MD         Bronchodilator therapy: Advair and Albuterol rescue inhaler    Smoking Status:   Social History     Tobacco Use   Smoking Status Never    Passive exposure: Past   Smokeless Tobacco Never     Pulm Rehab: no  Sleep: yes not on CPAP uses 2 LPM oxygen     Support System: lives alone    Code Status:   There are no questions and answers to display.        Review of Systems:  A complete review of systems is performed and all other systems were reviewed and negative as note above in the HPI.  Review of Systems   Constitutional:  Positive for fatigue.   HENT:  Positive for congestion, postnasal drip and sinus pressure.    Eyes: Negative.    Respiratory:  Positive for cough, chest tightness and shortness of breath.    Cardiovascular: Negative.    Gastrointestinal: Negative.    Endocrine: Negative.    Genitourinary: Negative.    Musculoskeletal:  Positive for arthralgias and back pain.   Skin: Negative.    Allergic/Immunologic: Positive for environmental allergies.   Neurological: Negative.    Hematological: Negative.    Psychiatric/Behavioral: Negative.         CAT/ACT Score:  Not done today    Medications:  Outpatient Encounter Medications as of 3/5/2024   Medication Sig Dispense Refill    albuterol sulfate  (90 Base) MCG/ACT inhaler Inhale 2 puffs Every 4 (Four) Hours As Needed for Wheezing. 6.7 g  5    allopurinol (ZYLOPRIM) 300 MG tablet       amitriptyline (ELAVIL) 25 MG tablet Take 1 tablet by mouth Every Night.      azelastine (ASTELIN) 0.1 % nasal spray 2 sprays into the nostril(s) as directed by provider 2 (Two) Times a Day. Use in each nostril as directed 60 mL 11    bumetanide (BUMEX) 1 MG tablet Take 1 tablet by mouth Daily.      butalbital-acetaminophen-caffeine (FIORICET, ESGIC) -40 MG per tablet Take 1 tablet by mouth Every 4 (Four) Hours As Needed for Headache.      carvedilol (COREG) 25 MG tablet TAKE 1 TABLET BY MOUTH TWICE A DAY 60 tablet 3    celecoxib (CeleBREX) 100 MG capsule Take 1 capsule by mouth.      DULoxetine (CYMBALTA) 60 MG capsule Take 1 capsule by mouth Every Morning.      EPINEPHrine (ADRENALIN) 0.05 MG/ML syringe Inject 20 mL as directed.      fenofibrate (TRICOR) 145 MG tablet Take 1 tablet by mouth Daily.      fluticasone (Flonase Allergy Relief) 50 MCG/ACT nasal spray 2 sprays into the nostril(s) as directed by provider Daily. 16 g 11    levothyroxine (SYNTHROID, LEVOTHROID) 75 MCG tablet Take 1 tablet by mouth Daily.      loratadine (CLARITIN) 10 MG tablet Take 1 tablet by mouth Daily. 90 tablet 3    meclizine (ANTIVERT) 25 MG tablet Take 1 tablet by mouth 3 (Three) Times a Day As Needed for Dizziness.      Multiple Vitamins-Minerals (MULTIVITAMIN ADULT PO) Take  by mouth.      nystatin (MYCOSTATIN) 879088 UNIT/GM powder Apply  topically to the appropriate area as directed 3 (Three) Times a Day. 60 g 0    OXYGEN-HELIUM IN Inhale. 2L at night      solifenacin (VESICARE) 10 MG tablet Take 1 tablet by mouth Daily.      spironolactone (ALDACTONE) 25 MG tablet Take 1 tablet by mouth.      vitamin D (ERGOCALCIFEROL) 1.25 MG (27007 UT) capsule capsule Take 1 capsule by mouth 1 (One) Time Per Week.      [DISCONTINUED] Fluticasone-Salmeterol (Wixela Inhub) 250-50 MCG/ACT DISKUS Inhale 1 puff 2 (Two) Times a Day. 1 each 11    Fluticasone-Umeclidin-Vilant (TRELEGY) 100-62.5-25  "MCG/ACT inhaler Inhale 1 puff Daily for 30 days. 180 each 0    Myrbetriq 25 MG tablet sustained-release 24 hour 24 hr tablet Take 1 tablet by mouth Daily. (Patient not taking: Reported on 3/5/2024)       No facility-administered encounter medications on file as of 3/5/2024.       Allergies:  Allergies   Allergen Reactions    Codeine Sulfate Hives    Lisinopril Swelling and Rash     Facial swelling       Immunizations:  Immunization History   Administered Date(s) Administered    COVID-19 (PFIZER) Purple Cap Monovalent 03/09/2021, 03/30/2021, 12/29/2021    Covid-19 (Pfizer) Gray Cap Monovalent 08/08/2022    Pneumococcal Conjugate 13-Valent (PCV13) 11/02/2018    Pneumococcal Conjugate 20-Valent (PCV20) 12/16/2022    Pneumococcal Polysaccharide (PPSV23) 11/30/2020    Tdap 10/11/2011       Objective:    Vitals:  BP (!) 162/108   Pulse 103   Ht 153.7 cm (60.5\")   Wt (!) 161 kg (354 lb)   LMP  (LMP Unknown)   SpO2 92% Comment: 2L  Breastfeeding No   BMI 68.00 kg/m²     Physical Exam:  General: Patient is a 68 y.o. middle aged pleasant morbidly obese  female. Looks stated age. Appears to be in no acute distress.  Eyes: EOMI. PERRLA. Vision intact. No scleral icterus.  Ear, Nose, Mouth and Throat: Hearing is grossly intact. No Leukoplakia, pharyngitis, stomatitis or thrush. Swollen nasal mucosa with post nasal drop.  Neck: Range of motion of neck normal. No thyromegaly or masses. Mallampati Class 3  Respiratory: Clear to auscultation bilaterally. No use of accessory muscles. Decreased breath sounds.  Cardiovascular: Normal heart sounds. Regularly regular rhythm without murmur.  Gastrointestinal: Non tender, non distended, soft. Bowel sounds positive in all four quadrants. No organomegaly.  Skin: No obvious rashes, lesions, ulcers or large amount of bruising. No edema.   Neurological: No new motor deficits. Cranial nerves appear intact.  Psychiatric: Patient is alert and oriented to person, place and " time.    Chest Imaging:      Study Result    Narrative & Impression   CT CHEST WO CONTRAST DIAGNOSTIC- 6/5/2023 8:11 AM CDT     HISTORY: Lung nodule, 6-8mm; R91.1-Solitary pulmonary nodule 6 mm left  upper lobe pulmonary nodule     COMPARISON: 05/12/2022, 06/04/2021, 01/14/2011     DOSE LENGTH PRODUCT: 709 mGy cm. Automated exposure control was also  utilized to decrease patient radiation dose.     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 Right thyroid lobe is  unremarkable in appearance.     No pathologic intrathoracic or axillary lymphadenopathy. Mild vascular  calcification with moderate LAD coronary calcification. Mild  cardiomegaly. No pericardial or pleural effusion.     Images of the upper abdomen demonstrate no suspicious adrenal nodules.  5.4 cm superior left renal cyst.     5 mm subpleural left upper lobe pulmonary nodule on image 32 is stable  compared back to the 01/14/2011 exam consistent with a benign process  requiring no routine follow-up. There is stable bibasilar linear right  upper lobe scarring. No new or suspicious pulmonary nodularity. No lobar  consolidation. No pneumothorax. No endobronchial lesion.     Degenerative changes of the shoulders as well as thoracic spine. No  aggressive regional bony lesions.     IMPRESSION:  1. Stable 5 mm subpleural left upper lobe pulmonary nodule unchanged  from 2011 supporting a benign process. No routine follow-up required.  Right upper and bilateral lower lobe scarring. No consolidation or  suspicious nodularity.  2. Mild cardiomegaly. Vascular calcification. Moderate within the LAD  coronary artery.  3. Left thyroidectomy changes.      This report was finalized on 06/05/2023 08:45 by Dr. Rosa Johnson MD.       Assessment:  1. Chronic obstructive pulmonary disease, unspecified COPD type    2. Hypoxemia requiring supplemental oxygen    3. Non-smoker    4. CHARLIE (obstructive  sleep apnea) - non compliant with cpap    5. History of COVID-19    6. SOB (shortness of breath)    7. Pulmonary scarring    8. Pulmonary hypertension    9. Mild intermittent asthma without complication    10. Lung nodule    11. Non compliance with medical treatment    12. Morbid obesity due to excess calories        Plan/Recommendations:    1.  I reviewed the PFT done today.  Results explained to the patient.  It was noted she had the last imaging study done last year and a follow-up chest x-ray ordered in 6 months time.  2.  For her asthma and COPD she is already using Advair but still gets short of breath I changed it to Trelegy Ellipta and she will continue Advair nebulizer and rescue her prescription sent to the pharmacy.  3.  She has super morbid obesity with BMI of 68 and has sleep apnea unable to tolerate CPAP and currently using only oxygen.  I have made a referral for the patient for bariatric surgery Dr. Bartolo Ley.  She will definitely need to lose some weight to improve her breathing.  4.  For nasal allergy she will continue seeing Astelin nasal spray fluticasone aspirin loratadine.  Continue all other treatment as before.  She is up-to-date on her vaccinations.  She will continue follow-up with other physicians and return to the pulmonary clinic for a follow-up visit in 6 months time or earlier if needed.    Follow up:  6 Months    Time Spent:  30 minutes    I appreciate the opportunity of participating in this patient's care. I would like to thank the PCP for the referral.  Please feel free to contact me with any other questions.    Peter Crews MD   Pulmonologist/Intensivist     Electronically signed by: Peter Crews MD, 3/5/2024 16:20 CST

## 2024-03-05 NOTE — PROCEDURES
Pulmonary Function Test    Performed by: Gabriela Bates, RRT  Authorized by: Peter Crews MD     Pre Drug % Predicted    FVC: 54%   FEV1: 49%   FEF 25-75%: 33%   FEV1/FVC: 71%   T%   RV: 108%   DLCO: 72%   D/VAsb: 104%    Interpretation   Overall comments:   The above test results are acceptable and reproducible by ATS criteria  From analysis of the above test results the patient showed evidence of severe obstructive airway dysfunction which is confirmed by decrease in FEV1 and FEF 25 to 75%.  She also has some restrictive dysfunction noted which is moderate due to decrease in TLC and residual volume is near normal limits the diffusion capacity corrected for alveolar volume is within normal limits    In comparison to the prior test done in  the test results are very similar without any significant change. Clinical correlation is indicated    Peter Crews MD  Pulmonologist/Intensivist  3/5/2024 16:20 CST

## 2024-03-07 ENCOUNTER — TELEPHONE (OUTPATIENT)
Dept: INTERNAL MEDICINE | Age: 69
End: 2024-03-07

## 2024-03-07 DIAGNOSIS — N32.81 OAB (OVERACTIVE BLADDER): Primary | ICD-10-CM

## 2024-03-07 NOTE — TELEPHONE ENCOUNTER
Patient called about solifenacin 10mg not being covered by insurance. Patient stated that [facility] pharmacy contacted office but has not received a reply. Patient was needing prescriptionor something else called in

## 2024-03-11 ENCOUNTER — OFFICE VISIT (OUTPATIENT)
Dept: WOUND CARE | Facility: HOSPITAL | Age: 69
End: 2024-03-11
Payer: MEDICARE

## 2024-03-13 DIAGNOSIS — E03.9 HYPOTHYROIDISM, UNSPECIFIED TYPE: ICD-10-CM

## 2024-03-14 RX ORDER — LEVOTHYROXINE SODIUM 0.07 MG/1
75 TABLET ORAL DAILY
Qty: 30 TABLET | Refills: 5 | Status: SHIPPED | OUTPATIENT
Start: 2024-03-14

## 2024-03-25 ENCOUNTER — OFFICE VISIT (OUTPATIENT)
Dept: WOUND CARE | Facility: HOSPITAL | Age: 69
End: 2024-03-25
Payer: MEDICARE

## 2024-04-01 ENCOUNTER — TRANSCRIBE ORDERS (OUTPATIENT)
Dept: ADMINISTRATIVE | Facility: HOSPITAL | Age: 69
End: 2024-04-01
Payer: MEDICARE

## 2024-04-01 ENCOUNTER — LAB (OUTPATIENT)
Dept: LAB | Facility: HOSPITAL | Age: 69
End: 2024-04-01
Payer: MEDICARE

## 2024-04-01 ENCOUNTER — OFFICE VISIT (OUTPATIENT)
Dept: WOUND CARE | Facility: HOSPITAL | Age: 69
End: 2024-04-01
Payer: MEDICARE

## 2024-04-01 DIAGNOSIS — I50.9 HEART FAILURE, UNSPECIFIED HF CHRONICITY, UNSPECIFIED HEART FAILURE TYPE: ICD-10-CM

## 2024-04-01 DIAGNOSIS — I50.9 HEART FAILURE, UNSPECIFIED HF CHRONICITY, UNSPECIFIED HEART FAILURE TYPE: Primary | ICD-10-CM

## 2024-04-01 LAB
ALBUMIN SERPL-MCNC: 4.3 G/DL (ref 3.5–5.2)
ALBUMIN/GLOB SERPL: 1.3 G/DL
ALP SERPL-CCNC: 51 U/L (ref 39–117)
ALT SERPL W P-5'-P-CCNC: 12 U/L (ref 1–33)
ANION GAP SERPL CALCULATED.3IONS-SCNC: 11 MMOL/L (ref 5–15)
AST SERPL-CCNC: 14 U/L (ref 1–32)
BASOPHILS # BLD AUTO: 0.04 10*3/MM3 (ref 0–0.2)
BASOPHILS NFR BLD AUTO: 0.6 % (ref 0–1.5)
BILIRUB SERPL-MCNC: 0.3 MG/DL (ref 0–1.2)
BUN SERPL-MCNC: 19 MG/DL (ref 8–23)
BUN/CREAT SERPL: 19 (ref 7–25)
CALCIUM SPEC-SCNC: 10.6 MG/DL (ref 8.6–10.5)
CHLORIDE SERPL-SCNC: 103 MMOL/L (ref 98–107)
CO2 SERPL-SCNC: 29 MMOL/L (ref 22–29)
CREAT SERPL-MCNC: 1 MG/DL (ref 0.57–1)
DEPRECATED RDW RBC AUTO: 58.4 FL (ref 37–54)
EGFRCR SERPLBLD CKD-EPI 2021: 61.5 ML/MIN/1.73
EOSINOPHIL # BLD AUTO: 0.19 10*3/MM3 (ref 0–0.4)
EOSINOPHIL NFR BLD AUTO: 3.1 % (ref 0.3–6.2)
ERYTHROCYTE [DISTWIDTH] IN BLOOD BY AUTOMATED COUNT: 16.8 % (ref 12.3–15.4)
GLOBULIN UR ELPH-MCNC: 3.4 GM/DL
GLUCOSE SERPL-MCNC: 76 MG/DL (ref 65–99)
HCT VFR BLD AUTO: 35.2 % (ref 34–46.6)
HGB BLD-MCNC: 10.7 G/DL (ref 12–15.9)
IMM GRANULOCYTES # BLD AUTO: 0.02 10*3/MM3 (ref 0–0.05)
IMM GRANULOCYTES NFR BLD AUTO: 0.3 % (ref 0–0.5)
LYMPHOCYTES # BLD AUTO: 1.41 10*3/MM3 (ref 0.7–3.1)
LYMPHOCYTES NFR BLD AUTO: 22.9 % (ref 19.6–45.3)
MCH RBC QN AUTO: 28.9 PG (ref 26.6–33)
MCHC RBC AUTO-ENTMCNC: 30.4 G/DL (ref 31.5–35.7)
MCV RBC AUTO: 95.1 FL (ref 79–97)
MONOCYTES # BLD AUTO: 0.34 10*3/MM3 (ref 0.1–0.9)
MONOCYTES NFR BLD AUTO: 5.5 % (ref 5–12)
NEUTROPHILS NFR BLD AUTO: 4.16 10*3/MM3 (ref 1.7–7)
NEUTROPHILS NFR BLD AUTO: 67.6 % (ref 42.7–76)
NRBC BLD AUTO-RTO: 0 /100 WBC (ref 0–0.2)
NT-PROBNP SERPL-MCNC: 610.1 PG/ML (ref 0–900)
PLATELET # BLD AUTO: 297 10*3/MM3 (ref 140–450)
PMV BLD AUTO: 9.7 FL (ref 6–12)
POTASSIUM SERPL-SCNC: 4.1 MMOL/L (ref 3.5–5.2)
PROT SERPL-MCNC: 7.7 G/DL (ref 6–8.5)
RBC # BLD AUTO: 3.7 10*6/MM3 (ref 3.77–5.28)
SODIUM SERPL-SCNC: 143 MMOL/L (ref 136–145)
TSH SERPL DL<=0.05 MIU/L-ACNC: 6.17 UIU/ML (ref 0.27–4.2)
WBC NRBC COR # BLD AUTO: 6.16 10*3/MM3 (ref 3.4–10.8)

## 2024-04-01 PROCEDURE — 83880 ASSAY OF NATRIURETIC PEPTIDE: CPT

## 2024-04-01 PROCEDURE — 36415 COLL VENOUS BLD VENIPUNCTURE: CPT

## 2024-04-01 PROCEDURE — 80053 COMPREHEN METABOLIC PANEL: CPT

## 2024-04-01 PROCEDURE — 84443 ASSAY THYROID STIM HORMONE: CPT

## 2024-04-01 PROCEDURE — 85025 COMPLETE CBC W/AUTO DIFF WBC: CPT

## 2024-04-09 ENCOUNTER — OFFICE VISIT (OUTPATIENT)
Dept: WOUND CARE | Facility: HOSPITAL | Age: 69
End: 2024-04-09
Payer: MEDICARE

## 2024-04-09 ENCOUNTER — OFFICE VISIT (OUTPATIENT)
Dept: INTERNAL MEDICINE | Age: 69
End: 2024-04-09

## 2024-04-09 VITALS
HEART RATE: 102 BPM | OXYGEN SATURATION: 90 % | BODY MASS INDEX: 53.92 KG/M2 | HEIGHT: 62 IN | SYSTOLIC BLOOD PRESSURE: 137 MMHG | DIASTOLIC BLOOD PRESSURE: 85 MMHG | WEIGHT: 293 LBS

## 2024-04-09 DIAGNOSIS — F32.89 OTHER DEPRESSION: ICD-10-CM

## 2024-04-09 DIAGNOSIS — I10 PRIMARY HYPERTENSION: ICD-10-CM

## 2024-04-09 DIAGNOSIS — N32.81 OAB (OVERACTIVE BLADDER): ICD-10-CM

## 2024-04-09 DIAGNOSIS — M25.50 ARTHRALGIA, UNSPECIFIED JOINT: ICD-10-CM

## 2024-04-09 DIAGNOSIS — J44.9 CHRONIC OBSTRUCTIVE PULMONARY DISEASE, UNSPECIFIED COPD TYPE (HCC): Primary | ICD-10-CM

## 2024-04-09 DIAGNOSIS — R73.9 HYPERGLYCEMIA: ICD-10-CM

## 2024-04-09 DIAGNOSIS — E78.5 HYPERLIPIDEMIA, UNSPECIFIED HYPERLIPIDEMIA TYPE: ICD-10-CM

## 2024-04-09 DIAGNOSIS — R53.1 GENERALIZED WEAKNESS: ICD-10-CM

## 2024-04-09 DIAGNOSIS — L97.909 ULCER OF LOWER EXTREMITY, UNSPECIFIED LATERALITY, UNSPECIFIED ULCER STAGE (HCC): ICD-10-CM

## 2024-04-09 DIAGNOSIS — E03.9 HYPOTHYROIDISM, UNSPECIFIED TYPE: ICD-10-CM

## 2024-04-09 DIAGNOSIS — R42 VERTIGO: ICD-10-CM

## 2024-04-09 DIAGNOSIS — E79.0 HYPERURICEMIA: ICD-10-CM

## 2024-04-09 DIAGNOSIS — R53.1 WEAKNESS: ICD-10-CM

## 2024-04-09 DIAGNOSIS — E55.9 VITAMIN D DEFICIENCY: ICD-10-CM

## 2024-04-09 PROCEDURE — G0463 HOSPITAL OUTPT CLINIC VISIT: HCPCS

## 2024-04-09 NOTE — PROGRESS NOTES
Chief Complaint   Patient presents with    Follow-up     1 month follow up       HPI: Tessa Dias is a 68 y.o. female is here for follow-up of COPD and lower extremity edema.  She is doing much better today.  She is still short of breath.  She is now on trelegy but she just started this this week and she is not sure whether or not it is helping as of yet.  Her pulmonologist is Dr. Mathur.  Her lower extremity edema has much improved.  She does have an upcoming appointment with PATRICK Wood on March 23.  Her TSH is elevated.  However, she states that she forgot to take her thyroid medication and she is now being more compliant with taking it.  She has been going to wound care and the wound on her lower extremity is improving.  However, she still complains of generalized weakness.  She is interested in getting some physical therapy and we will order home health for this.    She does have a history of reactive airways disease, which is stable on albuterol.  She has not had any gout flares on allopurinol.  Her depression is stable.  She still takes Fioricet as needed for migraines.  However, we have not filled this in almost 3 years.  Her blood pressure is well-controlled here in the office today.  She denies any complaints of chest pain.  She is chronically short of breath secondary to her COPD.  Her allergies are stable on Flonase.      Meclizine is helpful for vertigo.  Myrbetriq has been helpful for her overactive bladder.  She does have a history of vitamin D deficiency and is on ergocalciferol    Past Medical History:   Diagnosis Date    Abnormal CT of spine     Acute sinusitis     Arthritis     Asthma     Bronchitis     Carpal tunnel syndrome     both    CHF (congestive heart failure) ([facility])     Depression     Fibromyalgia     Furuncle     Gout     Hyperlipidemia     Hypertension     Low vitamin D level     Lumbago     Lumbar radiculopathy     Neuropathy     feet    Obesity     Rheumatoid nodule

## 2024-04-10 ENCOUNTER — TELEPHONE (OUTPATIENT)
Dept: INTERNAL MEDICINE CLINIC | Age: 69
End: 2024-04-10

## 2024-04-10 NOTE — TELEPHONE ENCOUNTER
[facility] has the referral and are asking if pt needs nursing as well  due to her COPD/CHF etc  ?    If no nursing needs it will be next week before they can admit for PT only   but if needs nursing can get admitted in  48 hrs or less     Does pt need nursing

## 2024-04-11 ENCOUNTER — TELEPHONE (OUTPATIENT)
Dept: INTERNAL MEDICINE CLINIC | Age: 69
End: 2024-04-11

## 2024-04-11 NOTE — TELEPHONE ENCOUNTER
[facility] nurse admitted pt and nursing will plan on seeing pt 1x a week x 3 weeks for cardiopulmonary assessments, any weights and to monitor blood pressure.     HH would also like to add  occupational therapy evaluation and patient wants SW  to help with living will and POA  - is this ok to add ?

## 2024-04-13 ASSESSMENT — ENCOUNTER SYMPTOMS
EYE DISCHARGE: 0
ABDOMINAL PAIN: 0
CHEST TIGHTNESS: 0
BLOOD IN STOOL: 0
SHORTNESS OF BREATH: 0
SINUS PRESSURE: 0
EYE PAIN: 0
NAUSEA: 0
DIARRHEA: 0
CONSTIPATION: 0
ABDOMINAL DISTENTION: 0
VOICE CHANGE: 0
TROUBLE SWALLOWING: 0
PHOTOPHOBIA: 0
SORE THROAT: 0
BACK PAIN: 1
EYE REDNESS: 0
WHEEZING: 0
COLOR CHANGE: 0
COUGH: 0
EYE ITCHING: 0

## 2024-04-15 DIAGNOSIS — F32.89 OTHER DEPRESSION: ICD-10-CM

## 2024-04-15 RX ORDER — AMITRIPTYLINE HYDROCHLORIDE 25 MG/1
TABLET, FILM COATED ORAL
Qty: 90 TABLET | Refills: 0 | Status: SHIPPED | OUTPATIENT
Start: 2024-04-15

## 2024-04-15 NOTE — PROGRESS NOTES
Last OV 4/9/2024  Next OV 7/9/2024      Requested Prescriptions     Pending Prescriptions Disp Refills    amitriptyline (ELAVIL) 25 MG tablet 90 tablet 0     Sig: TAKE ONE TABLET BY MOUTH NIGHTLY FOR NERVE DISEASE

## 2024-04-17 ENCOUNTER — TELEPHONE (OUTPATIENT)
Dept: INTERNAL MEDICINE CLINIC | Age: 69
End: 2024-04-17

## 2024-04-18 ENCOUNTER — TELEPHONE (OUTPATIENT)
Dept: INTERNAL MEDICINE CLINIC | Age: 69
End: 2024-04-18

## 2024-04-18 NOTE — TELEPHONE ENCOUNTER
[facility] OT michael completed OT Recommending continued OT 1w2 beginning w/o 4/21 to address LB dressing and BUE strength    Please advise if approved

## 2024-05-02 ENCOUNTER — TELEPHONE (OUTPATIENT)
Dept: INTERNAL MEDICINE CLINIC | Age: 69
End: 2024-05-02

## 2024-05-02 NOTE — TELEPHONE ENCOUNTER
[facility] OT reporting pt has  :  no further OT needs at this time pt is I with UE HEP and able to use sock aid to natasha socks after instruction, met OT goals     Fyi

## 2024-05-16 ENCOUNTER — TELEPHONE (OUTPATIENT)
Dept: INTERNAL MEDICINE | Age: 69
End: 2024-05-16

## 2024-05-16 NOTE — PROGRESS NOTES
[facility] - PODIATRY    Today's Date: 05/20/2024     Patient Name: Pau Cabrera  MRN: [id number]  CSN: [id number]  PCP: Carola Barbosa MD  Referring Provider: No ref. provider found    SUBJECTIVE     Chief Complaint   Patient presents with    Follow-up     Carola Barbosa, 02/19/2024 3 MTH FU-pt states she is here today for nail care/pt went to ED on 05/18 for a spot on the bottom of her foot-pt reports pain level 5/10     HPI: Pau Cabrera, a 68 y.o.female, comes to clinic as a(n) new patient complaining of foot pain, complaining of toenail/callus issues, and complaining of lower extremity wounds. Patient has h/o Anxiety, Asthma, Back Pain, CHF, CAD, Depression, Fibromyalgia, GERD, Gout, Hyperlipidemia, HTN, Obesity, CHARLIE, Peripheral Neuropathy, Pulmonary Arterial HTN, Rheumatoid Arthritis . Patient is non-diabetic. Today patient presents with complaints that her toenails are elongated, thickened, dystrophic, and discolored. She has difficulty caring for them herself d/t body habitus, mobility issues, and back issues. Reports continued edema in her BLEs that has been present for years now. Patient states that this past Saturday she went to the ER for a painful sore to the bottom of her right foot. She does not recall injuring the area or stepping on anything but reports that she first noticed the area around May 11th. Patient reports they obtained xrays which were negative for anything significant, debrided the area and sent in an antibiotic and pain meds and discharged her home. Patient reports the wound to her RLE she was seeing outpatient [facility] has healed and is no longer an issue. She did get a new set of Lymphedema pumps since her previous visit but use is limited due to how difficult they are to get on and off.  Admits pain at 5/10 level. Denies previous treatment. Denies any constitutional symptoms. No other pedal complaints at this time.       Past Medical  History:   Diagnosis Date    Anemia     iron def    Anxiety     Asthma     Back pain     CHF (congestive heart failure)     Chronic bronchitis     Coronary artery disease     COVID-19 vaccine series completed     pfizer    COVID-19 vaccine series completed     CTS (carpal tunnel syndrome)     Depression     Fibromyalgia     GERD (gastroesophageal reflux disease)     Gout     Hyperlipemia     Hyperlipidemia     Hypertension     Obesity     Obstructive sleep apnea     could not tolerate machine     Peripheral neuropathy     Primary central sleep apnea     Pulmonary arterial hypertension     Rheumatoid arthritis     Sinusitis      Past Surgical History:   Procedure Laterality Date    BREAST BIOPSY Left      SECTION      X 2    COLONOSCOPY  10/22/2015    Tics repeat exam in 5 years    COLONOSCOPY  2007    Small hemorrhoids repeat exam in 3 years    COLONOSCOPY N/A 2020    Procedure: COLONOSCOPY WITH ANESTHESIA;  Surgeon: Denny Francis MD;  Location: [facility] ENDOSCOPY;  Service: Gastroenterology;  Laterality: N/A;  pre: family hx colon ca  post: diverticulosis  Carola Barbosa MD    HYSTERECTOMY      total    THYROIDECTOMY Left 2022    Procedure: Left thyroidectomy with isthmusectomy;  Surgeon: Milad So MD;  Location: [facility] OR;  Service: ENT;  Laterality: Left;    UMBILICAL HERNIA REPAIR      had 1/2 of it repaired with hysterectomy      Family History   Problem Relation Age of Onset    Arthritis Mother     Heart disease Mother     Hypertension Mother     Thyroid disease Mother     Arthritis Father     Hypertension Father     Anuerysm Father     Stroke Father     Asthma Brother     Colon polyps Neg Hx     Colon cancer Neg Hx      Social History     Socioeconomic History    Marital status:    Tobacco Use    Smoking status: Never     Passive exposure: Past    Smokeless tobacco: Never   Vaping Use    Vaping status: Never Used   Substance and Sexual Activity     Alcohol use: No    Drug use: No    Sexual activity: Not Currently     Partners: Male     Birth control/protection: Surgical     Allergies   Allergen Reactions    Codeine Sulfate Hives    Lisinopril Swelling and Rash     Facial swelling     Current Outpatient Medications   Medication Sig Dispense Refill    albuterol sulfate  (90 Base) MCG/ACT inhaler Inhale 2 puffs Every 4 (Four) Hours As Needed for Wheezing. 6.7 g 5    allopurinol (ZYLOPRIM) 300 MG tablet       amitriptyline (ELAVIL) 25 MG tablet Take 1 tablet by mouth Every Night.      azelastine (ASTELIN) 0.1 % nasal spray 2 sprays into the nostril(s) as directed by provider 2 (Two) Times a Day. Use in each nostril as directed 60 mL 11    bumetanide (BUMEX) 1 MG tablet Take 1 tablet by mouth Daily.      butalbital-acetaminophen-caffeine (FIORICET, ESGIC) -40 MG per tablet Take 1 tablet by mouth Every 4 (Four) Hours As Needed for Headache.      carvedilol (COREG) 25 MG tablet TAKE 1 TABLET BY MOUTH TWICE A DAY 60 tablet 3    celecoxib (CeleBREX) 100 MG capsule Take 1 capsule by mouth.      doxycycline (MONODOX) 100 MG capsule Take 1 capsule by mouth 2 (Two) Times a Day. 20 capsule 0    DULoxetine (CYMBALTA) 60 MG capsule Take 1 capsule by mouth Every Morning.      EPINEPHrine (ADRENALIN) 0.05 MG/ML syringe Inject 20 mL as directed.      fenofibrate (TRICOR) 145 MG tablet Take 1 tablet by mouth Daily.      fluticasone (Flonase Allergy Relief) 50 MCG/ACT nasal spray 2 sprays into the nostril(s) as directed by provider Daily. 16 g 11    HYDROcodone-acetaminophen (Norco) 7.5-325 MG per tablet Take 1 tablet by mouth Every 6 (Six) Hours As Needed for Moderate Pain. 6 tablet 0    levothyroxine (SYNTHROID, LEVOTHROID) 75 MCG tablet Take 1 tablet by mouth Daily.      loratadine (CLARITIN) 10 MG tablet Take 1 tablet by mouth Daily. 90 tablet 3    meclizine (ANTIVERT) 25 MG tablet Take 1 tablet by mouth 3 (Three) Times a Day As Needed for Dizziness.      Multiple  Vitamins-Minerals (MULTIVITAMIN ADULT PO) Take  by mouth.      Myrbetriq 25 MG tablet sustained-release 24 hour 24 hr tablet Take 1 tablet by mouth Daily.      nystatin (MYCOSTATIN) 849934 UNIT/GM powder Apply  topically to the appropriate area as directed 3 (Three) Times a Day. 60 g 0    OXYGEN-HELIUM IN Inhale. 2L at night      solifenacin (VESICARE) 10 MG tablet Take 1 tablet by mouth Daily.      spironolactone (ALDACTONE) 25 MG tablet Take 1 tablet by mouth.      vitamin D (ERGOCALCIFEROL) 1.25 MG (78120 UT) capsule capsule Take 1 capsule by mouth 1 (One) Time Per Week.       No current facility-administered medications for this visit.     Review of Systems   Constitutional:  Negative for activity change and fever.   HENT:  Negative for congestion.    Respiratory:  Negative for chest tightness, shortness of breath and wheezing.         Wearing portable O2 today   Cardiovascular:  Positive for leg swelling. Negative for chest pain.   Gastrointestinal:  Negative for abdominal pain, constipation, diarrhea, nausea and vomiting.   Musculoskeletal:  Positive for arthralgias, back pain, gait problem and myalgias.        Fibromyalgia; wheelchair for ambulation; bilateral foot pain   Skin:  Positive for wound. Negative for color change.        Wound to plantar right foot. Thickened, elongated, irregular toenails. Extremely dry skin.   Neurological:  Positive for weakness. Negative for dizziness and numbness.   Psychiatric/Behavioral:  Negative for agitation, behavioral problems and confusion.        OBJECTIVE     Vitals:    05/20/24 1112   BP: 160/90   Pulse: 91   SpO2: 94%         PHYSICAL EXAM  GEN:   Accompanied by none.     Foot/Ankle Exam    GENERAL  Appearance:  appears stated age  Orientation:  AAOx3  Affect:  appropriate  Gait:  (Unsteady)  Assistance:  wheelchair  Right shoe gear: sock (gauze dressing to R foot)  Left shoe gear: sock    VASCULAR     Right Foot Vascularity   Dorsalis pedis:  2+  Posterior tibial:   2+  Skin temperature:  warm  Edema grading:  3+ and pitting  CFT:  3  Pedal hair growth:  Absent  Varicosities:  none     Left Foot Vascularity   Dorsalis pedis:  2+  Posterior tibial:  2+  Skin temperature:  warm  Edema grading:  3+, non-pitting and pitting  CFT:  3  Pedal hair growth:  Absent  Varicosities:  none     NEUROLOGIC     Right Foot Neurologic   Light touch sensation: diminished  Vibratory sensation: diminished  Hot/Cold sensation: diminished     Left Foot Neurologic   Light touch sensation: diminished  Vibratory sensation: diminished  Hot/Cold sensation:  diminished    MUSCULOSKELETAL     Right Foot Musculoskeletal   Ecchymosis:  none  Tenderness:  callous right foot and toenail problem   (Localized pain to plantar hindfoot)  Arch:  Pes planus     Left Foot Musculoskeletal   Ecchymosis:  none  Tenderness:  toenail problem  Arch:  Pes planus    MUSCLE STRENGTH     Right Foot Muscle Strength   Foot dorsiflexion:  4  Foot plantar flexion:  4  Foot inversion:  4  Foot eversion:  4     Left Foot Muscle Strength   Foot dorsiflexion:  4  Foot plantar flexion:  4  Foot inversion:  4  Foot eversion:  4    RANGE OF MOTION     Right Foot Range of Motion   Foot and ankle ROM within normal limits       Left Foot Range of Motion   Foot and ankle ROM within normal limits      DERMATOLOGIC      Right Foot Dermatologic   Skin  Positive for corn, drainage, dryness, skin changes and wound.   Nails  1.  Positive for elongated, onychomycosis, abnormal thickness, subungual debris, dystrophic nail and pincer.  2.  Positive for elongated, abnormal thickness, subungual debris, dystrophic nail and pincer nail.  3.  Positive for elongated, abnormal thickness, subungual debris, dystrophic nail and pincer.  4.  Positive for elongated, abnormal thickness, subungual debris, dystrophic nail and pincer.  5.  Positive for elongated, onychomycosis, subungual debris, dystrophic nail and pincer.     Left Foot Dermatologic   Skin  Positive  for dryness and skin changes.   Nails  1.  Positive for elongated, onychomycosis, abnormal thickness, subungual debris, dystrophic nail and pincer.  2.  Positive for elongated, abnormal thickness, subungual debris, dystrophic nail and pincer.  3.  Positive for elongated, abnormal thickness, subungual debris, dystrophic nail and pincer.  4.  Positive for elongated, abnormally thick, subungual debris, dystrophic nail and pincer.  5.  Positive for elongated, abnormally thick, subungual debris, dystrophic nail and pincer.     Right foot additional comments: Extremely dry, flaky skin noted.Slight hyperpigmentation noted.       Left foot additional comments: Extremely dry, flaky skin noted. Slight hyperpigmentation noted.    Image:       RADIOLOGY/NUCLEAR:  XR Foot 3+ View Right    Result Date: 5/18/2024  Narrative: EXAMINATION: XR FOOT 3+ VW RIGHT-  5/18/2024 7:40 PM  HISTORY: RIGHT foot wound. Pain and swelling. Osteomyelitis evaluation.  3 view RIGHT foot exam.  COMPARISON: 2/26/2024.  Osteoarthritic changes of the MTP joints and the midfoot joints. Moderate calcaneal spurring.  Few soft tissue swelling.  No fracture or dislocation.  No focal bone destruction to indicate osteomyelitis.      Impression: 1. Degenerative joint changes. 2. No osteomyelitis is seen.   This report was signed and finalized on 5/18/2024 9:09 PM by Dr. Esteban Gill MD.       LABORATORY/CULTURE RESULTS:      PATHOLOGY RESULTS:       ASSESSMENT/PLAN     Diagnoses and all orders for this visit:    1. Dystrophic nail (Primary)    2. Bilateral foot pain    3. Ulcer of right foot, limited to breakdown of skin    4. Gait abnormality    5. Thickened nail    6. Dry skin    7. Lymphedema    8. BMI 60.0-69.9, adult        Comprehensive lower extremity examination and evaluation was performed.  Discussed findings and treatment plan including risks, benefits, and treatment options with patient in detail. Patient agreed with treatment plan.  After verbal  consent obtained, nail(s) x10 debrided of length and thickness with nail nipper without incidence  After verbal consent obtained, calluses x1 pared utilizing dermal curette and/or scalpel without incidence  Patient may maintain nails and calluses at home utilizing emery board or pumice stone between visits as needed  After verbal consent obtained, selective debridement performed to remove skin, slough and non-viable tissue utilizing dermal curette and/or scapel. Hemostasis achieved with pressure. Wound area debrided was entire measurement documented.   Recommened OTC Urea 40% cream for extremely dry skin to be applied 2x/daily. Also recommended to lather feet up in Vaseline and to apply bag over feet for a couple hours then remove and wash Vaseline off and exfoliate skin with either a wash cloth or pumice stone.  Encouraged to utilize compression stockings, Lymphedema pumps, and to keep feet elevated while at rest.   Instructed patient to go back down to outpatient [facility] today to schedule new appointment for further evaluation and treatment of ulceration to plantar right foot. Patient previously seen there for open area of weeping to back of RLE a couple months ago that has since healed. No need for new referral at this time.  Instructed patient to keep antibiotic ointment and bandage to affected area to be changed twice daily until can be further evaluated by wound care.   Dispensed surgical shoe today to offload pressure.  Follow-up with Podiatry in 63 days to keep nails and calluses maintained.     An After Visit Summary was printed and given to the patient at discharge, including (if requested) any available informative/educational handouts regarding diagnosis, treatment, or medications. All questions were answered to patient/family satisfaction. Should symptoms fail to improve or worsen they agree to call or return to clinic or to go to the Emergency Department. Discussed the importance of following up with any  needed screening tests/labs/specialist appointments and any requested follow-up recommended by me today. Importance of maintaining follow-up discussed and patient accepts that missed appointments can delay diagnosis and potentially lead to worsening of conditions.  Return in about 2 months (around 7/22/2024) for Follow-up with APRN, Follow-up in [facility] or sooner if acute issues arise.      This document has been electronically signed by DONOVAN Feldman on May 20, 2024 13:16 CDT

## 2024-05-16 NOTE — TELEPHONE ENCOUNTER
Patient called stating they have a sore on right foot (heel area) that is causing a lot of pain and was wanting to talk to someone in the office

## 2024-05-16 NOTE — TELEPHONE ENCOUNTER
Recommended patient be evaluated in office. Appointment scheduled with next available provider. Patient voiced understanding and agreed to appointment time and date.

## 2024-05-17 ENCOUNTER — TELEPHONE (OUTPATIENT)
Dept: PODIATRY | Facility: CLINIC | Age: 69
End: 2024-05-17
Payer: MEDICARE

## 2024-05-17 NOTE — TELEPHONE ENCOUNTER
Hub to relay  Called patient regarding appt on 05/20/2024. Left message for patient to return call if any questions or concerns arise.

## 2024-05-18 ENCOUNTER — HOSPITAL ENCOUNTER (EMERGENCY)
Facility: HOSPITAL | Age: 69
Discharge: HOME | End: 2024-05-18
Attending: INTERNAL MEDICINE
Payer: MEDICARE

## 2024-05-18 ENCOUNTER — APPOINTMENT (OUTPATIENT)
Dept: GENERAL RADIOLOGY | Facility: HOSPITAL | Age: 69
End: 2024-05-18
Payer: MEDICARE

## 2024-05-18 VITALS
RESPIRATION RATE: 20 BRPM | SYSTOLIC BLOOD PRESSURE: 161 MMHG | OXYGEN SATURATION: 98 % | TEMPERATURE: 98.1 F | WEIGHT: 293 LBS | BODY MASS INDEX: 53.92 KG/M2 | HEIGHT: 62 IN | DIASTOLIC BLOOD PRESSURE: 90 MMHG | HEART RATE: 101 BPM

## 2024-05-18 DIAGNOSIS — L03.115 CELLULITIS OF RIGHT FOOT: Primary | ICD-10-CM

## 2024-05-18 LAB
BASOPHILS # BLD AUTO: 0.06 10*3/MM3 (ref 0–0.2)
BASOPHILS NFR BLD AUTO: 0.8 % (ref 0–1.5)
CRP SERPL-MCNC: 3.77 MG/DL (ref 0–0.5)
DEPRECATED RDW RBC AUTO: 57 FL (ref 37–54)
EOSINOPHIL # BLD AUTO: 0.21 10*3/MM3 (ref 0–0.4)
EOSINOPHIL NFR BLD AUTO: 2.8 % (ref 0.3–6.2)
ERYTHROCYTE [DISTWIDTH] IN BLOOD BY AUTOMATED COUNT: 16.6 % (ref 12.3–15.4)
ERYTHROCYTE [SEDIMENTATION RATE] IN BLOOD: 77 MM/HR (ref 0–30)
HCT VFR BLD AUTO: 35.9 % (ref 34–46.6)
HGB BLD-MCNC: 10.9 G/DL (ref 12–15.9)
IMM GRANULOCYTES # BLD AUTO: 0.03 10*3/MM3 (ref 0–0.05)
IMM GRANULOCYTES NFR BLD AUTO: 0.4 % (ref 0–0.5)
LYMPHOCYTES # BLD AUTO: 1.63 10*3/MM3 (ref 0.7–3.1)
LYMPHOCYTES NFR BLD AUTO: 21.7 % (ref 19.6–45.3)
MCH RBC QN AUTO: 28.5 PG (ref 26.6–33)
MCHC RBC AUTO-ENTMCNC: 30.4 G/DL (ref 31.5–35.7)
MCV RBC AUTO: 94 FL (ref 79–97)
MONOCYTES # BLD AUTO: 0.36 10*3/MM3 (ref 0.1–0.9)
MONOCYTES NFR BLD AUTO: 4.8 % (ref 5–12)
NEUTROPHILS NFR BLD AUTO: 5.23 10*3/MM3 (ref 1.7–7)
NEUTROPHILS NFR BLD AUTO: 69.5 % (ref 42.7–76)
NRBC BLD AUTO-RTO: 0 /100 WBC (ref 0–0.2)
PLATELET # BLD AUTO: 288 10*3/MM3 (ref 140–450)
PMV BLD AUTO: 9.8 FL (ref 6–12)
RBC # BLD AUTO: 3.82 10*6/MM3 (ref 3.77–5.28)
WBC NRBC COR # BLD AUTO: 7.52 10*3/MM3 (ref 3.4–10.8)

## 2024-05-18 PROCEDURE — 87205 SMEAR GRAM STAIN: CPT | Performed by: INTERNAL MEDICINE

## 2024-05-18 PROCEDURE — 36415 COLL VENOUS BLD VENIPUNCTURE: CPT

## 2024-05-18 PROCEDURE — 73630 X-RAY EXAM OF FOOT: CPT

## 2024-05-18 PROCEDURE — 87070 CULTURE OTHR SPECIMN AEROBIC: CPT | Performed by: INTERNAL MEDICINE

## 2024-05-18 PROCEDURE — 87077 CULTURE AEROBIC IDENTIFY: CPT | Performed by: INTERNAL MEDICINE

## 2024-05-18 PROCEDURE — 86140 C-REACTIVE PROTEIN: CPT | Performed by: INTERNAL MEDICINE

## 2024-05-18 PROCEDURE — 85025 COMPLETE CBC W/AUTO DIFF WBC: CPT | Performed by: INTERNAL MEDICINE

## 2024-05-18 PROCEDURE — 87186 SC STD MICRODIL/AGAR DIL: CPT | Performed by: INTERNAL MEDICINE

## 2024-05-18 PROCEDURE — 99283 EMERGENCY DEPT VISIT LOW MDM: CPT

## 2024-05-18 PROCEDURE — 85652 RBC SED RATE AUTOMATED: CPT | Performed by: INTERNAL MEDICINE

## 2024-05-18 RX ORDER — HYDROCODONE BITARTRATE AND ACETAMINOPHEN 7.5; 325 MG/1; MG/1
1 TABLET ORAL ONCE
Status: COMPLETED | OUTPATIENT
Start: 2024-05-18 | End: 2024-05-18

## 2024-05-18 RX ORDER — DOXYCYCLINE 100 MG/1
100 TABLET ORAL ONCE
Status: COMPLETED | OUTPATIENT
Start: 2024-05-18 | End: 2024-05-18

## 2024-05-18 RX ORDER — HYDROCODONE BITARTRATE AND ACETAMINOPHEN 7.5; 325 MG/1; MG/1
1 TABLET ORAL EVERY 6 HOURS PRN
Qty: 6 TABLET | Refills: 0 | Status: SHIPPED | OUTPATIENT
Start: 2024-05-18

## 2024-05-18 RX ORDER — DOXYCYCLINE 100 MG/1
100 CAPSULE ORAL 2 TIMES DAILY
Qty: 20 CAPSULE | Refills: 0 | Status: SHIPPED | OUTPATIENT
Start: 2024-05-18

## 2024-05-18 RX ADMIN — HYDROCODONE BITARTRATE AND ACETAMINOPHEN 1 TABLET: 7.5; 325 TABLET ORAL at 20:59

## 2024-05-18 RX ADMIN — DOXYCYCLINE 100 MG: 100 TABLET, FILM COATED ORAL at 22:00

## 2024-05-19 NOTE — ED PROVIDER NOTES
Subjective   History of Present Illness  68-year-old female who presents emergency department for a wound on the bottom of her right foot.  She notes that it has been there for about a week.  It feels like pins-and-needles in her foot, or she might have something in the bottom of her foot.  Her family has been wrapping/dressing this for her.  She has no current drainage or discharge.  She has not noticed an odor from the area.  She has had no fevers or chills.    Review of Systems   Constitutional:  Negative for chills and fever.   Skin:  Positive for color change and wound.       Past Medical History:   Diagnosis Date    Anemia     iron def    Anxiety     Asthma     Back pain     CHF (congestive heart failure)     Chronic bronchitis     Coronary artery disease     COVID-19 vaccine series completed     pfizer    COVID-19 vaccine series completed     CTS (carpal tunnel syndrome)     Depression     Fibromyalgia     GERD (gastroesophageal reflux disease)     Gout     Hyperlipemia     Hyperlipidemia     Hypertension     Obesity     Obstructive sleep apnea     could not tolerate machine     Peripheral neuropathy     Primary central sleep apnea     Pulmonary arterial hypertension     Rheumatoid arthritis     Sinusitis        Allergies   Allergen Reactions    Codeine Sulfate Hives    Lisinopril Swelling and Rash     Facial swelling       Past Surgical History:   Procedure Laterality Date    BREAST BIOPSY Left      SECTION      X 2    COLONOSCOPY  10/22/2015    Tics repeat exam in 5 years    COLONOSCOPY  2007    Small hemorrhoids repeat exam in 3 years    COLONOSCOPY N/A 2020    Procedure: COLONOSCOPY WITH ANESTHESIA;  Surgeon: Denny Francis MD;  Location: [facility] ENDOSCOPY;  Service: Gastroenterology;  Laterality: N/A;  pre: family hx colon ca  post: diverticulosis  Carola Barbosa MD    HYSTERECTOMY      total    THYROIDECTOMY Left 2022    Procedure: Left thyroidectomy with  isthmusectomy;  Surgeon: Milad So MD;  Location: [facility] OR;  Service: ENT;  Laterality: Left;    UMBILICAL HERNIA REPAIR      had 1/2 of it repaired with hysterectomy        Family History   Problem Relation Age of Onset    Arthritis Mother     Heart disease Mother     Hypertension Mother     Thyroid disease Mother     Arthritis Father     Hypertension Father     Anuerysm Father     Stroke Father     Asthma Brother     Colon polyps Neg Hx     Colon cancer Neg Hx        Social History     Socioeconomic History    Marital status:    Tobacco Use    Smoking status: Never     Passive exposure: Past    Smokeless tobacco: Never   Vaping Use    Vaping status: Never Used   Substance and Sexual Activity    Alcohol use: No    Drug use: No    Sexual activity: Not Currently     Partners: Male     Birth control/protection: Surgical           Objective   Physical Exam  Vitals reviewed.   Constitutional:       Appearance: She is obese.   HENT:      Head: Normocephalic and atraumatic.      Right Ear: External ear normal.      Left Ear: External ear normal.      Nose: Nose normal.   Eyes:      General: No scleral icterus.     Conjunctiva/sclera: Conjunctivae normal.   Cardiovascular:      Rate and Rhythm: Normal rate and regular rhythm.      Heart sounds: Normal heart sounds.   Pulmonary:      Effort: Pulmonary effort is normal.      Breath sounds: Normal breath sounds.   Musculoskeletal:         General: No tenderness.      Cervical back: Normal range of motion and neck supple.      Comments: Chronic bilateral lower extremity lymphedema.   Skin:     General: Skin is warm and dry.      Findings: Lesion present.      Comments: The patient has a lesion to the bottom pad of her heel.  There appears to be some redness in the area.  She also has some skin that appears to be peeling.  She has a circular lesion about the size of a quarter where a blister appears to be present that has popped.  The surrounding area is  healed up.  This area was removed in order to better visualize the wound underneath.  There is no current drainage.  It does not appear to be odorous.  It is tender to palpation.  Anticipate this is possibly a pressure wound.   Neurological:      Mental Status: She is alert and oriented to person, place, and time.      Cranial Nerves: No cranial nerve deficit.   Psychiatric:         Mood and Affect: Mood normal.         Behavior: Behavior normal.         Procedures           ED Course      XR Foot 3+ View Right   Final Result   1. Degenerative joint changes.   2. No osteomyelitis is seen.           This report was signed and finalized on 5/18/2024 9:09 PM by Dr. Esteban Gill MD.                                       Labs Reviewed   SEDIMENTATION RATE - Abnormal; Notable for the following components:       Result Value    Sed Rate 77 (*)     All other components within normal limits   C-REACTIVE PROTEIN - Abnormal; Notable for the following components:    C-Reactive Protein 3.77 (*)     All other components within normal limits   CBC WITH AUTO DIFFERENTIAL - Abnormal; Notable for the following components:    Hemoglobin 10.9 (*)     MCHC 30.4 (*)     RDW 16.6 (*)     RDW-SD 57.0 (*)     Monocyte % 4.8 (*)     All other components within normal limits   WOUND CULTURE   CBC AND DIFFERENTIAL    Narrative:     The following orders were created for panel order CBC & Differential.  Procedure                               Abnormality         Status                     ---------                               -----------         ------                     CBC Auto Differential[[id number]]        Abnormal            Final result                 Please view results for these tests on the individual orders.                 Medical Decision Making  Amount and/or Complexity of Data Reviewed  Labs: ordered.     Details: CBC ESR CRP  Radiology: ordered.     Details: X-ray right foot    Risk  Prescription drug management.    Please  keep your appointment on Monday with podiatry as scheduled.   Antibiotic and pain medication was called in to your pharmacy tonight as we discussed.   Please keep the foot clean and dry with a dry dressing in place while wearing a shoe.   Return to the ED if Sx worsen or fail to improve.     Final diagnoses:   Cellulitis of right foot       ED Disposition  ED Disposition       ED Disposition   Discharge    Condition   Stable    Comment   --               FU with podiatry on Monday as scheduled.    In 2 days           Medication List        New Prescriptions      doxycycline 100 MG capsule  Commonly known as: MONODOX  Take 1 capsule by mouth 2 (Two) Times a Day.     HYDROcodone-acetaminophen 7.5-325 MG per tablet  Commonly known as: Norco  Take 1 tablet by mouth Every 6 (Six) Hours As Needed for Moderate Pain.               Where to Get Your Medications        These medications were sent to [facility]/pharmacy #[id number] - [place] - [address] - [phone]  - [phone]   [address] [place] [id number]      Phone: [phone]   doxycycline 100 MG capsule  HYDROcodone-acetaminophen 7.5-325 MG per tablet            Kamala Barahona,   05/18/24 5282

## 2024-05-19 NOTE — DISCHARGE INSTRUCTIONS
Please keep your appointment on Monday with podiatry as scheduled.   Antibiotic and pain medication was called in to your pharmacy tonight as we discussed.   Please keep the foot clean and dry with a dry dressing in place while wearing a shoe.   Return to the ED if Sx worsen or fail to improve.   Your inflammatory markers were elevated today. I would like for you to have these repeat next week to make sure they are trending down. Your PCP can do this.

## 2024-05-20 ENCOUNTER — OFFICE VISIT (OUTPATIENT)
Dept: PODIATRY | Facility: CLINIC | Age: 69
End: 2024-05-20
Payer: MEDICARE

## 2024-05-20 VITALS
HEIGHT: 62 IN | HEART RATE: 91 BPM | SYSTOLIC BLOOD PRESSURE: 160 MMHG | BODY MASS INDEX: 53.92 KG/M2 | DIASTOLIC BLOOD PRESSURE: 90 MMHG | WEIGHT: 293 LBS | OXYGEN SATURATION: 94 %

## 2024-05-20 DIAGNOSIS — L85.3 DRY SKIN: ICD-10-CM

## 2024-05-20 DIAGNOSIS — L97.511 ULCER OF RIGHT FOOT, LIMITED TO BREAKDOWN OF SKIN: ICD-10-CM

## 2024-05-20 DIAGNOSIS — L60.2 THICKENED NAIL: ICD-10-CM

## 2024-05-20 DIAGNOSIS — I89.0 LYMPHEDEMA: ICD-10-CM

## 2024-05-20 DIAGNOSIS — L60.3 DYSTROPHIC NAIL: Primary | ICD-10-CM

## 2024-05-20 DIAGNOSIS — M79.671 BILATERAL FOOT PAIN: ICD-10-CM

## 2024-05-20 DIAGNOSIS — R26.9 GAIT ABNORMALITY: ICD-10-CM

## 2024-05-20 DIAGNOSIS — M79.672 BILATERAL FOOT PAIN: ICD-10-CM

## 2024-05-20 PROCEDURE — 3080F DIAST BP >= 90 MM HG: CPT

## 2024-05-20 PROCEDURE — 99213 OFFICE O/P EST LOW 20 MIN: CPT

## 2024-05-20 PROCEDURE — 1160F RVW MEDS BY RX/DR IN RCRD: CPT

## 2024-05-20 PROCEDURE — 11721 DEBRIDE NAIL 6 OR MORE: CPT

## 2024-05-20 PROCEDURE — 1159F MED LIST DOCD IN RCRD: CPT

## 2024-05-20 PROCEDURE — 11055 PARING/CUTG B9 HYPRKER LES 1: CPT

## 2024-05-20 PROCEDURE — 97597 DBRDMT OPN WND 1ST 20 CM/<: CPT

## 2024-05-20 PROCEDURE — 3077F SYST BP >= 140 MM HG: CPT

## 2024-05-22 ENCOUNTER — OFFICE VISIT (OUTPATIENT)
Dept: WOUND CARE | Facility: HOSPITAL | Age: 69
End: 2024-05-22
Payer: MEDICARE

## 2024-05-22 DIAGNOSIS — Q82.0 HEREDITARY LYMPHEDEMA: ICD-10-CM

## 2024-05-22 DIAGNOSIS — E66.01 MORBID (SEVERE) OBESITY DUE TO EXCESS CALORIES: ICD-10-CM

## 2024-05-22 DIAGNOSIS — R26.89 OTHER ABNORMALITIES OF GAIT AND MOBILITY: Primary | ICD-10-CM

## 2024-05-22 PROCEDURE — G0463 HOSPITAL OUTPT CLINIC VISIT: HCPCS

## 2024-05-29 DIAGNOSIS — I10 PRIMARY HYPERTENSION: ICD-10-CM

## 2024-05-29 DIAGNOSIS — F32.89 OTHER DEPRESSION: ICD-10-CM

## 2024-05-29 RX ORDER — CARVEDILOL 25 MG/1
25 TABLET ORAL 2 TIMES DAILY
Qty: 180 TABLET | Refills: 0 | Status: SHIPPED | OUTPATIENT
Start: 2024-05-29

## 2024-05-29 RX ORDER — AMITRIPTYLINE HYDROCHLORIDE 25 MG/1
TABLET, FILM COATED ORAL
Qty: 90 TABLET | Refills: 0 | Status: SHIPPED | OUTPATIENT
Start: 2024-05-29

## 2024-05-31 DIAGNOSIS — M19.90 OSTEOARTHRITIS, UNSPECIFIED OSTEOARTHRITIS TYPE, UNSPECIFIED SITE: ICD-10-CM

## 2024-05-31 RX ORDER — CELECOXIB 100 MG/1
CAPSULE ORAL
Qty: 180 CAPSULE | Refills: 0 | Status: SHIPPED | OUTPATIENT
Start: 2024-05-31

## 2024-07-19 ENCOUNTER — TELEPHONE (OUTPATIENT)
Dept: INTERNAL MEDICINE | Age: 69
End: 2024-07-19

## 2024-07-19 NOTE — TELEPHONE ENCOUNTER
Pt called stating that she needed a letter sent to [facility] for her oxygen concentrator. She also scheduled a follow up appointment because the appointment that she missed she thought was scheduled for another day and time.

## 2024-08-19 DIAGNOSIS — E55.9 VITAMIN D DEFICIENCY: ICD-10-CM

## 2024-08-19 DIAGNOSIS — J45.20 MILD INTERMITTENT ASTHMA WITHOUT COMPLICATION: Primary | ICD-10-CM

## 2024-08-19 RX ORDER — ERGOCALCIFEROL 1.25 MG/1
50000 CAPSULE ORAL WEEKLY
Qty: 12 CAPSULE | Refills: 1 | Status: SHIPPED | OUTPATIENT
Start: 2024-08-19

## 2024-08-19 RX ORDER — LORATADINE 10 MG/1
10 TABLET ORAL DAILY
Qty: 90 TABLET | Refills: 3 | Status: SHIPPED | OUTPATIENT
Start: 2024-08-19

## 2024-08-19 NOTE — TELEPHONE ENCOUNTER
Pharmacy sent a request for refills on Loratadine. Refill request routed to Dr. Crews.  Rx Refill Note  Requested Prescriptions     Pending Prescriptions Disp Refills    loratadine (CLARITIN) 10 MG tablet [Pharmacy Med Name: LORATADINE 10 MG TABS 10 Tablet] 90 tablet 3     Sig: TAKE 1 TABLET BY MOUTH DAILY.      Last office visit with prescribing clinician: 3/5/2024   Last telemedicine visit with prescribing clinician: Visit date not found   Next office visit with prescribing clinician: 9/26/2024                         Would you like a call back once the refill request has been completed: [] Yes [] No    If the office needs to give you a call back, can they leave a voicemail: [] Yes [] No    Deawyne Lopez, ANITRA  08/19/24, 11:16 CDT

## 2024-08-29 DIAGNOSIS — I10 PRIMARY HYPERTENSION: ICD-10-CM

## 2024-08-29 DIAGNOSIS — M10.9 GOUT OF MULTIPLE SITES, UNSPECIFIED CAUSE, UNSPECIFIED CHRONICITY: Primary | ICD-10-CM

## 2024-08-29 RX ORDER — CARVEDILOL 25 MG/1
25 TABLET ORAL 2 TIMES DAILY
Qty: 180 TABLET | Refills: 0 | Status: SHIPPED | OUTPATIENT
Start: 2024-08-29

## 2024-08-29 RX ORDER — ALLOPURINOL 300 MG/1
300 TABLET ORAL DAILY
Qty: 30 TABLET | Refills: 5 | Status: SHIPPED | OUTPATIENT
Start: 2024-08-29

## 2024-09-13 DIAGNOSIS — M19.90 OSTEOARTHRITIS, UNSPECIFIED OSTEOARTHRITIS TYPE, UNSPECIFIED SITE: ICD-10-CM

## 2024-09-13 RX ORDER — CELECOXIB 100 MG/1
CAPSULE ORAL
Qty: 180 CAPSULE | Refills: 0 | Status: SHIPPED | OUTPATIENT
Start: 2024-09-13

## 2024-09-19 RX ORDER — FLUTICASONE PROPIONATE AND SALMETEROL 250; 50 UG/1; UG/1
1 POWDER RESPIRATORY (INHALATION)
COMMUNITY
Start: 2024-05-29

## 2024-09-24 ENCOUNTER — TELEPHONE (OUTPATIENT)
Dept: PULMONOLOGY | Facility: CLINIC | Age: 69
End: 2024-09-24
Payer: MEDICARE

## 2024-10-03 ENCOUNTER — TELEPHONE (OUTPATIENT)
Dept: PULMONOLOGY | Facility: CLINIC | Age: 69
End: 2024-10-03
Payer: MEDICARE

## 2024-10-03 NOTE — TELEPHONE ENCOUNTER
Below are the following reason for why the cancelled appointment has not been rescheduled: Patient does not wish to reschedule. They will call to reschedule when it is good for them.

## 2024-10-22 ENCOUNTER — APPOINTMENT (OUTPATIENT)
Dept: GENERAL RADIOLOGY | Facility: HOSPITAL | Age: 69
End: 2024-10-22
Payer: MEDICARE

## 2024-10-22 ENCOUNTER — HOSPITAL ENCOUNTER (EMERGENCY)
Facility: HOSPITAL | Age: 69
Discharge: HOME | End: 2024-10-22
Attending: FAMILY MEDICINE | Admitting: FAMILY MEDICINE
Payer: MEDICARE

## 2024-10-22 VITALS
HEIGHT: 61 IN | DIASTOLIC BLOOD PRESSURE: 83 MMHG | WEIGHT: 293 LBS | BODY MASS INDEX: 55.32 KG/M2 | SYSTOLIC BLOOD PRESSURE: 133 MMHG | OXYGEN SATURATION: 98 % | TEMPERATURE: 98.6 F | RESPIRATION RATE: 22 BRPM | HEART RATE: 109 BPM

## 2024-10-22 DIAGNOSIS — R06.02 SHORTNESS OF BREATH: Primary | ICD-10-CM

## 2024-10-22 LAB
ANION GAP SERPL CALCULATED.3IONS-SCNC: 6 MMOL/L (ref 5–15)
B PARAPERT DNA SPEC QL NAA+PROBE: NOT DETECTED
B PERT DNA SPEC QL NAA+PROBE: NOT DETECTED
BASOPHILS # BLD AUTO: 0.04 10*3/MM3 (ref 0–0.2)
BASOPHILS NFR BLD AUTO: 0.5 % (ref 0–1.5)
BUN SERPL-MCNC: 18 MG/DL (ref 8–23)
BUN/CREAT SERPL: 19.4 (ref 7–25)
C PNEUM DNA NPH QL NAA+NON-PROBE: NOT DETECTED
CALCIUM SPEC-SCNC: 10 MG/DL (ref 8.6–10.5)
CHLORIDE SERPL-SCNC: 98 MMOL/L (ref 98–107)
CO2 SERPL-SCNC: 38 MMOL/L (ref 22–29)
CREAT SERPL-MCNC: 0.93 MG/DL (ref 0.57–1)
DEPRECATED RDW RBC AUTO: 59.4 FL (ref 37–54)
EGFRCR SERPLBLD CKD-EPI 2021: 66.7 ML/MIN/1.73
EOSINOPHIL # BLD AUTO: 0.26 10*3/MM3 (ref 0–0.4)
EOSINOPHIL NFR BLD AUTO: 2.9 % (ref 0.3–6.2)
ERYTHROCYTE [DISTWIDTH] IN BLOOD BY AUTOMATED COUNT: 17.1 % (ref 12.3–15.4)
FLUAV SUBTYP SPEC NAA+PROBE: NOT DETECTED
FLUBV RNA ISLT QL NAA+PROBE: NOT DETECTED
GEN 5 2HR TROPONIN T REFLEX: 29 NG/L
GLUCOSE SERPL-MCNC: 90 MG/DL (ref 65–99)
HADV DNA SPEC NAA+PROBE: NOT DETECTED
HCOV 229E RNA SPEC QL NAA+PROBE: NOT DETECTED
HCOV HKU1 RNA SPEC QL NAA+PROBE: NOT DETECTED
HCOV NL63 RNA SPEC QL NAA+PROBE: NOT DETECTED
HCOV OC43 RNA SPEC QL NAA+PROBE: NOT DETECTED
HCT VFR BLD AUTO: 35.4 % (ref 34–46.6)
HGB BLD-MCNC: 10.4 G/DL (ref 12–15.9)
HMPV RNA NPH QL NAA+NON-PROBE: NOT DETECTED
HOLD SPECIMEN: NORMAL
HOLD SPECIMEN: NORMAL
HPIV1 RNA ISLT QL NAA+PROBE: NOT DETECTED
HPIV2 RNA SPEC QL NAA+PROBE: NOT DETECTED
HPIV3 RNA NPH QL NAA+PROBE: NOT DETECTED
HPIV4 P GENE NPH QL NAA+PROBE: NOT DETECTED
IMM GRANULOCYTES # BLD AUTO: 0.02 10*3/MM3 (ref 0–0.05)
IMM GRANULOCYTES NFR BLD AUTO: 0.2 % (ref 0–0.5)
LYMPHOCYTES # BLD AUTO: 1.17 10*3/MM3 (ref 0.7–3.1)
LYMPHOCYTES NFR BLD AUTO: 13.2 % (ref 19.6–45.3)
M PNEUMO IGG SER IA-ACNC: NOT DETECTED
MAGNESIUM SERPL-MCNC: 1.8 MG/DL (ref 1.6–2.4)
MCH RBC QN AUTO: 28 PG (ref 26.6–33)
MCHC RBC AUTO-ENTMCNC: 29.4 G/DL (ref 31.5–35.7)
MCV RBC AUTO: 95.4 FL (ref 79–97)
MONOCYTES # BLD AUTO: 0.62 10*3/MM3 (ref 0.1–0.9)
MONOCYTES NFR BLD AUTO: 7 % (ref 5–12)
NEUTROPHILS NFR BLD AUTO: 6.77 10*3/MM3 (ref 1.7–7)
NEUTROPHILS NFR BLD AUTO: 76.2 % (ref 42.7–76)
NRBC BLD AUTO-RTO: 0 /100 WBC (ref 0–0.2)
PLATELET # BLD AUTO: 324 10*3/MM3 (ref 140–450)
PMV BLD AUTO: 9.8 FL (ref 6–12)
POTASSIUM SERPL-SCNC: 4.7 MMOL/L (ref 3.5–5.2)
RBC # BLD AUTO: 3.71 10*6/MM3 (ref 3.77–5.28)
RHINOVIRUS RNA SPEC NAA+PROBE: NOT DETECTED
RSV RNA NPH QL NAA+NON-PROBE: NOT DETECTED
SARS-COV-2 RNA NPH QL NAA+NON-PROBE: NOT DETECTED
SODIUM SERPL-SCNC: 142 MMOL/L (ref 136–145)
TROPONIN T DELTA: 1 NG/L
TROPONIN T SERPL HS-MCNC: 28 NG/L
WBC NRBC COR # BLD AUTO: 8.88 10*3/MM3 (ref 3.4–10.8)
WHOLE BLOOD HOLD COAG: NORMAL
WHOLE BLOOD HOLD SPECIMEN: NORMAL

## 2024-10-22 PROCEDURE — 93010 ELECTROCARDIOGRAM REPORT: CPT | Performed by: HOSPITALIST

## 2024-10-22 PROCEDURE — 93005 ELECTROCARDIOGRAM TRACING: CPT

## 2024-10-22 PROCEDURE — 0202U NFCT DS 22 TRGT SARS-COV-2: CPT | Performed by: FAMILY MEDICINE

## 2024-10-22 PROCEDURE — 80048 BASIC METABOLIC PNL TOTAL CA: CPT | Performed by: FAMILY MEDICINE

## 2024-10-22 PROCEDURE — 71045 X-RAY EXAM CHEST 1 VIEW: CPT

## 2024-10-22 PROCEDURE — 36415 COLL VENOUS BLD VENIPUNCTURE: CPT

## 2024-10-22 PROCEDURE — 85025 COMPLETE CBC W/AUTO DIFF WBC: CPT | Performed by: FAMILY MEDICINE

## 2024-10-22 PROCEDURE — 84484 ASSAY OF TROPONIN QUANT: CPT | Performed by: FAMILY MEDICINE

## 2024-10-22 PROCEDURE — 99284 EMERGENCY DEPT VISIT MOD MDM: CPT

## 2024-10-22 PROCEDURE — 83735 ASSAY OF MAGNESIUM: CPT | Performed by: FAMILY MEDICINE

## 2024-10-22 RX ORDER — SODIUM CHLORIDE 0.9 % (FLUSH) 0.9 %
10 SYRINGE (ML) INJECTION AS NEEDED
Status: DISCONTINUED | OUTPATIENT
Start: 2024-10-22 | End: 2024-10-23 | Stop reason: HOSPADM

## 2024-10-23 LAB
QT INTERVAL: 334 MS
QTC INTERVAL: 437 MS

## 2024-10-23 NOTE — ED PROVIDER NOTES
Subjective   History of Present Illness  69-year-old female states that she has been having shortness of breath for 1 year now.  She states has been progressively gotten worse.  She has not been evaluated by anyone.  She states that she uses 2 L of oxygen at home.  Has not required any more oxygen than she normally does.  She has a cough which she does take allergy medicine for.  She has no fevers no chest pain.  Denies any other symptoms at this time.      Review of Systems   Respiratory:  Positive for shortness of breath.    All other systems reviewed and are negative.      Past Medical History:   Diagnosis Date    Anemia     iron def    Anxiety     Asthma     Back pain     CHF (congestive heart failure)     Chronic bronchitis     Coronary artery disease     COVID-19 vaccine series completed     pfizer    COVID-19 vaccine series completed     CTS (carpal tunnel syndrome)     Depression     Fibromyalgia     GERD (gastroesophageal reflux disease)     Gout     Hyperlipemia     Hyperlipidemia     Hypertension     Obesity     Obstructive sleep apnea     could not tolerate machine     Peripheral neuropathy     Primary central sleep apnea     Pulmonary arterial hypertension     Rheumatoid arthritis     Sinusitis        Allergies   Allergen Reactions    Codeine Sulfate Hives    Lisinopril Swelling and Rash     Facial swelling       Past Surgical History:   Procedure Laterality Date    BREAST BIOPSY Left      SECTION      X 2    COLONOSCOPY  10/22/2015    Tics repeat exam in 5 years    COLONOSCOPY  2007    Small hemorrhoids repeat exam in 3 years    COLONOSCOPY N/A 2020    Procedure: COLONOSCOPY WITH ANESTHESIA;  Surgeon: Denny Francis MD;  Location: [facility] ENDOSCOPY;  Service: Gastroenterology;  Laterality: N/A;  pre: family hx colon ca  post: diverticulosis  Carola Barbosa MD    HYSTERECTOMY      total    THYROIDECTOMY Left 2022    Procedure: Left thyroidectomy with isthmusectomy;   Surgeon: Milad So MD;  Location: [facility] OR;  Service: ENT;  Laterality: Left;    UMBILICAL HERNIA REPAIR      had 1/2 of it repaired with hysterectomy        Family History   Problem Relation Age of Onset    Arthritis Mother     Heart disease Mother     Hypertension Mother     Thyroid disease Mother     Arthritis Father     Hypertension Father     Anuerysm Father     Stroke Father     Asthma Brother     Colon polyps Neg Hx     Colon cancer Neg Hx        Social History     Socioeconomic History    Marital status:    Tobacco Use    Smoking status: Never     Passive exposure: Past    Smokeless tobacco: Never   Vaping Use    Vaping status: Never Used   Substance and Sexual Activity    Alcohol use: No    Drug use: No    Sexual activity: Not Currently     Partners: Male     Birth control/protection: Surgical           Objective   Physical Exam  Vitals and nursing note reviewed.   Constitutional:       Appearance: She is well-developed.   HENT:      Head: Normocephalic and atraumatic.      Mouth/Throat:      Mouth: Mucous membranes are moist.   Eyes:      Extraocular Movements: Extraocular movements intact.      Pupils: Pupils are equal, round, and reactive to light.   Cardiovascular:      Rate and Rhythm: Normal rate and regular rhythm.      Heart sounds: Normal heart sounds.   Pulmonary:      Effort: Pulmonary effort is normal.      Breath sounds: Normal breath sounds.   Abdominal:      General: Bowel sounds are normal.      Palpations: Abdomen is soft.      Tenderness: There is no abdominal tenderness.   Skin:     General: Skin is warm and dry.   Neurological:      General: No focal deficit present.      Mental Status: She is alert and oriented to person, place, and time.   Psychiatric:         Mood and Affect: Mood normal.         Behavior: Behavior normal.         Procedures           ED Course  ED Course as of 10/22/24 2153   Tue Oct 22, 2024   1930 EKG rate 103  Sinus tachycardia  Artifact  No  STEMI [RP]      ED Course User Index  [RP] Peter Mcduffie MD                                             Lab Results (last 24 hours)       Procedure Component Value Units Date/Time    CBC & Differential [[id number]]  (Abnormal) Collected: 10/22/24 1908    Specimen: Blood Updated: 10/22/24 1915    Narrative:      The following orders were created for panel order CBC & Differential.  Procedure                               Abnormality         Status                     ---------                               -----------         ------                     CBC Auto Differential[[id number]]        Abnormal            Final result                 Please view results for these tests on the individual orders.    Basic Metabolic Panel [[id number]]  (Abnormal) Collected: 10/22/24 1908    Specimen: Blood Updated: 10/22/24 1935     Glucose 90 mg/dL      BUN 18 mg/dL      Creatinine 0.93 mg/dL      Sodium 142 mmol/L      Potassium 4.7 mmol/L      Comment: Slight hemolysis detected by analyzer. Result may be falsely elevated.        Chloride 98 mmol/L      CO2 38.0 mmol/L      Calcium 10.0 mg/dL      BUN/Creatinine Ratio 19.4     Anion Gap 6.0 mmol/L      eGFR 66.7 mL/min/1.73     Narrative:      GFR Normal >60  Chronic Kidney Disease <60  Kidney Failure <15      High Sensitivity Troponin T [[id number]]  (Abnormal) Collected: 10/22/24 1908    Specimen: Blood Updated: 10/22/24 1933     HS Troponin T 28 ng/L     Narrative:      High Sensitive Troponin T Reference Range:  <14.0 ng/L- Negative Female for AMI  <22.0 ng/L- Negative Male for AMI  >=14 - Abnormal Female indicating possible myocardial injury.  >=22 - Abnormal Male indicating possible myocardial injury.   Clinicians would have to utilize clinical acumen, EKG, Troponin, and serial changes to determine if it is an Acute Myocardial Infarction or myocardial injury due to an underlying chronic condition.         Magnesium [[id number]]  (Normal) Collected: 10/22/24 1908    Specimen:  Blood Updated: 10/22/24 1935     Magnesium 1.8 mg/dL     CBC Auto Differential [[id number]]  (Abnormal) Collected: 10/22/24 1908    Specimen: Blood Updated: 10/22/24 1915     WBC 8.88 10*3/mm3      RBC 3.71 10*6/mm3      Hemoglobin 10.4 g/dL      Hematocrit 35.4 %      MCV 95.4 fL      MCH 28.0 pg      MCHC 29.4 g/dL      RDW 17.1 %      RDW-SD 59.4 fl      MPV 9.8 fL      Platelets 324 10*3/mm3      Neutrophil % 76.2 %      Lymphocyte % 13.2 %      Monocyte % 7.0 %      Eosinophil % 2.9 %      Basophil % 0.5 %      Immature Grans % 0.2 %      Neutrophils, Absolute 6.77 10*3/mm3      Lymphocytes, Absolute 1.17 10*3/mm3      Monocytes, Absolute 0.62 10*3/mm3      Eosinophils, Absolute 0.26 10*3/mm3      Basophils, Absolute 0.04 10*3/mm3      Immature Grans, Absolute 0.02 10*3/mm3      nRBC 0.0 /100 WBC     Respiratory Panel PCR w/COVID-19(SARS-CoV-2) BRENNEN/SIL/ANGELO/PAD/COR/JAZMÍN In-House, NP Swab in UTM/VTM, 2 HR TAT - Swab, Nasopharynx [[id number]]  (Normal) Collected: 10/22/24 1922    Specimen: Swab from Nasopharynx Updated: 10/22/24 2015     ADENOVIRUS, PCR Not Detected     Coronavirus 229E Not Detected     Coronavirus HKU1 Not Detected     Coronavirus NL63 Not Detected     Coronavirus OC43 Not Detected     COVID19 Not Detected     Human Metapneumovirus Not Detected     Human Rhinovirus/Enterovirus Not Detected     Influenza A PCR Not Detected     Influenza B PCR Not Detected     Parainfluenza Virus 1 Not Detected     Parainfluenza Virus 2 Not Detected     Parainfluenza Virus 3 Not Detected     Parainfluenza Virus 4 Not Detected     RSV, PCR Not Detected     Bordetella pertussis pcr Not Detected     Bordetella parapertussis PCR Not Detected     Chlamydophila pneumoniae PCR Not Detected     Mycoplasma pneumo by PCR Not Detected    Narrative:      In the setting of a positive respiratory panel with a viral infection PLUS a negative procalcitonin without other underlying concern for bacterial infection, consider observing  off antibiotics or discontinuation of antibiotics and continue supportive care. If the respiratory panel is positive for atypical bacterial infection (Bordetella pertussis, Chlamydophila pneumoniae, or Mycoplasma pneumoniae), consider antibiotic de-escalation to target atypical bacterial infection.    High Sensitivity Troponin T 2Hr [[id number]]  (Abnormal) Collected: 10/22/24 2059    Specimen: Blood Updated: 10/22/24 2147     HS Troponin T 29 ng/L      Troponin T Delta 1 ng/L     Narrative:      High Sensitive Troponin T Reference Range:  <14.0 ng/L- Negative Female for AMI  <22.0 ng/L- Negative Male for AMI  >=14 - Abnormal Female indicating possible myocardial injury.  >=22 - Abnormal Male indicating possible myocardial injury.   Clinicians would have to utilize clinical acumen, EKG, Troponin, and serial changes to determine if it is an Acute Myocardial Infarction or myocardial injury due to an underlying chronic condition.               XR Chest 1 View   Final Result   1.  Cardiomegaly with central pulmonary vascular congestion and   interstitial edema. No consolidation or obvious pleural effusion.       This report was signed and finalized on 10/22/2024 8:15 PM by Dr Brenton Phillips.            Medications   sodium chloride 0.9 % flush 10 mL (has no administration in time range)       Medical Decision Making    I had a discussion with the patient regarding diagnosis, diagnostic results, treatment plan, and medications.  The patient indicated understanding of these instructions.  I spent adequate time at the bedside prior to discharge necessary to discuss the aftercare instructions, giving patient education, providing explanations of the results of our evaluations/findings, and my decision making to assure that the patient understand the plan of care.  Time was allotted to answer questions at that time and throughout the ED course.  Emphasis was placed on timely follow-up after discharge.  I also discussed the  potential for the development of an acute emergent condition requiring further evaluation, return to the ER, admission, or even surgical intervention. I discussed that we found nothing during the visit today indicating the need for further ER workup at this time, admission to the hospital, or the presence of an acute unstable medical condition.  I encouraged the patient to return to the emergency department immediately for ANY concerns, worsening, new complaints, or if symptoms persist and unable to seek follow-up in a timely fashion.  The patient expressed understanding and agreement with this plan.      Problems Addressed:  Shortness of breath: complicated acute illness or injury    Amount and/or Complexity of Data Reviewed  Labs: ordered. Decision-making details documented in ED Course.  Radiology: ordered. Decision-making details documented in ED Course.  ECG/medicine tests: ordered. Decision-making details documented in ED Course.    Risk  Prescription drug management.        Final diagnoses:   Shortness of breath       ED Disposition  ED Disposition       ED Disposition   Discharge    Condition   Stable    Comment   --               Carola Barbosa MD  [address] [place] [address] [address]  [facility] [address]  [phone]    Schedule an appointment as soon as possible for a visit       [facility] EMERGENCY DEPARTMENT  [address] [place] [id number] [phone]    As needed, If symptoms worsen         Medication List      No changes were made to your prescriptions during this visit.            Peter Mcduffie MD  10/22/24 9416

## 2024-10-31 PROBLEM — J96.00 ACUTE RESPIRATORY FAILURE: Status: ACTIVE | Noted: 2024-01-01

## 2024-10-31 NOTE — ED PROVIDER NOTES
HPI:     Patient is a 69-year-old morbidly obese -American female presents to the emergency room with a complaint of having frequent falls and weakness.  Patient has fallen twice in the last 12 hours.  Patient states that she is short of breath and has been having some intermittent chest pain.  Currently the chest pain is not present.  Patient states her last fall was falling from her recliner.  Patient states she has been having a bad headache and left arm pain.  She is also states she has had 2 days of blurred vision which she does not normally have.  She states that she has still been battling a heel callus that causes significant amount of pain when she tries to walk which also makes her feel unstable when she walks.  She denies any fever.  Her chest pain is 6 out of 10.  Her headache which is now gone was at his most 5 out of 10.  He has been no head trauma.  Patient states over the last 2 to 3 days she is felt very weak and sleepy all the time.      REVIEW OF SYSTEMS  CONSTITUTIONAL:  No complaints of fever, chills,or weakness  EYES: Positive for blurring of vision   ENT: No complaints of sore throat or ear pain  CARDIOVASCULAR: Positive for chest pain rating to both shoulders.  RESPIRATORY: Positive for shortness of breath especially on exertion   GI:  No complaints of abdominal pain, nausea, vomiting, or diarrhea  MUSCULOSKELETAL:  No complaints of back pain  SKIN:  No complaints of rash  NEUROLOGIC: Positive for headache ENDOCRINE:  No complaints of polyuria or polydipsia  LYMPHATIC:  No complaints of swollen glands  GENITOURINARY: No complaints of urinary frequency or hematuria        PAST MEDICAL HISTORY  Past Medical History:   Diagnosis Date    Anemia     iron def    Anxiety     Asthma     Back pain     CHF (congestive heart failure)     Chronic bronchitis     Coronary artery disease     COVID-19 vaccine series completed     pfizer    COVID-19 vaccine series completed     CTS (carpal tunnel  syndrome)     Depression     Fibromyalgia     GERD (gastroesophageal reflux disease)     Gout     Hyperlipemia     Hyperlipidemia     Hypertension     Obesity     Obstructive sleep apnea     could not tolerate machine     Peripheral neuropathy     Primary central sleep apnea     Pulmonary arterial hypertension     Rheumatoid arthritis     Sinusitis        FAMILY HISTORY  Family History   Problem Relation Age of Onset    Arthritis Mother     Heart disease Mother     Hypertension Mother     Thyroid disease Mother     Arthritis Father     Hypertension Father     Anuerysm Father     Stroke Father     Asthma Brother     Colon polyps Neg Hx     Colon cancer Neg Hx        SOCIAL HISTORY  Social History     Socioeconomic History    Marital status:    Tobacco Use    Smoking status: Never     Passive exposure: Past    Smokeless tobacco: Never   Vaping Use    Vaping status: Never Used   Substance and Sexual Activity    Alcohol use: No    Drug use: No    Sexual activity: Not Currently     Partners: Male     Birth control/protection: Surgical       IMMUNIZATION HISTORY  Deferred to primary care physician.    SURGICAL HISTORY  Past Surgical History:   Procedure Laterality Date    BREAST BIOPSY Left      SECTION      X 2    COLONOSCOPY  10/22/2015    Tics repeat exam in 5 years    COLONOSCOPY  2007    Small hemorrhoids repeat exam in 3 years    COLONOSCOPY N/A 2020    Procedure: COLONOSCOPY WITH ANESTHESIA;  Surgeon: Denny Francis MD;  Location: [facility] ENDOSCOPY;  Service: Gastroenterology;  Laterality: N/A;  pre: family hx colon ca  post: diverticulosis  Carola Barbosa MD    HYSTERECTOMY  2000    total    THYROIDECTOMY Left 2022    Procedure: Left thyroidectomy with isthmusectomy;  Surgeon: Milad So MD;  Location: [facility] OR;  Service: ENT;  Laterality: Left;    UMBILICAL HERNIA REPAIR      had 1/2 of it repaired with hysterectomy        CURRENT MEDICATIONS    Current  Facility-Administered Medications:     cefTRIAXone (ROCEPHIN) 1,000 mg in sodium chloride 0.9 % 100 mL MBP, 1,000 mg, Intravenous, Once, Deven Gill MD    Current Outpatient Medications:     albuterol sulfate  (90 Base) MCG/ACT inhaler, Inhale 2 puffs Every 4 (Four) Hours As Needed for Wheezing., Disp: 6.7 g, Rfl: 5    allopurinol (ZYLOPRIM) 300 MG tablet, , Disp: , Rfl:     amitriptyline (ELAVIL) 25 MG tablet, Take 1 tablet by mouth Every Night., Disp: , Rfl:     azelastine (ASTELIN) 0.1 % nasal spray, 2 sprays into the nostril(s) as directed by provider 2 (Two) Times a Day. Use in each nostril as directed, Disp: 60 mL, Rfl: 11    bumetanide (BUMEX) 1 MG tablet, Take 1 tablet by mouth Daily., Disp: , Rfl:     butalbital-acetaminophen-caffeine (FIORICET, ESGIC) -40 MG per tablet, Take 1 tablet by mouth Every 4 (Four) Hours As Needed for Headache., Disp: , Rfl:     carvedilol (COREG) 25 MG tablet, TAKE 1 TABLET BY MOUTH TWICE A DAY, Disp: 60 tablet, Rfl: 3    celecoxib (CeleBREX) 100 MG capsule, Take 1 capsule by mouth., Disp: , Rfl:     doxycycline (MONODOX) 100 MG capsule, Take 1 capsule by mouth 2 (Two) Times a Day., Disp: 20 capsule, Rfl: 0    DULoxetine (CYMBALTA) 60 MG capsule, Take 1 capsule by mouth Every Morning., Disp: , Rfl:     EPINEPHrine (ADRENALIN) 0.05 MG/ML syringe, Inject 20 mL as directed., Disp: , Rfl:     fenofibrate (TRICOR) 145 MG tablet, Take 1 tablet by mouth Daily., Disp: , Rfl:     fluticasone (Flonase Allergy Relief) 50 MCG/ACT nasal spray, 2 sprays into the nostril(s) as directed by provider Daily., Disp: 16 g, Rfl: 11    Fluticasone-Salmeterol (ADVAIR/WIXELA) 250-50 MCG/ACT DISKUS, Inhale 1 puff 2 (Two) Times a Day., Disp: , Rfl:     HYDROcodone-acetaminophen (Norco) 7.5-325 MG per tablet, Take 1 tablet by mouth Every 6 (Six) Hours As Needed for Moderate Pain., Disp: 6 tablet, Rfl: 0    levothyroxine (SYNTHROID, LEVOTHROID) 75 MCG tablet, Take 1 tablet by mouth Daily.,  "Disp: , Rfl:     loratadine (CLARITIN) 10 MG tablet, TAKE 1 TABLET BY MOUTH DAILY., Disp: 90 tablet, Rfl: 3    meclizine (ANTIVERT) 25 MG tablet, Take 1 tablet by mouth 3 (Three) Times a Day As Needed for Dizziness., Disp: , Rfl:     Multiple Vitamins-Minerals (MULTIVITAMIN ADULT PO), Take  by mouth., Disp: , Rfl:     Myrbetriq 25 MG tablet sustained-release 24 hour 24 hr tablet, Take 1 tablet by mouth Daily., Disp: , Rfl:     nystatin (MYCOSTATIN) 841310 UNIT/GM powder, Apply  topically to the appropriate area as directed 3 (Three) Times a Day., Disp: 60 g, Rfl: 0    OXYGEN-HELIUM IN, Inhale. 2L at night, Disp: , Rfl:     solifenacin (VESICARE) 10 MG tablet, Take 1 tablet by mouth Daily., Disp: , Rfl:     spironolactone (ALDACTONE) 25 MG tablet, Take 1 tablet by mouth., Disp: , Rfl:     vitamin D (ERGOCALCIFEROL) 1.25 MG (28240 UT) capsule capsule, Take 1 capsule by mouth 1 (One) Time Per Week., Disp: , Rfl:     ALLERGIES  Allergies   Allergen Reactions    Codeine Sulfate Hives    Lisinopril Swelling and Rash     Facial swelling           Cardiac exam    VITAL SIGNS:  /68 (BP Location: Right arm, Patient Position: Lying)   Pulse 78   Temp 98 °F (36.7 °C) (Oral)   Resp 21   Ht 154.9 cm (61\")   LMP  (LMP Unknown)   SpO2 99%   BMI 69.34 kg/m²     Constitutional: Patient is alert and in no distress.  Patient with moderate chest discomfort.    ENT: There is a normal pharynx with no acute erythema or exudate and oral mucosa is moist.  Nose is clear with no drainage.  Tympanic membranes intact and nonerythemic    Respiratory: Patient is clear to auscultation bilaterally with no wheezing or rhonchi.  Chest wall is nontender.  There are no external lesions on the chest.  There is no crepitance    Cardiovascular: S1-S2 irregularly irregular rhythm    Abdomen: Soft, nontender, and  bowel sounds are normal in all 4 quadrants.  There is no rebound or guarding noted.  There is no abdominal distention or " hepatosplenomegaly.    Genitourinary: Patient is voiding appropriately.    Integument: No acute lesions noted and color appears to be normal.    [place] Coma Scale: Total score 15    Neurological: Patient is alert and oriented x4 and no acute findings noted.  Speech is fluent and cognition is normal.  No evidence of acute CVA.  Cranial nerves II through XII intact.  Patient with normal motor function as well as reflexes and sensation        RADIOLOGY/PROCEDURES      XR Chest 1 View   Final Result   1. Right IJ deep line placed with the tip at the caval atrial juncture   and no complication.   2. Cardiomegaly with chronic appearing mild pulmonary vascular   congestion.       This report was signed and finalized on 10/31/2024 6:27 PM by Dr. Jose Basilio MD.          XR Chest 1 View   Final Result   1.  Underlying cardiomegaly. Dilated central pulmonary arteries which   may be evidence for pulmonary hypertension.   2.  No visualized acute infiltrate or evidence of pulmonary edema.       This report was signed and finalized on 10/31/2024 2:44 PM by Dr Brenton Phillips.          CT Head Without Contrast   Final Result   1. No acute intracranial process.   2. Empty sella which is a nonspecific finding at this age.       This report was signed and finalized on 10/31/2024 2:42 PM by Dr Brenton Phillips.                 FUTURE APPOINTMENTS     No future appointments.       HEART SCORE     Patient history  1      Moderately suspicious (1 point)    2   ECG       Nonspecific repolarization disturbance (1   3   Patient age     Equal or higher than 65 (2 points)    4   Risk factors (Hypercholesterolemia, Hypertension, diabetes, smoking, obesity)     More than 3 risk factors or atherosclerosis history (2 points)    5   Troponin     3 times higher than normal or more (2 points)   1 to 3 times higher than normal (1 point)   Less than normal limit (0 points)     6     TOTAL RISK NUMBER:     The three risk categories are described  below:  Heart score MACE risk Recommendation  0 - 3 Low (1.7%) Discharge can be an option.  4 - 6 Intermediate (20.3%) Clinical observation and further investigations.  7 - 10 High (72.2%) Immediate invasive treatment        COURSE & MEDICAL DECISION MAKING       Patient's partial differential diagnosis can include:    Unstable angina, angina, non-STEMI, arrhythmia, pneumothorax, pulmonary embolism, electrolyte abnormality,  Prinzmetal angina, costochondritis pleurisy, pleural effusion, pulmonary edema, panic attack, esophageal spasm, GERD, gastritis, chest spasms, and others    CT scan of the head shows no acute findings as well as a chest x-ray.  2 hours later we still do not have any blood to be evaluate the patient.  Still waiting for the blood to be drawn and evaluated.  Due to this final disposition of this patient will be turned over to Dr. Niño      Patient's level of risk: Moderate         CRITICAL CARE    CRITICAL CARE: No    CRITICAL CARE TIME: None      Recent Results (from the past 24 hours)   Blood Gas, Arterial With Co-Ox    Collection Time: 10/31/24  5:39 PM    Specimen: Arterial Blood   Result Value Ref Range    Site Right Radial     Scott's Test Positive     pH, Arterial 7.281 (L) 7.350 - 7.450 pH units    pCO2, Arterial 80.8 (C) 35.0 - 45.0 mm Hg    pO2, Arterial 81.5 (L) 83.0 - 108.0 mm Hg    HCO3, Arterial 38.1 (H) 20.0 - 26.0 mmol/L    Base Excess, Arterial 9.1 (H) 0.0 - 2.0 mmol/L    O2 Saturation, Arterial 94.7 94.0 - 99.0 %    Hemoglobin, Blood Gas 10.3 (L) 12 - 16 g/dL    Hematocrit, Blood Gas 31.7 (L) 38.0 - 51.0 %    Oxyhemoglobin 92.8 (L) 94 - 99 %    Methemoglobin 0.60 0.00 - 3.00 %    Carboxyhemoglobin 1.4 0 - 5 %    Temperature 37.0     Sodium, Arterial 146 (H) 136 - 145 mmol/L    Potassium, Arterial 4.1 3.5 - 5.2 mmol/L    Barometric Pressure for Blood Gas 754 mmHg    Modality NRB     Flow Rate 15.0 lpm    Ventilator Mode NA     Notified Marie NIÑO     Notified By Olivia Hayward,  RRT     Notified Time 10/31/2024 17:40     Collected by [id number]     pH, Temp Corrected 7.281 (L) 7.350 - 7.450 pH Units    pCO2, Temperature Corrected 80.8 (C) 35 - 45 mm Hg    pO2, Temperature Corrected 81.5 (L) 83 - 108 mm Hg   Comprehensive Metabolic Panel    Collection Time: 10/31/24  5:51 PM    Specimen: Blood   Result Value Ref Range    Glucose 79 65 - 99 mg/dL    BUN 23 8 - 23 mg/dL    Creatinine 0.77 0.57 - 1.00 mg/dL    Sodium 143 136 - 145 mmol/L    Potassium 4.3 3.5 - 5.2 mmol/L    Chloride 100 98 - 107 mmol/L    CO2 38.0 (H) 22.0 - 29.0 mmol/L    Calcium 9.6 8.6 - 10.5 mg/dL    Total Protein 6.7 6.0 - 8.5 g/dL    Albumin 3.4 (L) 3.5 - 5.2 g/dL    ALT (SGPT) 26 1 - 33 U/L    AST (SGOT) 32 1 - 32 U/L    Alkaline Phosphatase 46 39 - 117 U/L    Total Bilirubin 0.4 0.0 - 1.2 mg/dL    Globulin 3.3 gm/dL    A/G Ratio 1.0 g/dL    BUN/Creatinine Ratio 29.9 (H) 7.0 - 25.0    Anion Gap 5.0 5.0 - 15.0 mmol/L    eGFR 83.6 >60.0 mL/min/1.73   Protime-INR    Collection Time: 10/31/24  5:51 PM    Specimen: Blood   Result Value Ref Range    Protime 14.9 (H) 11.8 - 14.8 Seconds    INR 1.13 (H) 0.91 - 1.09   aPTT    Collection Time: 10/31/24  5:51 PM    Specimen: Blood   Result Value Ref Range    PTT 27.0 24.5 - 36.0 seconds   Lactic Acid, Plasma    Collection Time: 10/31/24  5:51 PM    Specimen: Blood   Result Value Ref Range    Lactate 0.9 0.5 - 2.0 mmol/L   High Sensitivity Troponin T    Collection Time: 10/31/24  5:51 PM    Specimen: Blood   Result Value Ref Range    HS Troponin T 39 (H) <14 ng/L   Magnesium    Collection Time: 10/31/24  5:51 PM    Specimen: Blood   Result Value Ref Range    Magnesium 1.9 1.6 - 2.4 mg/dL   CBC Auto Differential    Collection Time: 10/31/24  5:51 PM    Specimen: Blood   Result Value Ref Range    WBC 9.23 3.40 - 10.80 10*3/mm3    RBC 3.43 (L) 3.77 - 5.28 10*6/mm3    Hemoglobin 9.4 (L) 12.0 - 15.9 g/dL    Hematocrit 33.6 (L) 34.0 - 46.6 %    MCV 98.0 (H) 79.0 - 97.0 fL    MCH 27.4 26.6 -  33.0 pg    MCHC 28.0 (L) 31.5 - 35.7 g/dL    RDW 16.3 (H) 12.3 - 15.4 %    RDW-SD 58.6 (H) 37.0 - 54.0 fl    MPV 10.0 6.0 - 12.0 fL    Platelets 324 140 - 450 10*3/mm3    Neutrophil % 81.8 (H) 42.7 - 76.0 %    Lymphocyte % 10.7 (L) 19.6 - 45.3 %    Monocyte % 5.6 5.0 - 12.0 %    Eosinophil % 1.1 0.3 - 6.2 %    Basophil % 0.3 0.0 - 1.5 %    Immature Grans % 0.5 0.0 - 0.5 %    Neutrophils, Absolute 7.54 (H) 1.70 - 7.00 10*3/mm3    Lymphocytes, Absolute 0.99 0.70 - 3.10 10*3/mm3    Monocytes, Absolute 0.52 0.10 - 0.90 10*3/mm3    Eosinophils, Absolute 0.10 0.00 - 0.40 10*3/mm3    Basophils, Absolute 0.03 0.00 - 0.20 10*3/mm3    Immature Grans, Absolute 0.05 0.00 - 0.05 10*3/mm3    nRBC 0.0 0.0 - 0.2 /100 WBC   Blood Gas, Arterial -    Collection Time: 10/31/24  6:51 PM    Specimen: Arterial Blood   Result Value Ref Range    Site Right Radial     Scott's Test Positive     pH, Arterial 7.287 (L) 7.350 - 7.450 pH units    pCO2, Arterial 80.2 (C) 35.0 - 45.0 mm Hg    pO2, Arterial 87.8 83.0 - 108.0 mm Hg    HCO3, Arterial 38.2 (H) 20.0 - 26.0 mmol/L    Base Excess, Arterial 9.4 (H) 0.0 - 2.0 mmol/L    O2 Saturation, Arterial 96.2 94.0 - 99.0 %    Temperature 37.0     Barometric Pressure for Blood Gas 754 mmHg    Modality BiPap     FIO2 60 %    Ventilator Mode NA     Set [facility] Resp Rate 20.0     IPAP 16     EPAP 6     Notified By Luisa Steve, RRT     Collected by [id number]     pCO2, Temperature Corrected 80.2 (C) 35 - 45 mm Hg    pH, Temp Corrected 7.287 (L) 7.350 - 7.450 pH Units    pO2, Temperature Corrected 87.8 83 - 108 mm Hg    PO2/FIO2 146 0 - 500   Urinalysis With Culture If Indicated - Urine, Catheter    Collection Time: 10/31/24  7:12 PM    Specimen: Urine, Catheter   Result Value Ref Range    Color, UA Yellow Yellow, Straw    Appearance, UA Clear Clear    pH, UA 5.5 5.0 - 8.0    Specific Gravity, UA 1.018 1.005 - 1.030    Glucose, UA Negative Negative    Ketones, UA Trace (A) Negative    Bilirubin, UA Negative  Negative    Blood, UA Negative Negative    Protein, UA Negative Negative    Leuk Esterase, UA Trace (A) Negative    Nitrite, UA Positive (A) Negative    Urobilinogen, UA 1.0 E.U./dL 0.2 - 1.0 E.U./dL   Urinalysis, Microscopic Only - Urine, Catheter    Collection Time: 10/31/24  7:12 PM    Specimen: Urine, Catheter   Result Value Ref Range    RBC, UA 0-2 None Seen, 0-2 /HPF    WBC, UA 11-20 (A) None Seen, 0-2 /HPF    Bacteria, UA 4+ (A) None Seen /HPF    Squamous Epithelial Cells, UA 0-2 None Seen, 0-2 /HPF    Hyaline Casts, UA 0-2 None Seen /LPF    Methodology Automated Microscopy             The patient's last clinical visit to PCP was reviewed by me:       Old charts were reviewed per [facility] EMR.  Pertinent details are summarized above.  All laboratory, radiologic, and EKG studies that were performed in the Emergency Department were a necessary part of the evaluation needed to exclude unstable or  emergent medical conditions.     Patient was hemodynamically and neurologically stable in the ED.   Pertinent studies were reviewed as above.     The patient received:  Medications   cefTRIAXone (ROCEPHIN) 1,000 mg in sodium chloride 0.9 % 100 mL MBP (has no administration in time range)   methylPREDNISolone sodium succinate (SOLU-Medrol) injection 125 mg (125 mg Intravenous Given 10/31/24 1914)   ipratropium-albuterol (DUO-NEB) nebulizer solution 3 mL (3 mL Nebulization Given 10/31/24 1817)   albuterol (PROVENTIL) nebulizer solution 0.083% 2.5 mg/3mL (2.5 mg Nebulization Given 10/31/24 1817)       ED Course as of 10/31/24 1957   Thu Oct 31, 2024   1753 Patient oxygen saturations were dropping I was informed the nursing staff the patient was placed on BiPAP ABG show hypercarbia line was not present therefore centralline was placed [TS]   1754 Central Line At Bedside    Date/Time:   Performed by: Deven Gill MD    Consent:     Consent obtained:  Emergent situation     Consent given by:      Pre-procedure details:      Hand hygiene: Hand hygiene performed prior to insertion      Sterile barrier technique: All elements of maximal sterile technique followed      Skin preparation:  2% chlorhexidine  Anesthesia (see MAR for exact dosages):     Anesthesia method:  Local infiltration    Local anesthetic:  Lidocaine 1% w/o epi  Procedure details:     Location:  R internal jugular    Patient position:  Flat    Procedural supplies:  Triple lumen    Catheter size:  8 Fr    Landmarks identified: yes      Ultrasound guidance: yes      Sterile ultrasound techniques: Sterile gel and sterile probe covers were used      Number of attempts:  1    Successful placement: yes    Post-procedure details:     Post-procedure:  Dressing applied    Assessment:  Blood return through all ports, no pneumothorax on x-ray, free fluid flow and placement verified by x-ray    Patient tolerance of procedure:  Tolerated well, no immediate complications     [TS]   1908 This patient was seen by Dr. Garrido please refer to his records for details [TS]   1920 This patient has been seen in the past also she had a history of fall secondary generalized malaise and fatigue that she is morbidly obese and has got venous lymphedema of the lower extremities.  Today she came to the ED for further episodes of fall and some nonspecific blurred vision Dr. Garrido saw the patient and ordered scans on her.  Patient and then ran out of the oxygen and her oxygen tank.  And side dropping oxygen saturation was noted to be hypercarbic she has got a history of chronic respiratory failure with hypercarbia.  She was placed on BiPAP and send plan access obtained by me since he did not have a peripheral line access.  Hemodynamically she has remained stable.  Currently she is waking up following commands moving all 4 extremities.  There is no obvious deformity or pain noted.  A CT of the head is negative and her x-ray of the chest is negative.  I think if she remains in a BiPAP for longer she  will improve and will not need to get intubated there from when I see for her and admit her to the ICU. [TS]   1955 Discussed with the intensivist ARNP will admit the patient to the service at this time. [TS]      ED Course User Index  [TS] Deevn Gill MD       ED Disposition       ED Disposition   Decision to Admit    Condition   --    Comment   Level of Care: Critical Care [6]   Diagnosis: Acute respiratory failure [518.81.ICD-9-CM]   Admitting Physician: SANCHEZ PRITCHETT [[id number]]   Attending Physician: SANCHEZ PRITCHETT [[id number]]   Certification: I Certify That Inpatient Hospital Services Are Medically Necessary For Greater Than 2 Midnights                   Dragon disclaimer:  Part of this note may be an electronic transcription/translation of spoken language to printed text using the Dragon Dictation System.    I have reviewed the patient’s prescription history via a prescription monitoring program.  This information is consistent with my knowledge of the patient’s controlled substance use history.    Patient evaluated during Coronavirus Pandemic. Isolation practices followed according to [facility] policy.     FINAL IMPRESSION   Diagnosis Plan   1. Acute respiratory failure with hypoxia and hypercapnia        2. Morbid obesity        3. Acute UTI (urinary tract infection)              MD Jairo Sam Tariq, MD  10/31/24 1958

## 2024-10-31 NOTE — ED NOTES
The following fall interventions were initiated:    [] Patient and/or family given education   [] Call light within reach and educated on how to use   [x] Bed rails up per protocol    [] Bed locked and in the lowest position   [] Bed alarm set and on loudest setting   [] Fall wrist band applied   [x] Non skid footwear applied   [x] Room free of clutter   [x] Patient items within reach   [x] Adequate lighting provided  [] Falls sign present    [x] Patient moved closer to nursing station   [] Restraints applied

## 2024-11-01 NOTE — PLAN OF CARE
Problem: Mechanical Ventilation Invasive  Goal: Effective Communication  Outcome: Progressing  Goal: Optimal Device Function  Outcome: Progressing  Intervention: Optimize Device Care and Function  Recent Flowsheet Documentation  Taken 11/1/2024 1415 by Lisa Barba RRT  Airway/Ventilation Management: airway patency maintained  Airway Safety Measures:   manual resuscitator/mask at bedside   suction at bedside  Taken 11/1/2024 1037 by Lisa Barba RRT  Airway/Ventilation Management: airway patency maintained  Airway Safety Measures:   manual resuscitator/mask at bedside   suction at bedside  Taken 11/1/2024 0707 by Lisa Barba RRT  Airway/Ventilation Management: airway patency maintained  Airway Safety Measures:   manual resuscitator/mask at bedside   suction at bedside  Goal: Mechanical Ventilation Liberation  Outcome: Progressing  Goal: Optimal Nutrition Delivery  Outcome: Progressing  Goal: Absence of Device-Related Skin and Tissue Injury  Outcome: Progressing  Goal: Absence of Ventilator-Induced Lung Injury  Outcome: Progressing   Goal Outcome Evaluation:

## 2024-11-01 NOTE — CASE MANAGEMENT/SOCIAL WORK
Patient intubated. Issues with reaching family members per phone. CM/SS will closely follow as patient will most likely need assist with discharge planning.

## 2024-11-01 NOTE — PROGRESS NOTES
"Pharmacy Dosing Service  Anticoagulant  Enoxaparin    Assessment/Action/Plan:  Pharmacy to dose lovenox VTE PPX, BMI 69.34, CrCl = 103.5 ml/min, 60mg sq bid.     Subjective:  Pau Cabrera is a 69 y.o. female on Enoxaparin 60 mg SQ every 12 hours for indication of VTE prophylaxis.  Objective:  [Ht: 154.9 cm (61\"); Wt:  ; BMI: Body mass index is 69.34 kg/m².]  Estimated Creatinine Clearance: 103.5 mL/min (by C-G formula based on SCr of 0.77 mg/dL).   Lab Results   Component Value Date    DDIMER 1.34 (H) 06/04/2021    DDIMER 17.00 (H) 02/09/2015      Lab Results   Component Value Date    INR 1.13 (H) 10/31/2024    INR 1.11 (H) 06/16/2021    INR 1.15 (H) 06/04/2021    PROTIME 14.9 (H) 10/31/2024    PROTIME 13.4 06/16/2021    PROTIME 13.8 (H) 06/04/2021      Lab Results   Component Value Date    HGB 9.4 (L) 10/31/2024    HGB 10.4 (L) 10/22/2024    HGB 10.9 (L) 05/18/2024      Lab Results   Component Value Date     10/31/2024     10/22/2024     05/18/2024       Humberto Brewster RPH  10/31/24 20:29 CDT    "

## 2024-11-01 NOTE — PLAN OF CARE
Goal Outcome Evaluation:  Plan of Care Reviewed With: patient      Patient intubated, ETT c/d/I at 23 at lip. OG c/d/I at 56 at lip. Turn Q2. Precedex held per provider. Propofol infusing and titrated per order parameters. Patient had PVC's and switched to ventricular rhythm, provider notified and EKG obtained. Right IJ c/d/I. IVF infusing per order.

## 2024-11-01 NOTE — PLAN OF CARE
Goal Outcome Evaluation:  Problem: Noninvasive Ventilation Acute  Goal: Effective Unassisted Ventilation and Oxygenation  Outcome: Unable to Meet  Intervention: Monitor and Manage Noninvasive Ventilation  Recent Flowsheet Documentation  Taken 11/1/2024 0002 by Luisa Steve RRT  Airway/Ventilation Management: airway patency maintained  Taken 10/31/2024 1853 by Luisa Steve RRT  Airway/Ventilation Management: airway patency maintained  NPPV/CPAP Maintenance:   adjusted   proper fit/secure     Problem: Sepsis/Septic Shock  Goal: Absence of Infection Signs and Symptoms  Intervention: Promote Recovery  Recent Flowsheet Documentation  Taken 11/1/2024 0002 by Luisa Steve RRT  Airway/Ventilation Management: airway patency maintained  Taken 10/31/2024 1853 by Luisa Steve RRT  Airway/Ventilation Management: airway patency maintained

## 2024-11-01 NOTE — PROGRESS NOTES
RT EQUIPMENT DEVICE RELATED - SKIN ASSESSMENT    Terry Score:        RT Medical Equipment/Device:     ETT Sun/[employer]fast    Skin Assessment:      Cheek:  Intact  Neck:  Intact  Lips:  Intact  Mouth:  Intact    Device Skin Pressure Protection:  Skin-to-device areas padded:  [employer]fast    Nurse Notification:  Liane Steve, RRT

## 2024-11-01 NOTE — PROGRESS NOTES
[facility] Intensivist Services  INPATIENT PROGRESS NOTE    Patient Name: Pau Cabrera  Date of Admission: 10/31/2024  Today's Date: 11/01/24  Length of Stay: 1  Primary Care Physician: Carola Barbosa MD    Subjective   Chief Complaint: Urinary tract infection, acute hypercapnic respiratory failure, restrictive lung disease, depression, demotivation.  Multiple wounds.  HPI   69-year-old obese female with suspected obesity hypoventilation syndrome, restrictive lung disease, chronic hypercapnic hypoxic respiratory failure currently using 2 L per nasal cannula at home, hypertension, hyperlipidemia, fibromyalgia, thyroid disease, rheumatoid arthritis, fibromyalgia presents to the ED 10/31/2024 initially with multiple chief complaints.  She complained of a headache, generalized weakness and she endorsed 2 falls over the last 12 hours.  No injury.  Her ER workup consisted of CT of the head which was essentially negative for any acute findings.  Chest x-ray reveals cardiomegaly, concerns for pulmonary hypertension however no acute infiltrate or pulmonary edema.  Throughout the ED workup she became more somnolent.  ABG revealed pH of 7.2, pCO2 of 80, PaO2 of 81.  She was initially placed on a BiPAP.  Repeat ABG essentially unchanged.  Declined and was intubated.    Patient appears to have a urinary tract infection in addition to her respiratory dysfunction.  E. coli on culture.      She was covered empirically with Rocephin and doxycycline.      Review of Systems   All pertinent negatives and positives are as above. All other systems have been reviewed and are negative unless otherwise stated.     Objective    Temp:  [98 °F (36.7 °C)-99.1 °F (37.3 °C)] 98.3 °F (36.8 °C)  Heart Rate:  [] 85  Resp:  [16-28] 20  BP: ()/() 131/70  FiO2 (%):  [40 %-50 %] 40 %  Physical Exam  PHYSICAL FINDINGS: SEE VITALS ABOVE  GENERAL APPEARANCE: ° Patient is intubated.   Morbid obese.  Chronic skin changes.  HEAD: Injuries: No evidence of a head injury. ° Appearance: Head normocephalic.  NECK: No masses ° Thyroid: ° Not diffusely enlarged.  EYES: General/bilateral: eyes closed. Pupils: ° PERRLA. Sclera: ° Showed no icterus.  THROAT: ETT in place, appears well positioned.  EARS: Hearing: ° patient sedated.  NOSE: General/bilateral: External Deformities: ° No external nose deformities.  LYMPH NODES: No cervical or generalized adenopathy noted.  CHEST: ° Visual inspection revealed no abnormalities.  LUNGS: ° Respiration rhythm and depth was normal. ° Normal breath sounds  ° No wheezing was heard bilaterally. ° No rhonchi were heard. ° No rales/crackles were heard. ° No clubbing noted. On ventilator.  CARDIOVASCULAR: JVD not increased. Heart Rate And Rhythm: Normal. ° Heart Sounds: No S3/ S4 heard. ° Murmurs: No murmurs were heard.  BACK: No obvious deformity noted.  ABDOMEN: Visual Inspection: Abdomen was not distended. Auscultation: ° Abdominal auscultation revealed no abnormalities. ° Bowel sounds were normal. ° Palpation: ° Abdomen was soft. ° No abdominal tenderness. ° No mass was palpated in the abdomen. ° Soft. °Liver: Not visibly enlarged. Spleen: Not visibly enlarged.  MUSCULOSKELETAL SYSTEM : General/bilateral: ° Sedated.  FEET/EXTREMITIES: General/bilateral: ° No visible swelling of the feet.  NEUROLOGICAL: Intubated ° Gait And Stance: cannot test.  SKIN: ° Sacral wound present on admission.  Ichthyosis.    L    Results Review:  Lab Results (last 24 hours)       Procedure Component Value Units Date/Time    POC Glucose Once [[id number]]  (Normal) Collected: 11/01/24 0919    Specimen: Blood Updated: 11/01/24 0940     Glucose 116 mg/dL      Comment: : [id number] Sylva SawyerMeter ID: [id number]       Blood Gas, Arterial - [[id number]]  (Abnormal) Collected: 11/01/24 0324    Specimen: Arterial Blood Updated: 11/01/24 0326     Site Right Radial     Scott's Test Positive     pH,  "Arterial 7.426 pH units      pCO2, Arterial 58.6 mm Hg      Comment: 83 Value above reference range        pO2, Arterial 69.9 mm Hg      Comment: 84 Value below reference range        HCO3, Arterial 38.5 mmol/L      Comment: 83 Value above reference range        Base Excess, Arterial 12.3 mmol/L      Comment: 83 Value above reference range        O2 Saturation, Arterial 94.6 %      Temperature 37.0     Barometric Pressure for Blood Gas 757 mmHg      Modality Ventilator     FIO2 50 %      Ventilator Mode AC     Set Tidal Volume 500.000     Set Mech Resp Rate 20.0     PEEP 8.0     Collected by [id number]     Comment: Meter: [id number]     :  Luisa Steve, DEBORAH        pCO2, Temperature Corrected 58.6 mm Hg      pH, Temp Corrected 7.426 pH Units      pO2, Temperature Corrected 69.9 mm Hg      PO2/FIO2 140    Procalcitonin [[id number]]  (Normal) Collected: 10/31/24 2140    Specimen: Blood Updated: 11/01/24 0238     Procalcitonin 0.08 ng/mL     Narrative:      As a Marker for Sepsis (Non-Neonates):    1. <0.5 ng/mL represents a low risk of severe sepsis and/or septic shock.  2. >2 ng/mL represents a high risk of severe sepsis and/or septic shock.    As a Marker for Lower Respiratory Tract Infections that require antibiotic therapy:    PCT on Admission    Antibiotic Therapy       6-12 Hrs later    >0.5                Strongly Recommended  >0.25 - <0.5        Recommended   0.1 - 0.25          Discouraged              Remeasure/reassess PCT  <0.1                Strongly Discouraged     Remeasure/reassess PCT    As 28 day mortality risk marker: \"Change in Procalcitonin Result\" (>80% or <=80%) if Day 0 (or Day 1) and Day 4 values are available. Refer to http://[email or web address]-pct-[email or web address]    Change in PCT <=80%  A decrease of PCT levels below or equal to 80% defines a positive change in PCT test result representing a higher risk for 28-day all-cause mortality of patients diagnosed with severe sepsis for septic " shock.    Change in PCT >80%  A decrease of PCT levels of more than 80% defines a negative change in PCT result representing a lower risk for 28-day all-cause mortality of patients diagnosed with severe sepsis or septic shock.       Magnesium [[id number]]  (Normal) Collected: 11/01/24 0209    Specimen: Blood Updated: 11/01/24 0233     Magnesium 1.8 mg/dL     Comprehensive Metabolic Panel [[id number]]  (Abnormal) Collected: 11/01/24 0209    Specimen: Blood Updated: 11/01/24 0233     Glucose 117 mg/dL      BUN 24 mg/dL      Creatinine 0.87 mg/dL      Sodium 145 mmol/L      Potassium 4.5 mmol/L      Chloride 98 mmol/L      CO2 35.0 mmol/L      Calcium 10.1 mg/dL      Total Protein 6.7 g/dL      Albumin 3.3 g/dL      ALT (SGPT) 29 U/L      AST (SGOT) 31 U/L      Alkaline Phosphatase 50 U/L      Total Bilirubin 0.3 mg/dL      Globulin 3.4 gm/dL      A/G Ratio 1.0 g/dL      BUN/Creatinine Ratio 27.6     Anion Gap 12.0 mmol/L      eGFR 72.2 mL/min/1.73     Narrative:      GFR Normal >60  Chronic Kidney Disease <60  Kidney Failure <15      Ammonia [[id number]]  (Normal) Collected: 11/01/24 0209    Specimen: Blood Updated: 11/01/24 0232     Ammonia 21 umol/L     Phosphorus [[id number]]  (Normal) Collected: 11/01/24 0209    Specimen: Blood Updated: 11/01/24 0231     Phosphorus 3.8 mg/dL     Lactic Acid, Plasma [[id number]]  (Normal) Collected: 11/01/24 0209    Specimen: Blood Updated: 11/01/24 0230     Lactate 1.3 mmol/L     Protime-INR [[id number]]  (Abnormal) Collected: 11/01/24 0209    Specimen: Blood Updated: 11/01/24 0226     Protime 14.9 Seconds      INR 1.12    CBC & Differential [[id number]]  (Abnormal) Collected: 11/01/24 0209    Specimen: Blood Updated: 11/01/24 0220    Narrative:      The following orders were created for panel order CBC & Differential.  Procedure                               Abnormality         Status                     ---------                               -----------         ------                      CBC Auto Differential[[id number]]        Abnormal            Final result                 Please view results for these tests on the individual orders.    CBC Auto Differential [[id number]]  (Abnormal) Collected: 11/01/24 0209    Specimen: Blood Updated: 11/01/24 0220     WBC 10.91 10*3/mm3      RBC 3.52 10*6/mm3      Hemoglobin 9.8 g/dL      Hematocrit 34.3 %      MCV 97.4 fL      MCH 27.8 pg      MCHC 28.6 g/dL      RDW 16.5 %      RDW-SD 57.4 fl      MPV 10.0 fL      Platelets 332 10*3/mm3      Neutrophil % 92.8 %      Lymphocyte % 5.7 %      Monocyte % 0.9 %      Eosinophil % 0.0 %      Basophil % 0.2 %      Immature Grans % 0.4 %      Neutrophils, Absolute 10.13 10*3/mm3      Lymphocytes, Absolute 0.62 10*3/mm3      Monocytes, Absolute 0.10 10*3/mm3      Eosinophils, Absolute 0.00 10*3/mm3      Basophils, Absolute 0.02 10*3/mm3      Immature Grans, Absolute 0.04 10*3/mm3      nRBC 0.0 /100 WBC     Fentanyl, Urine - Urine, Clean Catch [[id number]]  (Normal) Collected: 11/01/24 0129    Specimen: Urine, Clean Catch Updated: 11/01/24 0151     Fentanyl, Urine Negative    Narrative:      Negative Threshold:      Fentanyl 5 ng/mL     The normal value for the drug tested is negative. This report includes final unconfirmed screening results to be used for medical treatment purposes only. Unconfirmed results must not be used for non-medical purposes such as employment or legal testing. Clinical consideration should be applied to any drug of abuse test, particularly when unconfirmed results are used.           Urine Drug Screen - Urine, Clean Catch [[id number]]  (Abnormal) Collected: 11/01/24 0129    Specimen: Urine, Clean Catch Updated: 11/01/24 0150     THC, Screen, Urine Negative     Phencyclidine (PCP), Urine Negative     Cocaine Screen, Urine Negative     Methamphetamine, Ur Negative     Opiate Screen Negative     Amphetamine Screen, Urine Negative     Benzodiazepine Screen, Urine Negative     Tricyclic  Antidepressants Screen Positive     Methadone Screen, Urine Negative     Barbiturates Screen, Urine Negative     Oxycodone Screen, Urine Negative     Buprenorphine, Screen, Urine Negative    Narrative:      Cutoff For Drugs Screened:    Amphetamines               500 ng/ml  Barbiturates               200 ng/ml  Benzodiazepines            150 ng/ml  Cocaine                    150 ng/ml  Methadone                  200 ng/ml  Opiates                    100 ng/ml  Phencyclidine               25 ng/ml  THC                         50 ng/ml  Methamphetamine            500 ng/ml  Tricyclic Antidepressants  300 ng/ml  Oxycodone                  100 ng/ml  Buprenorphine               10 ng/ml    The normal value for all drugs tested is negative. This report includes unconfirmed screening results, with the cutoff values listed, to be used for medical treatment purposes only.  Unconfirmed results must not be used for non-medical purposes such as employment or legal testing.  Clinical consideration should be applied to any drug of abuse test, particularly when unconfirmed results are used.      POC Glucose Once [[id number]]  (Normal) Collected: 11/01/24 0117    Specimen: Blood Updated: 11/01/24 0128     Glucose 81 mg/dL      Comment: : [id number] [email or web address] ID: [id number]       TSH [[id number]]  (Normal) Collected: 10/31/24 2140    Specimen: Blood Updated: 10/31/24 2244     TSH 1.690 uIU/mL     POC Glucose Once [[id number]]  (Normal) Collected: 10/31/24 2230    Specimen: Blood Updated: 10/31/24 2241     Glucose 75 mg/dL      Comment: : [id number] Ambient CorporationipMeter ID: [id number]       Blood Gas, Arterial - [[id number]]  (Abnormal) Collected: 10/31/24 2231    Specimen: Arterial Blood Updated: 10/31/24 2236     Site Left Radial     Scott's Test Positive     pH, Arterial 7.305 pH units      Comment: 84 Value below reference range        pCO2, Arterial 76.1 mm Hg      Comment: 86 Value above critical limit        pO2,  Arterial 66.9 mm Hg      Comment: 84 Value below reference range        HCO3, Arterial 37.8 mmol/L      Comment: 83 Value above reference range        Base Excess, Arterial 9.3 mmol/L      Comment: 83 Value above reference range        O2 Saturation, Arterial 91.3 %      Comment: 84 Value below reference range        Temperature 37.0     Barometric Pressure for Blood Gas 756 mmHg      Modality Ventilator     FIO2 50 %      Ventilator Mode BiPAP     Set Mech Resp Rate 20.0     IPAP 6     Comment: Meter: [id number]     :  Nirav Barnhart, CRT        EPAP 22     Notified By Nirav Barnhart CRT     Collected by [id number]     pCO2, Temperature Corrected 76.1 mm Hg      pH, Temp Corrected 7.305 pH Units      pO2, Temperature Corrected 66.9 mm Hg      PO2/FIO2 134    POC Glucose Once [[id number]]  (Abnormal) Collected: 10/31/24 2211    Specimen: Blood Updated: 10/31/24 2222     Glucose 58 mg/dL      Comment: : [id number] Hiral DiaipMeter ID: [id number]       Magnesium [[id number]]  (Normal) Collected: 10/31/24 2140    Specimen: Blood Updated: 10/31/24 2159     Magnesium 1.9 mg/dL     Blood Gas, Arterial - [[id number]]  (Abnormal) Collected: 10/31/24 2118    Specimen: Arterial Blood Updated: 10/31/24 2123     Site Left Radial     Scott's Test Positive     pH, Arterial 7.307 pH units      Comment: 84 Value below reference range        pCO2, Arterial 77.5 mm Hg      Comment: 86 Value above critical limit        pO2, Arterial 64.4 mm Hg      Comment: 84 Value below reference range        HCO3, Arterial 38.7 mmol/L      Comment: 83 Value above reference range        Base Excess, Arterial 10.1 mmol/L      Comment: 83 Value above reference range        O2 Saturation, Arterial 90.5 %      Comment: 84 Value below reference range        Temperature 37.0     Barometric Pressure for Blood Gas 755 mmHg      Modality BiPap     FIO2 50 %      Ventilator Mode BiPAP     Set Mech Resp Rate 20.0     IPAP 20     Comment: Meter:  [id number]     :  Nirav Barnhart CRT        EPAP 6     Notified By Nirav Barnhart CRT     Collected by [id number]     pCO2, Temperature Corrected 77.5 mm Hg      pH, Temp Corrected 7.307 pH Units      pO2, Temperature Corrected 64.4 mm Hg      PO2/FIO2 129    BNP [[id number]]  (Abnormal) Collected: 10/31/24 1951    Specimen: Blood Updated: 10/31/24 2048     proBNP 3,688.0 pg/mL     Narrative:      This assay is used as an aid in the diagnosis of individuals suspected of having heart failure. It can be used as an aid in the diagnosis of acute decompensated heart failure (ADHF) in patients presenting with signs and symptoms of ADHF to the emergency department (ED). In addition, NT-proBNP of <300 pg/mL indicates ADHF is not likely.    Age Range Result Interpretation  NT-proBNP Concentration (pg/mL:      <50             Positive            >450                   Gray                 300-450                    Negative             <300    50-75           Positive            >900                  Gray                300-900                  Negative            <300      >75             Positive            >1800                  Gray                300-1800                  Negative            <300    High Sensitivity Troponin T 2Hr [[id number]]  (Abnormal) Collected: 10/31/24 1951    Specimen: Blood Updated: 10/31/24 2020     HS Troponin T 41 ng/L      Troponin T Delta 2 ng/L     Narrative:      High Sensitive Troponin T Reference Range:  <14.0 ng/L- Negative Female for AMI  <22.0 ng/L- Negative Male for AMI  >=14 - Abnormal Female indicating possible myocardial injury.  >=22 - Abnormal Male indicating possible myocardial injury.   Clinicians would have to utilize clinical acumen, EKG, Troponin, and serial changes to determine if it is an Acute Myocardial Infarction or myocardial injury due to an underlying chronic condition.         Urinalysis With Culture If Indicated - Urine, Catheter [[id number]]  (Abnormal)  Collected: 10/31/24 1912    Specimen: Urine, Catheter Updated: 10/31/24 1942     Color, UA Yellow     Appearance, UA Clear     pH, UA 5.5     Specific Gravity, UA 1.018     Glucose, UA Negative     Ketones, UA Trace     Bilirubin, UA Negative     Blood, UA Negative     Protein, UA Negative     Leuk Esterase, UA Trace     Nitrite, UA Positive     Urobilinogen, UA 1.0 E.U./dL    Narrative:      In absence of clinical symptoms, the presence of pyuria, bacteria, and/or nitrites on the urinalysis result does not correlate with infection.    Urinalysis, Microscopic Only - Urine, Catheter [[id number]]  (Abnormal) Collected: 10/31/24 1912    Specimen: Urine, Catheter Updated: 10/31/24 1942     RBC, UA 0-2 /HPF      WBC, UA 11-20 /HPF      Bacteria, UA 4+ /HPF      Squamous Epithelial Cells, UA 0-2 /HPF      Hyaline Casts, UA 0-2 /LPF      Methodology Automated Microscopy    Urine Culture - Urine, Urine, Catheter [[id number]] Collected: 10/31/24 1912    Specimen: Urine, Catheter Updated: 10/31/24 1942    Blood Gas, Arterial - [[id number]]  (Abnormal) Collected: 10/31/24 1851    Specimen: Arterial Blood Updated: 10/31/24 1851     Site Right Radial     Scott's Test Positive     pH, Arterial 7.287 pH units      Comment: 84 Value below reference range        pCO2, Arterial 80.2 mm Hg      Comment: 86 Value above critical limit        pO2, Arterial 87.8 mm Hg      HCO3, Arterial 38.2 mmol/L      Comment: 83 Value above reference range        Base Excess, Arterial 9.4 mmol/L      Comment: 83 Value above reference range        O2 Saturation, Arterial 96.2 %      Temperature 37.0     Barometric Pressure for Blood Gas 754 mmHg      Modality BiPap     FIO2 60 %      Ventilator Mode NA     Set Mec Resp Rate 20.0     IPAP 16     Comment: Meter: [id number]     :  Luisa Steve RRT        EPAP 6     Notified By Luisa Steve RRT     Collected by [id number]     pCO2, Temperature Corrected 80.2 mm Hg      pH, Temp Corrected  7.287 pH Units      pO2, Temperature Corrected 87.8 mm Hg      PO2/FIO2 146    Comprehensive Metabolic Panel [[id number]]  (Abnormal) Collected: 10/31/24 1751    Specimen: Blood Updated: 10/31/24 1832     Glucose 79 mg/dL      BUN 23 mg/dL      Creatinine 0.77 mg/dL      Sodium 143 mmol/L      Potassium 4.3 mmol/L      Chloride 100 mmol/L      CO2 38.0 mmol/L      Calcium 9.6 mg/dL      Total Protein 6.7 g/dL      Albumin 3.4 g/dL      ALT (SGPT) 26 U/L      AST (SGOT) 32 U/L      Alkaline Phosphatase 46 U/L      Total Bilirubin 0.4 mg/dL      Globulin 3.3 gm/dL      A/G Ratio 1.0 g/dL      BUN/Creatinine Ratio 29.9     Anion Gap 5.0 mmol/L      eGFR 83.6 mL/min/1.73     Narrative:      GFR Normal >60  Chronic Kidney Disease <60  Kidney Failure <15      Magnesium [[id number]]  (Normal) Collected: 10/31/24 1751    Specimen: Blood Updated: 10/31/24 1832     Magnesium 1.9 mg/dL     Lactic Acid, Plasma [[id number]]  (Normal) Collected: 10/31/24 1751    Specimen: Blood Updated: 10/31/24 1831     Lactate 0.9 mmol/L     High Sensitivity Troponin T [[id number]]  (Abnormal) Collected: 10/31/24 1751    Specimen: Blood Updated: 10/31/24 1830     HS Troponin T 39 ng/L     Narrative:      High Sensitive Troponin T Reference Range:  <14.0 ng/L- Negative Female for AMI  <22.0 ng/L- Negative Male for AMI  >=14 - Abnormal Female indicating possible myocardial injury.  >=22 - Abnormal Male indicating possible myocardial injury.   Clinicians would have to utilize clinical acumen, EKG, Troponin, and serial changes to determine if it is an Acute Myocardial Infarction or myocardial injury due to an underlying chronic condition.         Protime-INR [[id number]]  (Abnormal) Collected: 10/31/24 1751    Specimen: Blood Updated: 10/31/24 1823     Protime 14.9 Seconds      INR 1.13    aPTT [[id number]]  (Normal) Collected: 10/31/24 1751    Specimen: Blood Updated: 10/31/24 1823     PTT 27.0 seconds     CBC & Differential [[id number]]  (Abnormal)  Collected: 10/31/24 1751    Specimen: Blood Updated: 10/31/24 1813    Narrative:      The following orders were created for panel order CBC & Differential.  Procedure                               Abnormality         Status                     ---------                               -----------         ------                     CBC Auto Differential[[id number]]        Abnormal            Final result                 Please view results for these tests on the individual orders.    CBC Auto Differential [[id number]]  (Abnormal) Collected: 10/31/24 1751    Specimen: Blood Updated: 10/31/24 1813     WBC 9.23 10*3/mm3      RBC 3.43 10*6/mm3      Hemoglobin 9.4 g/dL      Hematocrit 33.6 %      MCV 98.0 fL      MCH 27.4 pg      MCHC 28.0 g/dL      RDW 16.3 %      RDW-SD 58.6 fl      MPV 10.0 fL      Platelets 324 10*3/mm3      Neutrophil % 81.8 %      Lymphocyte % 10.7 %      Monocyte % 5.6 %      Eosinophil % 1.1 %      Basophil % 0.3 %      Immature Grans % 0.5 %      Neutrophils, Absolute 7.54 10*3/mm3      Lymphocytes, Absolute 0.99 10*3/mm3      Monocytes, Absolute 0.52 10*3/mm3      Eosinophils, Absolute 0.10 10*3/mm3      Basophils, Absolute 0.03 10*3/mm3      Immature Grans, Absolute 0.05 10*3/mm3      nRBC 0.0 /100 WBC     Blood Culture - Blood, Blood, Central Line [[id number]] Collected: 10/31/24 1805    Specimen: Blood, Central Line Updated: 10/31/24 1811    Blood Gas, Arterial With Co-Ox [[id number]]  (Abnormal) Collected: 10/31/24 1739    Specimen: Arterial Blood Updated: 10/31/24 1739     Site Right Radial     Scott's Test Positive     pH, Arterial 7.281 pH units      Comment: 84 Value below reference range        pCO2, Arterial 80.8 mm Hg      Comment: 86 Value above critical limit        pO2, Arterial 81.5 mm Hg      Comment: 84 Value below reference range        HCO3, Arterial 38.1 mmol/L      Comment: 83 Value above reference range        Base Excess, Arterial 9.1 mmol/L      Comment: 83 Value above  reference range        O2 Saturation, Arterial 94.7 %      Hemoglobin, Blood Gas 10.3 g/dL      Comment: 84 Value below reference range        Hematocrit, Blood Gas 31.7 %      Comment: 84 Value below reference range        Oxyhemoglobin 92.8 %      Comment: 84 Value below reference range        Methemoglobin 0.60 %      Carboxyhemoglobin 1.4 %      Temperature 37.0     Sodium, Arterial 146 mmol/L      Comment: 83 Value above reference range        Potassium, Arterial 4.1 mmol/L      Barometric Pressure for Blood Gas 754 mmHg      Modality NRB     Flow Rate 15.0 lpm      Ventilator Mode NA     Notified Who SAYYAD     Notified By Olivia Hayward, DEBORAH     Notified Time 10/31/2024 17:40     Collected by [id number]     Comment: Meter: [id number]     :  Olivia Hayward RRT        pH, Temp Corrected 7.281 pH Units      pCO2, Temperature Corrected 80.8 mm Hg      pO2, Temperature Corrected 81.5 mm Hg     Blood Culture - Blood, Arm, Right [[id number]] Collected: 10/31/24 1544    Specimen: Blood from Arm, Right Updated: 10/31/24 1602             XR Abdomen KUB    Result Date: 11/1/2024  1. A linear metallic wire like object projecting of the distal esophagus and proximal stomach may represent a guidewire for OG tube?.  This report was signed and finalized on 11/1/2024 7:10 AM by Dr. Cindy Collier MD.      XR Chest 1 View    Result Date: 11/1/2024  1. The endotracheal tube in place. The central venous line in place. No change. 2. The cardiopulmonary status is unchanged, similar to the previous study.   This report was signed and finalized on 11/1/2024 6:59 AM by Dr. Cindy Collier MD.      CT Abdomen Pelvis With Contrast    Result Date: 11/1/2024  1. No acute abnormality of the abdomen or pelvis, particularly, no finding to suggest acute traumatic involvement of the abdominal organs. 2. No fracture. 3. Other nonacute findings as above            This report was signed and finalized on 11/1/2024 6:51 AM by   Cindy Collier MD.      CT Angiogram Chest    Result Date: 11/1/2024  1. A very limited study due to respiratory motion, patient motion, suboptimal opacification of the pulmonary arterial bed and patient's body habitus. No significant or obvious pulmonary embolism is noted. No aortic aneurysm or dissection. 2. No finding to suggest right heart strain. 3. Moderate cardiomegaly. 4. Bilateral lower lobar consolidation with air bronchogram may represent acute inflammatory/infectious process. A groundglass infiltrate in the right middle lobe may represent an acute inflammatory/infectious process.       This report was signed and finalized on 11/1/2024 6:40 AM by Dr. Cindy Collier MD.      CT Head Without Contrast    Result Date: 11/1/2024  1. No acute intracranial abnormality. 2. Acute on chronic sinus disease.             This report was signed and finalized on 11/1/2024 6:04 AM by Dr. Cindy Collier MD.      XR Chest 1 View    Result Date: 10/31/2024  1. Right IJ deep line placed with the tip at the caval atrial juncture and no complication. 2. Cardiomegaly with chronic appearing mild pulmonary vascular congestion.  This report was signed and finalized on 10/31/2024 6:27 PM by Dr. Jose Basilio MD.      XR Chest 1 View    Result Date: 10/31/2024  1.  Underlying cardiomegaly. Dilated central pulmonary arteries which may be evidence for pulmonary hypertension. 2.  No visualized acute infiltrate or evidence of pulmonary edema.  This report was signed and finalized on 10/31/2024 2:44 PM by Dr Brenton Phillips.      CT Head Without Contrast    Result Date: 10/31/2024  1. No acute intracranial process. 2. Empty sella which is a nonspecific finding at this age.  This report was signed and finalized on 10/31/2024 2:42 PM by Dr Brenton Phillips.       Result Review:  I have personally reviewed the results from the time of this admission to 11/1/2024 11:10 CDT and agree with these findings:  [x]  Laboratory list /  "accordion  []  Microbiology  [x]  Radiology  []  EKG/Telemetry   []  Cardiology/Vascular   []  Pathology  []  Old records  []  Other:  Most notable findings include: Possible guidewire noted in abdomen??      Culture Data:   No results found for: \"BLOODCX\", \"URINECX\", \"WOUNDCX\", \"MRSACX\", \"RESPCX\", \"STOOLCX\"    I have reviewed the patient's current medications.     Assessment/Plan   Assessment  Active Hospital Problems    Diagnosis     **Acute respiratory failure      1.  Acute on chronic hypercapnic respiratory failure along with morbid obesity, obstructive sleep apnea and restrictive lung disease.  Patient may also have a pneumonia.  Supportive care.  Continue antibiotics.    2.  Urinary tract infection: E. coli.  Continue ceftriaxone and await sensitivities.    3, Possible guidewire in abdomen: Normal CT scan abdomen.      4. Empty sella noted on imaging:  Nonspecific finding.  Follow-up with primary care.    Total critical care time: 30 minutes    Due to a high probability of clinically significant, life threatening deterioration, the patient required my highest level of preparedness to intervene emergently and I personally spent this critical care time directly and personally managing the patient.     This critical care time included obtaining a history; examining the patient; pulse oximetry; ordering and review of studies; arranging urgent treatment with development of a management plan; evaluation of patient's response to treatment; frequent reassessment; and, discussions with other providers.    This critical care time was performed to assess and manage the high probability of imminent, life-threatening deterioration that could result in multi-organ failure. It was exclusive of separately billable procedures and treating other patients and teaching time.    Please see MDM section and the rest of the note for further information on patient assessment and treatment.    This note was performed using Dragon " voice recognition software.  Errors of dictation may be introduced accidentally.  Words and phrases may be mis-transcribed. If there is any clarity needed please contact the physician directly.  Use context to determine any abnormalities that may exist.    Electronically Signed by Binh Acevedo MD, CM   Pulmonary, Critical Care  [facility]  Please call with any questions  (Cell [phone])  Electronically signed by Binh Acevedo MD on 11/1/2024 at 18:09 CDT

## 2024-11-01 NOTE — CASE MANAGEMENT/SOCIAL WORK
Discharge Planning Assessment   [facility]     Patient Name: Pau Cabrera  MRN: [id number]  Today's Date: 11/1/2024    Admit Date: 10/31/2024        Discharge Needs Assessment       Row Name 11/01/24 1336       Living Environment    People in Home alone    Current Living Arrangements apartment    Potentially Unsafe Housing Conditions none    In the past 12 months has the electric, gas, oil, or water company threatened to shut off services in your home? No    Primary Care Provided by self    Provides Primary Care For no one    Family Caregiver if Needed child(kevin), adult    Quality of Family Relationships supportive;helpful;involved    Able to Return to Prior Arrangements yes       Resource/Environmental Concerns    Resource/Environmental Concerns none    Transportation Concerns none       Transportation Needs    In the past 12 months, has lack of transportation kept you from medical appointments or from getting medications? no    In the past 12 months, has lack of transportation kept you from meetings, work, or from getting things needed for daily living? No       Food Insecurity    Within the past 12 months, you worried that your food would run out before you got the money to buy more. Never true    Within the past 12 months, the food you bought just didn't last and you didn't have money to get more. Never true       Transition Planning    Patient/Family Anticipates Transition to long-term care facility    Patient/Family Anticipated Services at Transition skilled nursing    Transportation Anticipated family or friend will provide;health plan transportation       Discharge Needs Assessment    Readmission Within the Last 30 Days no previous admission in last 30 days    Equipment Currently Used at Home oxygen;rollator    Concerns to be Addressed care coordination/care conferences;discharge planning    Do you want help finding or keeping work or a job? I do not need or want help    Do you want help with school  or training? For example, starting or completing job training or getting a high school diploma, GED or equivalent No    Anticipated Changes Related to Illness inability to care for self    Outpatient/Agency/Support Group Needs skilled nursing facility    Discharge Facility/Level of Care Needs nursing facility, skilled    Provided Post Acute Provider List? Yes    Post Acute Provider List Nursing Home    Provided Post Acute Provider Quality & Resource List? Yes    Post Acute Provider Quality and Resource List Nursing Home    Delivered To Support Person    Support Person Son - Arnulfo Cabrera    Method of Delivery Telephone    Current Discharge Risk chronically ill;lives alone    Discharge Coordination/Progress Received call from patient's son, Arnulfo Cabrera, regarding discharge plan.  Son is anticipating rehab/SNF placement upon discharge.  Discussed insurance coverage and facility options with patient's son and provided SW's contact info.  Patient currently on vent.  Patient's insurance will require prior approval for SNF placement.  SW following and will assist with disposition.                   Discharge Plan    No documentation.                 Continued Care and Services - Admitted Since 10/31/2024    No active coordination exists for this encounter.          Demographic Summary    No documentation.                  Functional Status    No documentation.                  Psychosocial    No documentation.                  Abuse/Neglect    No documentation.                  Legal    No documentation.                  Substance Abuse    No documentation.                  Patient Forms    No documentation.                     RAJESH Vivas

## 2024-11-01 NOTE — H&P
[facility]   HISTORY AND PHYSICAL    Patient Name: Pau Cabrera  : 1955  MRN: [id number]  Primary Care Physician:  Carola Barbosa MD  Date of admission: 10/31/2024    Subjective   Subjective     Chief Complaint: Acute hypoxic hypercapnic respiratory failure, UTI    History of Present Illness  69-year-old obese female patient past medical history acute on chronic hypercapnic hypoxic respiratory failure currently using 2 L per nasal cannula, hypertension, hyperlipidemia, fibromyalgia, thyroid disease, rheumatoid arthritis, fibromyalgia presents to the ED today initially with multiple chief complaints.  She complained of a headache, generalized weakness that she endorsed 2 falls over the last 12 hours.  No injury.  Her ER workup consisted of CT of the head which was essentially negative for any acute findings.  Chest x-ray reveals cardiomegaly, concerns for pulmonary hypertension however no acute infiltrate or pulmonary edema.  Throughout the ED workup she became more somnolent.  ABG revealed pH of 7.2, pCO2 of 80, PaO2 of 81.  She was initially placed on a BiPAP.  Repeat ABG essentially unchanged.  She seems to be mentating decently.  She will awake to voice and answer questions appropriately.    CBC shows a normal WBC, H&H 9.4, Schwartz crit 33.6.  Platelets 324.  Left shift however no bandemia.  CMP shows a CO2 of 38 otherwise essentially unremarkable.  High-sensitivity troponin initially 39, repeat 41 with a delta of 2.  proBNP 3688.  Magnesium 1.5.  Urinalysis shows leukoesterase nitrate positive.  WBCs and 4+ bacteria.    She was covered empirically with Rocephin.  Huang catheter placed while in the emergency department.  Intensive service was consulted for acute on chronic hypercapnic respiratory failure    I have seen evaluate the patient in the ED room 45.  She awakes to voice.  We had a conversation.  She endorses not feeling well generalized weakness.  She endorses living at home  and ambulating.  She denies any systemic symptoms such as fever, chills rigors nausea or vomiting.  She endorses just generalized weakness.  She is moving all extremities.    Patient admitted to CCU room 12.  Initially on admission she is again alert responds to voice and answers all questions appropriately.    Fall        Review of Systems   Constitutional:  Positive for fatigue.   Respiratory:  Positive for shortness of breath.    All other systems reviewed and are negative.       Personal History     Past Medical History:   Diagnosis Date    Anemia     iron def    Anxiety     Asthma     Back pain     CHF (congestive heart failure)     Chronic bronchitis     Coronary artery disease     COVID-19 vaccine series completed     pfizer    COVID-19 vaccine series completed     CTS (carpal tunnel syndrome)     Depression     Fibromyalgia     GERD (gastroesophageal reflux disease)     Gout     Hyperlipemia     Hyperlipidemia     Hypertension     Obesity     Obstructive sleep apnea     could not tolerate machine     Peripheral neuropathy     Primary central sleep apnea     Pulmonary arterial hypertension     Rheumatoid arthritis     Sinusitis        Past Surgical History:   Procedure Laterality Date    BREAST BIOPSY Left      SECTION      X 2    COLONOSCOPY  10/22/2015    Tics repeat exam in 5 years    COLONOSCOPY  2007    Small hemorrhoids repeat exam in 3 years    COLONOSCOPY N/A 2020    Procedure: COLONOSCOPY WITH ANESTHESIA;  Surgeon: Denny Francis MD;  Location: [facility] ENDOSCOPY;  Service: Gastroenterology;  Laterality: N/A;  pre: family hx colon ca  post: diverticulosis  Carola Barbosa MD    HYSTERECTOMY      total    THYROIDECTOMY Left 2022    Procedure: Left thyroidectomy with isthmusectomy;  Surgeon: Milad So MD;  Location: [facility] OR;  Service: ENT;  Laterality: Left;    UMBILICAL HERNIA REPAIR      had 1/2 of it repaired with hysterectomy        Family History:  family history includes Anuerysm in her father; Arthritis in her father and mother; Asthma in her brother; Heart disease in her mother; Hypertension in her father and mother; Stroke in her father; Thyroid disease in her mother. Otherwise pertinent FHx was reviewed and not pertinent to current issue.    Social History:  reports that she has never smoked. She has been exposed to tobacco smoke. She has never used smokeless tobacco. She reports that she does not drink alcohol and does not use drugs.    Home Medications:  DULoxetine, EPINEPHrine, Fluticasone-Salmeterol, HYDROcodone-acetaminophen, Mirabegron ER, Oxygen-Helium, albuterol sulfate HFA, allopurinol, amitriptyline, azelastine, bumetanide, butalbital-acetaminophen-caffeine, carvedilol, celecoxib, doxycycline, fenofibrate, fluticasone, levothyroxine, loratadine, meclizine, multivitamin with minerals, nystatin, solifenacin, spironolactone, and vitamin D    Allergies:  Allergies   Allergen Reactions    Codeine Sulfate Hives    Lisinopril Swelling and Rash     Facial swelling       Objective    Objective     Vitals:   Temp:  [98 °F (36.7 °C)] 98 °F (36.7 °C)  Heart Rate:  [] 93  Resp:  [16-28] 22  BP: (153-155)/(67-68) 155/67  Flow (L/min) (Oxygen Therapy):  [15] 15    Physical Exam  Vitals and nursing note reviewed. Exam conducted with a chaperone present.   Constitutional:       Appearance: She is obese.      Comments: Obese 69-year-old female patient.  Currently on the BiPAP, 20/8, FiO2 40%.  She awakes to voice and answers all questions appropriately.  She is moving all extremities.   HENT:      Head: Normocephalic and atraumatic.      Nose: Nose normal.   Eyes:      Extraocular Movements: Extraocular movements intact.      Pupils: Pupils are equal, round, and reactive to light.   Cardiovascular:      Rate and Rhythm: Normal rate and regular rhythm.      Pulses: Normal pulses.      Heart sounds: Normal heart sounds.   Pulmonary:      Breath sounds: No  wheezing, rhonchi or rales.      Comments: Patient is currently on the BiPAP, 22/6.  40% FiO2  Abdominal:      Palpations: Abdomen is soft.   Musculoskeletal:      Cervical back: Normal range of motion.      Right lower leg: Edema present.      Left lower leg: Edema present.   Skin:     General: Skin is warm and dry.      Capillary Refill: Capillary refill takes 2 to 3 seconds.   Neurological:      Comments: Response to voice answers all questions appropriately.         Result Review    Result Review:  I have personally reviewed the results from the time of this admission to 10/31/2024 20:34 CDT and agree with these findings:  [x]  Laboratory list / accordion  [x]  Microbiology  [x]  Radiology  []  EKG/Telemetry   []  Cardiology/Vascular -echo 2019 shows grade 1 diastolic dysfunction impaired relaxation.  Left ventricular wall thickness is consistent with mild concentric hypertrophy.  []  Pathology  []  Old records  [x]  Other:-Reviewed previous EMR patient was seen in the emergency department on 10/22/2024 for shortness of breath.  She was discharged home.  Most notable findings include: ABG shows a pH of 7.2, pCO2 of 80.  Acute hypercapnic respiratory failure.  Be NP 3600.  Concerns for UTI.  Nitrite positive leukoesterase 11-20 WBCs and 4+ bacteria      Assessment & Plan   Assessment / Plan     Brief Patient Summary:  Pau Cabrera is a 69 y.o. female who was admitted for acute on chronic hypercapnic respiratory failure requiring BiPAP therapy.    Patient does have a history of CHARLIE unsure of compliance    UTI on urinalysis, patient was treated empirically with Rocephin.  Will continue with treatment, de-escalate based on urine culture    Patient was mentating decent when I met her in the ED and when she arrived on the floor.  Her mentation has decompensated, she remains in acute respiratory failure is pH 7.3 however pCO2 76 appears compensated however her mentation has worsened.  At this time I do not  feel that she is controlling her airway.    We have made multiple vent changes and reassessed her ABG last 1 at 1130 and again pH 7.3 pCO2 76.,  PaO2 66.  She is not following commands.  Her arms are flaccid.  At this time I am going to urgently intubate.    I have attempted to notify her family as I have attempted to call her son Arnulfo vera and are next of kin x 2 not able to leave a voicemail and also reach out to her brother again not able to leave a voicemail.    I did discuss CODE STATUS with the patient when I met her in the emergency department and she did wish to be intubated if her respiratory status declined overnight.      Urgent intubation at 1130.      Active Hospital Problems:  Active Hospital Problems    Diagnosis     **Acute respiratory failure      Plan:   Admit to acute ICU CCU under the intensivist services  Acute on chronic hypoxic/hypercapnic respiratory failure  -Patient did not respond to BiPAP therapy.  Mentation worse.  pCO2 did not decrease below 76.  Compensated however she is not controlling her airway.  -Urgent intubation for acute on chronic hypercapnic respiratory failure.  -Initial CT of the brain negative however since she has had a decline and worsening mental status despite a slightly improving respiratory gas we will repeat a stat CT of the head  -Obtain a CTA of the chest to rule out any pulmonary embolism.  Patient is obese and even though she endorses that she ambulates I suspect she has a very sedentary lifestyle.  -Obtain echocardiogram in the a.m.  -Post intubation chest x-ray is concerning for pulmonary edema  -Lasix 40 mg x 1  -Incidentally post intubation now she is opening her eyes to commands unsure of etiology however will keep her on the BiPAP overnight.  -Sedation with propofol, Precedex and as needed Versed and fentanyl.  -Obtain viral panel.  Implement Rocephin and doxycycline for any superimposed bacterial pneumonia.  De-escalate based on sputum culture viral  panel and CT of the chest.   -Obtain procalcitonin. WBC and lactate normal  -Solu-Medrol 40 mg every 8  -DuoNebs every 4 hours    Acute UTI  -Urine culture pending.  Evidence of an acute UTI on urinalysis.  Nitrate positive leukoesterase and WBCs plus bacteria.  -Continue with Rocephin 1 g daily    Concerns volume overload  -Lasix 40 mg IV administered shortly after admission  -Strict I's and O's  -Obtain echocardiogram in the a.m.  -Cardiomegaly on chest x-ray.  BNP 3600  -I suspect she will require ongoing diuresing.    Hypoglycemia  -Patient has been given an amp of D50 however glucose still low will implement D10    Encephalopathy  -Unsure of etiology, we are repeating CT of the head in light of new or worsening altered mental status.  Ongoing appears to be compensated minimally improving respiratory failure however mentation is not improving actually worsening.   -Obtaining further encephalopathy workup.  Obtain an order TSH, ammonia.  No obvious concerns for sepsis however will obtain a CTA of the chest rule out any acute PE versus pneumonia or volume overload will also obtain CT of the chest as patient did fall to make sure that there is no acute abdominal pathology.      Home medications have been reviewed, levothyroxine 75 mcg daily resumed, Cymbalta 60 mg daily     VTE Prophylaxis: Lovenox DVT prophylaxis  GI prophylaxis with Protonix in the setting of methylprednisolone and Lovenox  Pharmacologic VTE prophylaxis orders are present.        CODE STATUS:       Admission Status:  I believe this patient meets inpatient status.    Patient was admitted to the intensivist services overnight.  Case will be discussed with the a.. intensivist.  Final treatment recommendations to be at his or her discretion.    120 minutes of critical care provided. This time excludes other billable procedures. Time does include preparation of documents, medical consultations, review of old records, and direct bedside care. Patient  is at high risk for life-threatening deterioration due to acute hypercapnic respiratory failure, altered mental status.      Aimee Greco, APRN

## 2024-11-01 NOTE — PROGRESS NOTES
RT EQUIPMENT DEVICE RELATED - SKIN ASSESSMENT    Terry Score:  Terry Score: 10     RT Medical Equipment/Device:     ETT Sun/Anchorfast    Skin Assessment:      Cheek:  Intact  Lips:  Intact  Mouth:  Intact    Device Skin Pressure Protection:  Skin-to-device areas padded:  Anchorfast    Nurse Notification:  Liane Barba, RRT

## 2024-11-01 NOTE — PROCEDURES
"Intubation    Date/Time: 10/31/2024 11:45 PM    Performed by: Aimee Greco APRN  Authorized by: Aimee Greco APRN  Consent: The procedure was performed in an emergent situation.  Consent given by: Attempted to call her son Arnulfo cota 2 unable to leave voicemail.  Attempted to call her brother as both are listed next of kin no answer and unable to leave voicemail on both family members.  Patient identity confirmed: hospital-assigned identification number and anonymous protocol, patient vented/unresponsive  Time out: Immediately prior to procedure a \"time out\" was called to verify the correct patient, procedure, equipment, support staff and site/side marked as required.  Indications: respiratory distress, respiratory failure, airway protection and hypercapnia  Intubation method: video-assisted  Patient status: paralyzed (RSI)  Laryngoscope size: Mac 3  Tube size: 7.5 mm  Tube type: cuffed  Number of attempts: 1  Post-procedure assessment: chest rise, ETCO2 monitor and CO2 detector  Breath sounds: equal and absent over the epigastrium  Cuff inflated: yes  ETT to lip: 23 cm  Tube secured with: ETT miller and bite block  Chest x-ray interpreted by me.  Chest x-ray findings: endotracheal tube in appropriate position  Patient tolerance: patient tolerated the procedure well with no immediate complications        "

## 2024-11-01 NOTE — CONSULTS
[facility]  INPATIENT WOUND & OSTOMY CONSULTATION    Today's Date: 11/01/24    Patient Name: Pau Cabrera  MRN: [id number]  CSN: [id number]  PCP: Carola Barbosa MD  Referring Provider:   Consulting Provider (From admission, onward)      Start Ordered     Status Ordering Provider    11/01/24 0526 11/01/24 0527  Inpatient Wound Care MD Consult  Once        Specialty:  Wound Care  Provider:  Gabriela Duncan APRN Acknowledged JOHNSON, TONYA D           Attending Provider: Angelina Graves MD  Length of Stay: 1    SUBJECTIVE   Chief Complaint: wounds of coccyx and right heel and excoriation of breast and groins    HPI: Pau Cabrera, a 69 y.o.female, presents with a past medical history of ***.  A full past medical history is listed below.  Inpatient wound care consulted due to wounds of coccyx and right heel and excoriation of breast and groins. Patient is currently receiving care in CCU room 12. ***    A1c 5.4 to 5.5 over the past 2 years.   Reportedly sits in chair at home all day every day. Seems to be depressed since  passed away.   Significant urine odor on skin.       Visit Dx:    ICD-10-CM ICD-9-CM   1. Acute respiratory failure with hypoxia and hypercapnia  J96.01 518.81    J96.02    2. Morbid obesity  E66.01 278.01   3. Acute UTI (urinary tract infection)  N39.0 599.0       Hospital Problem List:     Acute respiratory failure      History:   Past Medical History:   Diagnosis Date    Anemia     iron def    Anxiety     Asthma     Back pain     CHF (congestive heart failure)     Chronic bronchitis     Coronary artery disease     COVID-19 vaccine series completed     pfizer    COVID-19 vaccine series completed     CTS (carpal tunnel syndrome)     Depression     Fibromyalgia     GERD (gastroesophageal reflux disease)     Gout     Hyperlipemia     Hyperlipidemia     Hypertension     Obesity     Obstructive sleep apnea     could not tolerate machine      Peripheral neuropathy     Primary central sleep apnea     Pulmonary arterial hypertension     Rheumatoid arthritis     Sinusitis      Past Surgical History:   Procedure Laterality Date    BREAST BIOPSY Left      SECTION      X 2    COLONOSCOPY  10/22/2015    Tics repeat exam in 5 years    COLONOSCOPY  2007    Small hemorrhoids repeat exam in 3 years    COLONOSCOPY N/A 2020    Procedure: COLONOSCOPY WITH ANESTHESIA;  Surgeon: Denny Francis MD;  Location: [facility] ENDOSCOPY;  Service: Gastroenterology;  Laterality: N/A;  pre: family hx colon ca  post: diverticulosis  Carola Barbosa MD    HYSTERECTOMY  2000    total    THYROIDECTOMY Left 2022    Procedure: Left thyroidectomy with isthmusectomy;  Surgeon: Milad So MD;  Location: [facility] OR;  Service: ENT;  Laterality: Left;    UMBILICAL HERNIA REPAIR      had 1/2 of it repaired with hysterectomy      Social History     Socioeconomic History    Marital status:    Tobacco Use    Smoking status: Never     Passive exposure: Past    Smokeless tobacco: Never   Vaping Use    Vaping status: Never Used   Substance and Sexual Activity    Alcohol use: No    Drug use: No    Sexual activity: Not Currently     Partners: Male     Birth control/protection: Surgical     Family History   Problem Relation Age of Onset    Arthritis Mother     Heart disease Mother     Hypertension Mother     Thyroid disease Mother     Arthritis Father     Hypertension Father     Anuerysm Father     Stroke Father     Asthma Brother     Colon polyps Neg Hx     Colon cancer Neg Hx        Allergies:  Allergies   Allergen Reactions    Codeine Sulfate Hives    Lisinopril Swelling and Rash     Facial swelling       Medications:    Current Facility-Administered Medications:     acetaminophen (TYLENOL) tablet 650 mg, 650 mg, Oral, Q4H PRN **OR** acetaminophen (TYLENOL) suppository 650 mg, 650 mg, Rectal, Q4H PRN, Aimee Greco APRN    sennosides-docusate  (PERICOLACE) 8.6-50 MG per tablet 2 tablet, 2 tablet, Oral, BID, 2 tablet at 11/01/24 0921 **AND** polyethylene glycol (MIRALAX) packet 17 g, 17 g, Oral, Daily PRN **AND** bisacodyl (DULCOLAX) EC tablet 5 mg, 5 mg, Oral, Daily PRN **AND** bisacodyl (DULCOLAX) suppository 10 mg, 10 mg, Rectal, Daily PRN, Aimee Greco APRN    Calcium Replacement - Follow Nurse / BPA Driven Protocol, , Does not apply, PRN, Aimee Grceo APRN    [Held by provider] carvedilol (COREG) tablet 25 mg, 25 mg, Oral, BID With Meals, Aimee Greco APRN    cefTRIAXone (ROCEPHIN) 1,000 mg in sodium chloride 0.9 % 100 mL MBP, 1,000 mg, Intravenous, Q24H, Aimee Greco APRN    Chlorhexidine Gluconate Cloth 2 % pads 1 Application, 1 Application, Topical, Q24H, Aimee Greco APRN, 1 Application at 11/01/24 0534    dexmedetomidine (PRECEDEX) 400 mcg in 100 mL NS infusion, 0.2-1.5 mcg/kg/hr, Intravenous, Titrated, Aimee Greco APRN, Stopped at 11/01/24 0605    dextrose 10 % infusion, 50 mL/hr, Intravenous, Continuous, Aimee Greco APRN, Last Rate: 50 mL/hr at 11/01/24 0228, 50 mL/hr at 11/01/24 0228    doxycycline (ADOXA) tablet 100 mg, 100 mg, Oral, Q12H, Aimee Greco APRN, 100 mg at 11/01/24 0921    DULoxetine (CYMBALTA) DR capsule 60 mg, 60 mg, Oral, Daily, Aimee Greco APRN    Enoxaparin Sodium (LOVENOX) syringe 60 mg, 60 mg, Subcutaneous, Q12H, Angelina Graves MD, 60 mg at 11/01/24 0921    fentaNYL citrate (PF) (SUBLIMAZE) injection 50 mcg, 50 mcg, Intravenous, Q30 Min PRN, Aimee Greco APRN, 50 mcg at 11/01/24 0337    ipratropium-albuterol (DUO-NEB) nebulizer solution 3 mL, 3 mL, Nebulization, 4x Daily - RT, Aimee Greco APRN, 3 mL at 11/01/24 0707    levothyroxine (SYNTHROID, LEVOTHROID) tablet 75 mcg, 75 mcg, Oral, Q AM, Aimee Greco APRN, 75 mcg at 11/01/24 0920    Magnesium Standard Dose Replacement - Follow Nurse / BPA Driven Protocol, , Does not apply, PRN, Aimee Greco, APRN     methylPREDNISolone sodium succinate (SOLU-Medrol) injection 40 mg, 40 mg, Intravenous, Q8H, Aimee Greco APRN, 40 mg at 11/01/24 0222    mupirocin (BACTROBAN) 2 % nasal ointment 1 Application, 1 Application, Each Nare, BID, Aimee Greco APRN, 1 Application at 11/01/24 0921    nitroglycerin (NITROSTAT) SL tablet 0.4 mg, 0.4 mg, Sublingual, Q5 Min PRN, Aimee Greco APRN    ondansetron (ZOFRAN) injection 4 mg, 4 mg, Intravenous, Q6H PRN, Aimee Greco APRN    pantoprazole (PROTONIX) EC tablet 40 mg, 40 mg, Oral, Q AM, Aimee Greco APRN    Pharmacy to Dose enoxaparin (LOVENOX), , Does not apply, Continuous PRN, Aimee Greco APRN    Phosphorus Replacement - Follow Nurse / BPA Driven Protocol, , Does not apply, PRN, Aimee Greco APRN    Potassium Replacement - Follow Nurse / BPA Driven Protocol, , Does not apply, PRN, Aimee Greco APRN    propofol (DIPRIVAN) infusion 10 mg/mL 100 mL, 5-50 mcg/kg/min, Intravenous, Titrated, Aimee Greco APRN, Last Rate: 44.8 mL/hr at 11/01/24 0756, 45 mcg/kg/min at 11/01/24 0756    sodium chloride 0.9 % flush 10 mL, 10 mL, Intravenous, Q12H, Aimee Greco APRN, 10 mL at 11/01/24 0921    sodium chloride 0.9 % flush 10 mL, 10 mL, Intravenous, PRN, Aimee Greco, DONOVAN    sodium chloride 0.9 % infusion 40 mL, 40 mL, Intravenous, PRN, Aimee Greco, APRN    OBJECTIVE     Vitals:    11/01/24 0845   BP: 131/70   Pulse: 76   Resp:    Temp:    SpO2: 96%       PHYSICAL EXAM: ***  Physical Exam    Pictures taken by nursing staff on admission:   Right posterior heel      Right medial thigh      Abdominal fold      Right breast      Buttocks      Buttocks         Results Review:  Lab Results (last 48 hours)       Procedure Component Value Units Date/Time    Blood Gas, Arterial - [[id number]]  (Abnormal) Collected: 11/01/24 0324    Specimen: Arterial Blood Updated: 11/01/24 0326     Site Right Radial     Scott's Test Positive     pH, Arterial 7.426 pH  "units      pCO2, Arterial 58.6 mm Hg      Comment: 83 Value above reference range        pO2, Arterial 69.9 mm Hg      Comment: 84 Value below reference range        HCO3, Arterial 38.5 mmol/L      Comment: 83 Value above reference range        Base Excess, Arterial 12.3 mmol/L      Comment: 83 Value above reference range        O2 Saturation, Arterial 94.6 %      Temperature 37.0     Barometric Pressure for Blood Gas 757 mmHg      Modality Ventilator     FIO2 50 %      Ventilator Mode AC     Set Tidal Volume 500.000     Set Mech Resp Rate 20.0     PEEP 8.0     Collected by [id number]     Comment: Meter: [id number]     :  Luisa Steve, DEBORAH        pCO2, Temperature Corrected 58.6 mm Hg      pH, Temp Corrected 7.426 pH Units      pO2, Temperature Corrected 69.9 mm Hg      PO2/FIO2 140    Procalcitonin [[id number]]  (Normal) Collected: 10/31/24 2140    Specimen: Blood Updated: 11/01/24 0238     Procalcitonin 0.08 ng/mL     Narrative:      As a Marker for Sepsis (Non-Neonates):    1. <0.5 ng/mL represents a low risk of severe sepsis and/or septic shock.  2. >2 ng/mL represents a high risk of severe sepsis and/or septic shock.    As a Marker for Lower Respiratory Tract Infections that require antibiotic therapy:    PCT on Admission    Antibiotic Therapy       6-12 Hrs later    >0.5                Strongly Recommended  >0.25 - <0.5        Recommended   0.1 - 0.25          Discouraged              Remeasure/reassess PCT  <0.1                Strongly Discouraged     Remeasure/reassess PCT    As 28 day mortality risk marker: \"Change in Procalcitonin Result\" (>80% or <=80%) if Day 0 (or Day 1) and Day 4 values are available. Refer to http://www.[employer]-pct-calculator.com    Change in PCT <=80%  A decrease of PCT levels below or equal to 80% defines a positive change in PCT test result representing a higher risk for 28-day all-cause mortality of patients diagnosed with severe sepsis for septic shock.    Change in " PCT >80%  A decrease of PCT levels of more than 80% defines a negative change in PCT result representing a lower risk for 28-day all-cause mortality of patients diagnosed with severe sepsis or septic shock.       Magnesium [[id number]]  (Normal) Collected: 11/01/24 0209    Specimen: Blood Updated: 11/01/24 0233     Magnesium 1.8 mg/dL     Comprehensive Metabolic Panel [[id number]]  (Abnormal) Collected: 11/01/24 0209    Specimen: Blood Updated: 11/01/24 0233     Glucose 117 mg/dL      BUN 24 mg/dL      Creatinine 0.87 mg/dL      Sodium 145 mmol/L      Potassium 4.5 mmol/L      Chloride 98 mmol/L      CO2 35.0 mmol/L      Calcium 10.1 mg/dL      Total Protein 6.7 g/dL      Albumin 3.3 g/dL      ALT (SGPT) 29 U/L      AST (SGOT) 31 U/L      Alkaline Phosphatase 50 U/L      Total Bilirubin 0.3 mg/dL      Globulin 3.4 gm/dL      A/G Ratio 1.0 g/dL      BUN/Creatinine Ratio 27.6     Anion Gap 12.0 mmol/L      eGFR 72.2 mL/min/1.73     Narrative:      GFR Normal >60  Chronic Kidney Disease <60  Kidney Failure <15      Ammonia [[id number]]  (Normal) Collected: 11/01/24 0209    Specimen: Blood Updated: 11/01/24 0232     Ammonia 21 umol/L     Phosphorus [[id number]]  (Normal) Collected: 11/01/24 0209    Specimen: Blood Updated: 11/01/24 0231     Phosphorus 3.8 mg/dL     Lactic Acid, Plasma [[id number]]  (Normal) Collected: 11/01/24 0209    Specimen: Blood Updated: 11/01/24 0230     Lactate 1.3 mmol/L     Protime-INR [[id number]]  (Abnormal) Collected: 11/01/24 0209    Specimen: Blood Updated: 11/01/24 0226     Protime 14.9 Seconds      INR 1.12    CBC & Differential [[id number]]  (Abnormal) Collected: 11/01/24 0209    Specimen: Blood Updated: 11/01/24 0220    Narrative:      The following orders were created for panel order CBC & Differential.  Procedure                               Abnormality         Status                     ---------                               -----------         ------                     CBC Auto  Differential[[id number]]        Abnormal            Final result                 Please view results for these tests on the individual orders.    CBC Auto Differential [[id number]]  (Abnormal) Collected: 11/01/24 0209    Specimen: Blood Updated: 11/01/24 0220     WBC 10.91 10*3/mm3      RBC 3.52 10*6/mm3      Hemoglobin 9.8 g/dL      Hematocrit 34.3 %      MCV 97.4 fL      MCH 27.8 pg      MCHC 28.6 g/dL      RDW 16.5 %      RDW-SD 57.4 fl      MPV 10.0 fL      Platelets 332 10*3/mm3      Neutrophil % 92.8 %      Lymphocyte % 5.7 %      Monocyte % 0.9 %      Eosinophil % 0.0 %      Basophil % 0.2 %      Immature Grans % 0.4 %      Neutrophils, Absolute 10.13 10*3/mm3      Lymphocytes, Absolute 0.62 10*3/mm3      Monocytes, Absolute 0.10 10*3/mm3      Eosinophils, Absolute 0.00 10*3/mm3      Basophils, Absolute 0.02 10*3/mm3      Immature Grans, Absolute 0.04 10*3/mm3      nRBC 0.0 /100 WBC     Fentanyl, Urine - Urine, Clean Catch [[id number]]  (Normal) Collected: 11/01/24 0129    Specimen: Urine, Clean Catch Updated: 11/01/24 0151     Fentanyl, Urine Negative    Narrative:      Negative Threshold:      Fentanyl 5 ng/mL     The normal value for the drug tested is negative. This report includes final unconfirmed screening results to be used for medical treatment purposes only. Unconfirmed results must not be used for non-medical purposes such as employment or legal testing. Clinical consideration should be applied to any drug of abuse test, particularly when unconfirmed results are used.           Urine Drug Screen - Urine, Clean Catch [[id number]]  (Abnormal) Collected: 11/01/24 0129    Specimen: Urine, Clean Catch Updated: 11/01/24 0150     THC, Screen, Urine Negative     Phencyclidine (PCP), Urine Negative     Cocaine Screen, Urine Negative     Methamphetamine, Ur Negative     Opiate Screen Negative     Amphetamine Screen, Urine Negative     Benzodiazepine Screen, Urine Negative     Tricyclic Antidepressants Screen  Positive     Methadone Screen, Urine Negative     Barbiturates Screen, Urine Negative     Oxycodone Screen, Urine Negative     Buprenorphine, Screen, Urine Negative    Narrative:      Cutoff For Drugs Screened:    Amphetamines               500 ng/ml  Barbiturates               200 ng/ml  Benzodiazepines            150 ng/ml  Cocaine                    150 ng/ml  Methadone                  200 ng/ml  Opiates                    100 ng/ml  Phencyclidine               25 ng/ml  THC                         50 ng/ml  Methamphetamine            500 ng/ml  Tricyclic Antidepressants  300 ng/ml  Oxycodone                  100 ng/ml  Buprenorphine               10 ng/ml    The normal value for all drugs tested is negative. This report includes unconfirmed screening results, with the cutoff values listed, to be used for medical treatment purposes only.  Unconfirmed results must not be used for non-medical purposes such as employment or legal testing.  Clinical consideration should be applied to any drug of abuse test, particularly when unconfirmed results are used.      POC Glucose Once [[id number]]  (Normal) Collected: 11/01/24 0117    Specimen: Blood Updated: 11/01/24 0128     Glucose 81 mg/dL      Comment: : [id number] Rooftop DownMeter ID: [id number]       TSH [[id number]]  (Normal) Collected: 10/31/24 2140    Specimen: Blood Updated: 10/31/24 2244     TSH 1.690 uIU/mL     POC Glucose Once [[id number]]  (Normal) Collected: 10/31/24 2230    Specimen: Blood Updated: 10/31/24 2241     Glucose 75 mg/dL      Comment: : [id number] [employer]ero GlassipMeter ID: [id number]       Blood Gas, Arterial - [[id number]]  (Abnormal) Collected: 10/31/24 2231    Specimen: Arterial Blood Updated: 10/31/24 2236     Site Left Radial     Scott's Test Positive     pH, Arterial 7.305 pH units      Comment: 84 Value below reference range        pCO2, Arterial 76.1 mm Hg      Comment: 86 Value above critical limit        pO2, Arterial 66.9 mm Hg       Comment: 84 Value below reference range        HCO3, Arterial 37.8 mmol/L      Comment: 83 Value above reference range        Base Excess, Arterial 9.3 mmol/L      Comment: 83 Value above reference range        O2 Saturation, Arterial 91.3 %      Comment: 84 Value below reference range        Temperature 37.0     Barometric Pressure for Blood Gas 756 mmHg      Modality Ventilator     FIO2 50 %      Ventilator Mode BiPAP     Set Mech Resp Rate 20.0     IPAP 6     Comment: Meter: [id number]     :  Nirav Barnhart, ALLY        EPAP 22     Notified By iNrav Barnhart CRT     Collected by [id number]     pCO2, Temperature Corrected 76.1 mm Hg      pH, Temp Corrected 7.305 pH Units      pO2, Temperature Corrected 66.9 mm Hg      PO2/FIO2 134    POC Glucose Once [[id number]]  (Abnormal) Collected: 10/31/24 2211    Specimen: Blood Updated: 10/31/24 2222     Glucose 58 mg/dL      Comment: : [id number] Hiral DiaipMeter ID: [id number]       Magnesium [[id number]]  (Normal) Collected: 10/31/24 2140    Specimen: Blood Updated: 10/31/24 2159     Magnesium 1.9 mg/dL     Blood Gas, Arterial - [[id number]]  (Abnormal) Collected: 10/31/24 2118    Specimen: Arterial Blood Updated: 10/31/24 2123     Site Left Radial     Scott's Test Positive     pH, Arterial 7.307 pH units      Comment: 84 Value below reference range        pCO2, Arterial 77.5 mm Hg      Comment: 86 Value above critical limit        pO2, Arterial 64.4 mm Hg      Comment: 84 Value below reference range        HCO3, Arterial 38.7 mmol/L      Comment: 83 Value above reference range        Base Excess, Arterial 10.1 mmol/L      Comment: 83 Value above reference range        O2 Saturation, Arterial 90.5 %      Comment: 84 Value below reference range        Temperature 37.0     Barometric Pressure for Blood Gas 755 mmHg      Modality BiPap     FIO2 50 %      Ventilator Mode BiPAP     Set Mech Resp Rate 20.0     IPAP 20     Comment: Meter: [id number]      :  Nirav Barnhart CRT        EPAP 6     Notified By Nirav Barnhart CRT     Collected by [id number]     pCO2, Temperature Corrected 77.5 mm Hg      pH, Temp Corrected 7.307 pH Units      pO2, Temperature Corrected 64.4 mm Hg      PO2/FIO2 129    BNP [[id number]]  (Abnormal) Collected: 10/31/24 1951    Specimen: Blood Updated: 10/31/24 2048     proBNP 3,688.0 pg/mL     Narrative:      This assay is used as an aid in the diagnosis of individuals suspected of having heart failure. It can be used as an aid in the diagnosis of acute decompensated heart failure (ADHF) in patients presenting with signs and symptoms of ADHF to the emergency department (ED). In addition, NT-proBNP of <300 pg/mL indicates ADHF is not likely.    Age Range Result Interpretation  NT-proBNP Concentration (pg/mL:      <50             Positive            >450                   Gray                 300-450                    Negative             <300    50-75           Positive            >900                  Gray                300-900                  Negative            <300      >75             Positive            >1800                  Gray                300-1800                  Negative            <300    High Sensitivity Troponin T 2Hr [[id number]]  (Abnormal) Collected: 10/31/24 1951    Specimen: Blood Updated: 10/31/24 2020     HS Troponin T 41 ng/L      Troponin T Delta 2 ng/L     Narrative:      High Sensitive Troponin T Reference Range:  <14.0 ng/L- Negative Female for AMI  <22.0 ng/L- Negative Male for AMI  >=14 - Abnormal Female indicating possible myocardial injury.  >=22 - Abnormal Male indicating possible myocardial injury.   Clinicians would have to utilize clinical acumen, EKG, Troponin, and serial changes to determine if it is an Acute Myocardial Infarction or myocardial injury due to an underlying chronic condition.         Urinalysis With Culture If Indicated - Urine, Catheter [[id number]]  (Abnormal) Collected: 10/31/24 1912     Specimen: Urine, Catheter Updated: 10/31/24 1942     Color, UA Yellow     Appearance, UA Clear     pH, UA 5.5     Specific Gravity, UA 1.018     Glucose, UA Negative     Ketones, UA Trace     Bilirubin, UA Negative     Blood, UA Negative     Protein, UA Negative     Leuk Esterase, UA Trace     Nitrite, UA Positive     Urobilinogen, UA 1.0 E.U./dL    Narrative:      In absence of clinical symptoms, the presence of pyuria, bacteria, and/or nitrites on the urinalysis result does not correlate with infection.    Urinalysis, Microscopic Only - Urine, Catheter [[id number]]  (Abnormal) Collected: 10/31/24 1912    Specimen: Urine, Catheter Updated: 10/31/24 1942     RBC, UA 0-2 /HPF      WBC, UA 11-20 /HPF      Bacteria, UA 4+ /HPF      Squamous Epithelial Cells, UA 0-2 /HPF      Hyaline Casts, UA 0-2 /LPF      Methodology Automated Microscopy    Urine Culture - Urine, Urine, Catheter [[id number]] Collected: 10/31/24 1912    Specimen: Urine, Catheter Updated: 10/31/24 1942    Blood Gas, Arterial - [[id number]]  (Abnormal) Collected: 10/31/24 1851    Specimen: Arterial Blood Updated: 10/31/24 1851     Site Right Radial     Scott's Test Positive     pH, Arterial 7.287 pH units      Comment: 84 Value below reference range        pCO2, Arterial 80.2 mm Hg      Comment: 86 Value above critical limit        pO2, Arterial 87.8 mm Hg      HCO3, Arterial 38.2 mmol/L      Comment: 83 Value above reference range        Base Excess, Arterial 9.4 mmol/L      Comment: 83 Value above reference range        O2 Saturation, Arterial 96.2 %      Temperature 37.0     Barometric Pressure for Blood Gas 754 mmHg      Modality BiPap     FIO2 60 %      Ventilator Mode NA     Set [facility] Resp Rate 20.0     IPAP 16     Comment: Meter: [id number]     :  Luisa Steve RRT        EPAP 6     Notified By Luisa Steve RRT     Collected by [id number]     pCO2, Temperature Corrected 80.2 mm Hg      pH, Temp Corrected 7.287 pH Units      pO2,  Temperature Corrected 87.8 mm Hg      PO2/FIO2 146    Comprehensive Metabolic Panel [[id number]]  (Abnormal) Collected: 10/31/24 1751    Specimen: Blood Updated: 10/31/24 1832     Glucose 79 mg/dL      BUN 23 mg/dL      Creatinine 0.77 mg/dL      Sodium 143 mmol/L      Potassium 4.3 mmol/L      Chloride 100 mmol/L      CO2 38.0 mmol/L      Calcium 9.6 mg/dL      Total Protein 6.7 g/dL      Albumin 3.4 g/dL      ALT (SGPT) 26 U/L      AST (SGOT) 32 U/L      Alkaline Phosphatase 46 U/L      Total Bilirubin 0.4 mg/dL      Globulin 3.3 gm/dL      A/G Ratio 1.0 g/dL      BUN/Creatinine Ratio 29.9     Anion Gap 5.0 mmol/L      eGFR 83.6 mL/min/1.73     Narrative:      GFR Normal >60  Chronic Kidney Disease <60  Kidney Failure <15      Magnesium [[id number]]  (Normal) Collected: 10/31/24 1751    Specimen: Blood Updated: 10/31/24 1832     Magnesium 1.9 mg/dL     Lactic Acid, Plasma [[id number]]  (Normal) Collected: 10/31/24 1751    Specimen: Blood Updated: 10/31/24 1831     Lactate 0.9 mmol/L     High Sensitivity Troponin T [[id number]]  (Abnormal) Collected: 10/31/24 1751    Specimen: Blood Updated: 10/31/24 1830     HS Troponin T 39 ng/L     Narrative:      High Sensitive Troponin T Reference Range:  <14.0 ng/L- Negative Female for AMI  <22.0 ng/L- Negative Male for AMI  >=14 - Abnormal Female indicating possible myocardial injury.  >=22 - Abnormal Male indicating possible myocardial injury.   Clinicians would have to utilize clinical acumen, EKG, Troponin, and serial changes to determine if it is an Acute Myocardial Infarction or myocardial injury due to an underlying chronic condition.         Protime-INR [[id number]]  (Abnormal) Collected: 10/31/24 1751    Specimen: Blood Updated: 10/31/24 1823     Protime 14.9 Seconds      INR 1.13    aPTT [[id number]]  (Normal) Collected: 10/31/24 1751    Specimen: Blood Updated: 10/31/24 1823     PTT 27.0 seconds     CBC & Differential [[id number]]  (Abnormal) Collected: 10/31/24 1751     Specimen: Blood Updated: 10/31/24 1813    Narrative:      The following orders were created for panel order CBC & Differential.  Procedure                               Abnormality         Status                     ---------                               -----------         ------                     CBC Auto Differential[[id number]]        Abnormal            Final result                 Please view results for these tests on the individual orders.    CBC Auto Differential [[id number]]  (Abnormal) Collected: 10/31/24 1751    Specimen: Blood Updated: 10/31/24 1813     WBC 9.23 10*3/mm3      RBC 3.43 10*6/mm3      Hemoglobin 9.4 g/dL      Hematocrit 33.6 %      MCV 98.0 fL      MCH 27.4 pg      MCHC 28.0 g/dL      RDW 16.3 %      RDW-SD 58.6 fl      MPV 10.0 fL      Platelets 324 10*3/mm3      Neutrophil % 81.8 %      Lymphocyte % 10.7 %      Monocyte % 5.6 %      Eosinophil % 1.1 %      Basophil % 0.3 %      Immature Grans % 0.5 %      Neutrophils, Absolute 7.54 10*3/mm3      Lymphocytes, Absolute 0.99 10*3/mm3      Monocytes, Absolute 0.52 10*3/mm3      Eosinophils, Absolute 0.10 10*3/mm3      Basophils, Absolute 0.03 10*3/mm3      Immature Grans, Absolute 0.05 10*3/mm3      nRBC 0.0 /100 WBC     Blood Culture - Blood, Blood, Central Line [[id number]] Collected: 10/31/24 1805    Specimen: Blood, Central Line Updated: 10/31/24 1811    Blood Gas, Arterial With Co-Ox [[id number]]  (Abnormal) Collected: 10/31/24 1739    Specimen: Arterial Blood Updated: 10/31/24 1739     Site Right Radial     Scott's Test Positive     pH, Arterial 7.281 pH units      Comment: 84 Value below reference range        pCO2, Arterial 80.8 mm Hg      Comment: 86 Value above critical limit        pO2, Arterial 81.5 mm Hg      Comment: 84 Value below reference range        HCO3, Arterial 38.1 mmol/L      Comment: 83 Value above reference range        Base Excess, Arterial 9.1 mmol/L      Comment: 83 Value above reference range        O2  Saturation, Arterial 94.7 %      Hemoglobin, Blood Gas 10.3 g/dL      Comment: 84 Value below reference range        Hematocrit, Blood Gas 31.7 %      Comment: 84 Value below reference range        Oxyhemoglobin 92.8 %      Comment: 84 Value below reference range        Methemoglobin 0.60 %      Carboxyhemoglobin 1.4 %      Temperature 37.0     Sodium, Arterial 146 mmol/L      Comment: 83 Value above reference range        Potassium, Arterial 4.1 mmol/L      Barometric Pressure for Blood Gas 754 mmHg      Modality NRB     Flow Rate 15.0 lpm      Ventilator Mode NA     Notified Who SAYYAD     Notified By Olivia Hayward, DEBORAH     Notified Time 10/31/2024 17:40     Collected by [id number]     Comment: Meter: [id number]     :  Olivia Hayward RRT        pH, Temp Corrected 7.281 pH Units      pCO2, Temperature Corrected 80.8 mm Hg      pO2, Temperature Corrected 81.5 mm Hg     Blood Culture - Blood, Arm, Right [[id number]] Collected: 10/31/24 1544    Specimen: Blood from Arm, Right Updated: 10/31/24 1602          Imaging Results (Last 72 Hours)       Procedure Component Value Units Date/Time    XR Abdomen KUB [[id number]] Collected: 11/01/24 0707     Updated: 11/01/24 0713    Narrative:      EXAMINATION: XR ABDOMEN KUB-     10/31/2024 11:25 PM     HISTORY: OG placement; J96.01-Acute respiratory failure with hypoxia;  J96.02-Acute respiratory failure with hypercapnia; E66.01-Morbid  (severe) obesity due to excess calories; N39.0-Urinary tract infection,  site not specified     A frontal projection of the upper abdomen and lower chest is obtained.  There is no previous study for comparison.     A linear radiodense structure which may represent a guidewire is seen  projected over the mid to distal esophagus with distal end in the  proximal stomach.     There is no significant gas in the stomach.     Minimal gas is seen in the limited visualized colon.     Ill-defined opacities are seen in the limited visualized lower  lungs.  There is moderate cardiomegaly.     The distal end of a central venous line is seen in the distal SVC.     No acute bony abnormality.       Impression:      1. A linear metallic wire like object projecting of the distal esophagus  and proximal stomach may represent a guidewire for OG tube?.     This report was signed and finalized on 11/1/2024 7:10 AM by Dr. Cindy Coliler MD.       XR Chest 1 View [[id number]] Collected: 11/01/24 0657     Updated: 11/01/24 0702    Narrative:      EXAMINATION: XR CHEST 1 VW-     10/31/2024 11:27 PM     HISTORY: ETT placement; J96.01-Acute respiratory failure with hypoxia;  J96.02-Acute respiratory failure with hypercapnia; E66.01-Morbid  (severe) obesity due to excess calories; N39.0-Urinary tract infection,  site not specified     A frontal projection of the chest is compared with the previous study  dated 10/31/2024.     The lungs are moderately well expanded similar to the previous study.     Bilateral lung infiltrate, right more than the left persist.     There is no pleural effusion. No pneumothorax.     There is a persistent moderate cardiomegaly. Atheromatous changes of the  thoracic aorta are noted.     Lobulation and fullness of the right hilum is similar to the previous  study.     Endotracheal tube is in place. Distal end is 3 cm from tracheal  bifurcation. Right internal jugular approach venous access line is seen  with distal end in the distal SVC. No change.     No acute bony abnormality.       Impression:      1. The endotracheal tube in place. The central venous line in place. No  change.  2. The cardiopulmonary status is unchanged, similar to the previous  study.        This report was signed and finalized on 11/1/2024 6:59 AM by Dr. Cindy Collier MD.       CT Abdomen Pelvis With Contrast [[id number]] Collected: 11/01/24 0644     Updated: 11/01/24 0654    Narrative:      EXAMINATION: CT ABDOMEN PELVIS W CONTRAST-      11/1/2024 2:36 AM     HISTORY:  AMS, Falls x 2 today,; J96.01-Acute respiratory failure with  hypoxia; J96.02-Acute respiratory failure with hypercapnia;  E66.01-Morbid (severe) obesity due to excess calories; N39.0-Urinary  tract infection, site not specified     In order to have a CT radiation dose as low as reasonably achievable  Automated Exposure Control was utilized for adjustment of the mA and/or  KV according to patient size.     Total DLP = 2599.7 mGy.cm     The CT scan of the abdomen and pelvis should performed after intravenous  contrast enhancement.     Images are acquired in axial plane and subsequent reconstruction in  coronal and sagittal plane.     There is no previous similar study for comparison.     The chest is separately reviewed and reported.     The study is of limited diagnostic value due to significant imaging  artifacts due to patient's large body habitus.     Liver and spleen are normal.     No gallstones.     Pancreas appear normal.     The adrenal glands bilaterally are normal.     There are low-density lesions in both kidneys, the larger one in the  upper pole of the left kidney measuring 6 cm in diameter. CT density  suggest a cyst. A smaller partly exophytic nodule is seen from the  lateral margin of the lower pole of the right kidney measuring 1.8 cm in  diameter. The CT density suggest a cyst. No radiopaque calculi on either  side. No hydronephrosis. The ureters are not well visualized or  evaluated. The urinary bladder is decompressed with a Huang catheter and  not evaluated.     The uterus is not visualized. No adnexal masses.     There is a fat-containing umbilical/paraumbilical ventral hernias.     Distal end of nasogastric tube is seen in the proximal stomach. The  stomach is decompressed. Small bowel is nondilated nondistended. No  finding to suggest appendicitis. Moderate gas and stool is seen in the  colon. There is diverticulosis of the colon. No evidence of a  diverticulitis.     Atheromatous changes of  the abdominal aorta and iliac arteries. No  aneurysmal dilatation.     No evidence of abdominal or pelvic lymphadenopathy.     Images reviewed in bone windows show chronic degenerative changes of the  lumbar and limited visualized thoracic spine. There is no evidence of  acute fracture. However a subtle nondisplaced may be obscured due to  significant motion and imaging artifacts.       Impression:      1. No acute abnormality of the abdomen or pelvis, particularly, no  finding to suggest acute traumatic involvement of the abdominal organs.  2. No fracture.  3. Other nonacute findings as above                                   This report was signed and finalized on 11/1/2024 6:51 AM by Dr. Cindy Collier MD.       CT Angiogram Chest [[id number]] Collected: 11/01/24 0632     Updated: 11/01/24 0643    Narrative:      EXAMINATION: CT ANGIOGRAM CHEST-      11/1/2024 2:36 AM     HISTORY: Hypoxia, ARF; J96.01-Acute respiratory failure with hypoxia;  J96.02-Acute respiratory failure with hypercapnia; E66.01-Morbid  (severe) obesity due to excess calories; N39.0-Urinary tract infection,  site not specified     In order to have a CT radiation dose as low as reasonably achievable  Automated Exposure Control was utilized for adjustment of the mA and/or  KV according to patient size.     Total DLP = 2599.7 mGy.cm     CT angiography of the chest should performed after intravenous contrast  enhancement.     Images are acquired in axial plane and subsequent reconstruction in  coronal and sagittal planes.     Comparison is made with the previous study dated 6/5/2023.     The study is of very poor diagnostic quality due to respiratory motion,  patient motion, patient body habitus and suboptimal opacification of the  pulmonary arterial bed.     No significant visible filling defect in the opacified pulmonary  arterial bed is noted.     RV/LV ratio is 58/74 which is in the normal range. No finding to suggest  right heart strain.      Atheromatous changes of the thoracic aorta. No aneurysmal dilatation. No  dissection.     Atheromatous changes of coronary arteries.     There is moderate cardiomegaly with four-chamber enlargement.     No mediastinal lymphadenopathy.     Limited visualized soft tissue of the neck are unremarkable.     An endotracheal tube is seen in place.     The lungs are poorly expanded.     There are extensive atelectatic changes bilaterally.     The bilateral lower lobar consolidation, right more than the left. There  is air bronchograms. There is a groundglass opacity in the right middle  lobe.     The abdomen is separately reviewed and reported.     Images reviewed in bone window show chronic degenerative changes of the  thoracic spine. No acute bony abnormality.     Nasogastric tube is seen in place.       Impression:      1. A very limited study due to respiratory motion, patient motion,  suboptimal opacification of the pulmonary arterial bed and patient's  body habitus. No significant or obvious pulmonary embolism is noted. No  aortic aneurysm or dissection.  2. No finding to suggest right heart strain.  3. Moderate cardiomegaly.  4. Bilateral lower lobar consolidation with air bronchogram may  represent acute inflammatory/infectious process. A groundglass  infiltrate in the right middle lobe may represent an acute  inflammatory/infectious process.                    This report was signed and finalized on 11/1/2024 6:40 AM by Dr. Cindy Collier MD.       CT Head Without Contrast [[id number]] Collected: 11/01/24 0600     Updated: 11/01/24 0607    Narrative:      EXAMINATION: CT HEAD WO CONTRAST-      11/1/2024 2:36 AM     HISTORY: Worsening AMS; J96.01-Acute respiratory failure with hypoxia;  J96.02-Acute respiratory failure with hypercapnia; E66.01-Morbid  (severe) obesity due to excess calories; N39.0-Urinary tract infection,  site not specified     In order to have a CT radiation dose as low as reasonably  achievable  Automated Exposure Control was utilized for adjustment of the mA and/or  KV according to patient size.     Total DLP = 2104.24 mGy.cm     CT scan of the head is performed without intravenous contrast  enhancement.     The images are acquired in axial plane and subsequent reconstruction  with coronal and sagittal plane.     The comparison is made with the previous study dated 10/31/2024.     There are significant imaging artifacts which lowers the diagnostic  quality of the study. Some subtle lesions may be obscured.     There is no evidence of a mass. There is no midline shift.     There is no evidence of intracranial hemorrhage or hematoma.     The ventricles, the basal cisterns and the cortical sulci are  unremarkable and age appropriate.     The gray-white matter differentiation is maintained.     Limited visualized posterior fossa is unremarkable.     An empty sella turcica is seen with moderate widening of the sella and  erosion of the dorsum sellae. This is similar to the previous studies.     Images reviewed in bone window show no acute bony abnormality. There is  mucosal thickening involving the ethmoid and maxillary and sphenoid  sinuses. There are fluid levels in the sphenoid sinuses. Mastoid air  cells are clear.       Impression:      1. No acute intracranial abnormality.  2. Acute on chronic sinus disease.                                      This report was signed and finalized on 11/1/2024 6:04 AM by Dr. Cindy Collier MD.       XR Chest 1 View [[id number]] Collected: 10/31/24 1826     Updated: 10/31/24 1831    Narrative:      EXAMINATION: XR CHEST 1 VW-  10/31/2024 6:26 PM     HISTORY: Confirm central line placement.     FINDINGS: Today's exam is compared to previous study of 10/31/2024. A  right IJ deep line is in place with the tip in the SVC. No  complications. The heart is enlarged. There is chronic mild pulmonary  vascular congestion and suspected pulmonary arterial  hypertension.  Bronchial wall thickening is present. No acute infiltrate or effusion.       Impression:      1. Right IJ deep line placed with the tip at the caval atrial juncture  and no complication.  2. Cardiomegaly with chronic appearing mild pulmonary vascular  congestion.     This report was signed and finalized on 10/31/2024 6:27 PM by Dr. Jose Basilio MD.       XR Chest 1 View [[id number]] Collected: 10/31/24 1442     Updated: 10/31/24 1447    Narrative:      XR CHEST 1 VW- 10/31/2024 1:38 PM     HISTORY: chest pain       COMPARISON: Chest x-ray dated 10/22/2024     FINDINGS:  Upright frontal radiograph of the chest was obtained     Underlying cardiomegaly which is stable. Dilated central pulmonary  arteries which may reflect pulmonary hypertension. Lungs are well  expanded. No consolidation or pleural effusion. No pneumothorax. No  acute bony abnormality seen.       Impression:      1.  Underlying cardiomegaly. Dilated central pulmonary arteries which  may be evidence for pulmonary hypertension.  2.  No visualized acute infiltrate or evidence of pulmonary edema.     This report was signed and finalized on 10/31/2024 2:44 PM by Dr Brenton Phillips.       CT Head Without Contrast [[id number]] Collected: 10/31/24 1438     Updated: 10/31/24 1445    Narrative:      CT HEAD WO CONTRAST- 10/31/2024 1:10 PM     HISTORY: weak frequent falls       DOSE LENGTH PRODUCT: 1977.81 mGy.cm. Automated exposure control was also  utilized to decrease patient radiation dose.     TECHNIQUE:  Axial CT of the brain without IV contrast. Sagittal and coronal  reformations are also provided for review. Soft tissue and bone kernels  are available for interpretation.     COMPARISON: None.     FINDINGS:     There is no evidence of acute large vascular territory infarct. No  intra-axial or extra-axial hemorrhage. No visualized mass lesion or mass  effect. The ventricles, cortical sulci and basal cisterns are symmetric  and age  appropriate. Empty sella which is nonspecific. Posterior fossa  structures are unremarkable. Visualized paranasal sinuses and mastoids  are clear.       Impression:      1. No acute intracranial process.  2. Empty sella which is a nonspecific finding at this age.     This report was signed and finalized on 10/31/2024 2:42 PM by Dr Brenton Phillips.                  ASSESSMENT/PLAN       Examination and evaluation of wound(s) was performed.    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     Acute respiratory failure     PLAN:   Orders placed for wound care and pressure/moisture management as listed below.       Start     Ordered    11/01/24 1028  Wound Care  Daily      Question Answer Comment   Wound Locations Right heel    Wound Care Instructions Clean with NS.  Apply a nickel-thick layer of Santyl to wound base.  Cover with Vaseline gauze and secure with silicone border foam dressing.  Change q12h.    Cleanse Normal Saline    Intervention Santyl - Thick Layer    Intervention Vaseline Gauze    Dressing: Silicone Border Dressing        11/01/24 1027    11/01/24 1021  Wound Care  Daily      Question Answer Comment   Wound Locations Bilateral posterior thighs    Wound Care Instructions Clean with NS.  Apply Opticell Ag to wound beds. Cover with ABD pad and secure with medipore tape. Change daily or as needed if soiled.        11/01/24 1020    11/01/24 1021  Wound Care  Daily      Question Answer Comment   Wound Locations Bilateral buttocks    Wound Care Instructions Clean with NS.  Apply a nickel-thick layer of Santyl to wound base.  Cover with saline moistened  gauze and secure with silicone border foam dressing.  Change daily or as needed if soiled.        11/01/24 1020    11/01/24 1021  Skin Care  2 Times Daily      Question Answer Comment   Wound Locations All skin folds    Wound Care Instructions Clean with no rinse foam cleanser. Pat dry. Apply nystatin powder and pillow cases to area. Change pillowcases often to maintain dry environment.        11/01/24 1020    11/01/24 1021  Specialty Bed Bariatric Dolphin Mattress  Once        Comments: For Dolphin FIS Mattress, call [employer] to order a wavecatch bed frame.  If bariatric/bariatric dolphin, [employer] provides frame, mattress, pump, and trapeze (if needed).  Nurse or HUC is to call [employer], the rental company, to order the equipment, provide patient's name, room number, and if in isolation. [phone].   Question:  Specialty Bed needed  Answer:  Bariatric Dolphin Mattress    11/01/24 1021    11/01/24 1016  Turn Patient  Now Then Every 2 Hours         11/01/24 1020    11/01/24 1016  Head of Bed 30 Degrees or Less (Unless Contraindicated)  Until Discontinued         11/01/24 1020    11/01/24 1016  Elevate Heels Off of Bed  Until Discontinued         11/01/24 1020    11/01/24 1016  Use Seat Cushion When Up In Chair  Continuous         11/01/24 1020    11/01/24 1016  Silicone Border Dressing to Bony Prominences  Every Shift      Comments: Apply silicone border foam dressing per protocol to bilateral heels for protection.  Nursing to change dressing every 3 days and PRN if soiled. Nursing is to peel back dressing with every assessment to assess skin underneath dressing. No barrier cream under dressing.    11/01/24 1020    11/01/24 1016  Do NOT Rub or Massage Any Bony Prominence  Continuous         11/01/24 1020    11/01/24 1016  Use Repositioning Wedge to Position Patient  Continuous        Comments: Use Comfort Glide repositioning sheet and wedges to position patient.    11/01/24 1020    11/01/24 0000  Ambulatory Referral to  Wound Clinic         11/01/24 1029    Unscheduled  Wound Care  As Needed      Question:  Wound Care Instructions  Answer:  Apply Moisture Barrier After Any Incontinence    11/01/24 1020             This document has been electronically signed by DONOVAN Dodge on 11/1/2024 09:28 CDT     change dressing every 3 days and PRN if soiled. Nursing is to peel back dressing with every assessment to assess skin underneath dressing. No barrier cream under dressing.    11/01/24 1020    11/01/24 1016  Do NOT Rub or Massage Any Bony Prominence  Continuous         11/01/24 1020    11/01/24 1016  Use Repositioning Wedge to Position Patient  Continuous        Comments: Use Comfort Glide repositioning sheet and wedges to position patient.    11/01/24 1020    11/01/24 0000  Ambulatory Referral to Wound Clinic         11/01/24 1029    Unscheduled  Wound Care  As Needed      Question:  Wound Care Instructions  Answer:  Apply Moisture Barrier After Any Incontinence    11/01/24 1020             This document has been electronically signed by DONOVAN Dodge on 11/1/2024 09:28 CDT

## 2024-11-02 NOTE — PLAN OF CARE
Goal Outcome Evaluation:  Plan of Care Reviewed With: patient      Patient intubated, ETT c/d/I at 23 at lip. OG c/d/I at 56 at lip. Propofol and Fentanyl infusing and titrated per order parameters. Turn Q2. IV abx given. Tube feeds infusing. Right IJ c/d/I. Patient able to move extremities and follow some commands with sedation weaned.

## 2024-11-02 NOTE — PLAN OF CARE
Goal Outcome Evaluation:  Plan of Care Reviewed With: child        Progress: no change  Outcome Evaluation: Pt continues on Vent, Propofol at 50mcg and Fentanyl at 125mcg. Pt safety maintained throughout shift.

## 2024-11-02 NOTE — PROGRESS NOTES
RT EQUIPMENT DEVICE RELATED - SKIN ASSESSMENT    Terry Score:  Terry Score: 10     RT Medical Equipment/Device:     ETT Sun/Anchorfast    Skin Assessment:      Cheek:  Intact  Lips:  Intact  Mouth:  Intact    Device Skin Pressure Protection:  Skin-to-device areas padded:  None Required    Nurse Notification:  Liane Barnhart, RRT

## 2024-11-02 NOTE — PROGRESS NOTES
RT EQUIPMENT DEVICE RELATED - SKIN ASSESSMENT    Terry Score:  Terry Score: 11     RT Medical Equipment/Device:     ETT Sun/Anchorfast    Skin Assessment:      Cheek:  Intact  Lips:  Intact  Mouth:  Intact    Device Skin Pressure Protection:  Skin-to-device areas padded:  Anchorfast    Nurse Notification:  Liane Santos, RRT

## 2024-11-02 NOTE — PROGRESS NOTES
"Pharmacy Dosing Service  Pharmacokinetics  Vancomycin Initial Evaluation  Assessment/Action/Plan:  Loading dose?: No  Current Order: Vancomycin 1000 mg IVPB every 12 hours  Current end date:11/08/24  Levels: None  Additional antimicrobial agent(s): cefepime, doxycycline  MRSA Nasal PCR ordered: Yes (Vancomycin indication is pneumonia, bone/joint, SSTI or IAI)    Vancomycin dosage initiated based on population pharmacokinetic parameters. Pharmacy will continue to follow daily and adjust dose accordingly.     Subjective:  Pau Cabrera is a 69 y.o. female with a Vancomycin \"Pharmacy to Dose\" consult for the treatment of pneumonia , day 1 of 7 of treatment.    AUC Model Data:  Loading dose: N/A  Regimen: 1000 mg IV every 12 hours.  Start time: 22:23 on 11/01/2024  Exposure target: AUC24 (range)400-600 mg/L.hr   AUC24,ss: 573 mg/L.hr  Probability of AUC24 > 400: 76 %  Ctrough,ss: 17.4 mg/L  Probability of Ctrough,ss > 20: 42 %  Probability of nephrotoxicity (Lodise NATHANAEL 2009): 13 %      Objective:  Ht: 155 cm (61.02\"); Wt: (!) 166 kg (365 lb 8.4 oz)  Estimated Creatinine Clearance: 91.6 mL/min (by C-G formula based on SCr of 0.87 mg/dL).   Creatinine   Date Value Ref Range Status   11/01/2024 0.87 0.57 - 1.00 mg/dL Final   10/31/2024 0.77 0.57 - 1.00 mg/dL Final   10/22/2024 0.93 0.57 - 1.00 mg/dL Final   05/12/2022 1.50 (H) 0.60 - 1.30 mg/dL Final     Comment:     Serial Number: [id number]:  [id number]   06/10/2021 1.1 (H) 0.5 - 0.9 mg/dL Final   03/01/2021 1.3 (H) 0.5 - 0.9 mg/dL Final   11/30/2020 1.1 (H) 0.5 - 0.9 mg/dL Final      Lab Results   Component Value Date    WBC 10.91 (H) 11/01/2024    WBC 9.23 10/31/2024    WBC 8.88 10/22/2024      Baseline culture results:  Microbiology Results (last 10 days)       Procedure Component Value - Date/Time    Urine Culture - Urine, Urine, Catheter [[id number]]  (Abnormal) Collected: 10/31/24 1912    Lab Status: Preliminary result Specimen: Urine, Catheter Updated: " 11/01/24 1325     Urine Culture >100,000 CFU/mL Escherichia coli    Narrative:      Colonization of the urinary tract without infection is common. Treatment is discouraged unless the patient is symptomatic, pregnant, or undergoing an invasive urologic procedure.    Blood Culture - Blood, Blood, Central Line [[id number]]  (Normal) Collected: 10/31/24 1805    Lab Status: Preliminary result Specimen: Blood, Central Line Updated: 11/01/24 1815     Blood Culture No growth at 24 hours    Blood Culture - Blood, Arm, Right [[id number]]  (Normal) Collected: 10/31/24 1544    Lab Status: Preliminary result Specimen: Blood from Arm, Right Updated: 11/01/24 1616     Blood Culture No growth at 24 hours            Juvenal Radford, PharmD  11/01/24 21:25 CDT

## 2024-11-02 NOTE — PROGRESS NOTES
[facility] Intensivist Services  INPATIENT PROGRESS NOTE    Patient Name: Pau Cabrera  Date of Admission: 10/31/2024  Today's Date: 24  Length of Stay: 2  Primary Care Physician: Carola Barbosa MD    Subjective   Primary Emergency Contact: Arnulfo Cabrera, Alan Phone: [phone]  Relation: Son  Secondary Emergency Contact: JOHANNA HERNANDEZ  Home Phone: [phone]  Relation: Mother    Chief Complaint: Urinary tract infection with E. coli, acute on likely chronic hypercapnic respiratory failure, restrictive and possibley obstructive lung disease, depression, demotivation.  Multiple wounds.    HPI   69-year-old obese female with suspected obesity hypoventilation syndrome, restrictive lung disease--and possible COPD based on  PFTs: chronic hypercapnic hypoxic respiratory failure currently using 2 L per nasal cannula at home, hypertension, hyperlipidemia, fibromyalgia, thyroid disease, rheumatoid arthritis, fibromyalgia presents to the ED 10/31/2024 initially with multiple chief complaints.  She complained of a headache, generalized weakness and she endorsed 2 falls over the last 12 hours.  No injury.  Her ER workup consisted of CT of the head which was essentially negative for any acute findings.  Chest x-ray reveals cardiomegaly, concerns for pulmonary hypertension however no acute infiltrate or pulmonary edema.  Throughout the ED workup she became more somnolent.  ABG revealed pH of 7.2, pCO2 of 80, PaO2 of 81.  She was initially placed on a BiPAP.  Repeat ABG essentially unchanged.  Declined and was intubated.     Patient appears to have a urinary tract infection in addition to her respiratory dysfunction.  E. coli on culture.      She was covered empirically with Rocephin and doxycycline.       : I took over care.  Spoke with  at the bedside who is a family friend.  She says that the patient's   3 years ago and she has been  profoundly depressed and essentially bedbound since then.    Echo:    Technically difficult study.     Left ventricular systolic function is mildly reduced with estimated   ejection fraction 45-50% and calculated ejection fraction (biplane) 45.5%   with possible lateral wall hypokinesis.     Left ventricular diastolic function is consistent with (grade II w/high   LAP) pseudonormalization.     The left ventricular cavity is borderline dilated. Left ventricular   wall thickness is consistent with moderate concentric hypertrophy.     Right ventricle is not well visualized but appears borderline to mildly   dilated in size with normal systolic function.     The left atrial cavity is mild to moderately dilated.     The right atrial cavity is mildly dilated.     At least mild to moderate, eccentric, posteriorly directed mitral valve   regurgitation.     :  Discontinue doxycycline.  Started azithromycin.  Son: says her  was a smoker, she never smoked.   Reviewed prior PFTs:  Pulmonary Function Test     Performed by: Gabriela Bates, RRT  Authorized by: Peter Crews MD     Pre Drug % Predicted    FVC: 54%   FEV1: 49%   FEF 25-75%: 33%   FEV1/FVC: 71%   T%   RV: 108%   DLCO: 72%   D/VAsb: 104%     Interpretation   Overall comments:   The above test results are acceptable and reproducible by ATS criteria  From analysis of the above test results the patient showed evidence of severe obstructive airway dysfunction which is confirmed by decrease in FEV1 and FEF 25 to 75%.  She also has some restrictive dysfunction noted which is moderate due to decrease in TLC and residual volume is near normal limits the diffusion capacity corrected for alveolar volume is within normal limits     In comparison to the prior test done in  the test results are very similar without any significant change. Clinical correlation is indicated     Peter Crews MD  Pulmonologist/Intensivist  3/5/2024 16:20 CST    My  interpretation is slightly different.  The FEF 25-75 is indicative of small airways disease.  I would say the FEV1 FVC ratio being relatively normal is not consistent with obstructive lung disease but the total lung capacity being greater than I would expect from her decreases in forced vital capacity may be consistent with hyperinflation and the DLCO is lower than I would expect.  The residual volume is not dramatically increased in the total lung capacity is dramatically decreased.    Review of Systems   All pertinent negatives and positives are as above. All other systems have been reviewed and are negative unless otherwise stated.         Objective    Temp:  [98.3 °F (36.8 °C)-100.6 °F (38.1 °C)] 98.7 °F (37.1 °C)  Heart Rate:  [56-91] 73  Resp:  [14-23] 14  BP: (105-168)/() 111/63  FiO2 (%):  [40 %-60 %] 50 %  Physical Exam  PHYSICAL FINDINGS: SEE VITALS ABOVE  GENERAL APPEARANCE: ° Patient is intubated.  Morbid obese.  Chronic skin changes noted as before.  HEAD: Injuries: No evidence of a head injury. ° Appearance: Head normocephalic.  NECK: No masses ° Thyroid: ° Not diffusely enlarged.  EYES: Disconjugate eyes.  THROAT: ETT in place, appears well positioned.  EARS: Hearing: ° patient sedated.  NOSE: General/bilateral: External Deformities: ° No external nose deformities.  LYMPH NODES: No cervical or generalized adenopathy noted.  CHEST: ° Visual inspection revealed no abnormalities.  LUNGS: ° Slight crackles noted.  CARDIOVASCULAR: JVD not increased. Heart Rate And Rhythm: Normal. ° Heart Sounds: No S3/ S4 heard. ° Murmurs: No murmurs were heard.  BACK: No obvious deformity noted.  ABDOMEN: Visual Inspection: Abdomen was not distended. Auscultation: ° Abdominal auscultation revealed no abnormalities. ° Bowel sounds were normal. ° Palpation: ° Abdomen was soft. ° No abdominal tenderness. ° No mass was palpated in the abdomen. ° Soft. °Liver: Not visibly enlarged. Spleen: Not visibly  enlarged.  MUSCULOSKELETAL SYSTEM : General/bilateral: ° Sedated.  FEET/EXTREMITIES: General/bilateral: ° No visible swelling of the feet.  NEUROLOGICAL: Intubated ° Gait And Stance: cannot test.  SKIN: ° Sacral wound present on admission.  Patient has a noted wound in the groin and heels as well as diffuse fungal infections along with pronounced lower extremity ichthyosis.       Pictures taken by nursing staff on admission:   Right posterior heel       Right medial thigh       Abdominal fold       Right breast       Buttocks       Buttocks         Basic Labs:  CBC:      Lab 11/02/24  0116 11/01/24  0209 10/31/24  1751   WBC 14.35* 10.91* 9.23   HEMOGLOBIN 9.1* 9.8* 9.4*   HEMATOCRIT 30.2* 34.3 33.6*   PLATELETS 326 332 324   NEUTROS ABS 12.09* 10.13* 7.54*   IMMATURE GRANS (ABS) 0.09* 0.04 0.05   LYMPHS ABS 1.18 0.62* 0.99   MONOS ABS 0.97* 0.10 0.52   EOS ABS 0.01 0.00 0.10   MCV 91.8 97.4* 98.0*       LIVER FUNCTION TESTS:      Lab 11/01/24  0209 10/31/24  1751   TOTAL PROTEIN 6.7 6.7   ALBUMIN 3.3* 3.4*   GLOBULIN 3.4 3.3   ALT (SGPT) 29 26   AST (SGOT) 31 32   BILIRUBIN 0.3 0.4   ALK PHOS 50 46       ABG:      Lab 11/02/24  0557 11/02/24  0342 11/02/24  0116 11/01/24  0324 11/01/24  0209 10/31/24  2231 10/31/24  2118 10/31/24  1851 10/31/24  1751   PH, ARTERIAL 7.471* 7.577*  --  7.426  --  7.305* 7.307*   < >  --    PO2 ART 96.5 54.4*  --  69.9*  --  66.9* 64.4*   < >  --    PCO2, ARTERIAL 53.1* 41.0  --  58.6*  --  76.1* 77.5*   < >  --    HCO3 ART 38.7* 38.2*  --  38.5*  --  37.8* 38.7*   < >  --    ANION GAP  --   --  11.0  --  12.0  --   --   --  5.0    < > = values in this interval not displayed.       CMP:      Lab 11/02/24  1114 11/02/24  0116 11/01/24  0209 10/31/24  2140 10/31/24  1751 10/31/24  1739   SODIUM  --  141 145  --  143  --    SODIUM, ARTERIAL  --   --   --   --   --  146*   POTASSIUM  --  3.8 4.5  --  4.3  --    CHLORIDE  --  98 98  --  100  --    BUN  --  24* 24*  --  23  --    CREATININE   --  0.95 0.87  --  0.77  --    CALCIUM  --  9.6 10.1  --  9.6  --    MAGNESIUM  --  1.7 1.8 1.9 1.9  --    PHOSPHORUS 3.1 1.6* 3.8  --   --   --           XR Abdomen KUB    Result Date: 11/2/2024  Enteric tube tip overlies the mid stomach.  This report was signed and finalized on 11/2/2024 11:16 AM by Dr. Angel Talamantes MD.      XR Chest 1 View    Result Date: 11/2/2024  1. Support lines/tubes are stable. 2. Slight decrease in bilateral perihilar predominant consolidation and diffuse interstitial opacity.  This report was signed and finalized on 11/2/2024 5:00 AM by Dr. Angel Talamantes MD.      XR Abdomen KUB    Result Date: 11/1/2024  1. A linear metallic wire like object projecting of the distal esophagus and proximal stomach may represent a guidewire for OG tube?.  This report was signed and finalized on 11/1/2024 7:10 AM by Dr. Cindy Collier MD.      XR Chest 1 View    Result Date: 11/1/2024  1. The endotracheal tube in place. The central venous line in place. No change. 2. The cardiopulmonary status is unchanged, similar to the previous study.   This report was signed and finalized on 11/1/2024 6:59 AM by Dr. Cindy Collier MD.      CT Abdomen Pelvis With Contrast    Result Date: 11/1/2024  1. No acute abnormality of the abdomen or pelvis, particularly, no finding to suggest acute traumatic involvement of the abdominal organs. 2. No fracture. 3. Other nonacute findings as above            This report was signed and finalized on 11/1/2024 6:51 AM by Dr. Cindy Collier MD.      CT Angiogram Chest    Result Date: 11/1/2024  1. A very limited study due to respiratory motion, patient motion, suboptimal opacification of the pulmonary arterial bed and patient's body habitus. No significant or obvious pulmonary embolism is noted. No aortic aneurysm or dissection. 2. No finding to suggest right heart strain. 3. Moderate cardiomegaly. 4. Bilateral lower lobar consolidation with air bronchogram may represent  acute inflammatory/infectious process. A groundglass infiltrate in the right middle lobe may represent an acute inflammatory/infectious process.       This report was signed and finalized on 11/1/2024 6:40 AM by Dr. Cindy Collier MD.      CT Head Without Contrast    Result Date: 11/1/2024  1. No acute intracranial abnormality. 2. Acute on chronic sinus disease.             This report was signed and finalized on 11/1/2024 6:04 AM by Dr. Cindy Collier MD.      XR Chest 1 View    Result Date: 10/31/2024  1. Right IJ deep line placed with the tip at the caval atrial juncture and no complication. 2. Cardiomegaly with chronic appearing mild pulmonary vascular congestion.  This report was signed and finalized on 10/31/2024 6:27 PM by Dr. Jose Basilio MD.      XR Chest 1 View    Result Date: 10/31/2024  1.  Underlying cardiomegaly. Dilated central pulmonary arteries which may be evidence for pulmonary hypertension. 2.  No visualized acute infiltrate or evidence of pulmonary edema.  This report was signed and finalized on 10/31/2024 2:44 PM by Dr Brenton Phillips.      CT Head Without Contrast    Result Date: 10/31/2024  1. No acute intracranial process. 2. Empty sella which is a nonspecific finding at this age.  This report was signed and finalized on 10/31/2024 2:42 PM by Dr Brenton Phillips.       Result Review:  I have personally reviewed the results from the time of this admission to 11/2/2024 11:44 CDT and agree with these findings:  [x]  Laboratory list / accordion  [x]  Microbiology  [x]  Radiology  [x]  EKG/Telemetry   [x]  Cardiology/Vascular   []  Pathology  []  Old records  []  Other:        Culture Data:   Blood Culture   Date Value Ref Range Status   10/31/2024 No growth at 24 hours  Preliminary   10/31/2024 No growth at 24 hours  Preliminary     Urine Culture   Date Value Ref Range Status   10/31/2024 >100,000 CFU/mL Escherichia coli (A)  Preliminary       I have reviewed the patient's current  medications.   Current Outpatient Medications   Medication Instructions    albuterol sulfate  (90 Base) MCG/ACT inhaler 2 puffs, Inhalation, Every 4 Hours PRN    allopurinol (ZYLOPRIM) 300 mg, Daily    amitriptyline (ELAVIL) 25 mg, Oral, Nightly    azelastine (ASTELIN) 0.1 % nasal spray 2 sprays, Nasal, 2 Times Daily, Use in each nostril as directed     bumetanide (BUMEX) 1 mg, Oral, Daily    butalbital-acetaminophen-caffeine (FIORICET, ESGIC) -40 MG per tablet 1 tablet, Oral, Every 4 Hours PRN    carvedilol (COREG) 25 MG tablet TAKE 1 TABLET BY MOUTH TWICE A DAY    celecoxib (CELEBREX) 100 mg, Oral, 2 Times Daily    doxycycline (MONODOX) 100 mg, Oral, 2 Times Daily    DULoxetine (CYMBALTA) 60 mg, Oral, Every Morning    EPINEPHrine (ADRENALIN) 0.05 MG/ML syringe 20 mL, Injection    fenofibrate (TRICOR) 145 mg, Oral, Daily    fluticasone (Flonase Allergy Relief) 50 MCG/ACT nasal spray 2 sprays, Nasal, Daily    Fluticasone-Salmeterol (ADVAIR/WIXELA) 250-50 MCG/ACT DISKUS 1 puff, Inhalation, 2 Times Daily - RT    HYDROcodone-acetaminophen (Norco) 7.5-325 MG per tablet 1 tablet, Oral, Every 6 Hours PRN    levothyroxine (SYNTHROID, LEVOTHROID) 75 MCG tablet 1 tablet, Oral, Daily    loratadine (CLARITIN) 10 mg, Oral, Daily    meclizine (ANTIVERT) 25 mg, Oral, 3 Times Daily PRN    Multiple Vitamins-Minerals (MULTIVITAMIN ADULT PO) Oral    Myrbetriq 25 mg, Oral, Daily    nystatin (MYCOSTATIN) 047728 UNIT/GM powder Topical, 3 Times Daily    OXYGEN-HELIUM IN Inhalation, 2L at night    solifenacin (VESICARE) 10 MG tablet 1 tablet, Oral, Daily    spironolactone (ALDACTONE) 25 mg, Oral    vitamin D (ERGOCALCIFEROL) 1.25 MG (43640 UT) capsule capsule 1 capsule, Oral, Weekly     Scheduled Meds:acetaZOLAMIDE, 500 mg, Nasogastric, BID  carvedilol, 25 mg, Oral, BID With Meals  cefTRIAXone, 1,000 mg, Intravenous, Q24H  Chlorhexidine Gluconate Cloth, 1 Application, Topical, Q24H  collagenase, 1 Application, Topical,  Q24H  DULoxetine, 60 mg, Oral, Daily  enoxaparin, 60 mg, Subcutaneous, Q12H  ipratropium-albuterol, 3 mL, Nebulization, 4x Daily - RT  levothyroxine, 75 mcg, Oral, Q AM  mupirocin, 1 Application, Each Nare, BID  nystatin, , Topical, Q12H  pantoprazole, 40 mg, Oral, Q AM  ProSource TF, 45 mL, Per G Tube, BID  senna-docusate sodium, 2 tablet, Oral, BID  sodium chloride, 10 mL, Intravenous, Q12H      Continuous Infusions:dexmedetomidine, 0.2-1.5 mcg/kg/hr, Last Rate: Stopped (11/01/24 1532)  fentanyl 10 mcg/mL,  mcg/hr, Last Rate: 125 mcg/hr (11/02/24 1059)  Pharmacy to Dose enoxaparin (LOVENOX),   propofol, 5-50 mcg/kg/min, Last Rate: 50 mcg/kg/min (11/02/24 1353)      PRN Meds:.  acetaminophen **OR** acetaminophen    senna-docusate sodium **AND** polyethylene glycol **AND** bisacodyl **AND** bisacodyl    Calcium Replacement - Follow Nurse / BPA Driven Protocol    fentaNYL citrate (PF)    Magnesium Standard Dose Replacement - Follow Nurse / BPA Driven Protocol    midazolam    nitroglycerin    ondansetron    Pharmacy to Dose enoxaparin (LOVENOX)    Phosphorus Replacement - Follow Nurse / BPA Driven Protocol    Potassium Replacement - Follow Nurse / BPA Driven Protocol    sodium chloride    sodium chloride  Assessment/Plan   1.  Acute on chronic hypercapnic respiratory failure along with morbid obesity, obstructive sleep apnea and restrictive lung disease.  Patient may also have a pneumonia.  Supportive care.  Continue antibiotics.  MRSA negative.  Using Ceftriaxone.  Will add atypical coverage with azithromycin.  PFT review is not clearly restrictive nor is it clearly obstructive only.  Added prednisone and inhaled budesonide due to concern for possible asthma based on old PFTs.  Respiratory status is not improving on current trajectory so we are going to adjust.  Try acetazolamide.  Risk benefits discussed with son.     2.  Urinary tract infection: E. coli.  Continue ceftriaxone and await sensitivities.    3.  Empty sella noted on imaging:  Nonspecific finding.  Follow-up with primary care.    4.  Multiple wounds all present on admission: Wound care ongoing.    5.  Hypothyroidism: Continue levothyroxine.    6.  Chronic depression: Continuing duloxetine.    7.  Hypertension: Continuing carvedilol 25 mg twice daily.    8.  GI: Continuing pantoprazole.    9.  DVT prophylaxis: Lovenox.    10. Code Status and Medical Interventions: CPR (Attempt to Resuscitate); Full Support.  Son says she would not want prolonged life support.  They are going to talk as a family.    Total critical care time: 30 minutes    Due to a high probability of clinically significant, life threatening deterioration, the patient required my highest level of preparedness to intervene emergently and I personally spent this critical care time directly and personally managing the patient.     This critical care time included obtaining a history; examining the patient; pulse oximetry; ordering and review of studies; arranging urgent treatment with development of a management plan; evaluation of patient's response to treatment; frequent reassessment; and, discussions with other providers.    This critical care time was performed to assess and manage the high probability of imminent, life-threatening deterioration that could result in multi-organ failure. It was exclusive of separately billable procedures and treating other patients and teaching time.    Please see MDM section and the rest of the note for further information on patient assessment and treatment.    This note was performed using Dragon voice recognition software.  Errors of dictation may be introduced accidentally.  Words and phrases may be mis-transcribed. If there is any clarity needed please contact the physician directly.  Use context to determine any abnormalities that may exist.    Electronically Signed by Binh Acevedo MD, CM   Pulmonary, Critical Care  [facility]  Please call  with any questions  (Cell [phone])    Electronically signed by Binh Acevedo MD on 11/2/2024 at 14:00 CDT

## 2024-11-03 NOTE — PROGRESS NOTES
[facility] Intensivist Services    Date of Admission: 10/31/2024  Date of Note: 24  Primary Care Physician: Carola Barbosa MD   LOS: 3 days     History   Primary Emergency Contact: Arnulfo Cabrera, Home Phone: [phone]  Relation: Son  Secondary Emergency Contact: JOHANNA HERNANDEZ  Home Phone: [phone]  Relation: Mother     Chief Complaint: Urinary tract infection with E. coli, acute on likely chronic hypercapnic respiratory failure, restrictive and possibley obstructive lung disease, depression, demotivation.  Multiple wounds.     HPI   69-year-old obese female with suspected obesity hypoventilation syndrome, restrictive lung disease--and possible COPD based on  PFTs: chronic hypercapnic hypoxic respiratory failure currently using 2 L per nasal cannula at home, hypertension, hyperlipidemia, fibromyalgia, thyroid disease, rheumatoid arthritis, fibromyalgia presents to the ED 10/31/2024 initially with multiple chief complaints.  She complained of a headache, generalized weakness and she endorsed 2 falls over the last 12 hours.  No injury.  Her ER workup consisted of CT of the head which was essentially negative for any acute findings.  Chest x-ray reveals cardiomegaly, concerns for pulmonary hypertension however no acute infiltrate or pulmonary edema.  Throughout the ED workup she became more somnolent.  ABG revealed pH of 7.2, pCO2 of 80, PaO2 of 81.  She was initially placed on a BiPAP.  Repeat ABG essentially unchanged.  Declined and was intubated.     Patient appears to have a urinary tract infection in addition to her respiratory dysfunction.  E. coli on culture.      She was covered empirically with Rocephin and doxycycline.         : I took over care.  Spoke with  at the bedside who is a family friend.  She says that the patient's   3 years ago and she has been profoundly depressed and essentially bedbound since then.      Echo:    Technically difficult study.     Left ventricular systolic function is mildly reduced with estimated   ejection fraction 45-50% and calculated ejection fraction (biplane) 45.5%   with possible lateral wall hypokinesis.     Left ventricular diastolic function is consistent with (grade II w/high   LAP) pseudonormalization.     The left ventricular cavity is borderline dilated. Left ventricular   wall thickness is consistent with moderate concentric hypertrophy.     Right ventricle is not well visualized but appears borderline to mildly   dilated in size with normal systolic function.     The left atrial cavity is mild to moderately dilated.     The right atrial cavity is mildly dilated.     At least mild to moderate, eccentric, posteriorly directed mitral valve   regurgitation.      :  Discontinue doxycycline.  Started azithromycin.  Son: says her  was a smoker, she never smoked.   Reviewed prior PFTs:  Pulmonary Function Test     Performed by: Gabriela Bates, RRT  Authorized by: Peter Crews MD     Pre Drug % Predicted    FVC: 54%   FEV1: 49%   FEF 25-75%: 33%   FEV1/FVC: 71%   T%   RV: 108%   DLCO: 72%   D/VAsb: 104%     Interpretation   Overall comments:   The above test results are acceptable and reproducible by ATS criteria  From analysis of the above test results the patient showed evidence of severe obstructive airway dysfunction which is confirmed by decrease in FEV1 and FEF 25 to 75%.  She also has some restrictive dysfunction noted which is moderate due to decrease in TLC and residual volume is near normal limits the diffusion capacity corrected for alveolar volume is within normal limits     In comparison to the prior test done in  the test results are very similar without any significant change. Clinical correlation is indicated     Peter Crews MD  Pulmonologist/Intensivist  3/5/2024 16:20 CST     My interpretation is slightly different.  The FEF 25-75 is  indicative of small airways disease.  I would say the FEV1 FVC ratio being relatively normal is not consistent with obstructive lung disease but the total lung capacity being greater than I would expect from her decreases in forced vital capacity may be consistent with hyperinflation and the DLCO is lower than I would expect.  The residual volume is not dramatically increased in the total lung capacity is dramatically decreased.       11/3: Son came to the bedside.  Says that she has been struggling for years.  Sits in chair does not sleep in a bed.  He would like us to do a trial extubation when we are ready.  He does not want tracheostomy as a final plan.  If she is not improving tomorrow he may want a trial extubation with a plan to switch to DO NOT INTUBATE if she does poorly.  He would like to be here at 10 AM.  Patient is currently still full code but he may reassess that depending on how she does.  She was very clear that she did not want to be on life support a long time.    Past Medical History     Active and Resolved Problems  Active Hospital Problems    Diagnosis  POA    **Acute respiratory failure [J96.00]  Yes      Resolved Hospital Problems   No resolved problems to display.       Past Medical History:   Past Medical History:   Diagnosis Date    Anemia     iron def    Anxiety     Asthma     Back pain     CHF (congestive heart failure)     Chronic bronchitis     Coronary artery disease     COVID-19 vaccine series completed     pfizer    COVID-19 vaccine series completed     CTS (carpal tunnel syndrome)     Depression     Fibromyalgia     GERD (gastroesophageal reflux disease)     Gout     Hyperlipemia     Hyperlipidemia     Hypertension     Obesity     Obstructive sleep apnea     could not tolerate machine     Peripheral neuropathy     Primary central sleep apnea     Pulmonary arterial hypertension     Rheumatoid arthritis     Sinusitis        Prior Surgeries: She  has a past surgical history that  includes  section; Hysterectomy (); Breast biopsy (Left); Umbilical hernia repair; Colonoscopy (10/22/2015); Colonoscopy (2007); Colonoscopy (N/A, 2020); and Thyroidectomy (Left, 2022).    Past Surgical History:   Past Surgical History:   Procedure Laterality Date    BREAST BIOPSY Left      SECTION      X 2    COLONOSCOPY  10/22/2015    Tics repeat exam in 5 years    COLONOSCOPY  2007    Small hemorrhoids repeat exam in 3 years    COLONOSCOPY N/A 2020    Procedure: COLONOSCOPY WITH ANESTHESIA;  Surgeon: Denny Francis MD;  Location: [facility] ENDOSCOPY;  Service: Gastroenterology;  Laterality: N/A;  pre: family hx colon ca  post: diverticulosis  Carola Barbosa MD    HYSTERECTOMY      total    THYROIDECTOMY Left 2022    Procedure: Left thyroidectomy with isthmusectomy;  Surgeon: Milad So MD;  Location: [facility] OR;  Service: ENT;  Laterality: Left;    UMBILICAL HERNIA REPAIR      had 1/2 of it repaired with hysterectomy        Social and Family History     Family History:  family history includes Anuerysm in her father; Arthritis in her father and mother; Asthma in her brother; Heart disease in her mother; Hypertension in her father and mother; Stroke in her father; Thyroid disease in her mother.    Tobacco/Social History:  reports that she has never smoked. She has been exposed to tobacco smoke. She has never used smokeless tobacco. She reports that she does not drink alcohol and does not use drugs.    Allergies     Allergies:   She is allergic to codeine sulfate and lisinopril.    Labs   Basic Labs:  CBC:      Lab 24  0248 24  0116 24  0209 10/31/24  1751   WBC 10.02 14.35* 10.91* 9.23   HEMOGLOBIN 9.1* 9.1* 9.8* 9.4*   HEMATOCRIT 30.3* 30.2* 34.3 33.6*   PLATELETS 319 326 332 324   NEUTROS ABS 8.50* 12.09* 10.13* 7.54*   IMMATURE GRANS (ABS) 0.18* 0.09* 0.04 0.05   LYMPHS ABS 0.83 1.18 0.62* 0.99   MONOS ABS 0.42 0.97*  0.10 0.52   EOS ABS 0.06 0.01 0.00 0.10   MCV 93.5 91.8 97.4* 98.0*       LIVER FUNCTION TESTS:      Lab 11/03/24  0248 11/01/24  0209 10/31/24  1751   TOTAL PROTEIN 5.7* 6.7 6.7   ALBUMIN 2.7* 3.3* 3.4*   GLOBULIN 3.0 3.4 3.3   ALT (SGPT) 22 29 26   AST (SGOT) 19 31 32   BILIRUBIN 0.2 0.3 0.4   ALK PHOS 44 50 46       ABG:      Lab 11/03/24  0336 11/03/24  0248 11/02/24  0557 11/02/24  0342 11/02/24  0116 11/01/24  0324 11/01/24  0209 10/31/24  2231 10/31/24  1851 10/31/24  1751   PH, ARTERIAL 7.384  --  7.471* 7.577*  --  7.426  --  7.305*   < >  --    PO2 ART 97.2  --  96.5 54.4*  --  69.9*  --  66.9*   < >  --    PCO2, ARTERIAL 55.9*  --  53.1* 41.0  --  58.6*  --  76.1*   < >  --    HCO3 ART 33.4*  --  38.7* 38.2*  --  38.5*  --  37.8*   < >  --    ANION GAP  --  12.0  --   --  11.0  --  12.0  --   --  5.0    < > = values in this interval not displayed.       CMP:      Lab 11/03/24  0248 11/02/24  1114 11/02/24  0116 11/01/24  0209 10/31/24  2140 10/31/24  1751 10/31/24  1739   SODIUM 139  --  141 145  --  143  --    SODIUM, ARTERIAL  --   --   --   --   --   --  146*   POTASSIUM 3.9  --  3.8 4.5  --  4.3  --    CHLORIDE 97*  --  98 98  --  100  --    BUN 25*  --  24* 24*  --  23  --    CREATININE 0.94  --  0.95 0.87  --  0.77  --    CALCIUM 9.5  --  9.6 10.1  --  9.6  --    MAGNESIUM 1.7  --  1.7 1.8 1.9 1.9  --    PHOSPHORUS 5.0* 3.1 1.6* 3.8  --   --   --        Diabetic:  A1C Last 3 Results          1/30/2024    12:32   HGBA1C Last 3 Results   Hemoglobin A1C 5.4          Details          This result is from an external source.               Inpatient Medications   Medications: Scheduled Meds:acetaZOLAMIDE, 500 mg, Nasogastric, BID  azithromycin, 500 mg, Per G Tube, Q24H  budesonide, 0.5 mg, Nebulization, BID - RT  carvedilol, 25 mg, Oral, BID With Meals  cefTRIAXone, 1,000 mg, Intravenous, Q24H  Chlorhexidine Gluconate Cloth, 1 Application, Topical, Q24H  collagenase, 1 Application, Topical,  Q24H  DULoxetine, 60 mg, Oral, Daily  enoxaparin, 60 mg, Subcutaneous, Q12H  ipratropium-albuterol, 3 mL, Nebulization, 4x Daily - RT  levothyroxine, 75 mcg, Oral, Q AM  mupirocin, 1 Application, Each Nare, BID  nystatin, , Topical, Q12H  pantoprazole, 40 mg, Intravenous, Q AM  predniSONE, 40 mg, Oral, Daily   Followed by  [START ON 11/5/2024] predniSONE, 20 mg, Oral, Daily   Followed by  [START ON 11/8/2024] predniSONE, 10 mg, Oral, Daily  ProSource TF, 45 mL, Per G Tube, BID  senna-docusate sodium, 2 tablet, Oral, BID  sodium chloride, 10 mL, Intravenous, Q12H      Continuous Infusions:dexmedetomidine, 0.2-1.5 mcg/kg/hr, Last Rate: Stopped (11/01/24 1532)  fentanyl 10 mcg/mL,  mcg/hr, Last Rate: 125 mcg/hr (11/02/24 8434)  Pharmacy to Dose enoxaparin (LOVENOX),   propofol, 5-50 mcg/kg/min, Last Rate: 50 mcg/kg/min (11/03/24 4741)      PRN Meds:.  acetaminophen **OR** acetaminophen    senna-docusate sodium **AND** polyethylene glycol **AND** bisacodyl **AND** bisacodyl    Calcium Replacement - Follow Nurse / BPA Driven Protocol    fentaNYL citrate (PF)    Magnesium Standard Dose Replacement - Follow Nurse / BPA Driven Protocol    midazolam    nitroglycerin    ondansetron    Pharmacy to Dose enoxaparin (LOVENOX)    Phosphorus Replacement - Follow Nurse / BPA Driven Protocol    Potassium Replacement - Follow Nurse / BPA Driven Protocol    sodium chloride    sodium chloride    I have reviewed the patient's current medications.   Outpatient Medications     Current Outpatient Medications   Medication Instructions    albuterol sulfate  (90 Base) MCG/ACT inhaler 2 puffs, Inhalation, Every 4 Hours PRN    allopurinol (ZYLOPRIM) 300 mg, Daily    amitriptyline (ELAVIL) 25 mg, Oral, Nightly    azelastine (ASTELIN) 0.1 % nasal spray 2 sprays, Nasal, 2 Times Daily, Use in each nostril as directed     bumetanide (BUMEX) 1 mg, Oral, Daily    butalbital-acetaminophen-caffeine (FIORICET, ESGIC) -40 MG per tablet 1  "tablet, Oral, Every 4 Hours PRN    carvedilol (COREG) 25 MG tablet TAKE 1 TABLET BY MOUTH TWICE A DAY    celecoxib (CELEBREX) 100 mg, Oral, 2 Times Daily    doxycycline (MONODOX) 100 mg, Oral, 2 Times Daily    DULoxetine (CYMBALTA) 60 mg, Oral, Every Morning    EPINEPHrine (ADRENALIN) 0.05 MG/ML syringe 20 mL, Injection    fenofibrate (TRICOR) 145 mg, Oral, Daily    fluticasone (Flonase Allergy Relief) 50 MCG/ACT nasal spray 2 sprays, Nasal, Daily    Fluticasone-Salmeterol (ADVAIR/WIXELA) 250-50 MCG/ACT DISKUS 1 puff, Inhalation, 2 Times Daily - RT    HYDROcodone-acetaminophen (Norco) 7.5-325 MG per tablet 1 tablet, Oral, Every 6 Hours PRN    levothyroxine (SYNTHROID, LEVOTHROID) 75 MCG tablet 1 tablet, Oral, Daily    loratadine (CLARITIN) 10 mg, Oral, Daily    meclizine (ANTIVERT) 25 mg, Oral, 3 Times Daily PRN    Multiple Vitamins-Minerals (MULTIVITAMIN ADULT PO) Oral    Myrbetriq 25 mg, Oral, Daily    nystatin (MYCOSTATIN) 110290 UNIT/GM powder Topical, 3 Times Daily    OXYGEN-HELIUM IN Inhalation, 2L at night    solifenacin (VESICARE) 10 MG tablet 1 tablet, Oral, Daily    spironolactone (ALDACTONE) 25 mg, Oral    vitamin D (ERGOCALCIFEROL) 1.25 MG (86343 UT) capsule capsule 1 capsule, Oral, Weekly       Current Antibiotics   azithromycin - 250 MG  cefTRIAXone (ROCEPHIN) 1000 mg IVPB in 100 mL NS (MBP)      Exam   Vent settings for last 24 hours:  Mode: VC/AC  FiO2 (%):  [30 %-50 %] 40 %  S RR:  [14] 14  S VT:  [500 mL] 500 mL  PEEP/CPAP (cm H2O):  [8 cm H20] 8 cm H20  MAP (cm H2O):  [12-15] 14    Vital signs for last 24 hours:  Temp:  [98.3 °F (36.8 °C)-98.9 °F (37.2 °C)] 98.3 °F (36.8 °C)  Heart Rate:  [58-89] 66  Resp:  [14] 14  BP: ()/(37-95) 92/50  FiO2 (%):  [30 %-50 %] 40 %    Vitals: Her  height is 155 cm (61.02\") and weight is 166 kg (367 lb 1.1 oz) (abnormal). Her axillary temperature is 98.3 °F (36.8 °C). Her blood pressure is 92/50 and her pulse is 66. Her respiration is 14 and oxygen saturation " is 97%.     PHYSICAL FINDINGS: SEE VITALS ABOVE  GENERAL APPEARANCE: ° Patient is intubated.  Morbid obese.  Chronic skin changes noted as before.  Severe ichthyosis.  HEAD: Injuries: No evidence of a head injury. ° Appearance: Head normocephalic.  NECK: No masses ° Thyroid: ° Not diffusely enlarged.  EYES: Disconjugate eyes slightly more in line compared to yesterday.  THROAT: ETT in place, appears well positioned.  EARS: Hearing: ° patient sedated.  NOSE: General/bilateral: External Deformities: ° No external nose deformities.  LYMPH NODES: No cervical or generalized adenopathy noted.  CHEST: ° Visual inspection revealed no abnormalities.  LUNGS: ° Slight crackles noted.  CARDIOVASCULAR: JVD not increased. Heart Rate And Rhythm: Normal. ° Heart Sounds: No S3/ S4 heard. ° Murmurs: No murmurs were heard.  BACK: No obvious deformity noted.  ABDOMEN: Visual Inspection: Abdomen was not distended. Auscultation: ° Abdominal auscultation revealed no abnormalities. ° Bowel sounds were normal. ° Palpation: ° Abdomen was soft. ° No abdominal tenderness. ° No mass was palpated in the abdomen. ° Soft. °Liver: Not visibly enlarged. Spleen: Not visibly enlarged.  MUSCULOSKELETAL SYSTEM : General/bilateral: ° Sedated.  FEET/EXTREMITIES: General/bilateral: ° No visible swelling of the feet.  NEUROLOGICAL: Intubated ° Gait And Stance: cannot test.  SKIN: ° Sacral wound present on admission.  Patient has a noted wound in the groin and heels as well as diffuse fungal infections along with pronounced lower extremity ichthyosis.  See pictures yesterday.       Intake/Output   Intake/Output this shift:  I/O this shift:  In: 201.7 [I.V.:109.7; Other:35; NG/GT:57]  Out: 120 [Urine:120]    Intake/Output last 3 shifts:  I/O last 3 completed shifts:  In: 4568.5 [I.V.:2493.5; Other:962; NG/GT:1013; IV Piggyback:100]  Out: 2675 [Urine:2675]    Results and Cultures Review     Result Review:  I have personally reviewed the results from the time  of this admission to 11/3/2024 09:23 CST and agree with these findings:  [x]  Laboratory list / accordion  [x]  Microbiology  [x]  Radiology  []  EKG/Telemetry   []  Cardiology/Vascular   []  Pathology  []  Old records  []  Other:  Most notable findings include: Gram-positive cocci.  1 out of 2 blood cultures from the 31st.  Urine positive E. coli greater than 100,000 colony-forming units.      Culture Data:   Blood Culture   Date Value Ref Range Status   10/31/2024 No growth at 2 days  Preliminary   10/31/2024 No growth at 2 days  Preliminary     Urine Culture   Date Value Ref Range Status   10/31/2024 >100,000 CFU/mL Escherichia coli (A)  Final       Assessment/Plan      1.  Acute on chronic hypercapnic respiratory failure along with morbid obesity, obstructive sleep apnea and restrictive lung disease.  Patient may also have a pneumonia--and volume overload.  Supportive care.  Continue antibiotics.  MRSA negative.  Using Ceftriaxone.  1 out of 2 positive cultures.  Following.  Yesterday added atypical coverage with azithromycin.  PFT review is not clearly restrictive nor is it clearly obstructive only.  Added prednisone and inhaled budesonide 11/2/2024 due to concern for possible asthma based on old PFTs.  Spontaneous breathing trial this morning was cut short due to rapid shallow breathing and patient being anxious.  Was not really following commands.  11/2/2024 started acetazolamide.  Risk benefits discussed with son.  Consider discontinuing tomorrow if things do not improve.  Imaging consistent with volume overload.  Giving Lasix.  Trying for dry lung strategy.     2.  Urinary tract infection: E. coli.  Oddly blood culture positive gram-positive cocci 1 out of 2.  Continue ceftriaxone and await sensitivities.  White count has improved.  No change to antibiotics today.  Follow-up cultures tomorrow.  Adjust as needed.     3. Empty sella noted on imaging:  Nonspecific finding.  Follow-up with primary care.     4.   Multiple wounds all present on admission: Wound care ongoing.  Very poor self-care noted.     5.  Hypothyroidism: Continue levothyroxine.     6.  Chronic depression: Continuing duloxetine.     7.  Hypertension: Continuing carvedilol 25 mg twice daily.     8.  GI: Continuing pantoprazole.     9.  DVT prophylaxis: Lovenox.     10. Code Status and Medical Interventions: CPR (Attempt to Resuscitate); Full Support.  Son says she would not want prolonged life support.  They are going to talk again.  They may want to extubate to BiPAP but definitely do not want to do tracheostomy and do not want prolonged life support.  Reassessing each day.     Total critical care time: 30 minutes     Due to a high probability of clinically significant, life threatening deterioration, the patient required my highest level of preparedness to intervene emergently and I personally spent this critical care time directly and personally managing the patient.      This critical care time included obtaining a history; examining the patient; pulse oximetry; ordering and review of studies; arranging urgent treatment with development of a management plan; evaluation of patient's response to treatment; frequent reassessment; and, discussions with other providers.     This critical care time was performed to assess and manage the high probability of imminent, life-threatening deterioration that could result in multi-organ failure. It was exclusive of separately billable procedures and treating other patients and teaching time.     Please see MDM section and the rest of the note for further information on patient assessment and treatment.     This note was performed using Dragon voice recognition software.  Errors of dictation may be introduced accidentally.  Words and phrases may be mis-transcribed. If there is any clarity needed please contact the physician directly.  Use context to determine any abnormalities that may exist.      Electronically  signed by Binh Acevedo MD on 11/3/2024 at 16:16 CST  Pulmonary, Critical Care  [facility]  Please call with any questions  (Cell [phone])

## 2024-11-03 NOTE — PROGRESS NOTES
RT EQUIPMENT DEVICE RELATED - SKIN ASSESSMENT    Terry Score:  Terry Score: 12     RT Medical Equipment/Device:     ETT Sun/Anchorfast    Skin Assessment:      Cheek:  Intact  Lips:  Intact  Mouth:  Intact    Device Skin Pressure Protection:  Skin-to-device areas padded:  Anchorfast    Nurse Notification:  Liane Santos, RRT

## 2024-11-03 NOTE — PLAN OF CARE
Goal Outcome Evaluation:      Propofol at 50 and Fentanyl at 125. Pt biting ETT. At 0400 assessment and 0500 rounds nothing was abnormal at 0600 round I went to suction pts mouth and a tooth was under the ETT. No signs of blood in mouth. TF at goal at 40. Pt safety maintained.

## 2024-11-04 NOTE — PLAN OF CARE
Goal Outcome Evaluation: PT self extubated and was re-intubated , PT remains on same vent settings      Problem: Mechanical Ventilation Invasive  Goal: Effective Communication  Outcome: Progressing  Goal: Optimal Device Function  Outcome: Progressing  Goal: Mechanical Ventilation Liberation  Outcome: Progressing  Goal: Optimal Nutrition Delivery  Outcome: Progressing  Goal: Absence of Device-Related Skin and Tissue Injury  Outcome: Progressing  Goal: Absence of Ventilator-Induced Lung Injury  Outcome: Progressing

## 2024-11-04 NOTE — PLAN OF CARE
Problem: Adult Inpatient Plan of Care  Goal: Plan of Care Review  Outcome: Progressing  Goal: Patient-Specific Goal (Individualized)  Outcome: Progressing  Goal: Absence of Hospital-Acquired Illness or Injury  Outcome: Progressing  Intervention: Identify and Manage Fall Risk  Recent Flowsheet Documentation  Taken 11/4/2024 0600 by Maria T Granda RN  Safety Promotion/Fall Prevention: safety round/check completed  Taken 11/4/2024 0500 by Maria T Granda RN  Safety Promotion/Fall Prevention: safety round/check completed  Taken 11/4/2024 0400 by Maria T Granda RN  Safety Promotion/Fall Prevention:   safety round/check completed   clutter free environment maintained   fall prevention program maintained  Taken 11/4/2024 0300 by Maria T Granda RN  Safety Promotion/Fall Prevention: safety round/check completed  Taken 11/4/2024 0200 by Maria T Granda RN  Safety Promotion/Fall Prevention: safety round/check completed  Taken 11/4/2024 0100 by Maria T Granda RN  Safety Promotion/Fall Prevention: safety round/check completed  Taken 11/4/2024 0000 by Maria T Granda RN  Safety Promotion/Fall Prevention:   safety round/check completed   clutter free environment maintained   fall prevention program maintained  Taken 11/3/2024 2300 by Maria T Granda RN  Safety Promotion/Fall Prevention: safety round/check completed  Taken 11/3/2024 2200 by Maria T Granda RN  Safety Promotion/Fall Prevention: safety round/check completed  Taken 11/3/2024 2100 by Maria T Granda RN  Safety Promotion/Fall Prevention: safety round/check completed  Taken 11/3/2024 2000 by Maria T Granda RN  Safety Promotion/Fall Prevention:   safety round/check completed   clutter free environment maintained   fall prevention program maintained  Intervention: Prevent Skin Injury  Recent Flowsheet Documentation  Taken 11/4/2024 0500 by Maria T Granda RN  Body Position:   turned   left   heels elevated   upper extremity elevated  Taken 11/4/2024 0400 by Maria T Granda  RN  Skin Protection:   incontinence pads utilized   silicone foam dressing in place  Taken 11/4/2024 0300 by Maria T Granda RN  Body Position:   turned   lower extremity elevated   upper extremity elevated   supine  Taken 11/4/2024 0100 by Maria T Granda RN  Body Position:   turned   right   lower extremity elevated   upper extremity elevated  Taken 11/4/2024 0000 by Maria T Granda RN  Skin Protection:   incontinence pads utilized   silicone foam dressing in place  Taken 11/3/2024 2300 by Maria T Granda RN  Body Position:   turned   supine   upper extremity elevated   lower extremity elevated  Taken 11/3/2024 2100 by Maria T Granda RN  Body Position:   turned   left   heels elevated   upper extremity elevated  Taken 11/3/2024 2000 by Maria T Granda RN  Skin Protection:   incontinence pads utilized   silicone foam dressing in place  Intervention: Prevent and Manage VTE (Venous Thromboembolism) Risk  Recent Flowsheet Documentation  Taken 11/4/2024 0400 by Maria T Granda RN  VTE Prevention/Management: other (see comments)  Taken 11/4/2024 0000 by Maria T Granda RN  VTE Prevention/Management: other (see comments)  Taken 11/3/2024 2000 by Maria T Granda RN  VTE Prevention/Management: (see MAR) other (see comments)  Intervention: Prevent Infection  Recent Flowsheet Documentation  Taken 11/4/2024 0600 by Maria T Granda RN  Infection Prevention: single patient room provided  Taken 11/4/2024 0500 by Maria T Granda RN  Infection Prevention: single patient room provided  Taken 11/4/2024 0400 by Maria T Granda RN  Infection Prevention:   single patient room provided   hand hygiene promoted   equipment surfaces disinfected  Taken 11/4/2024 0300 by Maria T Granda RN  Infection Prevention: single patient room provided  Taken 11/4/2024 0200 by Maria T Granda RN  Infection Prevention: single patient room provided  Taken 11/4/2024 0100 by Maria T Granda RN  Infection Prevention: single patient room provided  Taken 11/4/2024 0000 by  Granda, Maria T, RN  Infection Prevention:   single patient room provided   hand hygiene promoted   equipment surfaces disinfected  Taken 11/3/2024 2300 by Maria T Granda RN  Infection Prevention: single patient room provided  Taken 11/3/2024 2200 by Maria T Granda RN  Infection Prevention: single patient room provided  Taken 11/3/2024 2100 by Maria T Granda RN  Infection Prevention: single patient room provided  Taken 11/3/2024 2000 by Maria T Granda RN  Infection Prevention:   single patient room provided   hand hygiene promoted   equipment surfaces disinfected  Goal: Optimal Comfort and Wellbeing  Outcome: Progressing  Intervention: Provide Person-Centered Care  Recent Flowsheet Documentation  Taken 11/4/2024 0400 by Maria T Granda RN  Trust Relationship/Rapport: care explained  Taken 11/4/2024 0000 by Maria T Granda RN  Trust Relationship/Rapport: care explained  Taken 11/3/2024 2000 by Maria T Granda RN  Trust Relationship/Rapport: care explained  Goal: Readiness for Transition of Care  Outcome: Progressing     Problem: Fall Injury Risk  Goal: Absence of Fall and Fall-Related Injury  Outcome: Progressing  Intervention: Identify and Manage Contributors  Recent Flowsheet Documentation  Taken 11/4/2024 0600 by Maria T Granda RN  Medication Review/Management: medications reviewed  Taken 11/4/2024 0500 by Maria T Granda RN  Medication Review/Management: medications reviewed  Taken 11/4/2024 0400 by Maria T Granda RN  Medication Review/Management: medications reviewed  Taken 11/4/2024 0300 by Maria T Granda RN  Medication Review/Management: medications reviewed  Taken 11/4/2024 0200 by Maria T Granda RN  Medication Review/Management: medications reviewed  Taken 11/4/2024 0100 by Maria T Granda RN  Medication Review/Management: medications reviewed  Taken 11/4/2024 0000 by Maria T Granda RN  Medication Review/Management: medications reviewed  Taken 11/3/2024 2300 by Maria T Granda RN  Medication Review/Management:  medications reviewed  Taken 11/3/2024 2200 by Maria T Granda RN  Medication Review/Management: medications reviewed  Taken 11/3/2024 2100 by Maria T Granda RN  Medication Review/Management: medications reviewed  Taken 11/3/2024 2000 by Maria T Granda RN  Medication Review/Management: medications reviewed  Intervention: Promote Injury-Free Environment  Recent Flowsheet Documentation  Taken 11/4/2024 0600 by Maria T Granda RN  Safety Promotion/Fall Prevention: safety round/check completed  Taken 11/4/2024 0500 by Maria T Granda RN  Safety Promotion/Fall Prevention: safety round/check completed  Taken 11/4/2024 0400 by Maria T Granda RN  Safety Promotion/Fall Prevention:   safety round/check completed   clutter free environment maintained   fall prevention program maintained  Taken 11/4/2024 0300 by Maria T Granda RN  Safety Promotion/Fall Prevention: safety round/check completed  Taken 11/4/2024 0200 by Maria T Granda RN  Safety Promotion/Fall Prevention: safety round/check completed  Taken 11/4/2024 0100 by Maria T Granda RN  Safety Promotion/Fall Prevention: safety round/check completed  Taken 11/4/2024 0000 by Maria T Granda RN  Safety Promotion/Fall Prevention:   safety round/check completed   clutter free environment maintained   fall prevention program maintained  Taken 11/3/2024 2300 by Maria T Granda RN  Safety Promotion/Fall Prevention: safety round/check completed  Taken 11/3/2024 2200 by Maria T Granda RN  Safety Promotion/Fall Prevention: safety round/check completed  Taken 11/3/2024 2100 by Maria T Granda RN  Safety Promotion/Fall Prevention: safety round/check completed  Taken 11/3/2024 2000 by Maria T Granda RN  Safety Promotion/Fall Prevention:   safety round/check completed   clutter free environment maintained   fall prevention program maintained     Problem: Sepsis/Septic Shock  Goal: Optimal Coping  Outcome: Progressing  Intervention: Support Patient and Family Response  Recent Flowsheet  Documentation  Taken 11/4/2024 0400 by Maria T Granda RN  Family/Support System Care: involvement promoted  Taken 11/4/2024 0000 by Maria T Granda RN  Family/Support System Care: involvement promoted  Taken 11/3/2024 2000 by Maria T Granda RN  Family/Support System Care: involvement promoted  Goal: Absence of Bleeding  Outcome: Progressing  Goal: Blood Glucose Level Within Target Range  Outcome: Progressing  Intervention: Optimize Glycemic Control  Recent Flowsheet Documentation  Taken 11/4/2024 0400 by Maria T Granda RN  Hyperglycemia Management: blood glucose monitored  Taken 11/4/2024 0000 by Maria T Granda RN  Hyperglycemia Management: blood glucose monitored  Taken 11/3/2024 2000 by Maria T Granda RN  Hyperglycemia Management: blood glucose monitored  Goal: Absence of Infection Signs and Symptoms  Outcome: Progressing  Intervention: Initiate Sepsis Management  Recent Flowsheet Documentation  Taken 11/4/2024 0600 by Maria T Granda RN  Infection Prevention: single patient room provided  Taken 11/4/2024 0500 by Maria T Granda RN  Infection Prevention: single patient room provided  Taken 11/4/2024 0400 by Maria T Granda RN  Infection Prevention:   single patient room provided   hand hygiene promoted   equipment surfaces disinfected  Taken 11/4/2024 0300 by Maria T Granda RN  Infection Prevention: single patient room provided  Taken 11/4/2024 0200 by Maria T Granda RN  Infection Prevention: single patient room provided  Taken 11/4/2024 0100 by Maria T Granda RN  Infection Prevention: single patient room provided  Taken 11/4/2024 0000 by Maria T Granda RN  Infection Prevention:   single patient room provided   hand hygiene promoted   equipment surfaces disinfected  Taken 11/3/2024 2300 by Maria T Granda RN  Infection Prevention: single patient room provided  Taken 11/3/2024 2200 by Maria T Granda RN  Infection Prevention: single patient room provided  Taken 11/3/2024 2100 by Maria T Granda RN  Infection Prevention:  single patient room provided  Taken 11/3/2024 2000 by Maria T Granda RN  Infection Prevention:   single patient room provided   hand hygiene promoted   equipment surfaces disinfected  Intervention: Promote Recovery  Recent Flowsheet Documentation  Taken 11/4/2024 0400 by Maria T Granda RN  Activity Management: bedrest  Taken 11/4/2024 0000 by Maria T Granda RN  Activity Management: bedrest  Taken 11/3/2024 2000 by Maria T Granda RN  Activity Management: bedrest  Goal: Optimal Nutrition Delivery  Outcome: Progressing     Problem: Mechanical Ventilation Invasive  Goal: Effective Communication  Outcome: Progressing  Intervention: Ensure Effective Communication  Recent Flowsheet Documentation  Taken 11/4/2024 0600 by Maria T Granda RN  Diversional Activities: television  Taken 11/4/2024 0400 by Maria T Granda RN  Trust Relationship/Rapport: care explained  Diversional Activities: television  Family/Support System Care: involvement promoted  Taken 11/4/2024 0200 by Maria T Granda RN  Diversional Activities: television  Taken 11/4/2024 0000 by Maria T Granda RN  Trust Relationship/Rapport: care explained  Diversional Activities: television  Family/Support System Care: involvement promoted  Taken 11/3/2024 2200 by Maria T Granda RN  Diversional Activities: television  Taken 11/3/2024 2000 by Maria T Granda RN  Trust Relationship/Rapport: care explained  Diversional Activities: television  Family/Support System Care: involvement promoted  Goal: Optimal Device Function  Outcome: Progressing  Intervention: Optimize Device Care and Function  Recent Flowsheet Documentation  Taken 11/4/2024 0600 by Maria T Granda RN  Airway Safety Measures: manual resuscitator/mask at bedside  Taken 11/4/2024 0500 by Maria T Granda RN  Airway Safety Measures: manual resuscitator/mask at bedside  Taken 11/4/2024 0400 by Maria T Granda RN  Airway Safety Measures:   suction at bedside   manual resuscitator/mask at bedside  Taken 11/4/2024 0300 by  Maria T Granda RN  Airway Safety Measures: manual resuscitator/mask at bedside  Taken 11/4/2024 0200 by Maria T Granda RN  Airway Safety Measures: manual resuscitator/mask at bedside  Taken 11/4/2024 0100 by Maria T Granda RN  Airway Safety Measures: manual resuscitator/mask at bedside  Taken 11/4/2024 0000 by Maria T Granda RN  Airway Safety Measures:   suction at bedside   manual resuscitator/mask at bedside  Taken 11/3/2024 2300 by Maria T Granda RN  Airway Safety Measures: manual resuscitator/mask at bedside  Taken 11/3/2024 2200 by Maria T Granda RN  Airway Safety Measures: manual resuscitator/mask at bedside  Taken 11/3/2024 2100 by Maria T Granda RN  Airway Safety Measures: manual resuscitator/mask at bedside  Taken 11/3/2024 2020 by Maria T Granda RN  Oral Care:   suction provided   swabbed with antiseptic solution  Taken 11/3/2024 2000 by Maria T Granda RN  Airway Safety Measures:   suction at bedside   manual resuscitator/mask at bedside  Goal: Mechanical Ventilation Liberation  Outcome: Progressing  Intervention: Promote Extubation and Mechanical Ventilation Liberation  Recent Flowsheet Documentation  Taken 11/4/2024 0600 by Maria T Granda RN  Medication Review/Management: medications reviewed  Taken 11/4/2024 0500 by Maria T Granda RN  Medication Review/Management: medications reviewed  Taken 11/4/2024 0400 by Maria T Granda RN  Medication Review/Management: medications reviewed  Taken 11/4/2024 0300 by Maria T Granda RN  Medication Review/Management: medications reviewed  Taken 11/4/2024 0200 by Maria T Granda RN  Medication Review/Management: medications reviewed  Taken 11/4/2024 0100 by Maria T Granda RN  Medication Review/Management: medications reviewed  Taken 11/4/2024 0000 by Maria T Granda RN  Medication Review/Management: medications reviewed  Taken 11/3/2024 2300 by Maria T Granda RN  Medication Review/Management: medications reviewed  Taken 11/3/2024 2200 by Maria T Granda RN  Medication  Review/Management: medications reviewed  Taken 11/3/2024 2100 by Maria T Granda RN  Medication Review/Management: medications reviewed  Taken 11/3/2024 2000 by Maria T Granda RN  Medication Review/Management: medications reviewed  Goal: Optimal Nutrition Delivery  Outcome: Progressing  Goal: Absence of Device-Related Skin and Tissue Injury  Outcome: Progressing  Intervention: Maintain Skin and Tissue Health  Recent Flowsheet Documentation  Taken 11/4/2024 0400 by Maria T Granda RN  Device Skin Pressure Protection:   adhesive use limited   absorbent pad utilized/changed   positioning supports utilized  Taken 11/4/2024 0000 by Maria T Granda RN  Device Skin Pressure Protection:   adhesive use limited   absorbent pad utilized/changed   positioning supports utilized  Taken 11/3/2024 2000 by Maria T Granda RN  Device Skin Pressure Protection:   adhesive use limited   absorbent pad utilized/changed   positioning supports utilized  Goal: Absence of Ventilator-Induced Lung Injury  Outcome: Progressing  Intervention: Prevent Ventilator-Associated Pneumonia  Recent Flowsheet Documentation  Taken 11/4/2024 0500 by Maria T Granda RN  Head of Bed (HOB) Positioning: HOB at 30-45 degrees  Taken 11/4/2024 0300 by Maria T Granda RN  Head of Bed (HOB) Positioning: HOB at 30-45 degrees  Taken 11/4/2024 0100 by Maria T Granda RN  Head of Bed (HOB) Positioning: HOB at 30-45 degrees  Taken 11/3/2024 2300 by Maria T Granda RN  Head of Bed (HOB) Positioning: HOB at 30-45 degrees  Taken 11/3/2024 2100 by Maria T Granda RN  Head of Bed (HOB) Positioning: HOB at 30-45 degrees  Taken 11/3/2024 2020 by Maria T Granda RN  Oral Care:   suction provided   swabbed with antiseptic solution     Problem: Restraint, Nonviolent  Goal: Absence of Harm or Injury  Outcome: Progressing  Intervention: Implement Least Restrictive Safety Strategies  Recent Flowsheet Documentation  Taken 11/4/2024 0600 by Maria T Granda RN  Medical Device Protection:   IV  pole/bag removed from visual field   torso covered   tubing secured  Diversional Activities: television  Taken 11/4/2024 0500 by Maria T Granda RN  Medical Device Protection:   IV pole/bag removed from visual field   torso covered   tubing secured  Taken 11/4/2024 0400 by Maria T Granda RN  Medical Device Protection:   IV pole/bag removed from visual field   torso covered   tubing secured  Diversional Activities: television  Taken 11/4/2024 0300 by Maria T Granda RN  Medical Device Protection:   IV pole/bag removed from visual field   torso covered   tubing secured  Taken 11/4/2024 0200 by Maria T Granda RN  Medical Device Protection:   IV pole/bag removed from visual field   torso covered   tubing secured  Diversional Activities: television  Taken 11/4/2024 0100 by Maria T Granda RN  Medical Device Protection:   IV pole/bag removed from visual field   torso covered   tubing secured  Taken 11/4/2024 0000 by Maria T Granda RN  Medical Device Protection:   IV pole/bag removed from visual field   torso covered   tubing secured  Diversional Activities: television  Taken 11/3/2024 2300 by Maria T Granda RN  Medical Device Protection:   IV pole/bag removed from visual field   torso covered   tubing secured  Taken 11/3/2024 2200 by Maria T Granda RN  Medical Device Protection:   IV pole/bag removed from visual field   torso covered   tubing secured  Diversional Activities: television  Taken 11/3/2024 2100 by Maria T Granda RN  Medical Device Protection:   IV pole/bag removed from visual field   torso covered   tubing secured  Taken 11/3/2024 2000 by Maria T Granda RN  Medical Device Protection:   IV pole/bag removed from visual field   torso covered   tubing secured  Diversional Activities: television  Intervention: Protect Dignity, Rights and Personal Wellbeing  Recent Flowsheet Documentation  Taken 11/4/2024 0400 by Maria T Granda RN  Trust Relationship/Rapport: care explained  Taken 11/4/2024 0000 by Maria T Granda  RN  Trust Relationship/Rapport: care explained  Taken 11/3/2024 2000 by Maria T Granda RN  Trust Relationship/Rapport: care explained  Intervention: Protect Skin and Joint Integrity  Recent Flowsheet Documentation  Taken 11/4/2024 0500 by Maria T Granda RN  Body Position:   turned   left   heels elevated   upper extremity elevated  Taken 11/4/2024 0400 by Maria T Granda RN  Skin Protection:   incontinence pads utilized   silicone foam dressing in place  Range of Motion: ROM (range of motion) performed  Taken 11/4/2024 0300 by Maria T Granda RN  Body Position:   turned   lower extremity elevated   upper extremity elevated   supine  Taken 11/4/2024 0100 by Maria T Granda RN  Body Position:   turned   right   lower extremity elevated   upper extremity elevated  Taken 11/4/2024 0000 by Maria T Granda RN  Skin Protection:   incontinence pads utilized   silicone foam dressing in place  Range of Motion: ROM (range of motion) performed  Taken 11/3/2024 2300 by Maria T Granda RN  Body Position:   turned   supine   upper extremity elevated   lower extremity elevated  Taken 11/3/2024 2100 by Maria T Granda RN  Body Position:   turned   left   heels elevated   upper extremity elevated  Taken 11/3/2024 2000 by Maria T Granda RN  Skin Protection:   incontinence pads utilized   silicone foam dressing in place  Range of Motion: ROM (range of motion) performed     Problem: Skin Injury Risk Increased  Goal: Skin Health and Integrity  Outcome: Progressing  Intervention: Optimize Skin Protection  Recent Flowsheet Documentation  Taken 11/4/2024 0500 by Maria T Granda RN  Head of Bed (HOB) Positioning: HOB at 30-45 degrees  Taken 11/4/2024 0400 by Maria T Granda RN  Activity Management: bedrest  Pressure Reduction Techniques:   heels elevated off bed   weight shift assistance provided  Pressure Reduction Devices:   pressure-redistributing mattress utilized   positioning supports utilized  Skin Protection:   incontinence pads utilized    silicone foam dressing in place  Taken 11/4/2024 0300 by Maria T Granda RN  Head of Bed ([facility]) Positioning: HOB at 30-45 degrees  Taken 11/4/2024 0100 by Maria T Granda RN  Head of Bed ([facility]) Positioning: HOB at 30-45 degrees  Taken 11/4/2024 0000 by Maria T Granda RN  Activity Management: bedrest  Pressure Reduction Techniques:   heels elevated off bed   weight shift assistance provided  Pressure Reduction Devices:   pressure-redistributing mattress utilized   positioning supports utilized  Skin Protection:   incontinence pads utilized   silicone foam dressing in place  Taken 11/3/2024 2300 by Maria T Granda RN  Head of Bed ([facility]) Positioning: HOB at 30-45 degrees  Taken 11/3/2024 2100 by Maria T Granda RN  Head of Bed ([facility]) Positioning: HOB at 30-45 degrees  Taken 11/3/2024 2000 by Maria T Granda RN  Activity Management: bedrest  Pressure Reduction Techniques:   heels elevated off bed   weight shift assistance provided  Pressure Reduction Devices:   pressure-redistributing mattress utilized   positioning supports utilized  Skin Protection:   incontinence pads utilized   silicone foam dressing in place     Problem: UTI (Urinary Tract Infection)  Goal: Improved Infection Symptoms  Outcome: Progressing     Problem: Enteral Nutrition  Goal: Absence of Aspiration Signs and Symptoms  Outcome: Progressing  Intervention: Minimize Aspiration Risk  Recent Flowsheet Documentation  Taken 11/4/2024 0500 by Maria T Granda RN  Head of Bed ([facility]) Positioning: HOB at 30-45 degrees  Taken 11/4/2024 0300 by Maria T Granda RN  Head of Bed ([facility]) Positioning: HOB at 30-45 degrees  Taken 11/4/2024 0100 by Maria T Granda RN  Head of Bed ([facility]) Positioning: HOB at 30-45 degrees  Taken 11/3/2024 2300 by Maria T Granda RN  Head of Bed ([facility]) Positioning: HOB at 30-45 degrees  Taken 11/3/2024 2100 by Maria T Granda RN  Head of Bed ([facility]) Positioning: HOB at 30-45 degrees  Taken 11/3/2024 2020 by Maria T Granda RN  Oral Care:   suction provided    swabbed with antiseptic solution  Goal: Safe, Effective Therapy Delivery  Outcome: Progressing  Goal: Feeding Tolerance  Outcome: Progressing   Goal Outcome Evaluation:      Pt HR dropping into 40s, stopped precedex and restarted propofol. Pt self extubated and was re-intubated at 0320 hours. Prop at 30. Versed x1 and fentanyl 25mcg push x2 during shift. Safety maintained. 2025 UOP through shift.

## 2024-11-04 NOTE — PROGRESS NOTES
[facility] Intensivist [facility]  INPATIENT PROGRESS NOTE    Patient Name: Pau Cabrera  Date of Admission: 10/31/2024  Today's Date: 11/04/24  Length of Stay: 4  Primary Care Physician: Carola Barbosa MD    Subjective   ICU summary:  69-year-old female with acute on chronic hypercapnic hypoxic respiratory failure, who was admitted to the CCU on 10/31/2024 for her respiratory failure initially requiring BiPAP.  Her hypercapnia continue to worsen and mentation declined requiring urgent intubation.  Urinalysis indicative of UTI, urine culture with E. coli, and she was treated with Rocephin course.  Has made slow progress on ventilator has failed previous spontaneous breathing trials.  She self extubated early morning on 11/4.  She remained hypoxic after extubation and required urgent reintubation.  Currently, patient intubated, sedated, full mechanical ventilator support, 8 of PEEP and 50% FiO2.  BP and heart rate stable.  No pressors      Review of Systems   All pertinent negatives and positives are as above. All other systems have been reviewed and are negative unless otherwise stated.     Objective    Temp:  [98.7 °F (37.1 °C)-99.9 °F (37.7 °C)] 99 °F (37.2 °C)  Heart Rate:  [49-86] 84  Resp:  [14-20] 16  BP: ()/(50-97) 125/76  FiO2 (%):  [40 %-50 %] 50 %  Physical Exam  Vitals and nursing note reviewed.   Constitutional:       Appearance: She is obese.      Interventions: She is sedated and intubated.   Eyes:      Pupils: Pupils are equal, round, and reactive to light.   Cardiovascular:      Rate and Rhythm: Normal rate and regular rhythm.      Pulses: Normal pulses.      Heart sounds: Normal heart sounds.   Pulmonary:      Effort: She is intubated.      Comments: Nonlabored respirations, presents clear bilateral upper lobes and diminished in bases  Abdominal:      Comments: Protuberant abdomen, hypoactive bowel sounds   Skin:     Comments: Bilateral  lower extremity lymphedema   Neurological:      Comments: Sedate, responds to pain           Results Review:  Lab Results (last 24 hours)       Procedure Component Value Units Date/Time    Potassium [[id number]]  (Normal) Collected: 11/04/24 1219    Specimen: Blood Updated: 11/04/24 1252     Potassium 4.1 mmol/L      Comment: Slight hemolysis detected by analyzer. Result may be falsely elevated.       Blood Gas, Arterial - [[id number]]  (Abnormal) Collected: 11/04/24 1106    Specimen: Arterial Blood Updated: 11/04/24 1105     Site Right Radial     Scott's Test Positive     pH, Arterial 7.383 pH units      pCO2, Arterial 58.5 mm Hg      Comment: 83 Value above reference range        pO2, Arterial 85.7 mm Hg      HCO3, Arterial 34.8 mmol/L      Comment: 83 Value above reference range        Base Excess, Arterial 8.2 mmol/L      Comment: 83 Value above reference range        O2 Saturation, Arterial 96.8 %      Temperature 37.0     Barometric Pressure for Blood Gas 753 mmHg      Modality Ventilator     FIO2 50 %      Ventilator Mode AC     Set Tidal Volume 500     Set Mech Resp Rate 14.0     PEEP 8.0     Collected by [id number]     Comment: Meter: [id number]     :  Lisa Barba, DEBORAH        pCO2, Temperature Corrected 58.5 mm Hg      pH, Temp Corrected 7.383 pH Units      pO2, Temperature Corrected 85.7 mm Hg      PO2/FIO2 171    Blood Culture - Blood, Blood, Central Line [[id number]]  (Abnormal) Collected: 10/31/24 1805    Specimen: Blood, Central Line Updated: 11/04/24 0546     Blood Culture Abnormal Stain     Gram Stain Anaerobic Bottle Gram positive cocci    POC Glucose Once [[id number]]  (Normal) Collected: 11/04/24 0532    Specimen: Blood Updated: 11/04/24 0542     Glucose 101 mg/dL      Comment: : [id number] Lidia MohanMeter ID: [id number]       Comprehensive Metabolic Panel [[id number]]  (Abnormal) Collected: 11/04/24 0250    Specimen: Blood Updated: 11/04/24 0348     Glucose 103 mg/dL      BUN 32  mg/dL      Creatinine 0.94 mg/dL      Sodium 140 mmol/L      Potassium 3.6 mmol/L      Chloride 97 mmol/L      CO2 35.0 mmol/L      Calcium 9.4 mg/dL      Total Protein 6.3 g/dL      Albumin 3.1 g/dL      ALT (SGPT) 26 U/L      AST (SGOT) 27 U/L      Alkaline Phosphatase 46 U/L      Total Bilirubin 0.2 mg/dL      Globulin 3.2 gm/dL      A/G Ratio 1.0 g/dL      BUN/Creatinine Ratio 34.0     Anion Gap 8.0 mmol/L      eGFR 65.8 mL/min/1.73     Narrative:      GFR Normal >60  Chronic Kidney Disease <60  Kidney Failure <15      Magnesium [[id number]]  (Normal) Collected: 11/04/24 0250    Specimen: Blood Updated: 11/04/24 0348     Magnesium 1.8 mg/dL     Phosphorus [[id number]]  (Normal) Collected: 11/04/24 0250    Specimen: Blood Updated: 11/04/24 0348     Phosphorus 4.4 mg/dL     CBC & Differential [[id number]]  (Abnormal) Collected: 11/04/24 0250    Specimen: Blood Updated: 11/04/24 0322    Narrative:      The following orders were created for panel order CBC & Differential.  Procedure                               Abnormality         Status                     ---------                               -----------         ------                     CBC Auto Differential[[id number]]        Abnormal            Final result                 Please view results for these tests on the individual orders.    CBC Auto Differential [[id number]]  (Abnormal) Collected: 11/04/24 0250    Specimen: Blood Updated: 11/04/24 0322     WBC 10.35 10*3/mm3      RBC 3.44 10*6/mm3      Hemoglobin 9.6 g/dL      Hematocrit 32.0 %      MCV 93.0 fL      MCH 27.9 pg      MCHC 30.0 g/dL      RDW 16.7 %      RDW-SD 55.9 fl      MPV 10.7 fL      Platelets 303 10*3/mm3      Neutrophil % 77.2 %      Lymphocyte % 13.4 %      Monocyte % 6.3 %      Eosinophil % 1.5 %      Basophil % 0.2 %      Immature Grans % 1.4 %      Neutrophils, Absolute 7.98 10*3/mm3      Lymphocytes, Absolute 1.39 10*3/mm3      Monocytes, Absolute 0.65 10*3/mm3      Eosinophils, Absolute  0.16 10*3/mm3      Basophils, Absolute 0.02 10*3/mm3      Immature Grans, Absolute 0.15 10*3/mm3      nRBC 0.2 /100 WBC     Calcium, Ionized [[id number]] Collected: 11/04/24 0259    Specimen: Blood Updated: 11/04/24 0301     Ionized Calcium 5.08 mg/dL      Collected by [id number]     Comment: Meter: [id number]     :  Nirav Barnhart CRT       Blood Gas, Arterial - [[id number]]  (Abnormal) Collected: 11/04/24 0255    Specimen: Arterial Blood Updated: 11/04/24 0259     Site Left Radial     Scott's Test Positive     pH, Arterial 7.409 pH units      pCO2, Arterial 57.6 mm Hg      Comment: 83 Value above reference range        pO2, Arterial 78.1 mm Hg      Comment: 84 Value below reference range        HCO3, Arterial 36.4 mmol/L      Comment: 83 Value above reference range        Base Excess, Arterial 10.1 mmol/L      Comment: 83 Value above reference range        O2 Saturation, Arterial 95.5 %      Temperature 37.0     Barometric Pressure for Blood Gas 751 mmHg      Modality Ventilator     FIO2 40 %      Ventilator Mode AC     Set Tidal Volume 500.000     Set [facility] Resp Rate 14.0     PEEP 8.0     Collected by [id number]     Comment: Meter: [id number]     :  Nirav Barnhart CRT        pCO2, Temperature Corrected 57.6 mm Hg      pH, Temp Corrected 7.409 pH Units      pO2, Temperature Corrected 78.1 mm Hg      PO2/FIO2 195    POC Glucose Once [[id number]]  (Normal) Collected: 11/04/24 0040    Specimen: Blood Updated: 11/04/24 0051     Glucose 104 mg/dL      Comment: : [id number] Tere GinaMeter ID: [id number]       POC Glucose Once [[id number]]  (Normal) Collected: 11/03/24 1807    Specimen: Blood Updated: 11/03/24 1818     Glucose 111 mg/dL      Comment: : [id number] Jethro EspinozaMeter ID: [id number]       POC Glucose Once [[id number]]  (Normal) Collected: 11/03/24 1528    Specimen: Blood Updated: 11/03/24 1539     Glucose 111 mg/dL      Comment: : [id number] Jethro EspinozaMeter ID:  [id number]       Blood Culture - Blood, Arm, Right [[id number]]  (Normal) Collected: 10/31/24 1544    Specimen: Blood from Arm, Right Updated: 11/03/24 1517     Blood Culture No growth at 3 days             XR Chest 1 View    Result Date: 11/4/2024  1. No change in cardiopulmonary status since the previous study. The tubes or lines in place.   This report was signed and finalized on 11/4/2024 7:01 AM by Dr. Cindy Collier MD.      XR Abdomen KUB    Result Date: 11/4/2024  1. The distal end of the orogastric tube is in the proximal stomach and the distal sideport is in the distal esophagus above the gastroesophageal junction. A 10 cm advancement may be appropriate.   This report was signed and finalized on 11/4/2024 6:28 AM by Dr. Cindy Collier MD.      XR Chest 1 View    Result Date: 11/4/2024  1. No significant change in the cardiopulmonary status since the previous study. The tubes and lines in unchanged position. Distal end of the nasogastric tube is not visualized and not evaluated.     This report was signed and finalized on 11/4/2024 6:11 AM by Dr. Cindy Collier MD.      XR Chest 1 View    Result Date: 11/3/2024  No change in appearance of the chest.  This report was signed and finalized on 11/3/2024 4:17 AM by Dr. Angel Talamantes MD.       Result Review:  I have personally reviewed the results from the time of this admission to 11/4/2024 13:07 CST and agree with these findings:  [x]  Laboratory list / accordion  [x]  Microbiology  [x]  Radiology  [x]  EKG/Telemetry   []  Cardiology/Vascular   []  Pathology  []  Old records  []  Other:        Culture Data:   Blood Culture   Date Value Ref Range Status   10/31/2024 Abnormal Stain (C)  Preliminary   10/31/2024 No growth at 3 days  Preliminary     Urine Culture   Date Value Ref Range Status   10/31/2024 >100,000 CFU/mL Escherichia coli (A)  Final       I have reviewed the patient's current medications.     Assessment/Plan   Assessment  Active Hospital  Problems    Diagnosis     **Acute respiratory failure    69-year-old female with acute on chronic hypercapnic and hypoxic respiratory failure, in CCU for critical care management.    Acute on chronic hypercapnic and hypoxic respiratory failure  -Requiring 8 of PEEP and 50% FiO2 this morning after self extubation event and reintubation described as above  -Latest ABG with PaO2 of 86, will wean FiO2 and PEEP  -Once on 5 PEEP and 40%, will perform SBT  -Will have family meeting with son when he arrives to discuss goals of care.  It appears if she were to undergo extubation in a controlled fashion, neither he nor the patient would want prolonged mechanical ventilation or tracheostomy which would necessitate the need for DNR/DNI  -Daily chest x-ray and ABG    UTI  -E. coli in urine culture, sensitive to Rocephin, continue course  -Blood culture still with 1 anaerobic bottle gram-positive cocci, suspect contamination at this point, follow-up on results    Hypothyroidism  -Continue Synthroid    Hypertension  -Continue carvedilol    CODE STATUS: Full resuscitation, pending goals of care discussion with son today  VTE: Lovenox  Nutrition: Tube feedings  Bowel: Scheduled Piedad-Colace with as needed MiraLAX and laxative  GI: Protonix    Total critical care time: Approximately 35 minutes     Due to a high probability of clinically significant, life threatening deterioration, the patient required my highest level of preparedness to intervene emergently and I personally spent this critical care time directly and personally managing the patient.      This critical care time included obtaining a history; examining the patient; pulse oximetry; ordering and review of studies; arranging urgent treatment with development of a management plan; evaluation of patient's response to treatment; frequent reassessment; and, discussions with other providers.     This critical care time was performed to assess and manage the high probability of  imminent, life-threatening deterioration that could result in multi-organ failure. It was exclusive of separately billable procedures and treating other patients and teaching time.     Please see MDM section and the rest of the note for further information on patient assessment and treatment.     Part of this note may be an electronic transcription/translation of spoken language to printed text using the Dragon dictation system       Electronically signed by DONOVAN Nieves on 11/4/2024 at 13:27 CST

## 2024-11-04 NOTE — CASE MANAGEMENT/SOCIAL WORK
Continued Stay Note   [facility]     Patient Name: Pau Cabrera  MRN: [id number]  Today's Date: 11/4/2024    Admit Date: 10/31/2024    Plan: SNF placement anticipated   Discharge Plan       Row Name 11/04/24 1035       Plan    Plan SNF placement anticipated    Plan Comments Patient continues on vent.  SW spoke with patient's son on Friday, 11/1, who advised he is anticipating SNF placement for rehab when patient is ready for discharge.                   Discharge Codes    No documentation.                       RAJESH Vivas

## 2024-11-04 NOTE — PROCEDURES
PROCEDURAL NOTE    Attending/Performing: Dr. Binh Acevedo MD, CM    Procedure: Endotracheal tube placement    Medications:    Etomidate 20 mg x 1    Rocuronium - Nondepolarizing 50 mg X 1    Indication: acute respiratory failure--self extubated.    Consent    Risks and benefits were NOT explained to the family. These would have included but would not be limited to: Bleeding, infection, failure of the procedure, infection, loss of limb and enjoyment of life and others. Informed Consent obtained not emergently. Implied consent doctrine used. Patient critically ill. Waiting was not an option.  Patient was previously intubated.    Technique:    The procedure was accomplished using a standard technique.    I washed my hands with warm water and soap with appropriate scrub time.    It was an emergency.  Saturations were 70% which declined to 53% on arrival.    Once the patient had been pre oxygenated, I gave the patient the sedative then the paralytic and then using the video hand-held glide scope 3.0 the patient was intubated on the first attempt with good EtCO2, good color change, saturations stable, good BS bilaterally.    CXR ordered to confirm placement.    ndGndrndanddndend:nd nd2nd View    Difficulty: DRY secretions.  Some vomitus noted in airway.    Estimated Blood Loss: 0 mL.    Location of endotracheal tube: 23 cm at the lip    Size of endotracheal tube: 7.5 mm ETT tube placed    Complications: None    Time spent on this procedure was independent of the time spent in critical care noted elsewhere.    Pt tolerated well.    Binh Acevedo MD, CM    Chest x-ray at bedside.  Appears appropriately placed.  OG tube appears appropriately placed.  Await final read from x-ray.

## 2024-11-05 NOTE — PROGRESS NOTES
[facility]  INPATIENT PROGRESS NOTE    Patient Name: Pau Cabrera  Date of Admission: 10/31/2024  Today's Date: 11/05/24  Length of Stay: 5  Primary Care Physician: Carola Barbosa MD    Subjective   ICU summary:  69-year-old female with acute on chronic hypercapnic hypoxic respiratory failure, who was admitted to the CCU on 10/31/2024 for her respiratory failure initially requiring BiPAP.  Her hypercapnia continue to worsen and mentation declined requiring urgent intubation.  Urinalysis indicative of UTI, urine culture with E. coli, and she was treated with Rocephin course.  Has made slow progress on ventilator has failed previous spontaneous breathing trials.  She self extubated early morning on 11/4.  She remained hypoxic after extubation and required urgent reintubation.  Failed afternoon SBT 11/4.    Interval update:  11/5  Remains intubated, sedated, on mechanical ventilator support.  Vital signs stable.  Chest x-ray indicative of volume overload.    Review of Systems   All pertinent negatives and positives are as above. All other systems have been reviewed and are negative unless otherwise stated.     Objective    Temp:  [98.8 °F (37.1 °C)-100.9 °F (38.3 °C)] 99 °F (37.2 °C)  Heart Rate:  [] 75  Resp:  [14-21] 14  BP: (116-177)/(56-98) 169/93  FiO2 (%):  [40 %-50 %] 40 %  Physical Exam  Vitals and nursing note reviewed.   Constitutional:       Appearance: She is obese.      Interventions: She is sedated and intubated.   Eyes:      Pupils: Pupils are equal, round, and reactive to light.   Cardiovascular:      Rate and Rhythm: Normal rate and regular rhythm.      Pulses: Normal pulses.      Heart sounds: Normal heart sounds.   Pulmonary:      Effort: She is intubated.      Comments: Nonlabored respirations, breath sounds clear bilateral upper lobes, fine crackles in bases  Abdominal:      Comments: Protuberant abdomen, soft, normal  bowel sounds   Skin:     General: Skin is warm.      Comments: Bilateral lower extremity lymphedema   Neurological:      Comments: Sedate, moving all extremities no focal deficits           Results Review:  Lab Results (last 24 hours)       Procedure Component Value Units Date/Time    Phosphorus [[id number]]  (Normal) Collected: 11/05/24 0615    Specimen: Blood Updated: 11/05/24 0803     Phosphorus 3.0 mg/dL     POC Glucose Once [[id number]]  (Normal) Collected: 11/05/24 0708    Specimen: Blood Updated: 11/05/24 0719     Glucose 76 mg/dL      Comment: : [id number] Turner PaigeMeter ID: [id number]       Comprehensive Metabolic Panel [[id number]]  (Abnormal) Collected: 11/05/24 0615    Specimen: Blood Updated: 11/05/24 0655     Glucose 65 mg/dL      BUN 38 mg/dL      Creatinine 0.87 mg/dL      Sodium 141 mmol/L      Potassium 4.1 mmol/L      Comment: Slight hemolysis detected by analyzer. Result may be falsely elevated.        Chloride 99 mmol/L      CO2 28.0 mmol/L      Calcium 10.3 mg/dL      Total Protein 6.8 g/dL      Albumin 3.0 g/dL      ALT (SGPT) 40 U/L      AST (SGOT) 44 U/L      Alkaline Phosphatase 50 U/L      Total Bilirubin 0.2 mg/dL      Globulin 3.8 gm/dL      A/G Ratio 0.8 g/dL      BUN/Creatinine Ratio 43.7     Anion Gap 14.0 mmol/L      eGFR 72.2 mL/min/1.73     Narrative:      GFR Normal >60  Chronic Kidney Disease <60  Kidney Failure <15      Magnesium [[id number]]  (Normal) Collected: 11/05/24 0615    Specimen: Blood Updated: 11/05/24 0655     Magnesium 2.1 mg/dL     POC Glucose Once [[id number]]  (Abnormal) Collected: 11/05/24 0639    Specimen: Blood Updated: 11/05/24 0650     Glucose 65 mg/dL      Comment: : [id number] HolyTransactiongeMeter ID: [id number]       CBC & Differential [[id number]]  (Abnormal) Collected: 11/05/24 0615    Specimen: Blood Updated: 11/05/24 0636    Narrative:      The following orders were created for panel order CBC & Differential.  Procedure                                Abnormality         Status                     ---------                               -----------         ------                     CBC Auto Differential[[id number]]        Abnormal            Final result                 Please view results for these tests on the individual orders.    CBC Auto Differential [[id number]]  (Abnormal) Collected: 11/05/24 0615    Specimen: Blood Updated: 11/05/24 0636     WBC 10.37 10*3/mm3      RBC 3.94 10*6/mm3      Hemoglobin 11.0 g/dL      Hematocrit 38.2 %      MCV 97.0 fL      MCH 27.9 pg      MCHC 28.8 g/dL      RDW 17.1 %      RDW-SD 60.4 fl      MPV 10.9 fL      Platelets 268 10*3/mm3      Neutrophil % 68.0 %      Lymphocyte % 16.9 %      Monocyte % 8.0 %      Eosinophil % 4.4 %      Basophil % 0.5 %      Immature Grans % 2.2 %      Neutrophils, Absolute 7.05 10*3/mm3      Lymphocytes, Absolute 1.75 10*3/mm3      Monocytes, Absolute 0.83 10*3/mm3      Eosinophils, Absolute 0.46 10*3/mm3      Basophils, Absolute 0.05 10*3/mm3      Immature Grans, Absolute 0.23 10*3/mm3      nRBC 0.0 /100 WBC     Blood Gas, Arterial - [[id number]]  (Abnormal) Collected: 11/05/24 0434    Specimen: Arterial Blood Updated: 11/05/24 0436     Site Right Radial     Scott's Test Positive     pH, Arterial 7.354 pH units      pCO2, Arterial 62.1 mm Hg      Comment: 83 Value above reference range        pO2, Arterial 85.0 mm Hg      HCO3, Arterial 34.5 mmol/L      Comment: 83 Value above reference range        Base Excess, Arterial 7.5 mmol/L      Comment: 83 Value above reference range        O2 Saturation, Arterial 96.1 %      Temperature 37.0     Barometric Pressure for Blood Gas 750 mmHg      Modality Ventilator     FIO2 40 %      Ventilator Mode AC     Set Tidal Volume 500.000     Set Mech Resp Rate 14.0     PEEP 5.0     Collected by [id number]     Comment: Meter: [id number]     :  Luisa Steve, DEBORAH        pCO2, Temperature Corrected 62.1 mm Hg      pH, Temp Corrected 7.354 pH Units       pO2, Temperature Corrected 85.0 mm Hg      PO2/FIO2 213    POC Glucose Once [[id number]]  (Normal) Collected: 11/04/24 2327    Specimen: Blood Updated: 11/04/24 2338     Glucose 78 mg/dL      Comment: : [id number] Michael Hudson ID: [id number]       POC Glucose Once [[id number]]  (Abnormal) Collected: 11/04/24 1803    Specimen: Blood Updated: 11/04/24 1818     Glucose 60 mg/dL      Comment: : [id number] Jethro Hodge ID: [id number]       Blood Culture - Blood, Arm, Right [[id number]]  (Normal) Collected: 10/31/24 1544    Specimen: Blood from Arm, Right Updated: 11/04/24 1515     Blood Culture No growth at 4 days    Potassium [[id number]]  (Normal) Collected: 11/04/24 1219    Specimen: Blood Updated: 11/04/24 1252     Potassium 4.1 mmol/L      Comment: Slight hemolysis detected by analyzer. Result may be falsely elevated.       Blood Gas, Arterial - [[id number]]  (Abnormal) Collected: 11/04/24 1106    Specimen: Arterial Blood Updated: 11/04/24 1105     Site Right Radial     Scott's Test Positive     pH, Arterial 7.383 pH units      pCO2, Arterial 58.5 mm Hg      Comment: 83 Value above reference range        pO2, Arterial 85.7 mm Hg      HCO3, Arterial 34.8 mmol/L      Comment: 83 Value above reference range        Base Excess, Arterial 8.2 mmol/L      Comment: 83 Value above reference range        O2 Saturation, Arterial 96.8 %      Temperature 37.0     Barometric Pressure for Blood Gas 753 mmHg      Modality Ventilator     FIO2 50 %      Ventilator Mode AC     Set Tidal Volume 500     Set [facility] Resp Rate 14.0     PEEP 8.0     Collected by [id number]     Comment: Meter: [id number]     :  Lisa Barba, DEBORAH        pCO2, Temperature Corrected 58.5 mm Hg      pH, Temp Corrected 7.383 pH Units      pO2, Temperature Corrected 85.7 mm Hg      PO2/FIO2 171             XR Chest 1 View    Result Date: 11/5/2024   1. Properly positioned support tubes and lines. 2. Low lung volumes with vascular  "crowding versus mild volume overload/edema. 3. Stable enlargement of the cardiac silhouette.  This report was signed and finalized on 11/5/2024 7:20 AM by Yoan Arboleda.      XR Chest 1 View    Result Date: 11/4/2024  1. No change in cardiopulmonary status since the previous study. The tubes or lines in place.   This report was signed and finalized on 11/4/2024 7:01 AM by Dr. Cindy Collier MD.      XR Abdomen KUB    Result Date: 11/4/2024  1. The distal end of the orogastric tube is in the proximal stomach and the distal sideport is in the distal esophagus above the gastroesophageal junction. A 10 cm advancement may be appropriate.   This report was signed and finalized on 11/4/2024 6:28 AM by Dr. Cindy Collier MD.      XR Chest 1 View    Result Date: 11/4/2024  1. No significant change in the cardiopulmonary status since the previous study. The tubes and lines in unchanged position. Distal end of the nasogastric tube is not visualized and not evaluated.     This report was signed and finalized on 11/4/2024 6:11 AM by Dr. Cindy Collier MD.       Result Review:  I have personally reviewed the results from the time of this admission to 11/5/2024 08:03 CST and agree with these findings:  [x]  Laboratory list / accordion  [x]  Microbiology  [x]  Radiology  [x]  EKG/Telemetry   []  Cardiology/Vascular   []  Pathology  []  Old records  []  Other:      Blood Culture   Date Value Ref Range Status   10/31/2024 Abnormal Stain (C)  Preliminary     BCID, PCR   Date Value Ref Range Status   10/31/2024 Negative by BCID PCR. Culture to Follow. Negative by BCID PCR. Culture to Follow. Final     No results found for: \"CULTURES\", \"HSVCX\", \"URCX\"  No results found for: \"EYECULTURE\", \"GCCX\", \"HSVCULTURE\", \"LABHSV\"  No results found for: \"LEGIONELLA\", \"MRSACX\", \"MUMPSCX\", \"MYCOPLASCX\"  No results found for: \"NOCARDIACX\", \"STOOLCX\"  Urine Culture   Date Value Ref Range Status   10/31/2024 >100,000 CFU/mL Escherichia coli " "(A)  Final     No results found for: \"VIRALCULTU\", \"WOUNDCX\"     I have reviewed the patient's current medications.     Assessment/Plan   Assessment  Active Hospital Problems    Diagnosis     **Acute respiratory failure    69-year-old female with acute on chronic hypercapnic and hypoxic respiratory failure, in CCU for critical care management.    Acute on chronic hypercapnic and hypoxic respiratory failure  -Ventilated weaned down to 5 PEEP and 40% FiO2  -Chest x-ray with volume overload, will diurese  -Will wean sedation to off and perform SBT  -Will discuss with son regarding goals of care if to pursue extubation as he indicates patient would not want long-term ventilation or trach, this would necessitate the need for a DNI order  -If able to extubate will be to BiPAP  -Continue Daily chest x-ray and ABG    UTI  -E. coli in urine culture, sensitive to Rocephin, continue course  -Blood culture still with 1 anaerobic bottle gram-positive cocci, suspect contamination at this point, follow-up on results    Hypothyroidism  -Continue Synthroid    Hypertension  -Continue carvedilol    CODE STATUS: Full resuscitation, pending goals of care discussion with son  VTE: Lovenox  Nutrition: Tube feedings  Bowel: Scheduled Piedad-Colace with as needed MiraLAX and laxative  GI: Protonix    Total critical care time: Approximately 35 minutes     Due to a high probability of clinically significant, life threatening deterioration, the patient required my highest level of preparedness to intervene emergently and I personally spent this critical care time directly and personally managing the patient.      This critical care time included obtaining a history; examining the patient; pulse oximetry; ordering and review of studies; arranging urgent treatment with development of a management plan; evaluation of patient's response to treatment; frequent reassessment; and, discussions with other providers.     This critical care time was " performed to assess and manage the high probability of imminent, life-threatening deterioration that could result in multi-organ failure. It was exclusive of separately billable procedures and treating other patients and teaching time.     Please see MDM section and the rest of the note for further information on patient assessment and treatment.     Part of this note may be an electronic transcription/translation of spoken language to printed text using the Dragon dictation system       Electronically signed by DONOVAN Nieves on 11/5/2024 at 08:09 CST

## 2024-11-05 NOTE — PLAN OF CARE
Goal Outcome Evaluation: Wean to extubate.    Problem: Mechanical Ventilation Invasive  Goal: Effective Communication  Outcome: Progressing  Goal: Optimal Device Function  Outcome: Progressing  Intervention: Optimize Device Care and Function  Recent Flowsheet Documentation  Taken 11/5/2024 0335 by Luisa Steve RRT  Airway/Ventilation Management: airway patency maintained  Airway Safety Measures:   mask valve resuscitator at bedside   manual resuscitator/mask at bedside   oxygen flowmeter at bedside   suction at bedside  Taken 11/4/2024 2325 by Luisa Steve RRT  Airway/Ventilation Management: airway patency maintained  Airway Safety Measures:   mask valve resuscitator at bedside   manual resuscitator/mask at bedside   oxygen flowmeter at bedside   suction at bedside  Goal: Mechanical Ventilation Liberation  Outcome: Progressing  Goal: Optimal Nutrition Delivery  Outcome: Progressing  Goal: Absence of Device-Related Skin and Tissue Injury  Outcome: Progressing  Intervention: Maintain Skin and Tissue Health  Recent Flowsheet Documentation  Taken 11/4/2024 2325 by Luisa Steve RRT  Device Skin Pressure Protection: skin-to-device areas padded  Goal: Absence of Ventilator-Induced Lung Injury  Outcome: Progressing  Intervention: Prevent Ventilator-Associated Pneumonia  Recent Flowsheet Documentation  Taken 11/5/2024 0335 by Luisa Steve RRT  Head of Bed (HOB) Positioning: HOB elevated  Taken 11/4/2024 2325 by Luisa Steve RRT  Head of Bed (HOB) Positioning: HOB at 30-45 degrees     Problem: Noninvasive Ventilation Acute  Goal: Effective Unassisted Ventilation and Oxygenation  Intervention: Monitor and Manage Noninvasive Ventilation  Recent Flowsheet Documentation  Taken 11/5/2024 0335 by Luisa Steve RRT  Airway/Ventilation Management: airway patency maintained  Taken 11/4/2024 2325 by Luisa Steve RRT  Airway/Ventilation Management: airway patency maintained     Problem: Sepsis/Septic  Shock  Goal: Absence of Infection Signs and Symptoms  Intervention: Promote Recovery  Recent Flowsheet Documentation  Taken 11/5/2024 0335 by Luisa Steve RRT  Airway/Ventilation Management: airway patency maintained  Taken 11/4/2024 2325 by Luisa Steve RRT  Airway/Ventilation Management: airway patency maintained     Problem: Skin Injury Risk Increased  Goal: Skin Health and Integrity  Intervention: Optimize Skin Protection  Recent Flowsheet Documentation  Taken 11/5/2024 0335 by Luisa Steve RRT  Head of Bed (HOB) Positioning: HOB elevated  Taken 11/4/2024 2325 by Luisa Steve RRT  Head of Bed (HOB) Positioning: HOB at 30-45 degrees     Problem: Enteral Nutrition  Goal: Absence of Aspiration Signs and Symptoms  Intervention: Minimize Aspiration Risk  Recent Flowsheet Documentation  Taken 11/5/2024 0335 by Luisa Steve RRT  Head of Bed (HOB) Positioning: HOB elevated  Taken 11/4/2024 2325 by Luisa Steve RRT  Head of Bed (HOB) Positioning: HOB at 30-45 degrees

## 2024-11-05 NOTE — PLAN OF CARE
Goal Outcome Evaluation:  Plan of Care Reviewed With: patient        Progress: improving  Outcome Evaluation: Nutrition follow up. Pt was sucessfully extubated this morning. OG tube has been pulled. Will d/c all TF and associated orders. She will need a swallow evaluation prior to starting a po diet. She remains NPO at present. She has not had a BM since 10/29. She is on a bowel regimen and has received pericolace and miralax. Chest x-ray indicated volume overload and she has received lasix. Will follow for diet initiation and tolerance.

## 2024-11-05 NOTE — PLAN OF CARE
Problem: Noninvasive Ventilation Acute  Goal: Effective Unassisted Ventilation and Oxygenation  Intervention: Monitor and Manage Noninvasive Ventilation  Recent Flowsheet Documentation  Taken 11/4/2024 2000 by Madhuri Rodriguez RN  Airway/Ventilation Management: airway patency maintained     Problem: Adult Inpatient Plan of Care  Goal: Absence of Hospital-Acquired Illness or Injury  Intervention: Identify and Manage Fall Risk  Recent Flowsheet Documentation  Taken 11/5/2024 0600 by Madhuri Rodriguez RN  Safety Promotion/Fall Prevention:   safety round/check completed   fall prevention program maintained  Taken 11/5/2024 0500 by Madhuri Rodriguez RN  Safety Promotion/Fall Prevention:   safety round/check completed   fall prevention program maintained  Taken 11/5/2024 0400 by Madhuri Rodriguez RN  Safety Promotion/Fall Prevention:   safety round/check completed   fall prevention program maintained  Taken 11/5/2024 0300 by Madhuri Rodriguez RN  Safety Promotion/Fall Prevention: safety round/check completed  Taken 11/5/2024 0200 by Madhuri Rodriguez RN  Safety Promotion/Fall Prevention: safety round/check completed  Taken 11/5/2024 0100 by Madhuri Rodriguez RN  Safety Promotion/Fall Prevention:   safety round/check completed   fall prevention program maintained  Taken 11/5/2024 0000 by Madhuri Rodriguez RN  Safety Promotion/Fall Prevention:   safety round/check completed   fall prevention program maintained  Taken 11/4/2024 2300 by Madhuri Rodriguez RN  Safety Promotion/Fall Prevention:   safety round/check completed   fall prevention program maintained  Taken 11/4/2024 2200 by Madhuri Rodriguez RN  Safety Promotion/Fall Prevention:   safety round/check completed   fall prevention program maintained  Taken 11/4/2024 2100 by Madhuri Rodriguez RN  Safety Promotion/Fall Prevention:   safety round/check completed   fall prevention program maintained  Taken 11/4/2024 2000 by Madhuri Rodriguez RN  Safety  Promotion/Fall Prevention:   safety round/check completed   fall prevention program maintained  Intervention: Prevent Skin Injury  Recent Flowsheet Documentation  Taken 11/5/2024 0500 by Madhuri Rodriguez RN  Body Position:   turned   left  Taken 11/5/2024 0300 by Madhuri Rodriguez RN  Body Position:   turned   supine  Taken 11/5/2024 0100 by Madhuri Rodriguez RN  Body Position:   turned   right   foot of bed elevated  Taken 11/4/2024 2300 by Madhuri Rodriguez RN  Body Position:   tilted   left  Taken 11/4/2024 2100 by Madhuri Rodriguez RN  Body Position:   turned   supine   lower extremity elevated  Taken 11/4/2024 2000 by Madhuri Rodriguez RN  Skin Protection:   transparent dressing maintained   silicone foam dressing in place   incontinence pads utilized  Intervention: Prevent and Manage VTE (Venous Thromboembolism) Risk  Recent Flowsheet Documentation  Taken 11/5/2024 0400 by Madhuri Rodriguez RN  VTE Prevention/Management: (See MAR) other (see comments)  Taken 11/5/2024 0000 by Madhuri Rodriguez RN  VTE Prevention/Management: (See MAR) other (see comments)  Taken 11/4/2024 2000 by Madhuri Rodriguez RN  VTE Prevention/Management: (See MAR) other (see comments)  Intervention: Prevent Infection  Recent Flowsheet Documentation  Taken 11/5/2024 0600 by Madhuri Rodriguez RN  Infection Prevention: single patient room provided  Taken 11/5/2024 0500 by Madhuri Rodriguez RN  Infection Prevention: single patient room provided  Taken 11/5/2024 0400 by Madhuri Rodriguez RN  Infection Prevention: single patient room provided  Taken 11/5/2024 0300 by Madhuri Rodriguez RN  Infection Prevention: single patient room provided  Taken 11/5/2024 0200 by Madhuri Rodriguez RN  Infection Prevention: single patient room provided  Taken 11/5/2024 0100 by Madhuri Rodriguez RN  Infection Prevention: single patient room provided  Taken 11/5/2024 0000 by Madhuri Rodriguez RN  Infection Prevention: single patient room  provided  Taken 11/4/2024 2300 by Madhuri Rodriguez RN  Infection Prevention: single patient room provided  Taken 11/4/2024 2200 by Madhuri Rodriguez RN  Infection Prevention: single patient room provided  Taken 11/4/2024 2100 by Madhuri Rodriguez RN  Infection Prevention: single patient room provided  Taken 11/4/2024 2000 by Madhuri Rodriguez RN  Infection Prevention: single patient room provided  Goal: Optimal Comfort and Wellbeing  Intervention: Provide Person-Centered Care  Recent Flowsheet Documentation  Taken 11/5/2024 0400 by Madhuri Rodriguez RN  Trust Relationship/Rapport:   care explained   reassurance provided  Taken 11/5/2024 0000 by Madhuri Rodriguez RN  Trust Relationship/Rapport:   care explained   reassurance provided  Taken 11/4/2024 2000 by Madhuri Rodriguez RN  Trust Relationship/Rapport: care explained   Goal Outcome Evaluation:

## 2024-11-05 NOTE — PROGRESS NOTES
RT EQUIPMENT DEVICE RELATED - SKIN ASSESSMENT    Terry Score:  Terry Score: 13     RT Medical Equipment/Device:     ETT Sun/[employer]fast    Skin Assessment:      Cheek:  Intact  Lips:  Intact  Tongue:  RN aware    Device Skin Pressure Protection:  Skin-to-device areas padded:  None Required    Nurse Notification:  Yes, notified [id number]    Carin Vega, RRT

## 2024-11-06 NOTE — DISCHARGE SUMMARY
[facility]  DISCHARGE SUMMARY       Date of Admission: 10/31/2024  Date of Discharge:  11/5/2024  Primary Care Physician: Carola Barbosa MD    Presenting Problem/History of Present Illness:  Hypoxic hypercapnic respiratory failure    Final Discharge Diagnoses:  Active Hospital Problems    Diagnosis     **Acute respiratory failure        Consults: Wound care    Procedures Performed: Intubation x 3, right IJ  Pertinent Test Results:   Results for orders placed during the hospital encounter of 10/31/24    Adult Transthoracic Echo Complete W/ Cont if Necessary Per Protocol    Interpretation Summary    Technically difficult study.    Left ventricular systolic function is mildly reduced with estimated ejection fraction 45-50% and calculated ejection fraction (biplane) 45.5% with possible lateral wall hypokinesis.    Left ventricular diastolic function is consistent with (grade II w/high LAP) pseudonormalization.    The left ventricular cavity is borderline dilated. Left ventricular wall thickness is consistent with moderate concentric hypertrophy.    Right ventricle is not well visualized but appears borderline to mildly dilated in size with normal systolic function.    The left atrial cavity is mild to moderately dilated.    The right atrial cavity is mildly  dilated.    At least mild to moderate, eccentric, posteriorly directed mitral valve regurgitation.      Imaging Results (All)       Procedure Component Value Units Date/Time    XR Chest 1 View [[id number]] Collected: 11/05/24 0718     Updated: 11/05/24 0723    Narrative:      EXAM: XR CHEST 1 VW-      DATE: 11/5/2024 1:33 AM     HISTORY: Intubation; J96.01-Acute respiratory failure with hypoxia;  J96.02-Acute respiratory failure with hypercapnia; E66.01-Morbid  (severe) obesity due to excess calories; N39.0-Urinary tract infection,  site not specified; L89.310-Pressure ulcer of right  buttock,  unstageable; L89.320-Pressure ulcer of left buttock, unstageable;  L89.213-Pressure ulcer of right hip, stage 3; L89.223-Pressure ulcer of  left       COMPARISON: 11/4/2024.     TECHNIQUE:  Frontal view(s) of the chest submitted.     FINDINGS:    There is an ET tube in place with tip 3 cm above the kirt. An NG tube  extends below the level of the diaphragm. The tip is not well  visualized. Right IJ central venous catheter tip is at the SVC. There  are multiple overlying wires and leads. There are low lung volumes with  vascular crowding versus mild volume overload. There is enlargement of  the cardiac silhouette. No large effusion or pneumothorax is seen.          Impression:         1. Properly positioned support tubes and lines.  2. Low lung volumes with vascular crowding versus mild volume  overload/edema.  3. Stable enlargement of the cardiac silhouette.     This report was signed and finalized on 11/5/2024 7:20 AM by Yoan Arboleda.       XR Chest 1 View [[id number]] Collected: 11/04/24 0659     Updated: 11/04/24 0704    Narrative:      EXAMINATION: XR CHEST 1 VW-     11/4/2024 1:50 AM     HISTORY: Intubation; J96.01-Acute respiratory failure with hypoxia;  J96.02-Acute respiratory failure with hypercapnia; E66.01-Morbid  (severe) obesity due to excess calories; N39.0-Urinary tract infection,  site not specified; L89.310-Pressure ulcer of right buttock,  unstageable; L89.320-Pressure ulcer of left buttock, unstageable;  L89.213-Pressure ulcer of right hip, stage 3; L89.223-Pressure ulcer of  left     A frontal projection of the chest is compared with the previous study  dated 11/3/2024.     The lungs are poorly expanded.     There are atelectatic changes in the right lower lung similar to the  previous study.     There is small bibasilar pleural effusion.     There is no pneumothorax.     Mild pulmonary venous congestion persist.     Persistent moderate cardiomegaly.     The endotracheal tube, the  nasogastric tube and the right IJ approach  venous access lines are in place.     No acute bony abnormality.       Impression:      1. No change in cardiopulmonary status since the previous study. The  tubes or lines in place.        This report was signed and finalized on 11/4/2024 7:01 AM by Dr. Cindy Collier MD.       XR Abdomen KUB [[id number]] Collected: 11/04/24 0626     Updated: 11/04/24 0631    Narrative:      EXAMINATION: XR ABDOMEN KUB-     11/4/2024 2:00 AM     HISTORY: og; J96.01-Acute respiratory failure with hypoxia; J96.02-Acute  respiratory failure with hypercapnia; E66.01-Morbid (severe) obesity due  to excess calories; N39.0-Urinary tract infection, site not specified;  L89.310-Pressure ulcer of right buttock, unstageable; L89.320-Pressure  ulcer of left buttock, unstageable; L89.213-Pressure ulcer of right hip,  stage 3; L89.223-Pressure ulcer of left hip, st     A frontal projection of the upper abdomen and lower chest is obtained.  Comparison is made with the previous study dated 11/2/2024.     Distal end of the orogastric tube is in the proximal stomach. The distal  sideport is in the distal esophagus above the gastroesophageal junction.  The position of the tube has changed since the previous study. A 10 cm  advancement is suggested.     There is moderate gas in the stomach. There is moderate gas and stool in  the limited visualized abdomen.     Limited visualized chest shows left lower lobar consolidation.       Impression:      1. The distal end of the orogastric tube is in the proximal stomach and  the distal sideport is in the distal esophagus above the  gastroesophageal junction. A 10 cm advancement may be appropriate.        This report was signed and finalized on 11/4/2024 6:28 AM by Dr. Cindy Collier MD.       XR Chest 1 View [[id number]] Collected: 11/04/24 0609     Updated: 11/04/24 0614    Narrative:      EXAMINATION: XR CHEST 1 VW-     11/4/2024 2:35 AM     HISTORY: ett  placement; J96.01-Acute respiratory failure with hypoxia;  J96.02-Acute respiratory failure with hypercapnia; E66.01-Morbid  (severe) obesity due to excess calories; N39.0-Urinary tract infection,  site not specified; L89.310-Pressure ulcer of right buttock,  unstageable; L89.320-Pressure ulcer of left buttock, unstageable;  L89.213-Pressure ulcer of right hip, stage 3; L89.223-Pressure ulcer of  l     Frontal projection of the chest is compared with the previous study  dated 11/3/2024.     A mild pulmonary venous congestion persist.     There is a small bibasilar pleural effusion.     No pneumothorax.     There is persistent moderate cardiomegaly. Atheromatous change of  thoracic aorta are noted.     The endotracheal tube, right IJ approach venous access line are in  place. The distal end of the nasogastric tube is not well visualized or  evaluated in this study.       Impression:      1. No significant change in the cardiopulmonary status since the  previous study. The tubes and lines in unchanged position. Distal end of  the nasogastric tube is not visualized and not evaluated.              This report was signed and finalized on 11/4/2024 6:11 AM by Dr. Cindy Collier MD.       XR Chest 1 View [[id number]] Collected: 11/03/24 0416     Updated: 11/03/24 0420    Narrative:      EXAM/TECHNIQUE: XR CHEST 1 VW-     INDICATION: Intubation; J96.01-Acute respiratory failure with hypoxia;  J96.02-Acute respiratory failure with hypercapnia; E66.01-Morbid  (severe) obesity due to excess calories; N39.0-Urinary tract infection,  site not specified; L89.310-Pressure ulcer of right buttock,  unstageable; L89.320-Pressure ulcer of left buttock, unstageable;  L89.213-Pressure ulcer of right hip, stage 3; L89.223-Pressure ulcer of  left     COMPARISON: Prior day     FINDINGS:     Endotracheal tube, right IJ CVL, and enteric tube appear stable in  position.     Cardiac silhouette is enlarged but stable.     No significant  change in central vascular congestion with perihilar  airspace opacities and mild diffuse interstitial opacity. No large  pleural effusion or visible pneumothorax.       Impression:      No change in appearance of the chest.     This report was signed and finalized on 11/3/2024 4:17 AM by Dr. Angel Talamantes MD.       XR Abdomen KUB [[id number]] Collected: 11/02/24 1114     Updated: 11/02/24 1119    Narrative:      EXAM/TECHNIQUE: XR ABDOMEN KUB-     INDICATION: OG Placement; J96.01-Acute respiratory failure with hypoxia;  J96.02-Acute respiratory failure with hypercapnia; E66.01-Morbid  (severe) obesity due to excess calories; N39.0-Urinary tract infection,  site not specified; L89.310-Pressure ulcer of right buttock,  unstageable; L89.320-Pressure ulcer of left buttock, unstageable;  L89.213-Pressure ulcer of right hip, stage 3; L89.223-Pressure ulcer of  le     COMPARISON: 11/1/2024     FINDINGS:     Enteric tube tip projects over the mid stomach.     Enlarged but stable cardiac silhouette. Right IJ CVL tip overlies the  SVC. Enteric tube is 3 cm above the kirt. Redemonstrated perihilar  consolidation and interstitial opacity.       Impression:      Enteric tube tip overlies the mid stomach.     This report was signed and finalized on 11/2/2024 11:16 AM by Dr. Angel Talamantes MD.       XR Chest 1 View [[id number]] Collected: 11/02/24 0458     Updated: 11/02/24 0503    Narrative:      EXAM/TECHNIQUE: XR CHEST 1 VW-     INDICATION: Intubation; J96.01-Acute respiratory failure with hypoxia;  J96.02-Acute respiratory failure with hypercapnia; E66.01-Morbid  (severe) obesity due to excess calories; N39.0-Urinary tract infection,  site not specified; L89.310-Pressure ulcer of right buttock,  unstageable; L89.320-Pressure ulcer of left buttock, unstageable;  L89.213-Pressure ulcer of right hip, stage 3; L89.223-Pressure ulcer of  left     COMPARISON: Prior day     FINDINGS:     Endotracheal tube is 3 cm above the  kirt. Right IJ CVL tip overlies  the SVC. Enteric tube terminates below the diaphragm out of the  field-of-view.     Cardiac silhouette is enlarged but stable. No large pleural effusion or  visible pneumothorax. Slight decrease in bilateral perihilar predominant  consolidation in diffuse interstitial opacity. Left basilar atelectasis  is present.       Impression:      1. Support lines/tubes are stable.  2. Slight decrease in bilateral perihilar predominant consolidation and  diffuse interstitial opacity.     This report was signed and finalized on 11/2/2024 5:00 AM by Dr. Angel Talamantes MD.       XR Abdomen KUB [[id number]] Collected: 11/01/24 0707     Updated: 11/01/24 0713    Narrative:      EXAMINATION: XR ABDOMEN KUB-     10/31/2024 11:25 PM     HISTORY: OG placement; J96.01-Acute respiratory failure with hypoxia;  J96.02-Acute respiratory failure with hypercapnia; E66.01-Morbid  (severe) obesity due to excess calories; N39.0-Urinary tract infection,  site not specified     A frontal projection of the upper abdomen and lower chest is obtained.  There is no previous study for comparison.     A linear radiodense structure which may represent a guidewire is seen  projected over the mid to distal esophagus with distal end in the  proximal stomach.     There is no significant gas in the stomach.     Minimal gas is seen in the limited visualized colon.     Ill-defined opacities are seen in the limited visualized lower lungs.  There is moderate cardiomegaly.     The distal end of a central venous line is seen in the distal SVC.     No acute bony abnormality.       Impression:      1. A linear metallic wire like object projecting of the distal esophagus  and proximal stomach may represent a guidewire for OG tube?.     This report was signed and finalized on 11/1/2024 7:10 AM by Dr. Cindy Collier MD.       XR Chest 1 View [[id number]] Collected: 11/01/24 0657     Updated: 11/01/24 0702    Narrative:       EXAMINATION: XR CHEST 1 VW-     10/31/2024 11:27 PM     HISTORY: ETT placement; J96.01-Acute respiratory failure with hypoxia;  J96.02-Acute respiratory failure with hypercapnia; E66.01-Morbid  (severe) obesity due to excess calories; N39.0-Urinary tract infection,  site not specified     A frontal projection of the chest is compared with the previous study  dated 10/31/2024.     The lungs are moderately well expanded similar to the previous study.     Bilateral lung infiltrate, right more than the left persist.     There is no pleural effusion. No pneumothorax.     There is a persistent moderate cardiomegaly. Atheromatous changes of the  thoracic aorta are noted.     Lobulation and fullness of the right hilum is similar to the previous  study.     Endotracheal tube is in place. Distal end is 3 cm from tracheal  bifurcation. Right internal jugular approach venous access line is seen  with distal end in the distal SVC. No change.     No acute bony abnormality.       Impression:      1. The endotracheal tube in place. The central venous line in place. No  change.  2. The cardiopulmonary status is unchanged, similar to the previous  study.        This report was signed and finalized on 11/1/2024 6:59 AM by Dr. Cindy Collier MD.       CT Abdomen Pelvis With Contrast [[id number]] Collected: 11/01/24 0644     Updated: 11/01/24 0654    Narrative:      EXAMINATION: CT ABDOMEN PELVIS W CONTRAST-      11/1/2024 2:36 AM     HISTORY: AMS, Falls x 2 today,; J96.01-Acute respiratory failure with  hypoxia; J96.02-Acute respiratory failure with hypercapnia;  E66.01-Morbid (severe) obesity due to excess calories; N39.0-Urinary  tract infection, site not specified     In order to have a CT radiation dose as low as reasonably achievable  Automated Exposure Control was utilized for adjustment of the mA and/or  KV according to patient size.     Total DLP = 2599.7 mGy.cm     The CT scan of the abdomen and pelvis should performed  after intravenous  contrast enhancement.     Images are acquired in axial plane and subsequent reconstruction in  coronal and sagittal plane.     There is no previous similar study for comparison.     The chest is separately reviewed and reported.     The study is of limited diagnostic value due to significant imaging  artifacts due to patient's large body habitus.     Liver and spleen are normal.     No gallstones.     Pancreas appear normal.     The adrenal glands bilaterally are normal.     There are low-density lesions in both kidneys, the larger one in the  upper pole of the left kidney measuring 6 cm in diameter. CT density  suggest a cyst. A smaller partly exophytic nodule is seen from the  lateral margin of the lower pole of the right kidney measuring 1.8 cm in  diameter. The CT density suggest a cyst. No radiopaque calculi on either  side. No hydronephrosis. The ureters are not well visualized or  evaluated. The urinary bladder is decompressed with a Huang catheter and  not evaluated.     The uterus is not visualized. No adnexal masses.     There is a fat-containing umbilical/paraumbilical ventral hernias.     Distal end of nasogastric tube is seen in the proximal stomach. The  stomach is decompressed. Small bowel is nondilated nondistended. No  finding to suggest appendicitis. Moderate gas and stool is seen in the  colon. There is diverticulosis of the colon. No evidence of a  diverticulitis.     Atheromatous changes of the abdominal aorta and iliac arteries. No  aneurysmal dilatation.     No evidence of abdominal or pelvic lymphadenopathy.     Images reviewed in bone windows show chronic degenerative changes of the  lumbar and limited visualized thoracic spine. There is no evidence of  acute fracture. However a subtle nondisplaced may be obscured due to  significant motion and imaging artifacts.       Impression:      1. No acute abnormality of the abdomen or pelvis, particularly, no  finding to suggest  acute traumatic involvement of the abdominal organs.  2. No fracture.  3. Other nonacute findings as above                                   This report was signed and finalized on 11/1/2024 6:51 AM by Dr. Cindy Collier MD.       CT Angiogram Chest [[id number]] Collected: 11/01/24 0632     Updated: 11/01/24 0643    Narrative:      EXAMINATION: CT ANGIOGRAM CHEST-      11/1/2024 2:36 AM     HISTORY: Hypoxia, ARF; J96.01-Acute respiratory failure with hypoxia;  J96.02-Acute respiratory failure with hypercapnia; E66.01-Morbid  (severe) obesity due to excess calories; N39.0-Urinary tract infection,  site not specified     In order to have a CT radiation dose as low as reasonably achievable  Automated Exposure Control was utilized for adjustment of the mA and/or  KV according to patient size.     Total DLP = 2599.7 mGy.cm     CT angiography of the chest should performed after intravenous contrast  enhancement.     Images are acquired in axial plane and subsequent reconstruction in  coronal and sagittal planes.     Comparison is made with the previous study dated 6/5/2023.     The study is of very poor diagnostic quality due to respiratory motion,  patient motion, patient body habitus and suboptimal opacification of the  pulmonary arterial bed.     No significant visible filling defect in the opacified pulmonary  arterial bed is noted.     RV/LV ratio is 58/74 which is in the normal range. No finding to suggest  right heart strain.     Atheromatous changes of the thoracic aorta. No aneurysmal dilatation. No  dissection.     Atheromatous changes of coronary arteries.     There is moderate cardiomegaly with four-chamber enlargement.     No mediastinal lymphadenopathy.     Limited visualized soft tissue of the neck are unremarkable.     An endotracheal tube is seen in place.     The lungs are poorly expanded.     There are extensive atelectatic changes bilaterally.     The bilateral lower lobar consolidation, right more  than the left. There  is air bronchograms. There is a groundglass opacity in the right middle  lobe.     The abdomen is separately reviewed and reported.     Images reviewed in bone window show chronic degenerative changes of the  thoracic spine. No acute bony abnormality.     Nasogastric tube is seen in place.       Impression:      1. A very limited study due to respiratory motion, patient motion,  suboptimal opacification of the pulmonary arterial bed and patient's  body habitus. No significant or obvious pulmonary embolism is noted. No  aortic aneurysm or dissection.  2. No finding to suggest right heart strain.  3. Moderate cardiomegaly.  4. Bilateral lower lobar consolidation with air bronchogram may  represent acute inflammatory/infectious process. A groundglass  infiltrate in the right middle lobe may represent an acute  inflammatory/infectious process.                    This report was signed and finalized on 11/1/2024 6:40 AM by Dr. Cindy Collier MD.       CT Head Without Contrast [[id number]] Collected: 11/01/24 0600     Updated: 11/01/24 0607    Narrative:      EXAMINATION: CT HEAD WO CONTRAST-      11/1/2024 2:36 AM     HISTORY: Worsening AMS; J96.01-Acute respiratory failure with hypoxia;  J96.02-Acute respiratory failure with hypercapnia; E66.01-Morbid  (severe) obesity due to excess calories; N39.0-Urinary tract infection,  site not specified     In order to have a CT radiation dose as low as reasonably achievable  Automated Exposure Control was utilized for adjustment of the mA and/or  KV according to patient size.     Total DLP = 2104.24 mGy.cm     CT scan of the head is performed without intravenous contrast  enhancement.     The images are acquired in axial plane and subsequent reconstruction  with coronal and sagittal plane.     The comparison is made with the previous study dated 10/31/2024.     There are significant imaging artifacts which lowers the diagnostic  quality of the study. Some  subtle lesions may be obscured.     There is no evidence of a mass. There is no midline shift.     There is no evidence of intracranial hemorrhage or hematoma.     The ventricles, the basal cisterns and the cortical sulci are  unremarkable and age appropriate.     The gray-white matter differentiation is maintained.     Limited visualized posterior fossa is unremarkable.     An empty sella turcica is seen with moderate widening of the sella and  erosion of the dorsum sellae. This is similar to the previous studies.     Images reviewed in bone window show no acute bony abnormality. There is  mucosal thickening involving the ethmoid and maxillary and sphenoid  sinuses. There are fluid levels in the sphenoid sinuses. Mastoid air  cells are clear.       Impression:      1. No acute intracranial abnormality.  2. Acute on chronic sinus disease.                                      This report was signed and finalized on 11/1/2024 6:04 AM by Dr. Cindy Collier MD.       XR Chest 1 View [[id number]] Collected: 10/31/24 1826     Updated: 10/31/24 1831    Narrative:      EXAMINATION: XR CHEST 1 VW-  10/31/2024 6:26 PM     HISTORY: Confirm central line placement.     FINDINGS: Today's exam is compared to previous study of 10/31/2024. A  right IJ deep line is in place with the tip in the SVC. No  complications. The heart is enlarged. There is chronic mild pulmonary  vascular congestion and suspected pulmonary arterial hypertension.  Bronchial wall thickening is present. No acute infiltrate or effusion.       Impression:      1. Right IJ deep line placed with the tip at the caval atrial juncture  and no complication.  2. Cardiomegaly with chronic appearing mild pulmonary vascular  congestion.     This report was signed and finalized on 10/31/2024 6:27 PM by Dr. Jose Basilio MD.       XR Chest 1 View [[id number]] Collected: 10/31/24 1442     Updated: 10/31/24 1447    Narrative:      XR CHEST 1 VW- 10/31/2024 1:38 PM      HISTORY: chest pain       COMPARISON: Chest x-ray dated 10/22/2024     FINDINGS:  Upright frontal radiograph of the chest was obtained     Underlying cardiomegaly which is stable. Dilated central pulmonary  arteries which may reflect pulmonary hypertension. Lungs are well  expanded. No consolidation or pleural effusion. No pneumothorax. No  acute bony abnormality seen.       Impression:      1.  Underlying cardiomegaly. Dilated central pulmonary arteries which  may be evidence for pulmonary hypertension.  2.  No visualized acute infiltrate or evidence of pulmonary edema.     This report was signed and finalized on 10/31/2024 2:44 PM by Dr Brenton Phillips.       CT Head Without Contrast [[id number]] Collected: 10/31/24 1438     Updated: 10/31/24 1445    Narrative:      CT HEAD WO CONTRAST- 10/31/2024 1:10 PM     HISTORY: weak frequent falls       DOSE LENGTH PRODUCT: 1977.81 mGy.cm. Automated exposure control was also  utilized to decrease patient radiation dose.     TECHNIQUE:  Axial CT of the brain without IV contrast. Sagittal and coronal  reformations are also provided for review. Soft tissue and bone kernels  are available for interpretation.     COMPARISON: None.     FINDINGS:     There is no evidence of acute large vascular territory infarct. No  intra-axial or extra-axial hemorrhage. No visualized mass lesion or mass  effect. The ventricles, cortical sulci and basal cisterns are symmetric  and age appropriate. Empty sella which is nonspecific. Posterior fossa  structures are unremarkable. Visualized paranasal sinuses and mastoids  are clear.       Impression:      1. No acute intracranial process.  2. Empty sella which is a nonspecific finding at this age.     This report was signed and finalized on 10/31/2024 2:42 PM by Dr Brenton Phillips.               Result Review    Result Review:  I have personally reviewed the results from the time of this admission to 11/5/2024 19:31 CST and agree with these  findings:  [x]  Laboratory list / accordion  [x]  Microbiology  [x]  Radiology  [x]  EKG/Telemetry   [x]  Cardiology/Vascular   []  Pathology  []  Old records  []  Other:  Most notable findings include: Cardiomegaly, urine culture positive for E. coli mostly pansensitive, ongoing acute respiratory acidosis.          Hospital Course: Patient was admitted on October 31 for acute on chronic hypercapnic hypoxic respiratory failure.  BiPAP therapy initiated after admission and initially she seemed to be responding however throughout the night she became airway incompetent was unable to control her airway therefore she was intubated urgently.  She was treated empirically for pneumonia and a UTI.  Urine culture showed Klebsiella mostly pansensitive antibiotics de-escalated to Rocephin.  EF 45 to 50% with diastolic and systolic heart failure, concerns for pulmonary hypertension.  Patient self extubated early 2 days ago and immediately became hypoxic and required again urgent reintubation.    Patient was extubated this morning initially to supplemental then upgraded to BiPAP as her pCO2 was increasing and her mentation was worsening.    Goals of care conversation throughout the day with the son Evan, the family was going to discuss goals of care overnight and make decisions in the a.m.    Just before shift change patient again became hypoxic, last pH was worsening for acute respiratory acidosis his pCO2 was greater than 100.  She was not mentating.    She went into PEA cardiac arrest which was felt to be respiratory driven.  CPR initiated and resuscitation efforts via ACLS guidelines.  She was reintubated.    Brief return of spontaneous circulation less than 2 minutes then again PEA  Cardiac arrest despite reintubation and 100% FiO2.  Patient was resuscitated unsuccessfully for total of 27 minutes.  Suleman son at bedside and phone conversations with Arnulfo at this time we decided to discontinue resuscitation  "efforts after 27 minutes of unsuccessful return to spontaneous circulation.  Time of death 184.    Physical Exam on Discharge:  /64   Pulse 72   Temp 97.5 °F (36.4 °C) (Axillary)   Resp 14   Ht 155 cm (61.02\")   Wt (!) 166 kg (365 lb 4.8 oz)   LMP  (LMP Unknown)   SpO2 (!) 51%   BMI 68.97 kg/m²   Physical Exam  Vitals and nursing note reviewed. Exam conducted with a chaperone present.   Constitutional:       Comments: No gag or cough, pupils dilated, no neurological response   Cardiovascular:      Comments: No cardiac activity.  Pulmonary:      Comments: No purposeful respiration.          Condition on Discharge:  at 1847    [place] [facility] [place] Medications:     Discharge Medications        Continue These Medications        Instructions Start Date   OXYGEN-HELIUM IN   Inhalation, 2L at night             ASK your doctor about these medications        Instructions Start Date   albuterol sulfate  (90 Base) MCG/ACT inhaler  Commonly known as: PROVENTIL HFA;VENTOLIN HFA;PROAIR HFA   2 puffs, Inhalation, Every 4 Hours PRN      allopurinol 300 MG tablet  Commonly known as: ZYLOPRIM   300 mg, Daily      amitriptyline 25 MG tablet  Commonly known as: ELAVIL   25 mg, Oral, Nightly      azelastine 0.1 % nasal spray  Commonly known as: ASTELIN   2 sprays, Nasal, 2 Times Daily, Use in each nostril as directed       bumetanide 1 MG tablet  Commonly known as: BUMEX   1 mg, Oral, Daily      butalbital-acetaminophen-caffeine -40 MG per tablet  Commonly known as: FIORICET, ESGIC   1 tablet, Oral, Every 4 Hours PRN      carvedilol 25 MG tablet  Commonly known as: COREG   TAKE 1 TABLET BY MOUTH TWICE A DAY      celecoxib 100 MG capsule  Commonly known as: CeleBREX   100 mg, Oral, 2 Times Daily      doxycycline 100 MG capsule  Commonly known as: MONODOX   100 mg, Oral, 2 Times Daily      DULoxetine 60 MG capsule  Commonly known as: CYMBALTA   60 mg, Oral, Every " Morning      EPINEPHrine 0.05 MG/ML syringe  Commonly known as: ADRENALIN   20 mL, Injection      fenofibrate 145 MG tablet  Commonly known as: TRICOR   145 mg, Oral, Daily      fluticasone 50 MCG/ACT nasal spray  Commonly known as: Flonase Allergy Relief   2 sprays, Nasal, Daily      Fluticasone-Salmeterol 250-50 MCG/ACT DISKUS  Commonly known as: ADVAIR/WIXELA   1 puff, Inhalation, 2 Times Daily - RT      HYDROcodone-acetaminophen 7.5-325 MG per tablet  Commonly known as: Norco   1 tablet, Oral, Every 6 Hours PRN      levothyroxine 75 MCG tablet  Commonly known as: SYNTHROID, LEVOTHROID   1 tablet, Oral, Daily      loratadine 10 MG tablet  Commonly known as: CLARITIN   10 mg, Oral, Daily      meclizine 25 MG tablet  Commonly known as: ANTIVERT   25 mg, Oral, 3 Times Daily PRN      multivitamin with minerals tablet tablet   Oral      Myrbetriq 25 MG tablet sustained-release 24 hour 24 hr tablet  Generic drug: Mirabegron ER   25 mg, Oral, Daily      nystatin [id number] UNIT/GM powder  Commonly known as: MYCOSTATIN   Topical, 3 Times Daily      solifenacin 10 MG tablet  Commonly known as: VESICARE   1 tablet, Oral, Daily      spironolactone 25 MG tablet  Commonly known as: ALDACTONE   25 mg, Oral      vitamin D 1.25 MG (37436 UT) capsule capsule  Commonly known as: ERGOCALCIFEROL   1 capsule, Oral, Weekly               This patient has current or prior documentation of an left ventricular ejection fraction (LVEF) of less than or equal to 40%.      Electronically signed by DONOVAN Styles on 11/5/2024 at 19:31 CST    Time: >30 minutes.

## 2024-11-06 NOTE — PROGRESS NOTES
Upon making rounds it was noted that the patient was hypoxic O2 saturations in the 60's and not mentating.  At that time we began bag mouth ventilation and 100% FiO2.  Family was notified immediately as both sons Arnulfo and Jose were discussing goals of care and if they wanted to pursue a third intubation versus comfort care.  Patient was extubated this morning and throughout the day she became more hypoxic and hypercapnic.    I attempted to call the son Arnulfo who is listed as the next of kin urgently as we are currently implementing BMV at100% FiO2 and she would need an urgent intubation when she went into cardiac arrest at 1820.  Cardiac arrest felt to be respiratory driven.    Resuscitation via ACLS guidelines.  Patient was reintubated.  ROSC obtained initially after second round of epi and CPR however shortly thereafter patient was again hypoxic despite reintubation and 100% Fi02 BMV, followed by bradycardia and PEA cardiac arrest.    We continue to implement resuscitation via ACLS guidelines for 27 minutes.    Frank Garcia at bedside.  We discussed further goals of care with Arnulfo via telephone and Jose at bedside.  Patient was resuscitated for total of 27 minutes without any consistent return of spontaneous circulation.  Family decided to discontinue resuscitation efforts at 1848.

## 2024-11-06 NOTE — PAYOR COMM NOTE
"Ref;   [id number]     Fax  [phone]    Pau Cabrera (Dcsd. Female)       Date of Birth   1955    Social Security Number       [facility]   [address] [facility] [facility] [place] [facility] [address]    Home Phone   [phone]    MRN   [id number]       [facility]    Marital Status                               Admission Date   10/31/24    Admission Type   Emergency    Admitting Provider   Angelina Graves MD    Attending Provider       Department, Room/Bed   [facility], C012/       Discharge Date   2024    Discharge Disposition       Discharge Destination                                 Attending Provider: (none)   Allergies: Codeine Sulfate, Lisinopril    Isolation: None   Infection: None   Code Status: Prior    Ht: 155 cm (61.02\")   Wt: 166 kg (365 lb 4.8 oz)    Admission Cmt: None   Principal Problem: Acute respiratory failure [J96.00]                   Active Insurance as of 10/31/2024       Primary Coverage       Payor Plan Insurance Group Employer/Plan Group    HUMANA MEDICARE REPLACEMENT HU[employer]NA MED ADV GROUP [id number]       Payor Plan Address Payor Plan Phone Number Payor Plan Fax Number Effective Dates    [address] [address] [phone]  2018 - None Entered    [facility] [id number]         Subscriber Name Subscriber Birth Date Member ID       PAU CABRERA 1955 [id number]                     Emergency Contacts        (Rel.) Home Phone Work Phone Mobile Phone    Arnulfo Cabrera (Son) [phone] -- --    JOHANNA HERNANDEZ (Mother) [phone] -- --    ANURADHA HERNANDEZ (Brother) -- -- [phone]                 Discharge Summary        Aimee Greco, APRN at 24 1931                [facility]  DISCHARGE SUMMARY       Date of Admission: 10/31/2024  Date of Discharge:  2024  Primary Care Physician: Carola Barbosa MD    Presenting Problem/History of Present " Illness:  Hypoxic hypercapnic respiratory failure    Final Discharge Diagnoses:  Active Hospital Problems    Diagnosis     **Acute respiratory failure        Consults: Wound care    Procedures Performed: Intubation x 3, right IJ  Pertinent Test Results:   Results for orders placed during the hospital encounter of 10/31/24    Adult Transthoracic Echo Complete W/ Cont if Necessary Per Protocol    Interpretation Summary    Technically difficult study.    Left ventricular systolic function is mildly reduced with estimated ejection fraction 45-50% and calculated ejection fraction (biplane) 45.5% with possible lateral wall hypokinesis.    Left ventricular diastolic function is consistent with (grade II w/high LAP) pseudonormalization.    The left ventricular cavity is borderline dilated. Left ventricular wall thickness is consistent with moderate concentric hypertrophy.    Right ventricle is not well visualized but appears borderline to mildly dilated in size with normal systolic function.    The left atrial cavity is mild to moderately dilated.    The right atrial cavity is mildly  dilated.    At least mild to moderate, eccentric, posteriorly directed mitral valve regurgitation.      Imaging Results (All)       Procedure Component Value Units Date/Time    XR Chest 1 View [[id number]] Collected: 11/05/24 0718     Updated: 11/05/24 0723    Narrative:      EXAM: XR CHEST 1 VW-      DATE: 11/5/2024 1:33 AM     HISTORY: Intubation; J96.01-Acute respiratory failure with hypoxia;  J96.02-Acute respiratory failure with hypercapnia; E66.01-Morbid  (severe) obesity due to excess calories; N39.0-Urinary tract infection,  site not specified; L89.310-Pressure ulcer of right buttock,  unstageable; L89.320-Pressure ulcer of left buttock, unstageable;  L89.213-Pressure ulcer of right hip, stage 3; L89.223-Pressure ulcer of  left       COMPARISON: 11/4/2024.     TECHNIQUE:  Frontal view(s) of the chest submitted.     FINDINGS:    There is  an ET tube in place with tip 3 cm above the krit. An NG tube  extends below the level of the diaphragm. The tip is not well  visualized. Right IJ central venous catheter tip is at the SVC. There  are multiple overlying wires and leads. There are low lung volumes with  vascular crowding versus mild volume overload. There is enlargement of  the cardiac silhouette. No large effusion or pneumothorax is seen.          Impression:         1. Properly positioned support tubes and lines.  2. Low lung volumes with vascular crowding versus mild volume  overload/edema.  3. Stable enlargement of the cardiac silhouette.     This report was signed and finalized on 11/5/2024 7:20 AM by Yoan Arboleda.       XR Chest 1 View [[id number]] Collected: 11/04/24 0659     Updated: 11/04/24 0704    Narrative:      EXAMINATION: XR CHEST 1 VW-     11/4/2024 1:50 AM     HISTORY: Intubation; J96.01-Acute respiratory failure with hypoxia;  J96.02-Acute respiratory failure with hypercapnia; E66.01-Morbid  (severe) obesity due to excess calories; N39.0-Urinary tract infection,  site not specified; L89.310-Pressure ulcer of right buttock,  unstageable; L89.320-Pressure ulcer of left buttock, unstageable;  L89.213-Pressure ulcer of right hip, stage 3; L89.223-Pressure ulcer of  left     A frontal projection of the chest is compared with the previous study  dated 11/3/2024.     The lungs are poorly expanded.     There are atelectatic changes in the right lower lung similar to the  previous study.     There is small bibasilar pleural effusion.     There is no pneumothorax.     Mild pulmonary venous congestion persist.     Persistent moderate cardiomegaly.     The endotracheal tube, the nasogastric tube and the right IJ approach  venous access lines are in place.     No acute bony abnormality.       Impression:      1. No change in cardiopulmonary status since the previous study. The  tubes or lines in place.        This report was signed and  finalized on 11/4/2024 7:01 AM by Dr. Cindy Collier MD.       XR Abdomen KUB [[id number]] Collected: 11/04/24 0626     Updated: 11/04/24 0631    Narrative:      EXAMINATION: XR ABDOMEN KUB-     11/4/2024 2:00 AM     HISTORY: og; J96.01-Acute respiratory failure with hypoxia; J96.02-Acute  respiratory failure with hypercapnia; E66.01-Morbid (severe) obesity due  to excess calories; N39.0-Urinary tract infection, site not specified;  L89.310-Pressure ulcer of right buttock, unstageable; L89.320-Pressure  ulcer of left buttock, unstageable; L89.213-Pressure ulcer of right hip,  stage 3; L89.223-Pressure ulcer of left hip, st     A frontal projection of the upper abdomen and lower chest is obtained.  Comparison is made with the previous study dated 11/2/2024.     Distal end of the orogastric tube is in the proximal stomach. The distal  sideport is in the distal esophagus above the gastroesophageal junction.  The position of the tube has changed since the previous study. A 10 cm  advancement is suggested.     There is moderate gas in the stomach. There is moderate gas and stool in  the limited visualized abdomen.     Limited visualized chest shows left lower lobar consolidation.       Impression:      1. The distal end of the orogastric tube is in the proximal stomach and  the distal sideport is in the distal esophagus above the  gastroesophageal junction. A 10 cm advancement may be appropriate.        This report was signed and finalized on 11/4/2024 6:28 AM by Dr. Cindy Collier MD.       XR Chest 1 View [[id number]] Collected: 11/04/24 0609     Updated: 11/04/24 0614    Narrative:      EXAMINATION: XR CHEST 1 VW-     11/4/2024 2:35 AM     HISTORY: ett placement; J96.01-Acute respiratory failure with hypoxia;  J96.02-Acute respiratory failure with hypercapnia; E66.01-Morbid  (severe) obesity due to excess calories; N39.0-Urinary tract infection,  site not specified; L89.310-Pressure ulcer of right  buttock,  unstageable; L89.320-Pressure ulcer of left buttock, unstageable;  L89.213-Pressure ulcer of right hip, stage 3; L89.223-Pressure ulcer of  l     Frontal projection of the chest is compared with the previous study  dated 11/3/2024.     A mild pulmonary venous congestion persist.     There is a small bibasilar pleural effusion.     No pneumothorax.     There is persistent moderate cardiomegaly. Atheromatous change of  thoracic aorta are noted.     The endotracheal tube, right IJ approach venous access line are in  place. The distal end of the nasogastric tube is not well visualized or  evaluated in this study.       Impression:      1. No significant change in the cardiopulmonary status since the  previous study. The tubes and lines in unchanged position. Distal end of  the nasogastric tube is not visualized and not evaluated.              This report was signed and finalized on 11/4/2024 6:11 AM by Dr. Cindy Collier MD.       XR Chest 1 View [[id number]] Collected: 11/03/24 0416     Updated: 11/03/24 0420    Narrative:      EXAM/TECHNIQUE: XR CHEST 1 VW-     INDICATION: Intubation; J96.01-Acute respiratory failure with hypoxia;  J96.02-Acute respiratory failure with hypercapnia; E66.01-Morbid  (severe) obesity due to excess calories; N39.0-Urinary tract infection,  site not specified; L89.310-Pressure ulcer of right buttock,  unstageable; L89.320-Pressure ulcer of left buttock, unstageable;  L89.213-Pressure ulcer of right hip, stage 3; L89.223-Pressure ulcer of  left     COMPARISON: Prior day     FINDINGS:     Endotracheal tube, right IJ CVL, and enteric tube appear stable in  position.     Cardiac silhouette is enlarged but stable.     No significant change in central vascular congestion with perihilar  airspace opacities and mild diffuse interstitial opacity. No large  pleural effusion or visible pneumothorax.       Impression:      No change in appearance of the chest.     This report was signed  and finalized on 11/3/2024 4:17 AM by Dr. Angel Talamantes MD.       XR Abdomen KUB [[id number]] Collected: 11/02/24 1114     Updated: 11/02/24 1119    Narrative:      EXAM/TECHNIQUE: XR ABDOMEN KUB-     INDICATION: OG Placement; J96.01-Acute respiratory failure with hypoxia;  J96.02-Acute respiratory failure with hypercapnia; E66.01-Morbid  (severe) obesity due to excess calories; N39.0-Urinary tract infection,  site not specified; L89.310-Pressure ulcer of right buttock,  unstageable; L89.320-Pressure ulcer of left buttock, unstageable;  L89.213-Pressure ulcer of right hip, stage 3; L89.223-Pressure ulcer of  le     COMPARISON: 11/1/2024     FINDINGS:     Enteric tube tip projects over the mid stomach.     Enlarged but stable cardiac silhouette. Right IJ CVL tip overlies the  SVC. Enteric tube is 3 cm above the kirt. Redemonstrated perihilar  consolidation and interstitial opacity.       Impression:      Enteric tube tip overlies the mid stomach.     This report was signed and finalized on 11/2/2024 11:16 AM by Dr. Angel Talamantes MD.       XR Chest 1 View [[id number]] Collected: 11/02/24 0458     Updated: 11/02/24 0503    Narrative:      EXAM/TECHNIQUE: XR CHEST 1 VW-     INDICATION: Intubation; J96.01-Acute respiratory failure with hypoxia;  J96.02-Acute respiratory failure with hypercapnia; E66.01-Morbid  (severe) obesity due to excess calories; N39.0-Urinary tract infection,  site not specified; L89.310-Pressure ulcer of right buttock,  unstageable; L89.320-Pressure ulcer of left buttock, unstageable;  L89.213-Pressure ulcer of right hip, stage 3; L89.223-Pressure ulcer of  left     COMPARISON: Prior day     FINDINGS:     Endotracheal tube is 3 cm above the kirt. Right IJ CVL tip overlies  the SVC. Enteric tube terminates below the diaphragm out of the  field-of-view.     Cardiac silhouette is enlarged but stable. No large pleural effusion or  visible pneumothorax. Slight decrease in bilateral perihilar  predominant  consolidation in diffuse interstitial opacity. Left basilar atelectasis  is present.       Impression:      1. Support lines/tubes are stable.  2. Slight decrease in bilateral perihilar predominant consolidation and  diffuse interstitial opacity.     This report was signed and finalized on 11/2/2024 5:00 AM by Dr. Angel Talamantes MD.       XR Abdomen KUB [[id number]] Collected: 11/01/24 0707     Updated: 11/01/24 0713    Narrative:      EXAMINATION: XR ABDOMEN KUB-     10/31/2024 11:25 PM     HISTORY: OG placement; J96.01-Acute respiratory failure with hypoxia;  J96.02-Acute respiratory failure with hypercapnia; E66.01-Morbid  (severe) obesity due to excess calories; N39.0-Urinary tract infection,  site not specified     A frontal projection of the upper abdomen and lower chest is obtained.  There is no previous study for comparison.     A linear radiodense structure which may represent a guidewire is seen  projected over the mid to distal esophagus with distal end in the  proximal stomach.     There is no significant gas in the stomach.     Minimal gas is seen in the limited visualized colon.     Ill-defined opacities are seen in the limited visualized lower lungs.  There is moderate cardiomegaly.     The distal end of a central venous line is seen in the distal SVC.     No acute bony abnormality.       Impression:      1. A linear metallic wire like object projecting of the distal esophagus  and proximal stomach may represent a guidewire for OG tube?.     This report was signed and finalized on 11/1/2024 7:10 AM by Dr. Cindy Collier MD.       XR Chest 1 View [[id number]] Collected: 11/01/24 0657     Updated: 11/01/24 0702    Narrative:      EXAMINATION: XR CHEST 1 VW-     10/31/2024 11:27 PM     HISTORY: ETT placement; J96.01-Acute respiratory failure with hypoxia;  J96.02-Acute respiratory failure with hypercapnia; E66.01-Morbid  (severe) obesity due to excess calories; N39.0-Urinary tract  infection,  site not specified     A frontal projection of the chest is compared with the previous study  dated 10/31/2024.     The lungs are moderately well expanded similar to the previous study.     Bilateral lung infiltrate, right more than the left persist.     There is no pleural effusion. No pneumothorax.     There is a persistent moderate cardiomegaly. Atheromatous changes of the  thoracic aorta are noted.     Lobulation and fullness of the right hilum is similar to the previous  study.     Endotracheal tube is in place. Distal end is 3 cm from tracheal  bifurcation. Right internal jugular approach venous access line is seen  with distal end in the distal SVC. No change.     No acute bony abnormality.       Impression:      1. The endotracheal tube in place. The central venous line in place. No  change.  2. The cardiopulmonary status is unchanged, similar to the previous  study.        This report was signed and finalized on 11/1/2024 6:59 AM by Dr. Cindy Collier MD.       CT Abdomen Pelvis With Contrast [[id number]] Collected: 11/01/24 0644     Updated: 11/01/24 0654    Narrative:      EXAMINATION: CT ABDOMEN PELVIS W CONTRAST-      11/1/2024 2:36 AM     HISTORY: AMS, Falls x 2 today,; J96.01-Acute respiratory failure with  hypoxia; J96.02-Acute respiratory failure with hypercapnia;  E66.01-Morbid (severe) obesity due to excess calories; N39.0-Urinary  tract infection, site not specified     In order to have a CT radiation dose as low as reasonably achievable  Automated Exposure Control was utilized for adjustment of the mA and/or  KV according to patient size.     Total DLP = 2599.7 mGy.cm     The CT scan of the abdomen and pelvis should performed after intravenous  contrast enhancement.     Images are acquired in axial plane and subsequent reconstruction in  coronal and sagittal plane.     There is no previous similar study for comparison.     The chest is separately reviewed and reported.     The  study is of limited diagnostic value due to significant imaging  artifacts due to patient's large body habitus.     Liver and spleen are normal.     No gallstones.     Pancreas appear normal.     The adrenal glands bilaterally are normal.     There are low-density lesions in both kidneys, the larger one in the  upper pole of the left kidney measuring 6 cm in diameter. CT density  suggest a cyst. A smaller partly exophytic nodule is seen from the  lateral margin of the lower pole of the right kidney measuring 1.8 cm in  diameter. The CT density suggest a cyst. No radiopaque calculi on either  side. No hydronephrosis. The ureters are not well visualized or  evaluated. The urinary bladder is decompressed with a Huang catheter and  not evaluated.     The uterus is not visualized. No adnexal masses.     There is a fat-containing umbilical/paraumbilical ventral hernias.     Distal end of nasogastric tube is seen in the proximal stomach. The  stomach is decompressed. Small bowel is nondilated nondistended. No  finding to suggest appendicitis. Moderate gas and stool is seen in the  colon. There is diverticulosis of the colon. No evidence of a  diverticulitis.     Atheromatous changes of the abdominal aorta and iliac arteries. No  aneurysmal dilatation.     No evidence of abdominal or pelvic lymphadenopathy.     Images reviewed in bone windows show chronic degenerative changes of the  lumbar and limited visualized thoracic spine. There is no evidence of  acute fracture. However a subtle nondisplaced may be obscured due to  significant motion and imaging artifacts.       Impression:      1. No acute abnormality of the abdomen or pelvis, particularly, no  finding to suggest acute traumatic involvement of the abdominal organs.  2. No fracture.  3. Other nonacute findings as above                                   This report was signed and finalized on 11/1/2024 6:51 AM by Dr. Cindy Collier MD.       CT Angiogram Chest  [[id number]] Collected: 11/01/24 0632     Updated: 11/01/24 0643    Narrative:      EXAMINATION: CT ANGIOGRAM CHEST-      11/1/2024 2:36 AM     HISTORY: Hypoxia, ARF; J96.01-Acute respiratory failure with hypoxia;  J96.02-Acute respiratory failure with hypercapnia; E66.01-Morbid  (severe) obesity due to excess calories; N39.0-Urinary tract infection,  site not specified     In order to have a CT radiation dose as low as reasonably achievable  Automated Exposure Control was utilized for adjustment of the mA and/or  KV according to patient size.     Total DLP = 2599.7 mGy.cm     CT angiography of the chest should performed after intravenous contrast  enhancement.     Images are acquired in axial plane and subsequent reconstruction in  coronal and sagittal planes.     Comparison is made with the previous study dated 6/5/2023.     The study is of very poor diagnostic quality due to respiratory motion,  patient motion, patient body habitus and suboptimal opacification of the  pulmonary arterial bed.     No significant visible filling defect in the opacified pulmonary  arterial bed is noted.     RV/LV ratio is 58/74 which is in the normal range. No finding to suggest  right heart strain.     Atheromatous changes of the thoracic aorta. No aneurysmal dilatation. No  dissection.     Atheromatous changes of coronary arteries.     There is moderate cardiomegaly with four-chamber enlargement.     No mediastinal lymphadenopathy.     Limited visualized soft tissue of the neck are unremarkable.     An endotracheal tube is seen in place.     The lungs are poorly expanded.     There are extensive atelectatic changes bilaterally.     The bilateral lower lobar consolidation, right more than the left. There  is air bronchograms. There is a groundglass opacity in the right middle  lobe.     The abdomen is separately reviewed and reported.     Images reviewed in bone window show chronic degenerative changes of the  thoracic spine. No  acute bony abnormality.     Nasogastric tube is seen in place.       Impression:      1. A very limited study due to respiratory motion, patient motion,  suboptimal opacification of the pulmonary arterial bed and patient's  body habitus. No significant or obvious pulmonary embolism is noted. No  aortic aneurysm or dissection.  2. No finding to suggest right heart strain.  3. Moderate cardiomegaly.  4. Bilateral lower lobar consolidation with air bronchogram may  represent acute inflammatory/infectious process. A groundglass  infiltrate in the right middle lobe may represent an acute  inflammatory/infectious process.                    This report was signed and finalized on 11/1/2024 6:40 AM by Dr. Cindy Collier MD.       CT Head Without Contrast [[id number]] Collected: 11/01/24 0600     Updated: 11/01/24 0607    Narrative:      EXAMINATION: CT HEAD WO CONTRAST-      11/1/2024 2:36 AM     HISTORY: Worsening AMS; J96.01-Acute respiratory failure with hypoxia;  J96.02-Acute respiratory failure with hypercapnia; E66.01-Morbid  (severe) obesity due to excess calories; N39.0-Urinary tract infection,  site not specified     In order to have a CT radiation dose as low as reasonably achievable  Automated Exposure Control was utilized for adjustment of the mA and/or  KV according to patient size.     Total DLP = 2104.24 mGy.cm     CT scan of the head is performed without intravenous contrast  enhancement.     The images are acquired in axial plane and subsequent reconstruction  with coronal and sagittal plane.     The comparison is made with the previous study dated 10/31/2024.     There are significant imaging artifacts which lowers the diagnostic  quality of the study. Some subtle lesions may be obscured.     There is no evidence of a mass. There is no midline shift.     There is no evidence of intracranial hemorrhage or hematoma.     The ventricles, the basal cisterns and the cortical sulci are  unremarkable and age  appropriate.     The gray-white matter differentiation is maintained.     Limited visualized posterior fossa is unremarkable.     An empty sella turcica is seen with moderate widening of the sella and  erosion of the dorsum sellae. This is similar to the previous studies.     Images reviewed in bone window show no acute bony abnormality. There is  mucosal thickening involving the ethmoid and maxillary and sphenoid  sinuses. There are fluid levels in the sphenoid sinuses. Mastoid air  cells are clear.       Impression:      1. No acute intracranial abnormality.  2. Acute on chronic sinus disease.                                      This report was signed and finalized on 11/1/2024 6:04 AM by Dr. Cindy Collier MD.       XR Chest 1 View [[id number]] Collected: 10/31/24 1826     Updated: 10/31/24 1831    Narrative:      EXAMINATION: XR CHEST 1 VW-  10/31/2024 6:26 PM     HISTORY: Confirm central line placement.     FINDINGS: Today's exam is compared to previous study of 10/31/2024. A  right IJ deep line is in place with the tip in the SVC. No  complications. The heart is enlarged. There is chronic mild pulmonary  vascular congestion and suspected pulmonary arterial hypertension.  Bronchial wall thickening is present. No acute infiltrate or effusion.       Impression:      1. Right IJ deep line placed with the tip at the caval atrial juncture  and no complication.  2. Cardiomegaly with chronic appearing mild pulmonary vascular  congestion.     This report was signed and finalized on 10/31/2024 6:27 PM by Dr. Jose Basilio MD.       XR Chest 1 View [[id number]] Collected: 10/31/24 1442     Updated: 10/31/24 1447    Narrative:      XR CHEST 1 VW- 10/31/2024 1:38 PM     HISTORY: chest pain       COMPARISON: Chest x-ray dated 10/22/2024     FINDINGS:  Upright frontal radiograph of the chest was obtained     Underlying cardiomegaly which is stable. Dilated central pulmonary  arteries which may reflect pulmonary  hypertension. Lungs are well  expanded. No consolidation or pleural effusion. No pneumothorax. No  acute bony abnormality seen.       Impression:      1.  Underlying cardiomegaly. Dilated central pulmonary arteries which  may be evidence for pulmonary hypertension.  2.  No visualized acute infiltrate or evidence of pulmonary edema.     This report was signed and finalized on 10/31/2024 2:44 PM by Dr Brenton Phillips.       CT Head Without Contrast [[id number]] Collected: 10/31/24 1438     Updated: 10/31/24 1445    Narrative:      CT HEAD WO CONTRAST- 10/31/2024 1:10 PM     HISTORY: weak frequent falls       DOSE LENGTH PRODUCT: 1977.81 mGy.cm. Automated exposure control was also  utilized to decrease patient radiation dose.     TECHNIQUE:  Axial CT of the brain without IV contrast. Sagittal and coronal  reformations are also provided for review. Soft tissue and bone kernels  are available for interpretation.     COMPARISON: None.     FINDINGS:     There is no evidence of acute large vascular territory infarct. No  intra-axial or extra-axial hemorrhage. No visualized mass lesion or mass  effect. The ventricles, cortical sulci and basal cisterns are symmetric  and age appropriate. Empty sella which is nonspecific. Posterior fossa  structures are unremarkable. Visualized paranasal sinuses and mastoids  are clear.       Impression:      1. No acute intracranial process.  2. Empty sella which is a nonspecific finding at this age.     This report was signed and finalized on 10/31/2024 2:42 PM by Dr Brenton Phillips.               Result Review    Result Review:  I have personally reviewed the results from the time of this admission to 11/5/2024 19:31 CST and agree with these findings:  [x]  Laboratory list / accordion  [x]  Microbiology  [x]  Radiology  [x]  EKG/Telemetry   [x]  Cardiology/Vascular   []  Pathology  []  Old records  []  Other:  Most notable findings include: Cardiomegaly, urine culture positive for E. coli  "mostly pansensitive, ongoing acute respiratory acidosis.          Hospital Course: Patient was admitted on October 31 for acute on chronic hypercapnic hypoxic respiratory failure.  BiPAP therapy initiated after admission and initially she seemed to be responding however throughout the night she became airway incompetent was unable to control her airway therefore she was intubated urgently.  She was treated empirically for pneumonia and a UTI.  Urine culture showed Klebsiella mostly pansensitive antibiotics de-escalated to Rocephin.  EF 45 to 50% with diastolic and systolic heart failure, concerns for pulmonary hypertension.  Patient self extubated early 2 days ago and immediately became hypoxic and required again urgent reintubation.    Patient was extubated this morning initially to supplemental then upgraded to BiPAP as her pCO2 was increasing and her mentation was worsening.    Goals of care conversation throughout the day with the son Arnulfo and Suleman, the family was going to discuss goals of care overnight and make decisions in the a.m.    Just before shift change patient again became hypoxic, last pH was worsening for acute respiratory acidosis his pCO2 was greater than 100.  She was not mentating.    She went into PEA cardiac arrest which was felt to be respiratory driven.  CPR initiated and resuscitation efforts via ACLS guidelines.  She was reintubated.    Brief return of spontaneous circulation less than 2 minutes then again PEA  Cardiac arrest despite reintubation and 100% FiO2.  Patient was resuscitated unsuccessfully for total of 27 minutes.  Suleman son at bedside and phone conversations with Arnulfo at this time we decided to discontinue resuscitation efforts after 27 minutes of unsuccessful return to spontaneous circulation.  Time of death 1847.    Physical Exam on Discharge:  /64   Pulse 72   Temp 97.5 °F (36.4 °C) (Axillary)   Resp 14   Ht 155 cm (61.02\")   Wt (!) 166 kg (365 lb 4.8 oz) "   LMP  (LMP Unknown)   SpO2 (!) 51%   BMI 68.97 kg/m²   Physical Exam  Vitals and nursing note reviewed. Exam conducted with a chaperone present.   Constitutional:       Comments: No gag or cough, pupils dilated, no neurological response   Cardiovascular:      Comments: No cardiac activity.  Pulmonary:      Comments: No purposeful respiration.          Condition on Discharge:  at 1847    Discharge Disposition: Body to the [facility] [place]      Discharge Medications:     Discharge Medications        Continue These Medications        Instructions Start Date   OXYGEN-HELIUM IN   Inhalation, 2L at night             ASK your doctor about these medications        Instructions Start Date   albuterol sulfate  (90 Base) MCG/ACT inhaler  Commonly known as: PROVENTIL HFA;VENTOLIN HFA;PROAIR HFA   2 puffs, Inhalation, Every 4 Hours PRN      allopurinol 300 MG tablet  Commonly known as: ZYLOPRIM   300 mg, Daily      amitriptyline 25 MG tablet  Commonly known as: ELAVIL   25 mg, Oral, Nightly      azelastine 0.1 % nasal spray  Commonly known as: ASTELIN   2 sprays, Nasal, 2 Times Daily, Use in each nostril as directed       bumetanide 1 MG tablet  Commonly known as: BUMEX   1 mg, Oral, Daily      butalbital-acetaminophen-caffeine -40 MG per tablet  Commonly known as: FIORICET, ESGIC   1 tablet, Oral, Every 4 Hours PRN      carvedilol 25 MG tablet  Commonly known as: COREG   TAKE 1 TABLET BY MOUTH TWICE A DAY      celecoxib 100 MG capsule  Commonly known as: CeleBREX   100 mg, Oral, 2 Times Daily      doxycycline 100 MG capsule  Commonly known as: MONODOX   100 mg, Oral, 2 Times Daily      DULoxetine 60 MG capsule  Commonly known as: CYMBALTA   60 mg, Oral, Every Morning      EPINEPHrine 0.05 MG/ML syringe  Commonly known as: ADRENALIN   20 mL, Injection      fenofibrate 145 MG tablet  Commonly known as: TRICOR   145 mg, Oral, Daily      fluticasone 50 MCG/ACT nasal spray  Commonly known as: Flonase Allergy Relief    2 sprays, Nasal, Daily      Fluticasone-Salmeterol 250-50 MCG/ACT DISKUS  Commonly known as: ADVAIR/WIXELA   1 puff, Inhalation, 2 Times Daily - RT      HYDROcodone-acetaminophen 7.5-325 MG per tablet  Commonly known as: Norco   1 tablet, Oral, Every 6 Hours PRN      levothyroxine 75 MCG tablet  Commonly known as: SYNTHROID, LEVOTHROID   1 tablet, Oral, Daily      loratadine 10 MG tablet  Commonly known as: CLARITIN   10 mg, Oral, Daily      meclizine 25 MG tablet  Commonly known as: ANTIVERT   25 mg, Oral, 3 Times Daily PRN      multivitamin with minerals tablet tablet   Oral      Myrbetriq 25 MG tablet sustained-release 24 hour 24 hr tablet  Generic drug: Mirabegron ER   25 mg, Oral, Daily      nystatin 815321 UNIT/GM powder  Commonly known as: MYCOSTATIN   Topical, 3 Times Daily      solifenacin 10 MG tablet  Commonly known as: VESICARE   1 tablet, Oral, Daily      spironolactone 25 MG tablet  Commonly known as: ALDACTONE   25 mg, Oral      vitamin D 1.25 MG (09797 UT) capsule capsule  Commonly known as: ERGOCALCIFEROL   1 capsule, Oral, Weekly               This patient has current or prior documentation of an left ventricular ejection fraction (LVEF) of less than or equal to 40%.      Electronically signed by DONOVAN Styles on 11/5/2024 at 19:31 CST    Time: >30 minutes.          Electronically signed by Aimee Greco APRN at 11/05/24 1943       Discharge Order (From admission, onward)      None

## 2024-11-06 NOTE — PLAN OF CARE
Goal Outcome Evaluation:                 Pt extubated to bipap, experiencing intermittent stridor, racemic epi given x2. Bipap currently 20/10 100% to maintain oxygen saturation. Mental status and ABG worsening, plan to obtain another ABG at 1830 and call son with plan of care discussion.

## 2024-11-06 NOTE — PLAN OF CARE
Goal Outcome Evaluation:            Pt PEA/respiratory arrest. TORONNY [id number]

## 2024-11-07 LAB
BACTERIA SPEC AEROBE CULT: ABNORMAL
GRAM STN SPEC: ABNORMAL
ISOLATED FROM: ABNORMAL

## 2024-11-21 NOTE — PROGRESS NOTES
"Enter Query Response Below      Query Response: Current treatment with Rocephin should cover identified culture              If applicable, please update the problem list.   Patient: Pau Martin        : 1955  Account: [id number]           Admit Date: 10/31/2024        How to Respond to this query:       a. Click New Note     b. Answer query within the yellow box.                c. Update the Problem List, if applicable.    If you have any questions about this query contact me at: [email or web address]     Aimee Greco, APRN,    68 yo female admitted per H&P dated 10/31/24 for \"acute hypoxic respiratory failure and UTI.\"  Risk Factors: medical history includes RA and morbid obesity.  Clinical indicators: Blood culture obtained 10/31/204 reported \"Kocuria rosea. Kocuria are Sensitive to Doxycycline, Ceftriaxone, Cefuroxime, Amikacin, and Amoxicillin with Clavulanic Acid, but are usually Resistant to Ampicillin and Erythromycin.\"  Treatment: IV Rocephin, cefepime, doxycycline, vancomycin, and po Zithromax    Please clarify the etiology of the patient’s blood culture result of Kocuria rosea being treated and/or monitored:    Kocuria rosea bacteremia due to (please list source)  Abnormal blood culture report only, clinically insignificant  Other- specify_______    By submitting this query, we are merely seeking further clarification of documentation to accurately reflect all conditions that you are monitoring, evaluating, treating or that extend the hospitalization or utilize additional resources of care. Please utilize your independent clinical judgment when addressing the question(s) above.     This query and your response, once completed, will be entered into the legal medical record.    Sincerely,  Keshia JASSO RN CCDS  System Director Clinical Documentation Integrity Program       "

## (undated) DEVICE — ADHS LIQ MASTISOL 2/3ML

## (undated) DEVICE — DRSNG BRDR MEPILEXLITE SLFADHR SIL 2X5

## (undated) DEVICE — RESERVOIR,SUCTION,100CC,SILICONE: Brand: MEDLINE

## (undated) DEVICE — HDRST POSITIONING FM RND 2X9IN

## (undated) DEVICE — Device: Brand: DEFENDO AIR/WATER/SUCTION AND BIOPSY VALVE

## (undated) DEVICE — BAPTIST TURNOVER KIT: Brand: MEDLINE INDUSTRIES, INC.

## (undated) DEVICE — SUT SILK 3/0 SH CR8 18IN C013D

## (undated) DEVICE — GLV SURG BIOGEL M LTX PF 7 1/2

## (undated) DEVICE — TBG SMPL FLTR LINE NASL 02/C02 A/ BX/100

## (undated) DEVICE — ELECTRD BLD EZ CLN MOD XLNG 2.75IN

## (undated) DEVICE — VAGINAL PREP TRAY: Brand: MEDLINE INDUSTRIES, INC.

## (undated) DEVICE — PROBE 8225101 5PK STD PRASS FL TIP ROHS

## (undated) DEVICE — EMG TUBE 8229707 NIM TRIVANTAGE 7.0MM ID: Brand: NIM TRIVANTAGE™

## (undated) DEVICE — YANKAUER,BULB TIP WITH VENT: Brand: ARGYLE

## (undated) DEVICE — SUT SILK 2/0 FS BLK 18IN 685G

## (undated) DEVICE — SUT SILK 4/0 SUTUPAK TIES 24IN SA73H

## (undated) DEVICE — MASK,OXYGEN,MED CONC,ADLT,7' TUB, UC: Brand: PENDING

## (undated) DEVICE — SUT MNCRYL 4/0 P3 18IN UD MCP494G

## (undated) DEVICE — HARMONIC FOCUS SHEARS 9CM LENGTH + ADAPTIVE TISSUE TECHNOLOGY FOR USE WITH BLUE HAND PIECE ONLY: Brand: HARMONIC FOCUS

## (undated) DEVICE — THE CHANNEL CLEANING BRUSH IS A NYLON FLEXI BRUSH ATTACHED TO A FLEXIBLE PLASTIC SHEATH DESIGNED TO SAFELY REMOVE DEBRIS FROM FLEXIBLE ENDOSCOPES.

## (undated) DEVICE — Device

## (undated) DEVICE — 3M™ STERI-STRIP™ REINFORCED ADHESIVE SKIN CLOSURES, R1546, 1/4 IN X 4 IN (6 MM X 100 MM), 10 STRIPS/ENVELOPE: Brand: 3M™ STERI-STRIP™

## (undated) DEVICE — SPNG DISSCT SECTO KTTNER PK/5

## (undated) DEVICE — SENSR O2 OXIMAX FNGR A/ 18IN NONSTR

## (undated) DEVICE — SUT SILK 3/0 SUTUPAK TIES 24IN SA74H

## (undated) DEVICE — 4-PORT MANIFOLD: Brand: NEPTUNE 2

## (undated) DEVICE — SUT MNCRYL 5/0 P3 18IN UD MCP493G

## (undated) DEVICE — RETR STAY HK ELAS SHRP 5MM 50PK

## (undated) DEVICE — TRAP FLD MINIVAC MEGADYNE 100ML

## (undated) DEVICE — CUFF,BP,DISP,1 TUBE,ADULT,HP: Brand: MEDLINE

## (undated) DEVICE — TBG PENCL TELESCP MEGADYNE SMOKE EVAC 10FT

## (undated) DEVICE — PK ENT HD AND NK 30